# Patient Record
Sex: FEMALE | Race: OTHER | Employment: OTHER | URBAN - METROPOLITAN AREA
[De-identification: names, ages, dates, MRNs, and addresses within clinical notes are randomized per-mention and may not be internally consistent; named-entity substitution may affect disease eponyms.]

---

## 2017-06-08 PROBLEM — I35.0 NONRHEUMATIC AORTIC VALVE STENOSIS: Status: ACTIVE | Noted: 2017-06-08

## 2018-02-21 NOTE — LETTER
Consent to Procedure/Sedation    Date: 3/30/2020    Time: _______________    1. I authorize the performance upon Tuan Vuong the following:    Right Nephrostomy Tube Insertion    2.  I authorize Dr. Roma Dennis (and whomever is designated as the doctor’s as Scheduled for:  Colon  Provider Name: SYLVIA  Date:  3-23-18  Location:  [facility]  Sedation:  IV sedation   Time:  9:30am, arrival 8:30am  Prep: Colyte  Meds/Allergies Reconciled?:  yes  Diagnosis with codes:  History of polyps Z86.010  Was patient informed to jenaro Witness: ____________________     Date: ______________    Printed: 3/30/2020   12:03 PM    Patient Name: Trisha Uriosteguilauren        : 1935       Medical Record #: [id number]

## 2018-03-13 ENCOUNTER — APPOINTMENT (OUTPATIENT)
Dept: GENERAL RADIOLOGY | Age: 83
End: 2018-03-13
Attending: EMERGENCY MEDICINE
Payer: MEDICARE

## 2018-03-13 ENCOUNTER — APPOINTMENT (OUTPATIENT)
Dept: CT IMAGING | Age: 83
End: 2018-03-13
Attending: EMERGENCY MEDICINE
Payer: MEDICARE

## 2018-03-13 ENCOUNTER — HOSPITAL ENCOUNTER (OUTPATIENT)
Facility: HOSPITAL | Age: 83
Setting detail: OBSERVATION
Discharge: HOME | End: 2018-03-17
Attending: EMERGENCY MEDICINE
Payer: MEDICARE

## 2018-03-13 DIAGNOSIS — K57.92 ACUTE DIVERTICULITIS: ICD-10-CM

## 2018-03-13 DIAGNOSIS — R07.9 ACUTE CHEST PAIN: Primary | ICD-10-CM

## 2018-03-13 PROCEDURE — 99285 EMERGENCY DEPT VISIT HI MDM: CPT

## 2018-03-13 PROCEDURE — 80053 COMPREHEN METABOLIC PANEL: CPT | Performed by: EMERGENCY MEDICINE

## 2018-03-13 PROCEDURE — 93010 ELECTROCARDIOGRAM REPORT: CPT

## 2018-03-13 PROCEDURE — 74177 CT ABD & PELVIS W/CONTRAST: CPT | Performed by: EMERGENCY MEDICINE

## 2018-03-13 PROCEDURE — 85610 PROTHROMBIN TIME: CPT | Performed by: EMERGENCY MEDICINE

## 2018-03-13 PROCEDURE — 71275 CT ANGIOGRAPHY CHEST: CPT | Performed by: EMERGENCY MEDICINE

## 2018-03-13 PROCEDURE — 81003 URINALYSIS AUTO W/O SCOPE: CPT | Performed by: EMERGENCY MEDICINE

## 2018-03-13 PROCEDURE — 85025 COMPLETE CBC W/AUTO DIFF WBC: CPT | Performed by: EMERGENCY MEDICINE

## 2018-03-13 PROCEDURE — 84484 ASSAY OF TROPONIN QUANT: CPT | Performed by: EMERGENCY MEDICINE

## 2018-03-13 PROCEDURE — 93005 ELECTROCARDIOGRAM TRACING: CPT

## 2018-03-13 PROCEDURE — 71045 X-RAY EXAM CHEST 1 VIEW: CPT | Performed by: EMERGENCY MEDICINE

## 2018-03-13 PROCEDURE — 96365 THER/PROPH/DIAG IV INF INIT: CPT

## 2018-03-13 PROCEDURE — 96367 TX/PROPH/DG ADDL SEQ IV INF: CPT

## 2018-03-13 RX ORDER — METRONIDAZOLE 500 MG/100ML
500 INJECTION, SOLUTION INTRAVENOUS ONCE
Status: COMPLETED | OUTPATIENT
Start: 2018-03-13 | End: 2018-03-13

## 2018-03-13 RX ORDER — LEVOFLOXACIN 5 MG/ML
500 INJECTION, SOLUTION INTRAVENOUS ONCE
Status: COMPLETED | OUTPATIENT
Start: 2018-03-13 | End: 2018-03-13

## 2018-03-13 NOTE — ED PROVIDER NOTES
Patient Seen in: THE [facility] Emergency Department In [place]    History   Patient presents with:  Dyspnea LIBBY SOB (respiratory)  Abdomen/Flank Pain (GI/)  Fatigue (constitutional, neurologic)    Stated Complaint: LIBBY, fatigue, abdominal pain    HPI    82 Used                      Alcohol use: No                Review of Systems    Positive for stated complaint: LIBBY, fatigue, abdominal pain  Other systems are as noted in HPI. Constitutional and vital signs reviewed.       All other systems reviewed and nega individual orders. URINALYSIS WITH CULTURE REFLEX   RAINBOW DRAW BLUE   RAINBOW DRAW LAVENDER   RAINBOW DRAW LIGHT GREEN   RAINBOW DRAW GOLD   CBC W/ DIFFERENTIAL     EKG    Rate, intervals and axes as noted on EKG Report.   Rate: 57  Rhythm: Sinus bradyc ([facility]) NRDR ([address]) which includes the Dose Index Registry.   PATIENT STATED HISTORY:(As transcribed by Technologist)  Patient complain of fatigue, dyspnea, bloody stool, and right side abdominal pain for represent some mild diverticulitis. Underlying chronic mass cannot be excluded  No dilated small bowel loops. Belkis Pencil PELVIC ORGANS:  No free fluid. BONES:  No lytic or destructive process.   There are some scattered lucencies involving thoracic and lumbar ve specified.       Medications Prescribed:  Current Discharge Medication List        Present on Admission  Date Reviewed: 6/8/2017          ICD-10-CM Noted POA    Acute chest pain R07.9 3/13/2018 Unknown

## 2018-03-14 ENCOUNTER — APPOINTMENT (OUTPATIENT)
Dept: CV DIAGNOSTICS | Facility: HOSPITAL | Age: 83
End: 2018-03-14
Attending: HOSPITALIST
Payer: MEDICARE

## 2018-03-14 PROCEDURE — 93306 TTE W/DOPPLER COMPLETE: CPT | Performed by: HOSPITALIST

## 2018-03-14 PROCEDURE — 83036 HEMOGLOBIN GLYCOSYLATED A1C: CPT | Performed by: HOSPITALIST

## 2018-03-14 PROCEDURE — 82962 GLUCOSE BLOOD TEST: CPT

## 2018-03-14 PROCEDURE — 96376 TX/PRO/DX INJ SAME DRUG ADON: CPT

## 2018-03-14 PROCEDURE — 96366 THER/PROPH/DIAG IV INF ADDON: CPT

## 2018-03-14 PROCEDURE — 84484 ASSAY OF TROPONIN QUANT: CPT | Performed by: HOSPITALIST

## 2018-03-14 RX ORDER — OXYBUTYNIN CHLORIDE 10 MG/1
10 TABLET, EXTENDED RELEASE ORAL DAILY
Status: DISCONTINUED | OUTPATIENT
Start: 2018-03-14 | End: 2018-03-17

## 2018-03-14 RX ORDER — METRONIDAZOLE 500 MG/100ML
500 INJECTION, SOLUTION INTRAVENOUS EVERY 8 HOURS
Status: DISCONTINUED | OUTPATIENT
Start: 2018-03-14 | End: 2018-03-17

## 2018-03-14 RX ORDER — LEVOTHYROXINE SODIUM 0.15 MG/1
150 TABLET ORAL
Status: DISCONTINUED | OUTPATIENT
Start: 2018-03-14 | End: 2018-03-17

## 2018-03-14 RX ORDER — DEXTROSE MONOHYDRATE 25 G/50ML
50 INJECTION, SOLUTION INTRAVENOUS
Status: DISCONTINUED | OUTPATIENT
Start: 2018-03-14 | End: 2018-03-17

## 2018-03-14 RX ORDER — GABAPENTIN 300 MG/1
600 CAPSULE ORAL 3 TIMES DAILY
Status: DISCONTINUED | OUTPATIENT
Start: 2018-03-14 | End: 2018-03-17

## 2018-03-14 RX ORDER — LEVOFLOXACIN 5 MG/ML
750 INJECTION, SOLUTION INTRAVENOUS
Status: DISCONTINUED | OUTPATIENT
Start: 2018-03-15 | End: 2018-03-17

## 2018-03-14 RX ORDER — SODIUM CHLORIDE 9 MG/ML
INJECTION, SOLUTION INTRAVENOUS CONTINUOUS
Status: DISCONTINUED | OUTPATIENT
Start: 2018-03-14 | End: 2018-03-17

## 2018-03-14 RX ORDER — FUROSEMIDE 40 MG/1
40 TABLET ORAL
Status: DISCONTINUED | OUTPATIENT
Start: 2018-03-14 | End: 2018-03-17

## 2018-03-14 RX ORDER — METOPROLOL SUCCINATE 25 MG/1
25 TABLET, EXTENDED RELEASE ORAL
Status: DISCONTINUED | OUTPATIENT
Start: 2018-03-14 | End: 2018-03-17

## 2018-03-14 RX ORDER — POLYETHYLENE GLYCOL 3350 17 G/17G
17 POWDER, FOR SOLUTION ORAL DAILY
Status: DISCONTINUED | OUTPATIENT
Start: 2018-03-14 | End: 2018-03-17

## 2018-03-14 RX ORDER — ACETAMINOPHEN 325 MG/1
650 TABLET ORAL EVERY 8 HOURS PRN
Status: DISCONTINUED | OUTPATIENT
Start: 2018-03-14 | End: 2018-03-17

## 2018-03-14 RX ORDER — DOCUSATE SODIUM 100 MG/1
100 CAPSULE, LIQUID FILLED ORAL 2 TIMES DAILY
Status: DISCONTINUED | OUTPATIENT
Start: 2018-03-14 | End: 2018-03-17

## 2018-03-14 RX ORDER — AMLODIPINE BESYLATE 5 MG/1
10 TABLET ORAL
Status: DISCONTINUED | OUTPATIENT
Start: 2018-03-14 | End: 2018-03-17

## 2018-03-14 NOTE — H&P
.  CC: Patient presents with:  Dyspnea LIBBY SOB (respiratory)  Abdomen/Flank Pain (GI/)  Fatigue (constitutional, neurologic)       PCP: Lucas Hnaks MD    History of Present Illness: Patient is a 81year old female with PMH sig for HL, T2DM, HTN, PE on xa Rfl: 0   METOPROLOL SUCCINATE ER 25 MG Oral Tablet 24 Hr TAKE 1 TABLET(25 MG) BY MOUTH DAILY Disp: 90 tablet Rfl: 0   LEVOTHYROXINE SODIUM 150 MCG Oral Tab TAKE 1 TABLET(150 MCG) BY MOUTH BEFORE BREAKFAST Disp: 90 tablet Rfl: 1   Tolterodine Tartrate ER (D 1852   WBC  5.3   HGB  12.9   MCV  84.5   PLT  279.0   INR  1.03       Recent Labs   Lab  03/13/18   1852   NA  137   K  4.1   CL  102   CO2  28.0   BUN  16   CREATSERUM  0.81   GLU  121*   CA  9.0       Recent Labs   Lab  03/13/18   1852   ALT  15   AST troponin negative x 2  - CTA without evidence for PE  - check echocardiogram    # T2DM with associated peripheral neuropathy  - Ha1c 7.0% on 12/27/17  - hold orals during admission  - continue po neurontin TID  - accu checks, SSI, hypoglycemia protocol

## 2018-03-14 NOTE — PROGRESS NOTES
NURSING ADMISSION NOTE      Patient admitted via ambulance  Oriented to room. Safety precautions initiated. Bed in low position. Call light in reach. Pt is aox4. Kinyarwanda speaking but can understand simple commands. Very pleasant.    Started IV fluid

## 2018-03-14 NOTE — CONSULTS
[facility]  Report of Consultation    Tuan Vuong Patient Status:  Observation    1935 MRN [id number]   [facility] 3NE-A Attending Gely Morton MD   Hosp Day # 0 PCP Lakshmi Mack MD     Reason for Consultation:    Surgical Consu • Hypertension Daughter    • Diabetes Son    • Hypertension Son        Social History:     reports that she has never smoked. She has never used smokeless tobacco. She reports that she does not drink alcohol or use drugs.     Allergies:    No Known Aller facility-administered medications on file prior to encounter.    Current Outpatient Prescriptions on File Prior to Encounter:  METFORMIN HCL 1000 MG Oral Tab TAKE 1 TABLET(1000 MG) BY MOUTH TWICE DAILY Disp: 180 tablet Rfl: 0   XARELTO 20 MG Oral Tab TAKE 1 warm/dry  RESPIRATORY: clear to auscultation  CARDIOVASCULAR: RRR  ABDOMEN: soft, non distended, tender to palpation right abdomen, no guarding, no peritonitis, BS+  LYMPHATIC: no lymphadenopathy  EXTREMITIES: no cyanosis, clubbing or edema    .     Chon Lee (CPT=[id number]), 9/24/2015, 12:28.   INDICATIONS:  LIBBY, fatigue, abdominal pain  TECHNIQUE:  CT of the chest (with CTA imaging), abdomen, and pelvis were obtained post- injection of non-ionic intravenous contrast material. Multi-planar reformatted/3-D images we moderate calcific plaque at the origin of the celiac and SMA and right main renal artery. Mild calcific plaque involving the proximal LINDSAY  RETROPERITONEUM:  No enlarged retroperitoneal adenopathy.   BOWEL/MESENTERY:  Extensive colonic diverticulosis with a bladder)     Acquired hypothyroidism     PE (pulmonary thromboembolism) (HCC)     Nonrheumatic aortic valve stenosis     Acute chest pain     Acute diverticulitis      Impression:    80 y/o with acute mild diverticulitis  -CT scan as noted above  -lower gi

## 2018-03-14 NOTE — PAYOR COMM NOTE
--------------  ADMISSION REVIEW     Payor: MEDICARE PART B ONLY  Subscriber #:  [id number]  Authorization Number: N/A    Admit date: N/A  Admit time: N/A       Admitting Physician: Latoya Mace MD  Attending Physician:  Litzy Harvey MD  Primary Care Ph CBC W/ DIFFERENTIAL[[phone]]                              Final result                 Please view results for these tests on the individual orders.    URINALYSIS WITH CULTURE REFLEX   RAINBOW DRAW BLUE   RAINBOW DRAW LAVENDER   RAINBOW DRAW LIGHT mg Oral Judy Stratton, RN      iohexol (OMNIPAQUE) 350 MG/ML injection 100 mL     Date Action Dose Route User    3/13/2018 1957 Given 100 mL Intravenous [address]      levofloxacin in D5W (LEVAQUIN) IVPB premix 500 mg     Date Action Dose Route User

## 2018-03-14 NOTE — PROGRESS NOTES
Pharmacy Note: Renal dose adjustment of Levaquin    Tuan Vuong is a 81year old female who has been prescribed Levaquin. CrCl is 43.1 ml/min so the dose has been adjusted  to 750mg IV q48h per hospital renal dose adjustment protocol.   Pharmacy will fo

## 2018-03-15 ENCOUNTER — APPOINTMENT (OUTPATIENT)
Dept: GENERAL RADIOLOGY | Facility: HOSPITAL | Age: 83
End: 2018-03-15
Attending: PHYSICIAN ASSISTANT
Payer: MEDICARE

## 2018-03-15 PROCEDURE — 73110 X-RAY EXAM OF WRIST: CPT | Performed by: PHYSICIAN ASSISTANT

## 2018-03-15 PROCEDURE — 85027 COMPLETE CBC AUTOMATED: CPT | Performed by: PHYSICIAN ASSISTANT

## 2018-03-15 PROCEDURE — 82962 GLUCOSE BLOOD TEST: CPT

## 2018-03-15 PROCEDURE — 80048 BASIC METABOLIC PNL TOTAL CA: CPT | Performed by: PHYSICIAN ASSISTANT

## 2018-03-15 PROCEDURE — 96366 THER/PROPH/DIAG IV INF ADDON: CPT

## 2018-03-15 PROCEDURE — 96376 TX/PRO/DX INJ SAME DRUG ADON: CPT

## 2018-03-15 NOTE — PROGRESS NOTES
[facility]  Progress Note    Tuan Vuong Patient Status:  Observation    1935 MRN [id number]   [facility] 3NE-A Attending Trisha Yuen MD   Hosp Day # 0 PCP Rashawn Cantu MD     Cc: follow up    Subjective:      Ms. Mookie Sweeney is leonila Recent Labs   Lab  03/13/18   1852   ALT  15   AST  12*   ALB  3.6       Recent Labs   Lab  03/14/18   0813  03/14/18   1208  03/14/18   1615  03/14/18   2102  03/15/18   0727   PGLU  139*  111*  124*  138*  124*         Imaging:  Echo reviewed    Me Millicent Smart PA-C  3/15/2018  Pager: [phone]    . Addendum:    Patient seen and examined independently.     Agree with above  Discussed with pt and daughter Aidan Gilbert over phone at length this morning with RN Evelyne Cockayne  Pt had been feeling better but then had increased disco

## 2018-03-15 NOTE — PROGRESS NOTES
[facility]  Progress Note    Farazdebora TORO Yevgeniy Patient Status:  Observation    1935 MRN [id number]   [facility] 3NE-A Attending Marycarmen Lakhani MD   Hosp Day # 0 PCP Phylicia Clinton MD     Subjective:    Patient reported right sided abdomin discuss, no answer  -patient will need outpatient GI work up likely to include colonoscopy  No acute surgical management warranted  D/W Meredith Kearns [id number] Surgery  3/15/2018

## 2018-03-15 NOTE — PLAN OF CARE
CARDIOVASCULAR - ADULT    • Absence of cardiac arrhythmias or at baseline Progressing        Diabetes/Glucose Control    • Glucose maintained within prescribed range Progressing        GASTROINTESTINAL - ADULT    • Minimal or absence of nausea and vomiting

## 2018-03-16 PROCEDURE — 85025 COMPLETE CBC W/AUTO DIFF WBC: CPT | Performed by: PHYSICIAN ASSISTANT

## 2018-03-16 PROCEDURE — 96366 THER/PROPH/DIAG IV INF ADDON: CPT

## 2018-03-16 PROCEDURE — 96376 TX/PRO/DX INJ SAME DRUG ADON: CPT

## 2018-03-16 PROCEDURE — 82962 GLUCOSE BLOOD TEST: CPT

## 2018-03-16 NOTE — PLAN OF CARE
Pt alert and oriented. Croatian speaking. C/o abdominal pain with palpation. Tolerating full liquid diet. Pt impulsive, bed alarm on. IVF, abx. VSS. Afebrile. BG monitored. No BM. Will monitor closely.     CARDIOVASCULAR - ADULT    • Absence of cardi

## 2018-03-16 NOTE — PLAN OF CARE
Assumed care at 1900. A/ox4, forgetful, son on phone to assist with translation, video translation available. vss on room air. Asymptomatic sinus martell on tele, as low as 51. Denies pain, tolerating full liquid diet.   CARDIOVASCULAR - ADULT    • Absence

## 2018-03-16 NOTE — PROGRESS NOTES
[facility]  Progress Note    Tuan Vuong Patient Status:  Observation    1935 MRN [id number]   [facility] 3NE-A Attending Moriah Danielson MD   Hosp Day # 0 PCP Kimi Maki MD     Cc: follow up    Subjective:      Ms. Mi Olivier is leonila Daily Beta Blocker   • AmLODIPine Besylate  10 mg Oral Daily   • furosemide  40 mg Oral BID (Diuretic)   • docusate sodium  100 mg Oral BID   • PEG 3350  17 g Oral Daily   • Levothyroxine Sodium  150 mcg Oral Before breakfast   • Oxybutynin Chloride ER  10 wrist discomfort and b/l feet discomfort. Exam  Vitals stable  rrr  ctab  s ntnd  R wrist able to move passively/actively.  +ttp medially and mild erythema but no overt joint swelling  No ANGIE    Plan  Advance diet today, if does well can go home tomorrow

## 2018-03-17 VITALS
SYSTOLIC BLOOD PRESSURE: 126 MMHG | WEIGHT: 180 LBS | RESPIRATION RATE: 16 BRPM | DIASTOLIC BLOOD PRESSURE: 56 MMHG | OXYGEN SATURATION: 93 % | TEMPERATURE: 99 F | BODY MASS INDEX: 33.13 KG/M2 | HEIGHT: 62 IN | HEART RATE: 60 BPM

## 2018-03-17 PROCEDURE — 96366 THER/PROPH/DIAG IV INF ADDON: CPT

## 2018-03-17 PROCEDURE — 82962 GLUCOSE BLOOD TEST: CPT

## 2018-03-17 RX ORDER — LEVOFLOXACIN 750 MG/1
750 TABLET ORAL EVERY OTHER DAY
Qty: 5 TABLET | Refills: 0 | Status: SHIPPED | OUTPATIENT
Start: 2018-03-17 | End: 2018-03-26

## 2018-03-17 RX ORDER — METRONIDAZOLE 500 MG/1
500 TABLET ORAL 3 TIMES DAILY
Qty: 27 TABLET | Refills: 0 | Status: SHIPPED | OUTPATIENT
Start: 2018-03-17 | End: 2018-03-26

## 2018-03-17 NOTE — PROGRESS NOTES
[facility]  Progress Note    Tuan CORTEZ Yevgeniy Patient Status:  Observation    1935 MRN [id number]   [facility] 3NE-A Attending Nima Abdul MD   Hosp Day # 0 PCP Lucas Hanks MD     Cc: follow up    Subjective:      Pt son on phone t • furosemide  40 mg Oral BID (Diuretic)   • docusate sodium  100 mg Oral BID   • PEG 3350  17 g Oral Daily   • Levothyroxine Sodium  150 mcg Oral Before breakfast   • Oxybutynin Chloride ER  10 mg Oral Daily   • metRONIDAZOLE in NaCl  500 mg Intravenous

## 2018-03-17 NOTE — PLAN OF CARE
A/Ox4  On RA  NSR on tele  Denies pain at this time  On Xarelto  Soft diet- tolerating well  IVF infusing 0.9 NS at 83ml/hr  IV Levaquin  IV Flagyl  Accucheck QID  Possible d/c home tomorrow   Will continue to monitor     CARDIOVASCULAR - ADULT    • Absenc

## 2018-03-17 NOTE — PROGRESS NOTES
NURSING DISCHARGE NOTE    Discharged Home via Wheelchair. Accompanied by Family member  Belongings Taken by patient/family. Patient and son given med rec and discharge instructions. Prescriptions for flagyl and levaquin given to son.  Follow up appoin

## 2018-03-18 NOTE — DISCHARGE SUMMARY
General Medicine Discharge Summary     Patient ID:  Qiana Vuong  81year old  8/8/1935    Admit date: 3/13/2018    Discharge date and time: 3/17/2018  5:30 PM     Attending Physician: Masha Whitmore, with cardiologist as outpatient     # T2DM with associated peripheral neuropathy and retinopathy  - Ha1c 7.0% on 12/27/17  - hold orals during admission  - continue po neurontin 600 mg TID for neuropathy - son states she may be missing doses on this on adm Normal, Disp-90 tablet, R-3    gabapentin 300 MG Oral Cap  Take 2 capsules (600 mg total) by mouth 3 (three) times daily. , Normal, Disp-540 capsule, R-3    Glucose Blood (BL TEST STRIP PACK) In Vitro Strip  Test daily, Normal, Disp-100 strip, R-3    Acetam

## 2018-04-17 ENCOUNTER — HOSPITAL ENCOUNTER (OUTPATIENT)
Facility: HOSPITAL | Age: 83
Setting detail: OBSERVATION
Discharge: HOME HEALTH | End: 2018-04-19
Attending: EMERGENCY MEDICINE | Admitting: HOSPITALIST
Payer: MEDICARE

## 2018-04-17 ENCOUNTER — APPOINTMENT (OUTPATIENT)
Dept: CT IMAGING | Age: 83
End: 2018-04-17
Attending: EMERGENCY MEDICINE
Payer: MEDICARE

## 2018-04-17 DIAGNOSIS — K56.41 FECAL IMPACTION OF COLON (HCC): ICD-10-CM

## 2018-04-17 DIAGNOSIS — R14.0 ABDOMINAL DISTENSION: ICD-10-CM

## 2018-04-17 DIAGNOSIS — R10.9 ABDOMINAL PAIN, ACUTE: Primary | ICD-10-CM

## 2018-04-17 PROBLEM — D64.9 ANEMIA: Status: ACTIVE | Noted: 2018-04-17

## 2018-04-17 PROBLEM — R73.9 HYPERGLYCEMIA: Status: ACTIVE | Noted: 2018-04-17

## 2018-04-17 PROBLEM — E87.1 HYPONATREMIA: Status: ACTIVE | Noted: 2018-04-17

## 2018-04-17 PROCEDURE — 96360 HYDRATION IV INFUSION INIT: CPT

## 2018-04-17 PROCEDURE — 99285 EMERGENCY DEPT VISIT HI MDM: CPT

## 2018-04-17 PROCEDURE — 82272 OCCULT BLD FECES 1-3 TESTS: CPT

## 2018-04-17 PROCEDURE — 96372 THER/PROPH/DIAG INJ SC/IM: CPT

## 2018-04-17 PROCEDURE — 80053 COMPREHEN METABOLIC PANEL: CPT | Performed by: EMERGENCY MEDICINE

## 2018-04-17 PROCEDURE — 74177 CT ABD & PELVIS W/CONTRAST: CPT | Performed by: EMERGENCY MEDICINE

## 2018-04-17 PROCEDURE — 85025 COMPLETE CBC W/AUTO DIFF WBC: CPT | Performed by: EMERGENCY MEDICINE

## 2018-04-17 RX ORDER — HYDROCODONE BITARTRATE AND ACETAMINOPHEN 5; 325 MG/1; MG/1
2 TABLET ORAL EVERY 4 HOURS PRN
Status: DISCONTINUED | OUTPATIENT
Start: 2018-04-17 | End: 2018-04-19

## 2018-04-17 RX ORDER — ONDANSETRON 2 MG/ML
4 INJECTION INTRAMUSCULAR; INTRAVENOUS EVERY 6 HOURS PRN
Status: DISCONTINUED | OUTPATIENT
Start: 2018-04-17 | End: 2018-04-19

## 2018-04-17 RX ORDER — SODIUM PHOSPHATE, DIBASIC AND SODIUM PHOSPHATE, MONOBASIC 7; 19 G/133ML; G/133ML
1 ENEMA RECTAL ONCE AS NEEDED
Status: DISCONTINUED | OUTPATIENT
Start: 2018-04-17 | End: 2018-04-19

## 2018-04-17 RX ORDER — ACETAMINOPHEN 325 MG/1
650 TABLET ORAL EVERY 4 HOURS PRN
Status: DISCONTINUED | OUTPATIENT
Start: 2018-04-17 | End: 2018-04-19

## 2018-04-17 RX ORDER — HYDROCODONE BITARTRATE AND ACETAMINOPHEN 5; 325 MG/1; MG/1
1 TABLET ORAL EVERY 4 HOURS PRN
Status: DISCONTINUED | OUTPATIENT
Start: 2018-04-17 | End: 2018-04-19

## 2018-04-17 RX ORDER — BISACODYL 10 MG
10 SUPPOSITORY, RECTAL RECTAL
Status: DISCONTINUED | OUTPATIENT
Start: 2018-04-17 | End: 2018-04-19

## 2018-04-17 RX ORDER — POLYETHYLENE GLYCOL 3350 17 G/17G
17 POWDER, FOR SOLUTION ORAL 2 TIMES DAILY
Status: DISCONTINUED | OUTPATIENT
Start: 2018-04-17 | End: 2018-04-19

## 2018-04-17 RX ORDER — ACETAMINOPHEN 325 MG/1
650 TABLET ORAL EVERY 6 HOURS PRN
Status: DISCONTINUED | OUTPATIENT
Start: 2018-04-17 | End: 2018-04-19

## 2018-04-17 RX ORDER — ENOXAPARIN SODIUM 100 MG/ML
40 INJECTION SUBCUTANEOUS NIGHTLY
Status: DISCONTINUED | OUTPATIENT
Start: 2018-04-17 | End: 2018-04-18

## 2018-04-17 NOTE — ED PROVIDER NOTES
Patient Seen in: [facility] Emergency Department In [place]    History   Patient presents with:  Abdomen/Flank Pain (GI/)  Constipation (gastrointestinal)    Stated Complaint: ABD PAIN/CONSTIPATION X 5 DAYS    HPI  Patient is an 76-year-old female, non- No date: OTHER SURGICAL HISTORY      Comment: GALLBLADDER  No date: OTHER SURGICAL HISTORY      Comment: STOMACH SURGERY  No date: OTHER SURGICAL HISTORY      Comment: RECONSTUCTION RT UPPER ARM  No date: REMOVAL GALLBLADDER        Smoking status: Never Sm Genitourinary Comments: Rectal exam revealed no stool in the vault. Hemoccult was negative but may be invalid due to questionable stool on glove. Neurological: She is alert. Skin: Skin is warm and dry. No rash noted. She is not diaphoretic.  No erythem PROCEDURE:  CT ABDOMEN+PELVIS (CONTRAST ONLY) (CPT=74177)  COMPARISON:  [facility], CTA CHEST + CT ABD (W) + CT PEL (W) (CPT=71275/20483), 3/13/2018, 19:49.   INDICATIONS:  ABD PAIN/CONSTIPATION X 5 DAYS  TECHNIQUE:  CT scanning was performed from the dom facet joint arthropathy. LUNG BASES:  Lingular atelectasis or scar. Small hiatal hernia. CONCLUSION:  1.  Significant amount of stool throughout the colon with areas of slight bowel wall thickening and pericolonic inflammatory changes most likely due

## 2018-04-18 ENCOUNTER — APPOINTMENT (OUTPATIENT)
Dept: ULTRASOUND IMAGING | Facility: HOSPITAL | Age: 83
End: 2018-04-18
Attending: HOSPITALIST
Payer: MEDICARE

## 2018-04-18 PROCEDURE — 97530 THERAPEUTIC ACTIVITIES: CPT

## 2018-04-18 PROCEDURE — 93005 ELECTROCARDIOGRAM TRACING: CPT

## 2018-04-18 PROCEDURE — 84443 ASSAY THYROID STIM HORMONE: CPT | Performed by: EMERGENCY MEDICINE

## 2018-04-18 PROCEDURE — 97162 PT EVAL MOD COMPLEX 30 MIN: CPT

## 2018-04-18 PROCEDURE — 93010 ELECTROCARDIOGRAM REPORT: CPT | Performed by: INTERNAL MEDICINE

## 2018-04-18 PROCEDURE — 83735 ASSAY OF MAGNESIUM: CPT | Performed by: EMERGENCY MEDICINE

## 2018-04-18 PROCEDURE — 97116 GAIT TRAINING THERAPY: CPT

## 2018-04-18 PROCEDURE — 97165 OT EVAL LOW COMPLEX 30 MIN: CPT

## 2018-04-18 PROCEDURE — 85025 COMPLETE CBC W/AUTO DIFF WBC: CPT | Performed by: EMERGENCY MEDICINE

## 2018-04-18 PROCEDURE — 93970 EXTREMITY STUDY: CPT | Performed by: HOSPITALIST

## 2018-04-18 PROCEDURE — 80048 BASIC METABOLIC PNL TOTAL CA: CPT | Performed by: EMERGENCY MEDICINE

## 2018-04-18 RX ORDER — GABAPENTIN 300 MG/1
600 CAPSULE ORAL 3 TIMES DAILY
Status: DISCONTINUED | OUTPATIENT
Start: 2018-04-18 | End: 2018-04-19

## 2018-04-18 RX ORDER — POLYETHYLENE GLYCOL 3350 17 G/17G
17 POWDER, FOR SOLUTION ORAL DAILY
Status: DISCONTINUED | OUTPATIENT
Start: 2018-04-18 | End: 2018-04-18

## 2018-04-18 RX ORDER — SERTRALINE HYDROCHLORIDE 100 MG/1
100 TABLET, FILM COATED ORAL DAILY
Status: DISCONTINUED | OUTPATIENT
Start: 2018-04-18 | End: 2018-04-19

## 2018-04-18 RX ORDER — FUROSEMIDE 40 MG/1
40 TABLET ORAL
Status: DISCONTINUED | OUTPATIENT
Start: 2018-04-18 | End: 2018-04-19

## 2018-04-18 RX ORDER — DOCUSATE SODIUM 100 MG/1
100 CAPSULE, LIQUID FILLED ORAL 2 TIMES DAILY
Status: DISCONTINUED | OUTPATIENT
Start: 2018-04-18 | End: 2018-04-19

## 2018-04-18 RX ORDER — AMLODIPINE BESYLATE 5 MG/1
10 TABLET ORAL
Status: DISCONTINUED | OUTPATIENT
Start: 2018-04-18 | End: 2018-04-19

## 2018-04-18 RX ORDER — LEVOTHYROXINE SODIUM 0.15 MG/1
150 TABLET ORAL
Status: DISCONTINUED | OUTPATIENT
Start: 2018-04-18 | End: 2018-04-19

## 2018-04-18 RX ORDER — METOPROLOL SUCCINATE 25 MG/1
25 TABLET, EXTENDED RELEASE ORAL
Status: DISCONTINUED | OUTPATIENT
Start: 2018-04-18 | End: 2018-04-19

## 2018-04-18 RX ORDER — OXYBUTYNIN CHLORIDE 10 MG/1
10 TABLET, EXTENDED RELEASE ORAL DAILY
Status: DISCONTINUED | OUTPATIENT
Start: 2018-04-18 | End: 2018-04-19

## 2018-04-18 NOTE — ED PROVIDER NOTES
I reviewed that chart and discussed the case. I have examined the patient and noted mildly dry mucous membranes, no respiratory distress, abdomen is distended, moderately tender diffusely, bilateral ankle edema, skin.       I agree with the following clini

## 2018-04-18 NOTE — PHYSICAL THERAPY NOTE
PHYSICAL THERAPY QUICK EVALUATION - INPATIENT    Room Number: 703/030-M  Evaluation Date: 4/18/2018  Presenting Problem: abdominal pain  Physician Order: PT Eval and Treat    Problem List  Principal Problem:    Abdominal pain, acute  Active Problems: promotion; Body mechanics;Breathing techniques;Relaxation;Repositioning   RANGE OF MOTION AND STRENGTH ASSESSMENT  See OT note for UE assessment      Lower extremity ROM is within functional limits     Lower extremity strength is within functional limits recommendations with /    ASSESSMENT   Patient is a 81year old female admitted on 4/17/2018 for abdominal pain.   Pertinent comorbidities and personal factors impacting therapy include Luxembourgish speaking- requiring use of

## 2018-04-18 NOTE — PLAN OF CARE
GASTROINTESTINAL - ADULT    • Maintains or returns to baseline bowel function Progressing        PAIN - ADULT    • Verbalizes/displays adequate comfort level or patient's stated pain goal Progressing        Patient/Family Goals    • Patient/Family Long Ter constipation    Anticipated discharge date: TBD    Current discharge plan: Home. Action: Keep pt comfortable and continue to monitor every 2 hours. Education: POC for the day. Response: Verbalized understanding.   line used to [employer]

## 2018-04-18 NOTE — H&P
[facility] Hospitalist H&P       CC: abdominal pain    PCP: Jacky Talbot MD    History of Present Illness: Pt is an 82 yo with HTN/HL, DMII, hypercoaguable state with hx PE on xarelto, hypothyroidism, who is admitted for abdominal pain.   Says prior to admis DAILY Disp: 90 tablet Rfl: 2   AMLODIPINE BESYLATE 10 MG Oral Tab TAKE 1 TABLET BY MOUTH DAILY Disp: 90 tablet Rfl: 2   FUROSEMIDE 40 MG Oral Tab TAKE 1 TABLET(40 MG) BY MOUTH TWICE DAILY Disp: 180 tablet Rfl: 2   METFORMIN HCL 1000 MG Oral Tab TAKE 1 TABL . Non distended, no overt hernias   Extremities: Extremities normal, atraumatic, no cyanosis    Skin: Skin color, texture, turgor normal. No visible rashes      Neurologic:  Psychiatric: No focal deficits  Tearful occasionally  memory intact     Diagnostic Outpatient records or previous hospital records reviewed. Further recommendations pending patient's clinical course.   [facility] hospitalist to continue to follow patient while in house    Patient and/or patient's family given opportunity to ask questions a

## 2018-04-18 NOTE — PLAN OF CARE
New admission at 66  Pt arrived from [place] ED. ROSE orientation level, pt only speaks Kinyarwanda and refuses  line and will only speak with son. Very upset about room assignment, and refuses to go in room.  Son states she prefers a room near nu

## 2018-04-18 NOTE — CM/SW NOTE
04/18/18 1500   CM/SW Referral Data   Referral Source    Reason for Referral Discharge planning   Informant Patient  (with video  services)      Method of Interpretation Translation line  (video )   Interpret

## 2018-04-18 NOTE — HOME CARE LIAISON
Received referral from [place], [facility][note 3] [facility]. Met with pt at the bedside and spoke with her daughter, Everett Marrero on the phone. Spoke with the pt through the  IPAD. Pt requested that I speak with her son, Rd Bhatt.  Her son and daughter are agreeable to [facility] services

## 2018-04-19 VITALS
OXYGEN SATURATION: 94 % | SYSTOLIC BLOOD PRESSURE: 104 MMHG | RESPIRATION RATE: 18 BRPM | WEIGHT: 162.31 LBS | HEART RATE: 66 BPM | DIASTOLIC BLOOD PRESSURE: 70 MMHG | BODY MASS INDEX: 30 KG/M2 | TEMPERATURE: 99 F

## 2018-04-19 RX ORDER — BISACODYL 10 MG
10 SUPPOSITORY, RECTAL RECTAL
Qty: 16 SUPPOSITORY | Refills: 0 | Status: SHIPPED | OUTPATIENT
Start: 2018-04-19 | End: 2019-04-13

## 2018-04-19 RX ORDER — SODIUM PHOSPHATE, DIBASIC AND SODIUM PHOSPHATE, MONOBASIC 7; 19 G/133ML; G/133ML
1 ENEMA RECTAL ONCE AS NEEDED
Qty: 1 BOTTLE | Refills: 0 | Status: SHIPPED | OUTPATIENT
Start: 2018-04-19 | End: 2018-04-19

## 2018-04-19 NOTE — PROGRESS NOTES
[facility] Hospitalist Progress Note     PCP: Lakshmi Mack MD    CC:  Follow up    SUBJECTIVE:  Used  ipad. RN at bedside. Pt feels much better given that she has been having BMs with bisacodyl and enemas.   Prefers this over oral.  Having BM Daily   • Oxybutynin Chloride ER  10 mg Oral Daily   • Rivaroxaban  20 mg Oral Daily with food   • PEG 3350  17 g Oral BID       acetaminophen, acetaminophen **OR** HYDROcodone-acetaminophen **OR** HYDROcodone-acetaminophen, magnesium hydroxide, bisacodyl,

## 2018-04-19 NOTE — PROGRESS NOTES
NURSING DISCHARGE NOTE    Discharged Home via Wheelchair. Accompanied by Family member  Belongings Taken by patient/family. Pt discharged. IV removed. All questions and concerns addressed. Pt verbalized understanding of discharge instructions.  Rebekah

## 2018-04-19 NOTE — OCCUPATIONAL THERAPY NOTE
OCCUPATIONAL THERAPY QUICK EVALUATION - INPATIENT    Room Number: 908/019-X  Evaluation Date: 4/18/2018     Type of Evaluation: Quick Eval  Presenting Problem: abd pain     Physician Order: IP Consult to Occupational Therapy  Reason for Therapy:  ADL/IADL assist with showers from her son on the weekends. Pt reports that she must use a bidet to initiate a bowel movement. SUBJECTIVE   \"I just can't believe how my family is, it is not the way I raised them. \"  \"I just want to go to the bathroom like ever Pt performed supine>sit EOB with supervision assistance. Pt performed sit>stand with RW and supervision assistance with min verbal/visual/tactile cues for safety with RW use and hand placement.  Pt performed functional mobility with supervision assistance a able to dress lower extremities: at previous functional level  Patient/Caregiver able to demonstrate safety with ADLS: at previous functional level

## 2018-04-20 NOTE — CM/SW NOTE
Patient discharged on 4/19/18 as previously planned.        04/20/18 0800   Discharge disposition   Expected discharge disposition Home-Health   Name of Facillity/Home Care/Hospice Residential   Home services after discharge Skilled home care;Senior service

## 2018-12-20 PROCEDURE — 82570 ASSAY OF URINE CREATININE: CPT | Performed by: FAMILY MEDICINE

## 2018-12-20 PROCEDURE — 82043 UR ALBUMIN QUANTITATIVE: CPT | Performed by: FAMILY MEDICINE

## 2019-03-08 NOTE — BH LEVEL OF CARE ASSESSMENT
Level of Care Assessment Note    General Questions  Why are you here?: The pt states she came into the hospital due to abdominal pain. Precipitating Events: The pt told the medical nurse she was depressed. History of Present Illness:  The pt states Pattern: Sleeps all night  Number of Sleep Hours:  (he pt states she dont keep track of it)  Use of Sleep Aids: denies  Appetite Symptoms: Decreased  Unplanned Weight Loss: Yes (comment) (pt is but not sure how much)  Unplanned Weight Gain: No  History of Level:  Oriented X4  Insight: Good  Judgment: Good  Thought Patterns  Clarity/Relevance: Coherent  Flow: Organized  Content: Ordinary  Level of Consciousness: Alert  Level of Consciousness: Alert  Behavior  Exhibited behavior: Appropriate to situation    As follows commands/alert and awake

## 2019-11-11 ENCOUNTER — APPOINTMENT (OUTPATIENT)
Dept: GENERAL RADIOLOGY | Age: 84
DRG: 330 | End: 2019-11-11
Attending: EMERGENCY MEDICINE
Payer: MEDICARE

## 2019-11-11 ENCOUNTER — HOSPITAL ENCOUNTER (INPATIENT)
Facility: HOSPITAL | Age: 84
LOS: 19 days | Discharge: NURSING, REHAB OR LONG-TERM CARE | DRG: 330 | End: 2019-11-30
Attending: EMERGENCY MEDICINE | Admitting: INTERNAL MEDICINE
Payer: MEDICARE

## 2019-11-11 ENCOUNTER — APPOINTMENT (OUTPATIENT)
Dept: CT IMAGING | Age: 84
DRG: 330 | End: 2019-11-11
Attending: EMERGENCY MEDICINE
Payer: MEDICARE

## 2019-11-11 DIAGNOSIS — K56.609 BOWEL OBSTRUCTION (HCC): ICD-10-CM

## 2019-11-11 DIAGNOSIS — R10.9 ABDOMINAL PAIN, ACUTE: Primary | ICD-10-CM

## 2019-11-11 DIAGNOSIS — K56.609 INTESTINAL OBSTRUCTION, UNSPECIFIED CAUSE, UNSPECIFIED WHETHER PARTIAL OR COMPLETE (HCC): ICD-10-CM

## 2019-11-11 DIAGNOSIS — E87.1 HYPONATREMIA: ICD-10-CM

## 2019-11-11 PROCEDURE — 74177 CT ABD & PELVIS W/CONTRAST: CPT | Performed by: EMERGENCY MEDICINE

## 2019-11-11 PROCEDURE — 71045 X-RAY EXAM CHEST 1 VIEW: CPT | Performed by: EMERGENCY MEDICINE

## 2019-11-11 PROCEDURE — 99291 CRITICAL CARE FIRST HOUR: CPT | Performed by: NURSE PRACTITIONER

## 2019-11-11 RX ORDER — BISACODYL 10 MG
10 SUPPOSITORY, RECTAL RECTAL
Status: DISCONTINUED | OUTPATIENT
Start: 2019-11-11 | End: 2019-11-12 | Stop reason: HOSPADM

## 2019-11-11 RX ORDER — MORPHINE SULFATE 4 MG/ML
2 INJECTION, SOLUTION INTRAMUSCULAR; INTRAVENOUS EVERY 2 HOUR PRN
Status: DISCONTINUED | OUTPATIENT
Start: 2019-11-11 | End: 2019-11-12

## 2019-11-11 RX ORDER — ACETAMINOPHEN 325 MG/1
650 TABLET ORAL EVERY 6 HOURS PRN
Status: DISCONTINUED | OUTPATIENT
Start: 2019-11-11 | End: 2019-11-12 | Stop reason: HOSPADM

## 2019-11-11 RX ORDER — SODIUM PHOSPHATE, DIBASIC AND SODIUM PHOSPHATE, MONOBASIC 7; 19 G/133ML; G/133ML
1 ENEMA RECTAL ONCE AS NEEDED
Status: DISCONTINUED | OUTPATIENT
Start: 2019-11-11 | End: 2019-11-12 | Stop reason: HOSPADM

## 2019-11-11 RX ORDER — METOCLOPRAMIDE HYDROCHLORIDE 5 MG/ML
5 INJECTION INTRAMUSCULAR; INTRAVENOUS EVERY 8 HOURS PRN
Status: DISCONTINUED | OUTPATIENT
Start: 2019-11-11 | End: 2019-11-12 | Stop reason: HOSPADM

## 2019-11-11 RX ORDER — ONDANSETRON 2 MG/ML
4 INJECTION INTRAMUSCULAR; INTRAVENOUS EVERY 6 HOURS PRN
Status: DISCONTINUED | OUTPATIENT
Start: 2019-11-11 | End: 2019-11-12 | Stop reason: HOSPADM

## 2019-11-11 RX ORDER — MORPHINE SULFATE 4 MG/ML
1 INJECTION, SOLUTION INTRAMUSCULAR; INTRAVENOUS EVERY 2 HOUR PRN
Status: DISCONTINUED | OUTPATIENT
Start: 2019-11-11 | End: 2019-11-12

## 2019-11-11 RX ORDER — MORPHINE SULFATE 4 MG/ML
4 INJECTION, SOLUTION INTRAMUSCULAR; INTRAVENOUS EVERY 2 HOUR PRN
Status: DISCONTINUED | OUTPATIENT
Start: 2019-11-11 | End: 2019-11-12

## 2019-11-11 RX ORDER — SODIUM CHLORIDE 9 MG/ML
INJECTION, SOLUTION INTRAVENOUS CONTINUOUS
Status: DISCONTINUED | OUTPATIENT
Start: 2019-11-11 | End: 2019-11-12 | Stop reason: HOSPADM

## 2019-11-11 RX ORDER — POLYETHYLENE GLYCOL 3350 17 G/17G
17 POWDER, FOR SOLUTION ORAL DAILY PRN
Status: DISCONTINUED | OUTPATIENT
Start: 2019-11-11 | End: 2019-11-12 | Stop reason: HOSPADM

## 2019-11-11 RX ORDER — SODIUM CHLORIDE 9 MG/ML
INJECTION, SOLUTION INTRAVENOUS CONTINUOUS
Status: CANCELLED | OUTPATIENT
Start: 2019-11-11 | End: 2019-11-11

## 2019-11-11 RX ORDER — SODIUM CHLORIDE 9 MG/ML
125 INJECTION, SOLUTION INTRAVENOUS CONTINUOUS
Status: DISCONTINUED | OUTPATIENT
Start: 2019-11-11 | End: 2019-11-12

## 2019-11-12 ENCOUNTER — ANESTHESIA (OUTPATIENT)
Dept: SURGERY | Facility: HOSPITAL | Age: 84
DRG: 330 | End: 2019-11-12
Payer: MEDICARE

## 2019-11-12 ENCOUNTER — APPOINTMENT (OUTPATIENT)
Dept: GENERAL RADIOLOGY | Facility: HOSPITAL | Age: 84
DRG: 330 | End: 2019-11-12
Attending: EMERGENCY MEDICINE
Payer: MEDICARE

## 2019-11-12 ENCOUNTER — ANESTHESIA EVENT (OUTPATIENT)
Dept: SURGERY | Facility: HOSPITAL | Age: 84
DRG: 330 | End: 2019-11-12
Payer: MEDICARE

## 2019-11-12 PROCEDURE — 0DNE0ZZ RELEASE LARGE INTESTINE, OPEN APPROACH: ICD-10-PCS | Performed by: SURGERY

## 2019-11-12 PROCEDURE — 0TS70ZZ REPOSITION LEFT URETER, OPEN APPROACH: ICD-10-PCS | Performed by: UROLOGY

## 2019-11-12 PROCEDURE — 71045 X-RAY EXAM CHEST 1 VIEW: CPT | Performed by: NURSE PRACTITIONER

## 2019-11-12 PROCEDURE — 0DTE0ZZ RESECTION OF LARGE INTESTINE, OPEN APPROACH: ICD-10-PCS | Performed by: SURGERY

## 2019-11-12 PROCEDURE — 0WPF0JZ REMOVAL OF SYNTHETIC SUBSTITUTE FROM ABDOMINAL WALL, OPEN APPROACH: ICD-10-PCS | Performed by: SURGERY

## 2019-11-12 PROCEDURE — 0WBH0ZZ EXCISION OF RETROPERITONEUM, OPEN APPROACH: ICD-10-PCS | Performed by: SURGERY

## 2019-11-12 PROCEDURE — 0D1B0Z4 BYPASS ILEUM TO CUTANEOUS, OPEN APPROACH: ICD-10-PCS | Performed by: SURGERY

## 2019-11-12 DEVICE — STENT URET 6F 24CM ULSMTH: Type: IMPLANTABLE DEVICE | Site: URETER | Status: FUNCTIONAL

## 2019-11-12 RX ORDER — MAGNESIUM SULFATE HEPTAHYDRATE 40 MG/ML
2 INJECTION, SOLUTION INTRAVENOUS ONCE
Status: COMPLETED | OUTPATIENT
Start: 2019-11-12 | End: 2019-11-12

## 2019-11-12 RX ORDER — BUPIVACAINE HYDROCHLORIDE AND EPINEPHRINE 2.5; 5 MG/ML; UG/ML
INJECTION, SOLUTION EPIDURAL; INFILTRATION; INTRACAUDAL; PERINEURAL AS NEEDED
Status: DISCONTINUED | OUTPATIENT
Start: 2019-11-12 | End: 2019-11-12 | Stop reason: SURG

## 2019-11-12 RX ORDER — LIDOCAINE HYDROCHLORIDE 10 MG/ML
INJECTION, SOLUTION EPIDURAL; INFILTRATION; INTRACAUDAL; PERINEURAL AS NEEDED
Status: DISCONTINUED | OUTPATIENT
Start: 2019-11-12 | End: 2019-11-12 | Stop reason: SURG

## 2019-11-12 RX ORDER — ONDANSETRON 2 MG/ML
4 INJECTION INTRAMUSCULAR; INTRAVENOUS EVERY 6 HOURS PRN
Status: DISCONTINUED | OUTPATIENT
Start: 2019-11-12 | End: 2019-11-17

## 2019-11-12 RX ORDER — SODIUM CHLORIDE 9 MG/ML
INJECTION, SOLUTION INTRAVENOUS CONTINUOUS
Status: DISCONTINUED | OUTPATIENT
Start: 2019-11-12 | End: 2019-11-12

## 2019-11-12 RX ORDER — ESMOLOL HYDROCHLORIDE 10 MG/ML
INJECTION INTRAVENOUS AS NEEDED
Status: DISCONTINUED | OUTPATIENT
Start: 2019-11-12 | End: 2019-11-12 | Stop reason: SURG

## 2019-11-12 RX ORDER — DEXTROSE MONOHYDRATE 25 G/50ML
50 INJECTION, SOLUTION INTRAVENOUS
Status: DISCONTINUED | OUTPATIENT
Start: 2019-11-12 | End: 2019-11-12 | Stop reason: HOSPADM

## 2019-11-12 RX ORDER — KETOROLAC TROMETHAMINE 30 MG/ML
30 INJECTION, SOLUTION INTRAMUSCULAR; INTRAVENOUS EVERY 6 HOURS PRN
Status: DISCONTINUED | OUTPATIENT
Start: 2019-11-12 | End: 2019-11-12

## 2019-11-12 RX ORDER — ONDANSETRON 2 MG/ML
4 INJECTION INTRAMUSCULAR; INTRAVENOUS EVERY 6 HOURS PRN
Status: DISCONTINUED | OUTPATIENT
Start: 2019-11-12 | End: 2019-11-12

## 2019-11-12 RX ORDER — HYDROMORPHONE HYDROCHLORIDE 1 MG/ML
INJECTION, SOLUTION INTRAMUSCULAR; INTRAVENOUS; SUBCUTANEOUS
Status: COMPLETED
Start: 2019-11-12 | End: 2019-11-12

## 2019-11-12 RX ORDER — NALBUPHINE HCL 10 MG/ML
2.5 AMPUL (ML) INJECTION EVERY 4 HOURS PRN
Status: DISCONTINUED | OUTPATIENT
Start: 2019-11-12 | End: 2019-11-17

## 2019-11-12 RX ORDER — POTASSIUM CHLORIDE 14.9 MG/ML
20 INJECTION INTRAVENOUS ONCE
Status: COMPLETED | OUTPATIENT
Start: 2019-11-12 | End: 2019-11-12

## 2019-11-12 RX ORDER — METOCLOPRAMIDE HYDROCHLORIDE 5 MG/ML
10 INJECTION INTRAMUSCULAR; INTRAVENOUS EVERY 6 HOURS PRN
Status: DISCONTINUED | OUTPATIENT
Start: 2019-11-12 | End: 2019-11-14

## 2019-11-12 RX ORDER — DIPHENHYDRAMINE HCL 25 MG
25 CAPSULE ORAL NIGHTLY PRN
Status: DISCONTINUED | OUTPATIENT
Start: 2019-11-12 | End: 2019-11-30

## 2019-11-12 RX ORDER — KETOROLAC TROMETHAMINE 15 MG/ML
15 INJECTION, SOLUTION INTRAMUSCULAR; INTRAVENOUS EVERY 6 HOURS PRN
Status: DISCONTINUED | OUTPATIENT
Start: 2019-11-12 | End: 2019-11-13

## 2019-11-12 RX ORDER — ROCURONIUM BROMIDE 10 MG/ML
INJECTION, SOLUTION INTRAVENOUS AS NEEDED
Status: DISCONTINUED | OUTPATIENT
Start: 2019-11-12 | End: 2019-11-12 | Stop reason: SURG

## 2019-11-12 RX ORDER — PHENYLEPHRINE HCL 10 MG/ML
VIAL (ML) INJECTION AS NEEDED
Status: DISCONTINUED | OUTPATIENT
Start: 2019-11-12 | End: 2019-11-12 | Stop reason: SURG

## 2019-11-12 RX ORDER — DIPHENHYDRAMINE HYDROCHLORIDE 50 MG/ML
12.5 INJECTION INTRAMUSCULAR; INTRAVENOUS EVERY 4 HOURS PRN
Status: DISCONTINUED | OUTPATIENT
Start: 2019-11-12 | End: 2019-11-17

## 2019-11-12 RX ORDER — NALOXONE HYDROCHLORIDE 0.4 MG/ML
0.08 INJECTION, SOLUTION INTRAMUSCULAR; INTRAVENOUS; SUBCUTANEOUS
Status: DISCONTINUED | OUTPATIENT
Start: 2019-11-12 | End: 2019-11-30

## 2019-11-12 RX ORDER — DEXAMETHASONE SODIUM PHOSPHATE 4 MG/ML
VIAL (ML) INJECTION AS NEEDED
Status: DISCONTINUED | OUTPATIENT
Start: 2019-11-12 | End: 2019-11-12 | Stop reason: SURG

## 2019-11-12 RX ORDER — LABETALOL HYDROCHLORIDE 5 MG/ML
INJECTION, SOLUTION INTRAVENOUS AS NEEDED
Status: DISCONTINUED | OUTPATIENT
Start: 2019-11-12 | End: 2019-11-12 | Stop reason: SURG

## 2019-11-12 RX ORDER — HYDROMORPHONE HYDROCHLORIDE 1 MG/ML
0.2 INJECTION, SOLUTION INTRAMUSCULAR; INTRAVENOUS; SUBCUTANEOUS EVERY 30 MIN PRN
Status: DISCONTINUED | OUTPATIENT
Start: 2019-11-12 | End: 2019-11-30

## 2019-11-12 RX ORDER — ACETAMINOPHEN 10 MG/ML
INJECTION, SOLUTION INTRAVENOUS AS NEEDED
Status: DISCONTINUED | OUTPATIENT
Start: 2019-11-12 | End: 2019-11-12 | Stop reason: SURG

## 2019-11-12 RX ORDER — HYDROMORPHONE HYDROCHLORIDE 1 MG/ML
0.4 INJECTION, SOLUTION INTRAMUSCULAR; INTRAVENOUS; SUBCUTANEOUS EVERY 30 MIN PRN
Status: DISCONTINUED | OUTPATIENT
Start: 2019-11-12 | End: 2019-11-30

## 2019-11-12 RX ORDER — SODIUM CHLORIDE 450 MG/100ML
INJECTION, SOLUTION INTRAVENOUS CONTINUOUS
Status: DISCONTINUED | OUTPATIENT
Start: 2019-11-12 | End: 2019-11-13

## 2019-11-12 RX ORDER — HYDROMORPHONE HYDROCHLORIDE 1 MG/ML
0.5 INJECTION, SOLUTION INTRAMUSCULAR; INTRAVENOUS; SUBCUTANEOUS EVERY 2 HOUR PRN
Status: DISCONTINUED | OUTPATIENT
Start: 2019-11-12 | End: 2019-11-12 | Stop reason: HOSPADM

## 2019-11-12 RX ADMIN — ROCURONIUM BROMIDE 10 MG: 10 INJECTION, SOLUTION INTRAVENOUS at 11:59:00

## 2019-11-12 RX ADMIN — ACETAMINOPHEN 1000 MG: 10 INJECTION, SOLUTION INTRAVENOUS at 13:26:00

## 2019-11-12 RX ADMIN — PHENYLEPHRINE HCL 100 MCG: 10 MG/ML VIAL (ML) INJECTION at 09:27:00

## 2019-11-12 RX ADMIN — ROCURONIUM BROMIDE 10 MG: 10 INJECTION, SOLUTION INTRAVENOUS at 13:03:00

## 2019-11-12 RX ADMIN — ROCURONIUM BROMIDE 10 MG: 10 INJECTION, SOLUTION INTRAVENOUS at 10:36:00

## 2019-11-12 RX ADMIN — ROCURONIUM BROMIDE 10 MG: 10 INJECTION, SOLUTION INTRAVENOUS at 09:32:00

## 2019-11-12 RX ADMIN — ESMOLOL HYDROCHLORIDE 30 MG: 10 INJECTION INTRAVENOUS at 09:56:00

## 2019-11-12 RX ADMIN — SODIUM CHLORIDE: 9 INJECTION, SOLUTION INTRAVENOUS at 09:33:00

## 2019-11-12 RX ADMIN — SODIUM CHLORIDE: 9 INJECTION, SOLUTION INTRAVENOUS at 12:47:00

## 2019-11-12 RX ADMIN — SODIUM CHLORIDE: 9 INJECTION, SOLUTION INTRAVENOUS at 11:48:00

## 2019-11-12 RX ADMIN — ONDANSETRON 4 MG: 2 INJECTION INTRAMUSCULAR; INTRAVENOUS at 13:40:00

## 2019-11-12 RX ADMIN — LIDOCAINE HYDROCHLORIDE 100 MG: 10 INJECTION, SOLUTION EPIDURAL; INFILTRATION; INTRACAUDAL; PERINEURAL at 09:25:00

## 2019-11-12 RX ADMIN — LABETALOL HYDROCHLORIDE 5 MG: 5 INJECTION, SOLUTION INTRAVENOUS at 14:01:00

## 2019-11-12 RX ADMIN — DEXAMETHASONE SODIUM PHOSPHATE 8 MG: 4 MG/ML VIAL (ML) INJECTION at 10:22:00

## 2019-11-12 RX ADMIN — BUPIVACAINE HYDROCHLORIDE AND EPINEPHRINE 30 ML: 2.5; 5 INJECTION, SOLUTION EPIDURAL; INFILTRATION; INTRACAUDAL; PERINEURAL at 13:45:00

## 2019-11-12 RX ADMIN — ESMOLOL HYDROCHLORIDE 30 MG: 10 INJECTION INTRAVENOUS at 10:27:00

## 2019-11-12 RX ADMIN — BUPIVACAINE HYDROCHLORIDE AND EPINEPHRINE 30 ML: 2.5; 5 INJECTION, SOLUTION EPIDURAL; INFILTRATION; INTRACAUDAL; PERINEURAL at 13:50:00

## 2019-11-12 RX ADMIN — PHENYLEPHRINE HCL 100 MCG: 10 MG/ML VIAL (ML) INJECTION at 13:09:00

## 2019-11-12 NOTE — ANESTHESIA POSTPROCEDURE EVALUATION
[facility] Patient Status:  Inpatient   Age/Gender 81year old female MRN [id number]   Location [facility] Attending Steven Alvarez MD   Hosp Day # 1 PCP Cristel Contreras MD       Anesthesia Post-op Note    Procedure(s)

## 2019-11-12 NOTE — H&P
[facility] hospitalist H+P  PCP Corrina Vargas MD  CC abd pain  HPI 80 yo female with multiple medical problems including but not limited to DM2, HTN, HL, presented with abdominal pain, distension over 4 days, did not move bowels, decreased urine output. No emesis. Spouse name: Not on file      Number of children: Not on file      Years of education: Not on file      Highest education level: Not on file    Occupational History      Not on file    Social Needs      Financial resource strain: Not on file      Food inse TAKE 1 TABLET BY MOUTH DAILY, Disp: 90 tablet, Rfl: 0  TOLTERODINE TARTRATE ER 4 MG Oral Capsule SR 24 Hr, TAKE 1 CAPSULE(4 MG) BY MOUTH DAILY, Disp: 90 capsule, Rfl: 0  GABAPENTIN 300 MG Oral Cap, TAKE 2 CAPSULES(600 MG) BY MOUTH THREE TIMES DAILY, Disp: ascending colon and cecum. The cecum is distended up to 13 cm. There is some adjacent soft tissue stranding and free fluid. There is free fluid   abutting the distal tip of the appendix which is within normal limits in caliber.   No evidence of portal ve

## 2019-11-12 NOTE — PLAN OF CARE
Pt received this AM drowsy, oriented to self, place, situation. Indonesian speaking- & son used for communication. Lungs are clear/diminished bilaterally on room air. NSR. BP stable. NG tube to LIS. Abd-distended & firm. Kept NPO for surgery.  Dr. Dali Diallo - See additional Care Plan goals for specific interventions   Outcome: Progressing     Problem: PAIN - ADULT  Goal: Verbalizes/displays adequate comfort level or patient's stated pain goal  Description  INTERVENTIONS:  - Encourage pt to monitor pain and discharge planning  - Arrange for needed discharge resources and transportation as appropriate  - Identify discharge learning needs (meds, wound care, etc)  - Arrange for interpreters to assist at discharge as needed  - Consider post-discharge preferences PO as ordered and tolerated  - Evaluate effectiveness of GI medications  - Encourage mobilization and activity  - Obtain nutritional consult as needed  - Establish a toileting routine/schedule  - Consider collaborating with pharmacy to review patient's med

## 2019-11-12 NOTE — SEPSIS REASSESSMENT
[facility] IN [place]    Sepsis Reassessment Note    /80   Pulse 72   Temp 98.2 °F (36.8 °C) (Temporal)   Resp 16   Ht 162.6 cm (5' 4\")   Wt 81.6 kg   SpO2 96%   BMI 30.90 kg/m²      9:14 PM    Cardiac:  Regularity: Regular  Ra

## 2019-11-12 NOTE — PROGRESS NOTES
[facility] Pharmacy Note:  Renal Dose Adjustment for Metoclopramide (REGLAN)    Tuan Vuong has been prescribed Metoclopramide (REGLAN) 10 mg every 8 hours as needed for nausea/vomiting.     Estimated Creatinine Clearance: 31.7 mL/min (A) (based on SCr of 1.16

## 2019-11-12 NOTE — CONSULTS
[address] Patient Status:  Inpatient    1935 MRN [id number]   [facility] 4SW-A Attending Kala Gordon MD   [address] Day # 1 PCP Yesika Cardoso MD     Date of Admission: 2019  Admission Diagnosis: Hyponatr • RIGHT PHACOEMULSIFICATION OF CATARACT WITH INTRAOCULAR LENS IMPLANT 39215 Right 4/26/2017    Performed by Magnolia Bermudez MD at [address] [place] [address]         No Known Allergies     Social History:   reports that she has never smoked.  She has never u MG/ML injection 0.5 mg, 0.5 mg, Intravenous, Q2H PRN  •  Piperacillin Sod-Tazobactam So (ZOSYN) 3.375 g in dextrose 5 % 100 mL ADD-vantage, 3.375 g, Intravenous, Q8H  •  glucose (DEX4) oral liquid 15 g, 15 g, Oral, Q15 Min PRN **OR** Glucose-Vitamin C (DEX auscultation bilaterally, no wheezes or crackles                           Chest wall: No tenderness or deformity.                           CKMVE: ATPKRNH rate and rhythm, normal S1S2                          Abdomen: tender, distended, abdominal binder in MD  11/12/2019  8:36 AM

## 2019-11-12 NOTE — ED NOTES
Report given to [facility] ambulance paramedics.  Pt will transferred by ambulance at this time

## 2019-11-12 NOTE — PLAN OF CARE
Received patient from [facility]. Patient speaks Spanish.  used. Son updated on plan of care. Will be here in the morning to discuss plan of care with Dr. Amado Montejo. Bladder pressure 20. Dr. Amado Montejo aware. Replaced potassium and mag. Na 124.  Mina Palumbo

## 2019-11-12 NOTE — ANESTHESIA PROCEDURE NOTES
[facility]  Performed by: Lucinda Boykin DO  Authorized by: Lucinda Boykin DO       General Information and Staff    Start Time:  11/12/2019 1:40 PM  End Time:  11/12/2019 1:52 PM  Anesthesiologist:  Lucinda Boykin DO      Site Identificati

## 2019-11-12 NOTE — PALLIATIVE CARE NOTE
Palliative Care Consult request from [facility] 435 noted requesting discussion for goals of care at [phone]. Prior to consult the patient was taken to the OR. Will follow up with the patient and family after surgery.        Thank you for the referral.

## 2019-11-12 NOTE — OPERATIVE REPORT
[facility]    PATIENT'S NAME: Jamil TORO   ATTENDING PHYSICIAN: Eliz Gao M.D. OPERATING PHYSICIAN: Amy Sunshine M.D.    PATIENT ACCOUNT#:   [de-identified]    LOCATION:  [address] [place] [address]  MEDICAL RECORD #:   [id number]       DATE OF BIRTH:  [birth year] with electrocautery. The omentum was taken down also with the LigaSure device. The extensive adhesions were lysed, exposing the distended colon.   A subtotal colectomy was performed by mobilizing the cecum, ascending colon, splenic flexure, transverse col quadrant for placement of the ileostomy. Dissection proceeded through the subcutaneous tissues and muscle fascia. The terminal ileum was then exteriorized for later maturation into an ileostomy.   A drain was placed in the pelvis after irrigation of the a

## 2019-11-12 NOTE — OPERATIVE REPORT
[facility]  Urology Procedure Note  Tuan Vuong Patient Status:  Inpatient    1935 MRN [id number]   [facility] SURGERY Attending Steve Joshi MD   Hosp Day # 1 PCP Kendra Padilla MD     Procedure: Left ureteral reimplantat made to reimplant the left ureter. Surgeon:  Dr. Jihan Olivas    Anesthesia:  General    Findings:  Transected left ureter. The right ureter was intact. Specimens: None. Blood Loss:  5 mL    Complications:  None    Drains:  Cosme drain in pelvis.

## 2019-11-12 NOTE — PROGRESS NOTES
ICU  Critical Care APRN Progress Note    NAME: Tuan Vuong - ROOM: [address] [place] [address] - MRN: [id number] - Age: 81year old - :1935    History Of Present Illness:  Christine Johnston is a 81year old female with PMHx significant for HTN, DM2, depression, HLD, P No    Drug use: No    Family Hx:  Family History   Problem Relation Age of Onset   • Diabetes Daughter    • Hypertension Daughter    • Diabetes Son    • Hypertension Son      Review of Systems:   A comprehensive 10 point review of systems was completed.   P intrahepatic and common bile duct dilatation. Small amount of free fluid in the pelvis and adjacent to the inferior tip of the right lobe of the liver. Critical exam results phoned to Dr. Kane Singh in the ER on 11/11/2019 .    Dictated by: Prabha Mckinley MD flatus, though bowel sounds throughout.   -NGT to LIS for decompression   -Dulcolax suppository--stool noted in lower colon on CT  -GI and Surgery on consult  -May need surgical intervention or possible lower bowel decompression if no improvement with above

## 2019-11-12 NOTE — BRIEF OP NOTE
Pre-Operative Diagnosis: Bowel obstruction ([facility] [K56.609]     Post-Operative Diagnosis: Colon Mass       Procedure Performed:   Procedure(s):  SUBTOTAL COLECTOMY WITH ILEOSTOMY Lysis of adhesions and removal of Mesh   Left Ureter Reimplant  with stent ins

## 2019-11-12 NOTE — ANESTHESIA PROCEDURE NOTES
Airway  Date/Time: 11/12/2019 9:26 AM  Urgency: elective    Airway not difficult    General Information and Staff    Patient location during procedure: OR  Anesthesiologist: Lacie Devine DO  Performed: anesthesiologist     Indications and Patient Con

## 2019-11-12 NOTE — ANESTHESIA PREPROCEDURE EVALUATION
PRE-OP EVALUATION    Patient Name: Asher Rodriguez    Pre-op Diagnosis: Bowel obstruction ([address] [Q21.824]    Procedure(s):  SUBTOTAL COLECTOMY WITH ILEOSTOMY    Surgeon(s) and Role:     Marta Carl MD - Primary    Pre-op vitals reviewed.   Temp: 98 °F (36 mL, 1,000 mL, Intravenous, Once  [COMPLETED] sodium chloride 0.9% IV bolus 2,448 mL, 30 mL/kg, Intravenous, Once  [COMPLETED] Piperacillin Sod-Tazobactam So (ZOSYN) 3.375 g in dextrose 5 % 100 mL ADD-vantage, 3.375 g, Intravenous, Once        Outpatient Me size was normal. Systolic function was normal.     The estimated ejection fraction was 60%. There was no diagnostic evidence     for regional wall motion abnormalities.  Doppler parameters are consistent     with abnormal left ventricular relaxation - grade Value Date     (L) 11/12/2019    K 4.4 11/12/2019    CL 94 (L) 11/12/2019    CO2 23.0 11/12/2019    BUN 23 (H) 11/12/2019    CREATSERUM 0.92 11/12/2019     (H) 11/12/2019    CA 8.2 (L) 11/12/2019     Lab Results   Component Value Date    INR 1

## 2019-11-12 NOTE — CONSULTS
[address] Shiraz Vuong Patient Status:  Inpatient   Date of Birth 8/8/1935 MRN [id number]   [facility] 4SW-A Attending Alexandria Caban MD   Hosp Day # 1 PCP Jessee Donato MD     Date of Consultatio 9/16/19, End Date , Taking? Yes, Authorizing Provider Francisco Alvarado MD    Medication AMLODIPINE BESYLATE 10 MG Oral Tab, Sig TAKE 1 TABLET BY MOUTH DAILY, Start Date 9/16/19, End Date , Taking?  Yes, Authorizing Provider Francisco Alvarado MD    Medication Aly Harrington Oral Tab CR, Sig Take 1 tablet (650 mg total) by mouth every 8 (eight) hours as needed for Pain., Start Date 4/27/16, End Date , Taking? , Authorizing Provider Domitila Alvarado MD    Medication PEG 3350 (MIRALAX) Oral Powd Pack, Sig Take 17 g by mouth daily. , positives and negatives noted in the the HPI.       Physical Exam:    Vital Signs:    11/12/19  0700   BP: 143/78   Pulse: 80   Resp: 10   Temp:      General: Alert, oriented and in no acute distress  HEENT: normocephalic; non-icteric; oral cavity free of l urinary retention x 4 days. CONTRAST USED:  96cc of Omnipaque 350     FINDINGS:    LUNG BASE:  Atelectasis and scarring in the lung bases. LIVER:  Homogeneous intrahepatic biliary ductal prominence.   Common bile duct is dilated measuring up to 1.2 cm secondary to underlying stricture. Mass cannot entirely be excluded. Stable intrahepatic and common bile duct dilatation. Small amount of free fluid in the pelvis and adjacent to the inferior tip of the right lobe of the liver.      Critical exam r

## 2019-11-12 NOTE — ED PROVIDER NOTES
Patient Seen in: THE [facility] Emergency Department In [place]      History   Patient presents with:  Abdomen/Flank Pain (GI/)    Stated Complaint: abd pain, constipation, urinary retention    HPI    42-year-old female presents emergency room with chief co No             Review of Systems    Positive for stated complaint: abd pain, constipation, urinary retention  Other systems are as noted in HPI. Constitutional and vital signs reviewed. All other systems reviewed and negative except as noted above. following components:    HCT 34.7 (*)     All other components within normal limits   LIPASE - Normal   CBC WITH DIFFERENTIAL WITH PLATELET    Narrative: The following orders were created for panel order CBC WITH DIFFERENTIAL WITH PLATELET.   Procedure hospitalist physician. Patient does have elevated lactic acid, patient was given IV fluid bolus of 30 cc/kg and patient was given IV Zosyn and consultation with pulmonary/critical care Dr. Graciela Renee.   Patient to remain n.p.o., discussed with the  and

## 2019-11-12 NOTE — PROGRESS NOTES
[facility] Note:  Age Based Dose Adjustment    Tuan Vuong has been prescribed ketorolac (TORADOL) 30 mg IV every 6 hours prn. Patient is >71 years old therefore the dose has been adjusted to 15 mg IV every 6 hours prn.       Thank you,  [employer],

## 2019-11-12 NOTE — CONSULTS
[facility] - Nephrology  Report of Consultation    Tuan Vuong Patient Status:  Inpatient    1935 MRN [id number]   [facility] 4SW-A Attending Cam Guthrie MD   Hosp Day # 1 PCP Meilssa Be MD     Reason for consult: hypon [id number] Right 4/26/2017    Performed by Ree Pina MD at [facility]     Family History   Problem Relation Age of Onset   • Diabetes Daughter    • Hypertension Daughter    • Diabetes Son    • Hypertension Son       reports that she has neve Cap DR Particles, TAKE 1 CAPSULE(60 MG) BY MOUTH DAILY, Disp: 30 capsule, Rfl: 0, Taking  Sertraline HCl 100 MG Oral Tab, Take 1 tablet (100 mg total) by mouth daily. , Disp: 90 tablet, Rfl: 1, Taking  Rivaroxaban (XARELTO) 20 MG Oral Tab, TAKE 1 TABLET(20 (1.626 m)   Wt 171 lb 4.8 oz (77.7 kg)   SpO2 100%   BMI 29.40 kg/m²   Temp (24hrs), Av.6 °F (36.4 °C), Min:96.5 °F (35.8 °C), Max:98.2 °F (36.8 °C)       Intake/Output Summary (Last 24 hours) at 2019 1616  Last data filed at 2019 1500  Tracy 11/12/2019     Lab Results   Component Value Date    COLORUR Yellow 11/11/2019    CLARITY Clear 11/11/2019    SPECGRAVITY 1.025 11/11/2019    GLUUR Negative 11/11/2019    BILUR Small (A) 11/11/2019    KETUR Negative 11/11/2019    BLOODURINE Negative 11/11/

## 2019-11-13 PROBLEM — Z71.89 COUNSELING REGARDING ADVANCE CARE PLANNING AND GOALS OF CARE: Status: ACTIVE | Noted: 2019-11-13

## 2019-11-13 PROCEDURE — 99223 1ST HOSP IP/OBS HIGH 75: CPT | Performed by: INTERNAL MEDICINE

## 2019-11-13 RX ORDER — ACETAMINOPHEN 10 MG/ML
1000 INJECTION, SOLUTION INTRAVENOUS EVERY 6 HOURS
Status: DISCONTINUED | OUTPATIENT
Start: 2019-11-13 | End: 2019-11-17

## 2019-11-13 RX ORDER — SODIUM CHLORIDE 9 MG/ML
INJECTION, SOLUTION INTRAVENOUS CONTINUOUS
Status: DISCONTINUED | OUTPATIENT
Start: 2019-11-13 | End: 2019-11-22

## 2019-11-13 NOTE — PROGRESS NOTES
[facility]  Progress Note    Tuan Vuong Patient Status:  Inpatient    1935 MRN [id number]   [facility] 4SW-A Attending Shaylee Berrios MD   Hosp Day # 2 PCP Dhara Ramon MD     Subjective:   Son at bedside translating   Patient c/ diverticulitis     Hyponatremia     Anemia     Hyperglycemia     Abdominal pain, acute     Abdominal distension     Fecal impaction of colon (HCC)     Intestinal obstruction, unspecified cause, unspecified whether partial or complete (HCC)     Lactic acido

## 2019-11-13 NOTE — PROGRESS NOTES
[address]  Nephrology Progress Note    Tuan Vuong Attending:  Nuha Tapia MD       SUBJECTIVE:     Low UOP  Really wants the NG out   Cr up   Na stable     PHYSICAL EXAM:     Vital Signs: /60   Pulse 84   Temp 97.8 °F (36.6 °C)   Resp 18 EOPERCENT 0.0 11/12/2019    BAPERCENT 0.1 11/12/2019    NE 6.27 11/12/2019    LYMABS 0.31 (L) 11/12/2019    MOABSO 0.37 11/12/2019    EOABSO 0.00 11/12/2019    BAABSO 0.01 11/12/2019     Lab Results   Component Value Date    MALBP 0.75 12/20/2018    CRE Or  Glucose-Vitamin C (DEX-4) chewable tab 4 tablet, 4 tablet, Oral, Q15 Min PRN    Or  glucose (DEX4) oral liquid 30 g, 30 g, Oral, Q15 Min PRN    Or  Glucose-Vitamin C (DEX-4) chewable tab 8 tablet, 8 tablet, Oral, Q15 Min PRN  phenol (CHLORASEPTIC) 1.4

## 2019-11-13 NOTE — CONSULTS
[address] Initial Consult    Tuan Vuong  [id number]  Hospital Day #2  Date of Consult: 11/13/19  Patient seen at: [address]    Reason for Consultation:      Consult requested by Dr Ham Gallegos for evaluation of IMPLANT [id number] Left 2017    Performed by Judy Cummings MD at [address] [place] [address]   •      • OTHER      SHOULDER, KNEES   • OTHER SURGICAL HISTORY      KNEE SURGERY   • OTHER SURGICAL HISTORY      GALLBLADDER   • OTHER SURGICAL HISTORY      S injection 2.5 mg, 2.5 mg, Intravenous, Q4H PRN  •  diphenhydrAMINE HCl (BENADRYL) injection 12.5 mg, 12.5 mg, Intravenous, Q4H PRN  •  ondansetron HCl (ZOFRAN) injection 4 mg, 4 mg, Intravenous, Q6H PRN  •  Piperacillin Sod-Tazobactam So (ZOSYN) 3.375 g in Distended ascending transverse and descending colon with concern for pneumatosis involving the ascending colon and cecum. The cecum is distended up to 13 cm. There is some adjacent soft tissue stranding and free fluid.   There is free fluid  abutting the General: Alert awake and in no apparent respiratory distress. Body habitus: Overweight   HEENT: No focal deficits.    Extremities: BLE Edema present which appears mild however she is wearing compression stockings  Neurologic: Alert and awake, normal af time, we are awaiting pathology results of mass. This is still pending right now. Nick Cates and Kiel Lozada still wanted to meet for a GOCD. Nick Cates feels in his heart of hearts that the mass will be benign.  Thus, he wanted to center our discussion on options a Counseling regarding advance care planning and goals of care          Palliative Care Recommendations/Plan:     1. Goals of care discussions.  Continue with current medical treatments; they are open to continued GOCD's.    2. Advance Care Planning.    - COD

## 2019-11-13 NOTE — PLAN OF CARE
Received patient after report. Patient resting in bed on nasal canula. NG to LIS. Son at bedside translating as patient is Georgian speaking only. Patient frequently denies pain, PCA re-explained to her throughout the night.  Patient moaning but still shakes

## 2019-11-13 NOTE — PHYSICAL THERAPY NOTE
PHYSICAL THERAPY EVALUATION - INPATIENT     Room Number: 465/465-A  Evaluation Date: 11/13/2019  Type of Evaluation: Initial  Physician Order: PT Eval and Treat    Presenting Problem: colonic obstruction s/p subtotal colectomy/ileostomy 11/12/19  Rhea Cervantes RIGHT PHACOEMULSIFICATION OF CATARACT WITH INTRAOCULAR LENS IMPLANT [id number] Right 4/26/2017    Performed by Ree Pina MD at [address]  Type of Home: House   Home Layout: One level                Lives With: Family  D currently have. ..  -   Turning over in bed (including adjusting bedclothes, sheets and blankets)?: A Lot   -   Sitting down on and standing up from a chair with arms (e.g., wheelchair, bedside commode, etc.): A Lot   -   Moving from lying on back to sittin met;Call light within reach;RN aware of session/findings; All patient questions and concerns addressed;SCDs in place; Family present    ASSESSMENT   Patient is a 81year old female admitted on 11/11/2019 for abdominal pain.  Imaging reveals colonic obstructio Goal #3 Patient is able to ambulate 50 feet with assist device: walker - rolling at assistance level: minimum assistance     Goal #4    Goal #5    Goal #6    Goal Comments: Goals established on 11/13/2019

## 2019-11-13 NOTE — PROGRESS NOTES
[facility] hospitalist daily note    Patient was seen/examined on 11/13/19    S has pain at the surgery site. Family at the bedside.  Patient and family informed about PCA and expressed understanding    Medications in Epic    PE     11/13/19  1534   BP:    Pulse:

## 2019-11-13 NOTE — PROGRESS NOTES
[facility] Patient Status:  Inpatient    1935 MRN [id number]   [facility] 4SW-A Attending Cirilo Kern MD   Hosp Day # 2 PCP Yesika Cardoso MD     Pulm / Critical Care Progress Note     S: underwent subtotal colect bs   Extremity: no edema         Recent Labs   Lab 11/12/19  0328 11/12/19  1514 11/13/19  0434   WBC 7.0 7.0 11.4*   HGB 11.2* 10.5* 10.4*   HCT 32.2* 30.6* 31.2*   .0 327.0 346.0     Recent Labs   Lab 11/11/19  1740 11/12/19  1514   INR 1.91* 1.44

## 2019-11-13 NOTE — PROGRESS NOTES
[facility]  Urology Progress Note    Tuan Vuong Patient Status:  Inpatient    1935 MRN [id number]   [facility] 4SW-A Attending Jim Aguilar MD   Hosp Day # 2 PCP Tabby Farnsworth MD     Subjective:  Ming Ag is a(n) 84 year cystogram prior to removal.  Ureteral stent related symptoms reviewed with family. Family informed that the ureteral stent is not permanent and would eventually need to be removed. Will eventually need ureteral stent removed - ~6 weeks.     Further ma

## 2019-11-13 NOTE — OCCUPATIONAL THERAPY NOTE
OCCUPATIONAL THERAPY EVALUATION - INPATIENT     Room Number: 465/465-A  Evaluation Date: 11/13/2019  Type of Evaluation: Initial  Presenting Problem: hyponatremia, abd pain, s/p expl lap with colectomy and ileostomy creation on 11/12/19     Physician Order Pulmonary embolism (HCC)    • Thyroid disease    • Type II or unspecified type diabetes mellitus without mention of complication, not stated as uncontrolled    • Vision loss     need cataract surgery       Past Surgical History  Past Surgical History:   Pr Cognitive Status:  WFL - within functional limits        RANGE OF MOTION AND STRENGTH ASSESSMENT  Upper extremity ROM is within functional limits     Upper extremity strength is within functional limits     COORDINATION  Gross Motor    [facility]    Fine Motor and concerns addressed; Family present    ASSESSMENT     Patient is a 81year old female admitted on 11/11/2019 with c/o abd pain. Pt is s/p expl lap with colectomy and ileostomy creation on 11/12/19.  Complete medical history and occupational profile noted 3-5x/week  Number of Visits to Meet Established Goals: 5    ADL Goals   Patient will perform lower body dressing:  with mod assist  Patient will perform toileting: with mod assist    Functional Transfer Goals  Patient will transfer from bed to chair:  with

## 2019-11-13 NOTE — PLAN OF CARE
Dr. Yecenia Briggs notified that pt having severe R side abdominal pain after sitting up in the chair for 1.5 hrs. VS otherwise stable and LAMONT continuing to have serosanguinous output.  Dr. Yecenia Briggs stated that this pain is to be expected given her increase in mobility

## 2019-11-13 NOTE — PROGRESS NOTES
[facility]  GI Progress Note      Tuan TORO Isidrodel Patient Status:  Inpatient    1935 MRN [id number]   [facility] 4SW-A Attending Citlali Lopez MD   Hosp Day # 2 PCP Junito Nixon MD          SUBJECTIVE:     Patient alert and extubate

## 2019-11-13 NOTE — PLAN OF CARE
Pt received this AM fatigued, oriented to self, able to follow commands. Serbian speaking-communicates per family. Dilaudid PCA pump explained. Lungs are clear/diminished bilaterally on room air. IS @ bedside, pt encouraged to use. NSR. BP stable.  NG to LIS non-pharmacological measures as appropriate and evaluate response  - Consider cultural and social influences on pain and pain management  - Manage/alleviate anxiety  - Utilize distraction and/or relaxation techniques  - Monitor for opioid side effects  - N Refer to Case Management Department for coordinating discharge planning if the patient needs post-hospital services based on physician/LIP order or complex needs related to functional status, cognitive ability or social support system  Outcome: Progressing each meal consumed  - Identify factors contributing to decreased intake, treat as appropriate  - Assist with meals as needed  - Monitor I&O, WT and lab values  - Obtain nutritional consult as needed  - Optimize oral hygiene and moisture  - Encourage food f

## 2019-11-13 NOTE — PROGRESS NOTES
Palliative care consultation for GOCD  I reviewed medical chart  I met with Tuan's son and daughter and SHANNAN at bedside  There are six children total  Several live in [place] [facility] [place]  One son lives here however is at work however he would want to join our discussion

## 2019-11-14 PROCEDURE — 99231 SBSQ HOSP IP/OBS SF/LOW 25: CPT | Performed by: NURSE PRACTITIONER

## 2019-11-14 RX ORDER — METOCLOPRAMIDE HYDROCHLORIDE 5 MG/ML
5 INJECTION INTRAMUSCULAR; INTRAVENOUS EVERY 8 HOURS PRN
Status: DISCONTINUED | OUTPATIENT
Start: 2019-11-14 | End: 2019-11-30

## 2019-11-14 RX ORDER — HEPARIN SODIUM 5000 [USP'U]/ML
5000 INJECTION, SOLUTION INTRAVENOUS; SUBCUTANEOUS EVERY 12 HOURS SCHEDULED
Status: DISCONTINUED | OUTPATIENT
Start: 2019-11-14 | End: 2019-11-17

## 2019-11-14 NOTE — PROGRESS NOTES
[facility]  Urology Progress Note    Tuan Vuong Patient Status:  Inpatient    1935 MRN [id number]   [facility] 4SW-A Attending Christin Chester MD   Hosp Day # 3 PCP [address] Rosas MD     Subjective:  Zuleyma Blackmon is a(n) 84 year Kori Gutierrez P.A.-C  [facility] Urology  11/14/2019  9:50 AM

## 2019-11-14 NOTE — PROGRESS NOTES
[facility]  Progress Note    Tuan Vuong Patient Status:  Inpatient    1935 MRN [id number]   [facility] 4SW-A Attending Lupe Kaur MD   Hosp Day # 3 PCP rGacia Fu MD     Subjective:    Patient c/o pain in her throat from N hypertension     Hypo-osmolar hyponatremia     Counseling regarding advance care planning and goals of care      Impression:     80 y/o S/P: Exploratory laparotomy, subtotal colectomy, creation of end ileostomy, extensive intra-abdominal lysis of adhesions

## 2019-11-14 NOTE — PLAN OF CARE
Pt received on room air. Minimal complaints of pain. She does complain of pain to the right side of her abdomen- scheduled tylenol given and encouraged use of pca.    Son at bedside and would like to be used as  when he is present otherwise he is

## 2019-11-14 NOTE — PROGRESS NOTES
Critical Care Progress Note     Assessment / Plan:  1.  Acute abdomen: with distended colon from sigmoid colon obstruction and concern for pneumatosis,   -surgery and GI following  -s/p ex-lap, subtotal colectomy with creation of end ileostomy  -NG per surg

## 2019-11-14 NOTE — PLAN OF CARE
Took over patient care in am. Vital signs monitored. Up in chair with two person assist, pt ot worked with patient today. Ng tube clamped and monitor for s/s of N/v ,none noted. Continue to monitor patient progress. Ng output post 6 hour 10 cc.  Ng tube out

## 2019-11-14 NOTE — PROGRESS NOTES
[facility] Hospitalist Progress Note     Tuan Vuong Patient Status:  Inpatient    1935 MRN [id number]   [facility] 4SW-A Attending Jarad Lozada MD   Hosp Day # 3 PCP Denys Herbert MD     CC: follow up    SUBJECTIVE:  Family at bedside, i AST 15 116* 44*   ALB 3.6 2.2* 2.0*       Recent Labs   Lab 11/13/19  1158 11/13/19  1747 11/13/19  2337 11/14/19  0547 11/14/19  1152   PGLU 179* 157* 165* 153* 138*         Meds:   Scheduled Medication:  • acetaminophen  1,000 mg Intravenous Q6H   • pi bedside.     Jhon Ramachandran MD   [place] Hospitalist  [phone]

## 2019-11-14 NOTE — PROGRESS NOTES
[facility]  Nephrology Progress Note    Tuan Vuong Attending:  Ghulam Quintero MD       SUBJECTIVE:     Overall labs are stable   UOP slightly up  Drain still with fair amount of output    PHYSICAL EXAM:     Vital Signs: /66   Pulse 75   Temp MOPERCENT 5.3 11/12/2019    EOPERCENT 0.0 11/12/2019    BAPERCENT 0.1 11/12/2019    NE 6.27 11/12/2019    LYMABS 0.31 (L) 11/12/2019    MOABSO 0.37 11/12/2019    EOABSO 0.00 11/12/2019    BAABSO 0.01 11/12/2019     Lab Results   Component Value Date    MAL Q15 Min PRN    Or  glucose (DEX4) oral liquid 30 g, 30 g, Oral, Q15 Min PRN    Or  Glucose-Vitamin C (DEX-4) chewable tab 8 tablet, 8 tablet, Oral, Q15 Min PRN  phenol (CHLORASEPTIC) 1.4 % oral liquid spray, , Oral, Q2H PRN  Insulin Aspart Pen (NOVOLOG) 10

## 2019-11-14 NOTE — PROGRESS NOTES
[address] [facility] 149 Follow Up    Tuan Vuong Patient Status:  Inpatient    1935 MRN [id number]   [facility] 4SW-A Attending Alyssa Peterson MD   Hosp Day # 3 PCP Alexander Alejandra MD     Date of visit:  2019  Day 3 of hos Full Normal Full   80 Full Some disease  Normal w/effort Full Normal or  Reduced Full   70 Reduced Some disease  Can't perform job Full Normal or   Reduced Full   60 Reduced Significant disease  Can't perform hobby Occasional  Assist Normal or   Reduced Fu agent per [facility]. Surrogacy Act confirmed yesterday, and noted below  Healthcare Agent Appointed: Yes  Healthcare Agent's Name: Anahi Toro (dtr)  Healthcare Agent's Phone Number: [phone]    Psychosocial: Emotional support provided to pt's family.     P and coordinating care. I discussed today's visit with Lexy Ramos. Thank you for inviting Palliative Care service to participate in the care of Rosi Ramirez. Palliative Care Service will continue to follow clinically.       CHENG Rebolledo, LAMBERTOP-B

## 2019-11-14 NOTE — PHYSICAL THERAPY NOTE
PHYSICAL THERAPY TREATMENT NOTE - INPATIENT    Room Number: 465/465-A     Session: 1  Number of Visits to Meet Established Goals: 5    Presenting Problem: colonic obstruction s/p subtotal colectomy/ileostomy 11/12/19      History related to current admiss CATARACT WITH INTRAOCULAR LENS IMPLANT 99136 Right 4/26/2017    Performed by Kwesi Roland MD at [address] not responding to family translation.  Pts family suggests that patient is upset that they did not stay overnigh present. Pt not responding to yes/no questions when translated by family. Pts family reports that patient appears \"upset\" and Ruby Magaña" that family did not stay with her overnight. Pt encouraged to follow commands and participate in therapy.  Pt sit-stand training;Balance training  Rehab Potential : Good  Frequency (Obs): 5x/week    CURRENT GOALS     Goal #1 Patient is able to demonstrate supine - sit EOB @ level: minimum assistance      Goal #2 Patient is able to demonstrate transfers EOB to/from [facility] at as

## 2019-11-15 PROCEDURE — 99231 SBSQ HOSP IP/OBS SF/LOW 25: CPT | Performed by: NURSE PRACTITIONER

## 2019-11-15 RX ORDER — POTASSIUM CHLORIDE 14.9 MG/ML
20 INJECTION INTRAVENOUS ONCE
Status: COMPLETED | OUTPATIENT
Start: 2019-11-15 | End: 2019-11-15

## 2019-11-15 NOTE — CM/SW NOTE
11/15/19 1400   CM/SW Referral Data   Referral Source    Reason for Referral Discharge planning   Informant   ([address])   Patient Info   Patient's Mental Status Alert;Oriented   Patient Communication Issues Language barrier   Patient rubina

## 2019-11-15 NOTE — PROGRESS NOTES
[facility]  Progress Note    Tuan Vuong Patient Status:  Inpatient    1935 MRN [id number]   [facility] 4SW-A Attending Makayla Vasquez MD   Hosp Day # 4 PCP Denisa Dos Santos MD     Subjective:    Patient tolerating NG removal, denies Lactic acidosis     Essential hypertension     Hypo-osmolar hyponatremia     Counseling regarding advance care planning and goals of care     Palliative care encounter      Impression:     80 y/o S/P: Exploratory laparotomy, subtotal colectomy, creation

## 2019-11-15 NOTE — PROGRESS NOTES
[facility]  Urology Progress Note    Tuan Vuong Patient Status:  Inpatient    1935 MRN [id number]   [facility] 4SW-A Attending Neville Tay MD   Hosp Day # 4 PCP Tory Doan MD     Subjective:  Trisha Rendon is a(n) 84 year eventually need to be removed.    Will eventually need ureteral stent removed - ~6 weeks post-op    Further management per general surgery. Above discussed with Dr. Sally Yadav.       Roxy Rose P.A.-C  [facility]  11/15/2019  10:48 AM

## 2019-11-15 NOTE — PLAN OF CARE
Received but AAO x 3. Unsure of specific date but reports it's Friday. Continues on room air. Afebrile. NSR w/ 1st degree block. Tolerating diet per order. Ileostomy patent, draining. Niño catheter in place, draining. C/o pain alleviate w/PCA.  PCA teachin

## 2019-11-15 NOTE — PROGRESS NOTES
[address] Yevgeniy Patient Status:  Inpatient    1935 MRN [id number]   [facility] 4SW-A Attending Jarad Lozada MD   Hosp Day # 4 PCP Denys Herbert MD     Pulm / Critical Care Progress Note     S: Pt is doing well.  Some po Recent Labs   Lab 11/13/19  0434 11/14/19  0432 11/14/19  1430 11/15/19  0435   WBC 11.4* 9.5  --  8.1   HGB 10.4* 8.0* 8.4* 7.7*   HCT 31.2* 24.2*  --  23.7*   .0 292.0  --  323.0     Recent Labs   Lab 11/11/19  1740 11/12/19  1514   INR 1.91* 1.

## 2019-11-15 NOTE — PROGRESS NOTES
[facility] Hospitalist Progress Note     Farazdebora Vuong Patient Status:  Inpatient    1935 MRN [id number]   [facility] 4SW-A Attending Lupe Kaur MD   Hosp Day # 4 PCP Gracia Fu MD     CC: follow up    SUBJECTIVE:  Laying in bed.  Mary Beth Burnette > = values in this interval not displayed.        Recent Labs   Lab 11/11/19  1715 11/12/19  1514 11/13/19  0434   ALT 22 82* 64*   AST 15 116* 44*   ALB 3.6 2.2* 2.0*       Recent Labs   Lab 11/14/19  0547 11/14/19  1152 11/14/19  1721 11/15/19  0045 11/15 anemia.      # ANTONIO - improving  - s/p intra-op hypotension  - remains NPO  - apprec renal recommendations  - IVF    # hyponatremia - resolved  - improving with IVF    # h/o PE  - resume xarelto when ok with surgery and ok for PO  - ok for hep sq per gen antonietta

## 2019-11-15 NOTE — PLAN OF CARE
Assumed care at [address] [place] [address] pt on bed, on room air. Malay speaking, son at the bedside to interpret. Tolerable abdominal pain, PCA Dilaudid. Denies nausea, tolerates ice chips  Niño cath with pink tinged output  Ileostomy with dark brown liquid output.

## 2019-11-15 NOTE — PROGRESS NOTES
[facility]  Nephrology Progress Note    Tuan Vuong Attending:  Alejandro Elias MD       SUBJECTIVE:     Cr better  NG out, she is happy about this  UOP up  Drain still with fair amount of output    PHYSICAL EXAM:     Vital Signs: /75   Pulse 11/12/2019    MOPERCENT 5.3 11/12/2019    EOPERCENT 0.0 11/12/2019    BAPERCENT 0.1 11/12/2019    NE 6.27 11/12/2019    LYMABS 0.31 (L) 11/12/2019    MOABSO 0.37 11/12/2019    EOABSO 0.00 11/12/2019    BAABSO 0.01 11/12/2019     Lab Results   Component Shawna (DEX4) oral liquid 15 g, 15 g, Oral, Q15 Min PRN    Or  Glucose-Vitamin C (DEX-4) chewable tab 4 tablet, 4 tablet, Oral, Q15 Min PRN    Or  glucose (DEX4) oral liquid 30 g, 30 g, Oral, Q15 Min PRN    Or  Glucose-Vitamin C (DEX-4) chewable tab 8 tablet, 8 t

## 2019-11-15 NOTE — PLAN OF CARE
Pt received on room air. Denies shortness of breath/chest pain. Pain is well controlled with dilaudid pca and atc tylenol. Educated on pca use  Son at bedside and assisting with communication. Ileostomy bag leaking and changed.  Yuliya Huizar remains with increased se

## 2019-11-15 NOTE — PROGRESS NOTES
[address] [facility] 149 Follow Up    Tuan Vuong Patient Status:  Inpatient    1935 MRN [id number]   [facility] 4SW-A Attending Shantal Ballard MD   Hosp Day # 4 PCP Kendra Padilla MD     Date of visit:  11/15/2019  Day 4 of hos Assist Normal or Reduced Full or confused   40 Mainly in bed Extensive Disease  Can't do any work Mainly Assist Normal or Reduced Full or confused   30 Bedbound Extensive Disease  Can't do any work Max Assist  Total Care Reduced Drowsy/confused   20 Bedbou individual decision - and first encouraged him to include pt and other family in discussion and we can follow up with  on Monday if questions or concerns.   I also educated that if decision is made to change code status from current full code, th partial remission (HCC)     Arthritis     Adhesive capsulitis of left shoulder     OAB (overactive bladder)     Hypothyroidism     PE (pulmonary thromboembolism) ([address]     Nonrheumatic aortic valve stenosis     Acute chest pain     Acute diverticulitis

## 2019-11-16 NOTE — PROGRESS NOTES
1035--renal rounding;pt. c/o dizziness (just got her up on the chair)denies any sob;per renal() check ortho x1 only and notify if positive;  1051--apn still on the unit;informed of ortho bp;told this nurse to increase ivf to 100ml/hr  1400--c/o

## 2019-11-16 NOTE — PLAN OF CARE
Assumed care of pt for the night shift. Pt resting in bed. Maintaining adequate O2 saturations on RA. Afebrile. Ileostomy present see flowsheet for output. LAMONT drain x1 draining see flowsheet for output. Niño present with adequate urine output.  PCA dilaudi

## 2019-11-16 NOTE — PLAN OF CARE
Assumed care of patient at [phone]. Report received from [facility]. A&O x4. VSS and afebrile. O2 sats WNL on room air. C/o mild pain, offering pain meds PRN. Abdomen soft, nondistended and tender. Bowel sounds active x4.  Ileostomy in RLQ excreting stool and flat non-pharmacological measures as appropriate and evaluate response  - Consider cultural and social influences on pain and pain management  - Manage/alleviate anxiety  - Utilize distraction and/or relaxation techniques  - Monitor for opioid side effects  - N Progressing     Problem: Altered Communication/Language Barrier  Goal: Patient/Family is able to understand and participate in their care  Description  Interventions:  - Assess communication ability and preferred communication style  - Implement communicat Encourage food from home; allow for food preferences  - Enhance eating environment  Outcome: Progressing  Goal: Achieves appropriate nutritional intake (bariatric)  Description  INTERVENTIONS:  - Monitor for over-consumption  - Identify factors contributin

## 2019-11-16 NOTE — PROGRESS NOTES
[address]  Nephrology Progress Note    Tuan Vuong Attending:  Filipe Weiss MD       SUBJECTIVE:     Having some pain, but better with pain medication   No SOB  Some dizziness with standing      PHYSICAL EXAM:     Vital Signs: /78 (BP [place] 11/16/2019    LYPERCENT 18.8 11/16/2019    MOPERCENT 7.3 11/16/2019    EOPERCENT 5.2 11/16/2019    BAPERCENT 0.3 11/16/2019    NE 4.24 11/16/2019    LYMABS 1.18 11/16/2019    MOABSO 0.46 11/16/2019    EOABSO 0.33 11/16/2019    BAABSO 0.02 11/16/2019     La 100 mL ADD-vantage, 3.375 g, Intravenous, Q8H  HYDROmorphone HCl (DILAUDID) 1 MG/ML injection 0.2 mg, 0.2 mg, Intravenous, Q30 Min PRN    Or  HYDROmorphone HCl (DILAUDID) 1 MG/ML injection 0.4 mg, 0.4 mg, Intravenous, Q30 Min PRN  glucose (DEX4) oral liqui

## 2019-11-16 NOTE — PROGRESS NOTES
[facility]  Progress Note    Farazdebora TORO Isidrodel Patient Status:  Inpatient    1935 MRN [id number]   [facility] 3NW-A Attending Neville Tay MD   Hosp Day # 5 PCP Tory Doan MD     Subjective: Tolerating clear liquids.     Objectiv Intestinal obstruction, unspecified cause, unspecified whether partial or complete (HCC)     Lactic acidosis     Essential hypertension     Hypo-osmolar hyponatremia     Counseling regarding advance care planning and goals of care     Palliative care encou

## 2019-11-17 RX ORDER — HYDROCODONE BITARTRATE AND ACETAMINOPHEN 5; 325 MG/1; MG/1
2 TABLET ORAL EVERY 4 HOURS PRN
Status: DISCONTINUED | OUTPATIENT
Start: 2019-11-17 | End: 2019-11-30

## 2019-11-17 RX ORDER — HYDROCODONE BITARTRATE AND ACETAMINOPHEN 5; 325 MG/1; MG/1
1 TABLET ORAL EVERY 4 HOURS PRN
Status: DISCONTINUED | OUTPATIENT
Start: 2019-11-17 | End: 2019-11-30

## 2019-11-17 RX ORDER — ACETAMINOPHEN 325 MG/1
650 TABLET ORAL EVERY 6 HOURS PRN
Status: DISCONTINUED | OUTPATIENT
Start: 2019-11-17 | End: 2019-11-30

## 2019-11-17 NOTE — PLAN OF CARE
Problem: Diabetes/Glucose Control  Goal: Glucose maintained within prescribed range  Description  INTERVENTIONS:  - Monitor Blood Glucose as ordered  - Assess for signs and symptoms of hyperglycemia and hypoglycemia  - Administer ordered medications to m to assist with strengthening/mobility  - Encourage toileting schedule  11/17/2019 0204 by Dia Henderson RN  Outcome: Progressing  11/17/2019 0202 by Dia Henderson RN  Outcome: Progressing     Problem: DISCHARGE PLANNING  Goal: Discharge to home or ot GASTROINTESTINAL - ADULT  Goal: Minimal or absence of nausea and vomiting  Description  INTERVENTIONS:  - Maintain adequate hydration with IV or PO as ordered and tolerated  - Nasogastric tube to low intermittent suction as ordered  - Evaluate effectivenes consult as needed  - Evaluate psychosocial factors contributing to over-consumption  Outcome: Progressing     Problem: Impaired Functional Mobility  Goal: Achieve highest/safest level of mobility/gait  Description  Interventions:  - Assess patient's functi

## 2019-11-17 NOTE — PROGRESS NOTES
PT AWAKE AND RESTLESS. HR UP -130. SITTING AT EDGE OF BED WITH ASSISTANCE. WITH SON THE PHONE TO INTERPRET PT DENIES ANY PAIN AND UNDERSTANDING TO USE PAIN BUTTON WHEN NEEDED. WHEN ASKED TO TAKE A DEEP BREATH FINDS SL DIFFICULT. O2 SAT ON R/ AT 98%.

## 2019-11-17 NOTE — PROGRESS NOTES
[facility] Hospitalist Progress Note     Tuan Vuong Patient Status:  Inpatient    1935 MRN [id number]   [facility] 4SW-A Attending Rishi Henry MD   Hosp Day # 5 PCP Ebonie Thompson MD     CC: follow up    SUBJECTIVE:  [place] LH earlier toda --  1.9   PHOS 2.7  --  2.4*  --   --   --   --   --  1.3*   *  --  248*   < > 233* 165* 160* 119* 149*    < > = values in this interval not displayed.        Recent Labs   Lab 11/11/19  1715 11/12/19  1514 11/13/19  0434   ALT 22 82* 64*   AST 15 10.5-->10.4-->8-->8.4-->7.7-->8.4  - check CBC only PRN to prevent iatrogenic anemia     # ANTONIO - improving  - s/p intra-op hypotension  - apprec renal recommendations  - IVF    # hyponatremia - resolved  - improving with IVF    # h/o PE  - resume xarelto w

## 2019-11-17 NOTE — PROGRESS NOTES
[facility]  Nephrology Progress Note    Tuan Vuong Attending:  Ghulam Quintero MD       SUBJECTIVE:     Denies SOB or dizziness this AM   Video  used        PHYSICAL EXAM:     Vital Signs: /75 (BP Location: Left arm)   Pulse 92   T MOPERCENT 7.3 11/16/2019    EOPERCENT 5.2 11/16/2019    BAPERCENT 0.3 11/16/2019    NE 4.24 11/16/2019    LYMABS 1.18 11/16/2019    MOABSO 0.46 11/16/2019    EOABSO 0.33 11/16/2019    BAABSO 0.02 11/16/2019     Lab Results   Component Value Date    MALB Or  HYDROmorphone HCl (DILAUDID) 1 MG/ML injection 0.4 mg, 0.4 mg, Intravenous, Q30 Min PRN  glucose (DEX4) oral liquid 15 g, 15 g, Oral, Q15 Min PRN    Or  Glucose-Vitamin C (DEX-4) chewable tab 4 tablet, 4 tablet, Oral, Q15 Min PRN    Or  glucose (DEX4)

## 2019-11-17 NOTE — PROGRESS NOTES
[facility]  Progress Note    Farazdebora TORO Isidrodel Patient Status:  Inpatient    1935 MRN [id number]   [facility] 3NW-A Attending Jarad Lozada MD   Hosp Day # 6 PCP Denys Herbert MD     Subjective: Tolerating diet.     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

## 2019-11-17 NOTE — PLAN OF CARE
Patient alert and oriented x3  Up with SBA to chair and to hallway  Al patent with yellow urine  Ostomy present with liquid output  Denies pain  LAMONT drain to gravity per urology   Incision to midline is CDI.    POC updated with patient and family via hosp Utilize distraction and/or relaxation techniques  - Monitor for opioid side effects  - Notify MD/LIP if interventions unsuccessful or patient reports new pain  - Anticipate increased pain with activity and pre-medicate as appropriate  Outcome: Progressing PO as ordered and tolerated  - Nasogastric tube to low intermittent suction as ordered  - Evaluate effectiveness of ordered antiemetic medications  - Provide nonpharmacologic comfort measures as appropriate  - Advance diet as tolerated, if ordered  - Obtai transfers and ambulation   Outcome: Progressing     Problem: Impaired Activities of Daily Living  Goal: Achieve highest/safest level of independence in self care  Description  Interventions:  - Assess ability and encourage patient to participate in ADLs to

## 2019-11-17 NOTE — PROGRESS NOTES
[facility] Hospitalist Progress Note     Tuan Vuong Patient Status:  Inpatient    1935 MRN [id number]   [facility] 4SW-A Attending Neville Tay MD   Hosp Day # 6 PCP Tory Doan MD     CC: follow up    SUBJECTIVE:  Denies pain.  No comp Recent Labs   Lab 11/16/19  1407 11/16/19  1727 11/16/19  2146 11/17/19  0911 11/17/19  1131   PGLU 169* 162* 164* 184* 177*         Meds:   Scheduled Medication:  • Insulin Aspart Pen  2-10 Units Subcutaneous TID CC and HS   • Heparin Sodium (Porcin making steady progress    Questions/concerns were discussed with patient and/or family by bedside.     Deshaun Palacio MD   [facility]ness[facility]  [phone]

## 2019-11-18 ENCOUNTER — APPOINTMENT (OUTPATIENT)
Dept: ULTRASOUND IMAGING | Facility: HOSPITAL | Age: 84
DRG: 330 | End: 2019-11-18
Attending: INTERNAL MEDICINE
Payer: MEDICARE

## 2019-11-18 ENCOUNTER — APPOINTMENT (OUTPATIENT)
Dept: CT IMAGING | Facility: HOSPITAL | Age: 84
DRG: 330 | End: 2019-11-18
Attending: INTERNAL MEDICINE
Payer: MEDICARE

## 2019-11-18 PROCEDURE — 99233 SBSQ HOSP IP/OBS HIGH 50: CPT | Performed by: INTERNAL MEDICINE

## 2019-11-18 PROCEDURE — 05H633Z INSERTION OF INFUSION DEVICE INTO LEFT SUBCLAVIAN VEIN, PERCUTANEOUS APPROACH: ICD-10-PCS | Performed by: INTERNAL MEDICINE

## 2019-11-18 PROCEDURE — B547ZZA ULTRASONOGRAPHY OF LEFT SUBCLAVIAN VEIN, GUIDANCE: ICD-10-PCS | Performed by: INTERNAL MEDICINE

## 2019-11-18 PROCEDURE — 93971 EXTREMITY STUDY: CPT | Performed by: INTERNAL MEDICINE

## 2019-11-18 PROCEDURE — 74176 CT ABD & PELVIS W/O CONTRAST: CPT | Performed by: INTERNAL MEDICINE

## 2019-11-18 PROCEDURE — 30233N1 TRANSFUSION OF NONAUTOLOGOUS RED BLOOD CELLS INTO PERIPHERAL VEIN, PERCUTANEOUS APPROACH: ICD-10-PCS | Performed by: INTERNAL MEDICINE

## 2019-11-18 RX ORDER — SODIUM CHLORIDE 0.9 % (FLUSH) 0.9 %
10 SYRINGE (ML) INJECTION EVERY 12 HOURS
Status: DISCONTINUED | OUTPATIENT
Start: 2019-11-18 | End: 2019-11-30

## 2019-11-18 RX ORDER — PANTOPRAZOLE SODIUM 40 MG/1
40 TABLET, DELAYED RELEASE ORAL
Status: DISCONTINUED | OUTPATIENT
Start: 2019-11-18 | End: 2019-11-28

## 2019-11-18 RX ORDER — LEVOTHYROXINE SODIUM 0.1 MG/1
100 TABLET ORAL
Status: DISCONTINUED | OUTPATIENT
Start: 2019-11-18 | End: 2019-11-21

## 2019-11-18 RX ORDER — SODIUM CHLORIDE 9 MG/ML
INJECTION, SOLUTION INTRAVENOUS ONCE
Status: COMPLETED | OUTPATIENT
Start: 2019-11-18 | End: 2019-11-18

## 2019-11-18 RX ORDER — DULOXETIN HYDROCHLORIDE 30 MG/1
30 CAPSULE, DELAYED RELEASE ORAL DAILY
Status: DISCONTINUED | OUTPATIENT
Start: 2019-11-18 | End: 2019-11-18

## 2019-11-18 NOTE — PROGRESS NOTES
[facility] Hospitalist Progress Note     Tuan Vuong Patient Status:  Inpatient    1935 MRN [id number]   [facility] 4SW-A Attending Angélica March MD   Hosp Day # 7 PCP Yesika Cardoso MD     CC: follow up    SUBJECTIVE:  Feeling ok, having b --  1.9  --   --   --  1.9  --   --    PHOS 2.7  --  2.4*  --   --   --  1.3* 2.1* 1.6*   *  --  248*   < > 160* 119* 149* 186* 151*    < > = values in this interval not displayed.        Recent Labs   Lab 11/11/19  1715 11/12/19  1514 11/13/19  0434 Hgb  - Patient denies abdominal pain, but noting back/flank pain -- will check CT abd/pelvis, mildly pink tinged urine, no clots.  Dark watery stool in ostomy.   - Hgb was 11-12 on admission    # DMII  - ha1C 6.8%  - -160s  - SSI with QID accuchecks

## 2019-11-18 NOTE — PROGRESS NOTES
[address] [facility] 149 Follow Up    Christine Johnston  [id number]  Hospital Day #7  Date of Consult: 11/13/19  Patient seen at: [facility] Room 308    Subjective:      Patient was seen and examined with her daughter Seun Galdamez at the bedside.    Tuan Allergies    Medications:       Current Facility-Administered Medications:   •  0.9% NaCl infusion, , Intravenous, Once  •  Levothyroxine Sodium (SYNTHROID) tab 100 mcg, 100 mcg, Oral, Before breakfast  •  Pantoprazole Sodium (PROTONIX) EC tab 40 mg, 40 mg 400.0 11/18/2019       Coags:  Lab Results   Component Value Date    INR 1.44 (H) 11/12/2019    PTT 27.8 11/12/2019     Chemistry:  Lab Results   Component Value Date    CREATSERUM 1.37 (H) 11/18/2019    BUN 28 (H) 11/18/2019     11/18/2019    K 3.9 Drowsy/confused   10 Bedbound/coma Extensive Disease  Can’t do any work  coma  Max Assist  Total Care Mouth care Drowsy or coma   0 Death     Palliative Care Assessment     Goals of Care: continue current medical tx's at this time    Advance Care Planning surrogate at this time  Unfortunately, there are multiple family dynamics where siblings do not talk with one another. When I talked to Saint ignace last week, she was the  to let family know about mom's condition.     I learned today that Abigail Morris

## 2019-11-18 NOTE — PROGRESS NOTES
[facility]  Progress Note    Tuan CORTEZ Yevgeniy Patient Status:  Inpatient    1935 MRN [id number]   [facility] 3NW-A Attending Aditi Daley MD   Hosp Day # 7 PCP Lashell Johnston MD     Subjective:    Patient without new complaints, alisa unspecified whether partial or complete (HCC)     Lactic acidosis     Essential hypertension     Hypo-osmolar hyponatremia     Counseling regarding advance care planning and goals of care     Palliative care encounter      Impression:     80 y/o S/P: subto

## 2019-11-18 NOTE — CM/SW NOTE
Interdisciplinary Rounds: 11/18/19  Admitted: 11/11/2019 LOS: 7  Disciplines in attendance: charge nurse, staff nurse, CM, [address] and discharge plan reviewed. Active issues needing resolution prior to discharge: Hgb low this morning, 6.  Antonina Brown

## 2019-11-18 NOTE — CM/SW NOTE
Met with pt's son, Elinor Harden ([phone]) to discuss DC planning. Pt currently off the floor for testing. Reviewed DC plan for MARCELLA. Pt's son stated that he has not yet started to look into options for MARCELLA.   He stated preference for facility in the [facility] Angie [address]

## 2019-11-18 NOTE — PLAN OF CARE
Problem: Diabetes/Glucose Control  Goal: Glucose maintained within prescribed range  Description  INTERVENTIONS:  - Monitor Blood Glucose as ordered  - Assess for signs and symptoms of hyperglycemia and hypoglycemia  - Administer ordered medications to m Identify cognitive and physical deficits and behaviors that affect risk of falls.   - [place] fall precautions as indicated by assessment.  - Educate pt/family on patient safety including physical limitations  - Instruct pt to call for assistance with act strategies to overcome with those who interact with patient  Outcome: Progressing     Problem: GASTROINTESTINAL - ADULT  Goal: Minimal or absence of nausea and vomiting  Description  INTERVENTIONS:  - Maintain adequate hydration with IV or PO as ordered an mobility/gait  Description  Interventions:  - Assess patient's functional ability and stability  - Promote increasing activity/tolerance for mobility and gait  - Educate and engage patient/family in tolerated activity level and precautions  - Recommend use

## 2019-11-18 NOTE — PROGRESS NOTES
[facility]  Nephrology Progress Note    Tuan Vuong Attending:  Albina Mondragon MD       SUBJECTIVE:     Denies SOB or dizziness this AM   Po intake poor   Ostomy w liquid outpt   Hgb down to 6, getting CT ap    PHYSICAL EXAM:     Vital Signs: BP 13 NEUTABS 5.15 11/18/2019    LYMPHABS 1.87 11/18/2019    EOSABS 0.16 11/18/2019    BASABS 0.08 11/18/2019    NEUT 64 11/18/2019    LYMPH 24 11/18/2019    MON 5 11/18/2019    EOS 2 11/18/2019    BASO 1 11/18/2019    NEPERCENT 67.4 11/16/2019    LYPERCENT 18.8 (BENADRYL) cap/tab 25 mg, 25 mg, Oral, Nightly PRN  Naloxone HCl (NARCAN) 0.4 MG/ML injection 0.08 mg, 0.08 mg, Intravenous, Q5 Min PRN  Piperacillin Sod-Tazobactam So (ZOSYN) 3.375 g in dextrose 5 % 100 mL ADD-vantage, 3.375 g, Intravenous, Q8H  HYDROmorp may need to be started on sodium bicarb supplementation     Anemia   - getting PRBC today 11/18  - blood transfusions per primary     Hypoalbuminemia  -- increase protein intake    Hypophosphatemia  -- give neutraphos packets     Status post colectomy, sta

## 2019-11-18 NOTE — PHYSICAL THERAPY NOTE
Attempted to see Pt this AM - RN K aware of attempt. Pt currently receiving 1 unit of pRBCs, and planned CT at 1300, and pending US of LLE to r/u DVT.   RN requesting PT re-attempt in PM.  Will f/u later today if time permits, after all other patients are

## 2019-11-18 NOTE — PLAN OF CARE
1unit of  Rbc transfused without signs/symptoms of adverse reaction. Patient's lungs clear throughout and VSS. IV site remains clean, dry, and intact with no redness or swelling noted, patient denies pain at IV site. Will continue to monitor patient.

## 2019-11-19 PROCEDURE — 99356 PROLONGED SERV,INPATIENT,1ST HR: CPT | Performed by: INTERNAL MEDICINE

## 2019-11-19 PROCEDURE — 99233 SBSQ HOSP IP/OBS HIGH 50: CPT | Performed by: INTERNAL MEDICINE

## 2019-11-19 RX ORDER — SODIUM CHLORIDE 9 MG/ML
INJECTION, SOLUTION INTRAVENOUS ONCE
Status: COMPLETED | OUTPATIENT
Start: 2019-11-19 | End: 2019-11-19

## 2019-11-19 RX ORDER — HEPARIN SODIUM 5000 [USP'U]/ML
5000 INJECTION, SOLUTION INTRAVENOUS; SUBCUTANEOUS EVERY 8 HOURS SCHEDULED
Status: DISCONTINUED | OUTPATIENT
Start: 2019-11-19 | End: 2019-11-24

## 2019-11-19 NOTE — PHYSICAL THERAPY NOTE
Attempted to see Pt this AM.  RN aware of attempt. Per chart - hemoglobin is 6.7 - RN requesting to hold until further medical workup. Will f/u later today if time permits, after all other patients are attempted per tentative schedule.

## 2019-11-19 NOTE — PROGRESS NOTES
[facility] Hospitalist Progress Note     Tuan Vuong Patient Status:  Inpatient    1935 MRN [id number]   [facility] 4SW-A Attending Archana Barrera MD   Hosp Day # 8 PCP Chela Barajas MD     CC: follow up    SUBJECTIVE:  Feeling better  Hgb interval not displayed.        Recent Labs   Lab 11/12/19  1514 11/13/19  0434 11/17/19  0548 11/18/19  0522   ALT 82* 64*  --   --    * 44*  --   --    ALB 2.2* 2.0* 1.5* 1.5*       Recent Labs   Lab 11/18/19  1252 11/18/19  1644 11/18/19  2109 11/1 unit  - ordered 1 unit pRBCs this AM. Hold Xarelto (was resumed yesterday)  - check post-transfusion Hgb and monitor Hgb  - CT abdomen / pelvis negative for RP bleed or other source of blood loss  - Hgb was 11-12 on admission    # DMII  - ha1C 6.8%  - BG 1

## 2019-11-19 NOTE — CM/SW NOTE
Patient declined for [facility]CELLA admission to [facility] and [facility]. Pt clinically accepted to [facility], however they do not have private rooms available. Referrals pending to [facility] and [facility].   Texts sent via [facility] to

## 2019-11-19 NOTE — PROGRESS NOTES
[facility]  Progress Note    Tuan Vuong Patient Status:  Inpatient    1935 MRN [id number]   [facility] 3NW-A Attending Julian Rubio MD   Hosp Day # 8 PCP Kendra Padilla MD     Subjective:  CT scan obtained yesterday which was Hypo-osmolar hyponatremia     Counseling regarding advance care planning and goals of care     Palliative care encounter      Impression:     80 y/o S/P: subtotal colectomy, ileostomy  hgb 6.7: receiving I unit PRBC     Plan:     Tolerating PO diet  LAMONT to s

## 2019-11-19 NOTE — CM/SW NOTE
Met at length with patient and Virginia Nguyen once again. The language line  for Liam Novjacky Arron was used twice: Code 279717 and AAAJ.  Nati left the room and through the language line, (not video conference) the patient stated that she wishes for Virginia Nguyen

## 2019-11-19 NOTE — PROGRESS NOTES
[facility]  Urology Progress Note    Tuan CORTEZ Yevgeniy Patient Status:  Inpatient    1935 MRN [id number]   [facility] 3NW-A Attending Augie Duran MD   Hosp Day # 8 PCP Beverly Leo MD     Subjective:  Farazdebora TORO Isidrodel is a(n 81 yea eventually need to be removed.    Will eventually need ureteral stent removed - ~6 weeks post-op    Further management per general surgery.     Misty Hugh Schaumann, P.A.-C  [facility] Urology  11/19/2019  10:44 AM

## 2019-11-19 NOTE — OCCUPATIONAL THERAPY NOTE
OT orders received and chart reviewed. D/w RN, who requested wait for treatment now due to low HGB and try again later in the day. OT will follow up as pt is becomes appropriate and after all other pts have been seen per tentative schedule.

## 2019-11-19 NOTE — CM/SW NOTE
Met with Dr Estefani Patel and with the pts daughter, Zuly Frank. We had a long detailed discussion  with regards to her family dynamics and history. Zuly Frank is offering to be the primary  for her mother.  Zuly Frank lives in [place], does not work, and can

## 2019-11-19 NOTE — DIETARY NOTE
[facility]    NUTRITION INITIAL ASSESSMENT    Pt does not meet malnutrition criteria. NUTRITION DIAGNOSIS/PROBLEM:    Inadequate energy intake related to inability to consume sufficient PO as evidenced by decreased appetite.      NUTRITION INTERVE Accumulation: none per RN documentation    NUTRITION PRESCRIPTION:  Calories: 2368-7319 calories/day (25-30 calories per kg)  Protein: 76-98grams protein/day (1.0-1.3 grams protein per kg)  Fluid: ~1 ml/kcal or per MD discretion    MONITOR AND EVALUATE/NUT

## 2019-11-19 NOTE — PROGRESS NOTES
[id number] [facility] 149 Follow Up    Leatha Morin  [id number]  Hospital Day #8  Date of Consult: 11/13/19  Patient seen at: [facility] Room 308    Subjective:      Patient was seen and examined with her daughter Shon Burger at the bedside.    Tuan Dr Ascencion Severance and they talked a bit. After they finished, I was also able to help with some answers. I reviewed her pathology results and they were negative for malignancy. It did note extensive diverticulosis.   I also reviewed her CT scan where it revealed mcg, Oral, Before breakfast  •  Pantoprazole Sodium (PROTONIX) EC tab 40 mg, 40 mg, Oral, QAM AC  •  Normal Saline Flush 0.9 % injection 10 mL, 10 mL, Intravenous, Q12H  •  potassium & sodium phosphates (NEUTRA-PHOS) 280-160-250 MG powder packet 1 packet, Value Date    INR 1.44 (H) 11/12/2019    PTT 27.8 11/12/2019     Chemistry:  Lab Results   Component Value Date    CREATSERUM 1.31 (H) 11/19/2019    BUN 18 11/19/2019     11/19/2019    K 3.9 11/19/2019     11/19/2019    CO2 20.0 (L) 11/19/2019 BMI 29.02 kg/m²     General: awake and in no apparent respiratory distress. Body habitus: Overweight  HEENT: No focal deficits.    Neurologic: Alert and awake and able to answer , gets upset at times when she asks for shampoo and doesn't get it r obstruction, unspecified cause, unspecified whether partial or complete (HCC)        Lactic acidosis        Essential hypertension        Hypo-osmolar hyponatremia        Counseling regarding advance care planning and goals of care        Palliative care e

## 2019-11-19 NOTE — PROGRESS NOTES
[facility]  Nephrology Progress Note    Tuan Vuong Attending:  Shaylee Berrios MD       SUBJECTIVE:     Denies SOB or dizziness this AM   Po intake poor   >5L outpt yesterday between urine, ostomy, LAMONT drain   Getting additional PRBC today    PHYSIC 5.22 11/19/2019    NEUTABS 6.64 11/19/2019    LYMPHABS 1.01 11/19/2019    EOSABS 0.34 11/19/2019    BASABS 0.00 11/19/2019    NEUT 77 11/19/2019    LYMPH 12 11/19/2019    MON 5 11/19/2019    EOS 4 11/19/2019    BASO 0 11/19/2019    NEPERCENT 67.4 11/16/201 (NARCAN) 0.4 MG/ML injection 0.08 mg, 0.08 mg, Intravenous, Q5 Min PRN  Piperacillin Sod-Tazobactam So (ZOSYN) 3.375 g in dextrose 5 % 100 mL ADD-vantage, 3.375 g, Intravenous, Q8H  HYDROmorphone HCl (DILAUDID) 1 MG/ML injection 0.2 mg, 0.2 mg, Intravenous without intravenous contrast.  No oral contrast present in this region as well. There is no evidence of obstruction as contrast material seen within the ostomy bag. No pneumatosis is seen.   There are a few locules of free intraperitoneal air   which may getting PRBC 11/18 and 11/19  - blood transfusions per primary     Hypoalbuminemia  -- increase protein intake       Status post colectomy, status post ureteral injury with reimplantation  As per surgical team and urology respectively    JULIO stallworth

## 2019-11-20 NOTE — PROGRESS NOTES
Hematology      Attempted to see patient this am.   She was indisposed. Case reviewed with RN and surgical teams. Plan  1. Ok to keep heparin SQ going for now  2. Anemia workup ordered  3. CBC is stable today.       Po Allison

## 2019-11-20 NOTE — PROGRESS NOTES
Medical chart reviewed  I called Nati's phone number listed on ADT sheet  I note that Tuan requested that Tino Bryant be her HCPOA from now on    No answer from [facility] phone    I will try again.     Plan for MARCELLA  I strongly recommend community palliative care

## 2019-11-20 NOTE — PHYSICAL THERAPY NOTE
PHYSICAL THERAPY TREATMENT NOTE - INPATIENT    Room Number: 040/837-L     Session: 2  Number of Visits to Meet Established Goals: 5    Presenting Problem: colonic obstruction s/p subtotal colectomy/ileostomy 11/12/19      History related to current admiss RIGHT PHACOEMULSIFICATION OF CATARACT WITH INTRAOCULAR LENS IMPLANT 98964 Right 4/26/2017    Performed by Mike Becerra MD at [facility] corresponding with phone intrepreter    Patient’s self-stated goal is not stated. scores based on dept protocol    Skilled Therapy Provided:     Pt presented in supine upon PT arrival. Pt willing to participate in session, working towards increased functional mobility and independence.   Discussed GOC, and short term/long term goals for independence/return to baseline. DISCHARGE RECOMMENDATIONS  PT Discharge Recommendations: Sub-acute rehabilitation(ELOS 11-14 days)     PLAN  PT Treatment Plan: Bed mobility; Endurance; Energy conservation;Patient education;Gait training;Strengthening;

## 2019-11-20 NOTE — PROGRESS NOTES
[facility]  Urology Progress Note    Tuan Vuong Patient Status:  Inpatient    1935 MRN [id number]   [facility] 3NW-A Attending Margaret Sanchez MD   Hosp Day # 9 PCP Fernando Soni MD     Subjective:  Tuan Greenfield is a(n 81 yea Urology  11/20/2019  10:49 AM

## 2019-11-20 NOTE — PLAN OF CARE
Problem: Diabetes/Glucose Control  Goal: Glucose maintained within prescribed range  Description  INTERVENTIONS:  - Monitor Blood Glucose as ordered  - Assess for signs and symptoms of hyperglycemia and hypoglycemia  - Administer ordered medications to m post-hospital services based on physician/LIP order or complex needs related to functional status, cognitive ability or social support system  Outcome: Progressing     Problem: GASTROINTESTINAL - ADULT  Goal: Minimal or absence of nausea and vomiting  Desc

## 2019-11-20 NOTE — OCCUPATIONAL THERAPY NOTE
Pt chart reviewed and RN cleared writer to see pt this day. Attempted to see pt with female PT for co-tx. Pt communicated with via . Pt refused to be treated by male therapist and requested only to be seen by female therapists in the future.  Requ

## 2019-11-20 NOTE — CM/SW NOTE
AP for Nati to call CM to discuss MARCELLA & to discuss the role she accepted as primary  for this admission.      Accepting SARs are:   Saul Salas: (still checking about private room)  [facility]

## 2019-11-20 NOTE — PLAN OF CARE
Patient alert and oriented x3  Up with SBA +walker. Needs encouragement to go to chair. She sat in chair for a few hours. Denies pain   Incision to ABD CDI. Ostomy with stool noted. Niño draining clear yellow urine.    Scotty with serous output noted; josé miguel distraction and/or relaxation techniques  - Monitor for opioid side effects  - Notify MD/LIP if interventions unsuccessful or patient reports new pain  - Anticipate increased pain with activity and pre-medicate as appropriate  Outcome: Progressing     Prob stability  - Promote increasing activity/tolerance for mobility and gait  - Educate and engage patient/family in tolerated activity level and precautions  - Recommend use of  RW for transfers and ambulation   Outcome: Progressing     Problem: Impaired Acti

## 2019-11-20 NOTE — PROGRESS NOTES
[facility]  Progress Note    Tuan Vuong Patient Status:  Inpatient    1935 MRN [id number]   [facility] 3NW-A Attending Cam Orr MD   Hosp Day # 9 PCP Melissa Be MD     Subjective:    Spoke with patient via  l complete ([facility] [address]     Lactic acidosis     Essential hypertension     Hypo-osmolar hyponatremia     Counseling regarding advance care planning and goals of care     Palliative care encounter      Impression:     82 y/o S/P: subtotal colectomy, ileostomy  -hgb

## 2019-11-20 NOTE — PROGRESS NOTES
[facility] Hospitalist Progress Note     Tuan Vuong Patient Status:  Inpatient    1935 MRN [id number]   [facility] 4SW-A Attending Ronnie House MD   Hosp Day # 9 PCP Mark Perrin MD     CC: follow up    SUBJECTIVE:  No acute events    O 11/19/19  1641 11/19/19  2103 11/20/19  0746 11/20/19  1255   PGLU 131* 179* 150* 140* 143*         Meds:   Scheduled Medication:  • Heparin Sodium (Porcine)  5,000 Units Subcutaneous Q8H [address]   • Levothyroxine Sodium  100 mcg Oral Before breakfast   • Pant hypotension  - apprec renal recommendations  - IVF    # hyponatremia - resolved  - improving with IVF    # h/o PE  - Per daughter, this was several years ago, she is unsure if was provoked or not, but has been on xarelto since that time  - will c/s hematol

## 2019-11-21 PROCEDURE — 99232 SBSQ HOSP IP/OBS MODERATE 35: CPT | Performed by: INTERNAL MEDICINE

## 2019-11-21 RX ORDER — LEVOTHYROXINE SODIUM 0.12 MG/1
125 TABLET ORAL
Status: DISCONTINUED | OUTPATIENT
Start: 2019-11-22 | End: 2019-11-30

## 2019-11-21 RX ORDER — AMOXICILLIN AND CLAVULANATE POTASSIUM 875; 125 MG/1; MG/1
1 TABLET, FILM COATED ORAL 2 TIMES DAILY
Qty: 20 TABLET | Refills: 0 | Status: SHIPPED | OUTPATIENT
Start: 2019-11-21 | End: 2019-11-27

## 2019-11-21 NOTE — PROGRESS NOTES
[facility]  Nephrology Progress Note    Tuan Vuong Attending:  Brittney Barbosa MD       SUBJECTIVE:     Po intake very poor   3.4L outpt yesterday between urine, ostomy, LAMONT drain       PHYSICAL EXAM:     Vital Signs: /69 (BP Location: Right a 11/20/2019    EOSABS 0.23 11/20/2019    BASABS 0.08 11/20/2019    NEUT 75 11/20/2019    LYMPH 18 11/20/2019    MON 2 11/20/2019    EOS 3 11/20/2019    BASO 1 11/20/2019    NEPERCENT 67.4 11/16/2019    LYPERCENT 18.8 11/16/2019    MOPERCENT 7.3 11/16/2019 PRN  Piperacillin Sod-Tazobactam So (ZOSYN) 3.375 g in dextrose 5 % 100 mL ADD-vantage, 3.375 g, Intravenous, Q8H  HYDROmorphone HCl (DILAUDID) 1 MG/ML injection 0.2 mg, 0.2 mg, Intravenous, Q30 Min PRN    Or  HYDROmorphone HCl (DILAUDID) 1 MG/ML injection as well. There is no evidence of obstruction as contrast material seen within the ostomy bag. No pneumatosis is seen. There are a few locules of free intraperitoneal air   which may be related to postoperative change.   No evidence of retroperitoneal hem primary     Hypoalbuminemia  -- increase protein intake    Hypomg/HypoK  -- replete     Status post colectomy, status post ureteral injury with reimplantation  As per surgical team and urology respectively    MD Azeem Lott pt

## 2019-11-21 NOTE — CONSULTS
[facility]  Report of Inpatient Ostomy Consultation     Farazdebora TORO Yevgeniy Patient Status:  Inpatient    1935 MRN [id number]   [facility] 3NW-A 308/308-A Attending Jesse Bennett MD   Hosp Day # 10 PCP Yesika Cardoso MD       REASON F Drug use: No      ASSESSMENT:    Stoma location: RLQ  Stoma type: ileostomy   Stoma color: pink  Stoma condition: edematous  Stoma diameter: 1 1/2\"  Ostomy output: semi-formed stool  Stoma height: adequate  Peristomal skin: intact  Pouching system:one pie [address] [facility] [phone]  11/21/2019  3:45 PM

## 2019-11-21 NOTE — CM/SW NOTE
CM received a call from patients son. He would like patient to discharge to [facility].

## 2019-11-21 NOTE — PLAN OF CARE
Patient is alert and oriented x3  Up with SBA +walker. Patient did not want to ambulate in hallway. Niño draining clear yellow urine. Ostomy draining. [address] @bedside with son to go over ostomy teaching.    LAMONT with large amounts of straw colored output  I techniques  - Monitor for opioid side effects  - Notify MD/LIP if interventions unsuccessful or patient reports new pain  - Anticipate increased pain with activity and pre-medicate as appropriate  Outcome: Progressing     Problem: SAFETY ADULT - FALL  Goal post-discharge preferences of patient/family/discharge partner  - Complete POLST form as appropriate  - Assess patient's ability to be responsible for managing their own health  - Refer to Case Management Department for coordinating discharge planning if t

## 2019-11-21 NOTE — PLAN OF CARE
Problem: Diabetes/Glucose Control  Goal: Glucose maintained within prescribed range  Description  INTERVENTIONS:  - Monitor Blood Glucose as ordered  - Assess for signs and symptoms of hyperglycemia and hypoglycemia  - Administer ordered medications to m Identify cognitive and physical deficits and behaviors that affect risk of falls.   - [place] fall precautions as indicated by assessment.  - Educate pt/family on patient safety including physical limitations  - Instruct pt to call for assistance with act strategies to overcome with those who interact with patient  Outcome: Progressing     Problem: GASTROINTESTINAL - ADULT  Goal: Minimal or absence of nausea and vomiting  Description  INTERVENTIONS:  - Maintain adequate hydration with IV or PO as ordered an mobility/gait  Description  Interventions:  - Assess patient's functional ability and stability  - Promote increasing activity/tolerance for mobility and gait  - Educate and engage patient/family in tolerated activity level and precautions  - Recommend use

## 2019-11-21 NOTE — CM/SW NOTE
KENN received a call from patients daughter Aidan Gilbert. Aidan Gilbert states she is no longer able to be the power of . She is going to speak with her brother regarding this.   Aidan Gilbert states she will not be making the decision as to what MARCELLA her mother goes to

## 2019-11-21 NOTE — PROGRESS NOTES
[facility] Hospitalist Progress Note     Tuan Vuong Patient Status:  Inpatient    1935 MRN [id number]   [facility] 4SW-A Attending Roe Davidson MD   Hosp Day # 10 PCP Savanah Addison MD     CC: follow up    SUBJECTIVE:  Doing well, no comp ALB 1.5* 1.5* 1.4* 1.8* 1.6*   LDH  --   --   --  173  --        Recent Labs   Lab 11/20/19  1255 11/20/19  1636 11/20/19  2111 11/21/19  0912 11/21/19  1217   PGLU 143* 210* 140* 159* 232*         Meds:   Scheduled Medication:  • Heparin Sodium (Porcine with QID accuchecks    # ANTONIO - improving- though increase in creat noted today  - s/p intra-op hypotension  - apprec renal recommendations  - IVF    # hyponatremia - resolved  - improving with IVF    # h/o PE  - Per daughter, this was several years ago, sh

## 2019-11-21 NOTE — CM/SW NOTE
[address] [place] to let them know pt/family has chosen them for MAR[facility]A. Spoke with Molly Kwon who said they will be ready for pt tomorrow when pt is scheduled to be d/c'd.

## 2019-11-21 NOTE — OCCUPATIONAL THERAPY NOTE
OCCUPATIONAL THERAPY TREATMENT NOTE - INPATIENT     Room Number: 166/177-J  Session: 1  Number of Visits to Meet Established Goals: 5    Presenting Problem: hyponatremia, abd pain, s/p expl lap with colectomy and ileostomy creation on 11/12/19     History PHACOEMULSIFICATION OF CATARACT WITH INTRAOCULAR LENS IMPLANT 38091 Right 4/26/2017    Performed by Meryle Guarneri, MD at [place] [address] Left 11/12/2019    Performed by Shiraz Wiley MD at [place] [address] remain standing. Patient instructed in reaching out of base of support and crossing midline while standing to challenge balance. Patient returned to sitting EOB and declined going to the chair despite encouragement.   Patient returned to supine with SBA

## 2019-11-21 NOTE — PROGRESS NOTES
[facility]  Nephrology Progress Note    Tuan Vuong Attending:  Valente Christopher MD       SUBJECTIVE:     Po intake still very poor   3.7L outpt yesterday between urine, ostomy, LAMONT drain   Cr bumped      PHYSICAL EXAM:     Vital Signs: /74 (BP NEUTABS 5.85 11/20/2019    LYMPHABS 1.39 11/20/2019    EOSABS 0.23 11/20/2019    BASABS 0.08 11/20/2019    NEUT 75 11/20/2019    LYMPH 18 11/20/2019    MON 2 11/20/2019    EOS 3 11/20/2019    BASO 1 11/20/2019    NEPERCENT 67.4 11/16/2019    LYPERCENT 18.8 MG/ML injection 0.08 mg, 0.08 mg, Intravenous, Q5 Min PRN  Piperacillin Sod-Tazobactam So (ZOSYN) 3.375 g in dextrose 5 % 100 mL ADD-vantage, 3.375 g, Intravenous, Q8H  HYDROmorphone HCl (DILAUDID) 1 MG/ML injection 0.2 mg, 0.2 mg, Intravenous, Q30 Min PRN intravenous contrast.  No oral contrast present in this region as well. There is no evidence of obstruction as contrast material seen within the ostomy bag. No pneumatosis is seen.   There are a few locules of free intraperitoneal air   which may be relat change fluids to bicarb.  Check lactate to ensure    Anemia   - getting PRBC 11/18 and 11/19  - blood transfusions per primary     Hypoalbuminemia  -- increase protein intake    Hypomg/HypoK  -- replete     Status post colectomy, status post ureteral injury

## 2019-11-21 NOTE — PROGRESS NOTES
[facility]  Progress Note    Tuan Vuong Patient Status:  Inpatient    1935 MRN [id number]   [facility] 3NW-A Attending Donn Perera MD   Hosp Day # 10 PCP Orinda Carrel, MD     Subjective:  Spoke with patient via  li Hyponatremia     Anemia     Hyperglycemia     Abdominal pain, acute     Abdominal distension     Fecal impaction of colon (HCC)     Intestinal obstruction, unspecified cause, unspecified whether partial or complete (HCC)     Lactic acidosis     Essential

## 2019-11-21 NOTE — PROGRESS NOTES
[facility]  Urology Progress Note    Tuan Vuong Patient Status:  Inpatient    1935 MRN [id number]   [facility] 3NW-A Attending Sabine Mckay MD   Hosp Day # 10 PCP Savanah Addison MD     Subjective:  Leatha Morin is a(n) 81 ye weeks post-op, cystogram prior to removal.    Will eventually need ureteral stent removed - ~6 weeks post-op     Further management per general surgery.     Angy Colin P.A.-C  [facility] Urology  11/21/2019  9:10 AM

## 2019-11-21 NOTE — PROGRESS NOTES
[address] [facility] 149 Follow Up    Leoncio Arroyo  [id number]  Hospital Day #10  Date of Consult: 11/13/19  Patient seen at: [facility] Room 308    Subjective:      Patient was seen and examined with no one else at the bedside.    I reviewed Naloxone HCl (NARCAN) 0.4 MG/ML injection 0.08 mg, 0.08 mg, Intravenous, Q5 Min PRN  •  Piperacillin Sod-Tazobactam So (ZOSYN) 3.375 g in dextrose 5 % 100 mL ADD-vantage, 3.375 g, Intravenous, Q8H  •  HYDROmorphone HCl (DILAUDID) 1 MG/ML injection 0.2 mg, SpO2 97%   BMI 29.02 kg/m²     General: awake and in no apparent respiratory distress.  Up in chair, appears comfortable    Palliative Performance Scale:     Palliative Performance Scale   % Ambulation Activity Level Self-Care Intake Consciousness   100 Fu Recommendations/Plan:     1. Ongoing goals of care discussion: continue with current medical tx's - GOC are established.     2. Advance Care Planning:   - CODE STATUS:  FULL CODE STATUS  - POLST: deferred  - Healthcare TORI / Tomas surrogate: Elinor Harden her son

## 2019-11-22 NOTE — PROGRESS NOTES
[facility]  Urology Progress Note    Tuan Vuong Patient Status:  Inpatient    1935 MRN [id number]   [facility] 3NW-A Attending Rojas Stratton MD   Hosp Day # 11 PCP Ebonie Thompson MD     Subjective:  Steff Renae is a(n) 81 ye surgery.     Wilfredo Mcrae P.A.-C  [facility] Urology  11/22/2019  9:51 AM

## 2019-11-22 NOTE — PROGRESS NOTES
[address] [place]  Nephrology Progress Note    Tuan Vuong Attending:  Brittney Barbosa MD       SUBJECTIVE:     Po intake still very poor -- refusing to eat per nurse, had zero lunch  3.2L outpt yesterday between urine, ostomy, LAMONT drain   Cr bumped day bef 33.5 11/22/2019    RDW 16.0 (H) 11/22/2019    NEPRELIM 4.41 11/22/2019    NEUTABS 5.85 11/20/2019    LYMPHABS 1.39 11/20/2019    EOSABS 0.23 11/20/2019    BASABS 0.08 11/20/2019    NEUT 75 11/20/2019    LYMPH 18 11/20/2019    MON 2 11/20/2019    EOS 3 11/2 mg, Oral, Nightly PRN  Naloxone HCl (NARCAN) 0.4 MG/ML injection 0.08 mg, 0.08 mg, Intravenous, Q5 Min PRN  Piperacillin Sod-Tazobactam So (ZOSYN) 3.375 g in dextrose 5 % 100 mL ADD-vantage, 3.375 g, Intravenous, Q8H  HYDROmorphone HCl (DILAUDID) 1 MG/ML i quadrant (series 3,   image 72) without intravenous contrast.  No oral contrast present in this region as well. There is no evidence of obstruction as contrast material seen within the ostomy bag. No pneumatosis is seen.   There are a few locules of free worsening, may need to be started on sodium bicarb supplementation or change fluids to bicarb.     Anemia   - getting PRBC 11/18 and 11/19  - blood transfusions per primary     Hypoalbuminemia  -- increase protein intake    Hypomg/HypoK  -- replete     Stat

## 2019-11-22 NOTE — DIETARY NOTE
[facility] [facility] ASSESSMENT    Pt meets moderate (acute) malnutrition criteria.       NUTRITION DIAGNOSIS/PROBLEM:    Malnutrition related to inability to consume sufficient PO as evidenced by decreased appetite, PO intake <75% for > 7 (180 lb)    NUTRITION:  Diet: 2500 kca CHO controlled  Oral Supplements: Ensure HP TID    FOOD/NUTRITION RELATED HISTORY:PTA  Appetite: Fair  Intake: 50-75%  Intake Meeting Needs: Marginal, added supplements  Food Allergies: No  Cultural/Ethnic/Protestant P

## 2019-11-22 NOTE — PLAN OF CARE
Problem: Diabetes/Glucose Control  Goal: Glucose maintained within prescribed range  Description  INTERVENTIONS:  - Monitor Blood Glucose as ordered  - Assess for signs and symptoms of hyperglycemia and hypoglycemia  - Administer ordered medications to m Identify cognitive and physical deficits and behaviors that affect risk of falls.   - Streeter fall precautions as indicated by assessment.  - Educate pt/family on patient safety including physical limitations  - Instruct pt to call for assistance with act strategies to overcome with those who interact with patient  Outcome: Progressing     Problem: GASTROINTESTINAL - ADULT  Goal: Minimal or absence of nausea and vomiting  Description  INTERVENTIONS:  - Maintain adequate hydration with IV or PO as ordered an mobility/gait  Description  Interventions:  - Assess patient's functional ability and stability  - Promote increasing activity/tolerance for mobility and gait  - Educate and engage patient/family in tolerated activity level and precautions  - Recommend use

## 2019-11-22 NOTE — CM/SW NOTE
Pt not ready for d/c today. Pt has too much LAMONT drain output. SW to call [facility] when she is ready for d/c.

## 2019-11-22 NOTE — CONSULTS
[facility]  Report of Consultation    Tuan Vuong Patient Status:  Inpatient    1935 MRN [id number]   [facility] 3NW-A Attending Edmar Whipple MD   Hosp Day # 6 PCP Daniel Moran MD     Reason for Consultation:  Anemia  Antico II or unspecified type diabetes mellitus without mention of complication, not stated as uncontrolled    • Vision loss     need cataract surgery     Past Surgical History:   Procedure Laterality Date   •      • COLECTOMY N/A 2019    Performed Units, 2-10 Units, Subcutaneous, TID CC and HS  •  Metoclopramide HCl (REGLAN) injection 5 mg, 5 mg, Intravenous, Q8H PRN  •  0.9% NaCl infusion, , Intravenous, Continuous  •  diphenhydrAMINE (BENADRYL) cap/tab 25 mg, 25 mg, Oral, Nightly PRN  •  Naloxone 11/22/2019    HCT 24.2 11/22/2019    .0 11/22/2019    CREATSERUM 1.26 11/22/2019    BUN 11 11/22/2019     11/22/2019    K 4.1 11/22/2019     11/22/2019    CO2 19.0 11/22/2019     11/22/2019    CA 8.5 11/22/2019    ALB 1.7 11/22/20 caliber. No evidence of portal venous gas. Ischemic bowel cannot be excluded. There is an abrupt caliber change involving the mid sigmoid colon. This may be secondary to underlying stricture. Mass cannot entirely be excluded.   Stable intrahepatic and Fecal impaction of colon ([facility] [address]     Intestinal obstruction, unspecified cause, unspecified whether partial or complete (HCC)     Lactic acidosis     Essential hypertension     Hypo-osmolar hyponatremia     Counseling regarding advance care planning and goal

## 2019-11-22 NOTE — PLAN OF CARE
Patient has very little appetite, refusing food, has high LAMONT drain output, and family has not made a decision about rehab services after discharge.    Problem: Diabetes/Glucose Control  Goal: Glucose maintained within prescribed range  Description  INTERVEN injury  Description  INTERVENTIONS:  - Assess pt frequently for physical needs  - Identify cognitive and physical deficits and behaviors that affect risk of falls.   - [place] fall precautions as indicated by assessment.  - Educate pt/family on patient sa Progressing  Goal: Maintains or returns to baseline bowel function  Description  INTERVENTIONS:  - Assess bowel function  - Maintain adequate hydration with IV or PO as ordered and tolerated  - Evaluate effectiveness of GI medications  - Encourage mobiliza

## 2019-11-22 NOTE — PROGRESS NOTES
11/22/19 8902   Clinical Encounter Type   Visited With   (Patient with  on [address])   Referral To Nurse  (Patient would like her son to be present for completion of HPOA form.  Please page a  at pager [phone] when her son arrives.  )

## 2019-11-22 NOTE — PLAN OF CARE
RN consulted emily for advance directives. Patient does not want to discuss today. When son is here, RN to call emily to discuss advance directives.

## 2019-11-22 NOTE — PROGRESS NOTES
[facility] Hospitalist Progress Note     Tuan Vuong Patient Status:  Inpatient    1935 MRN [id number]   [facility] 4SW-A Attending Octavia Guillory MD   Hosp Day # 6 PCP Jacky Talbot MD     CC: follow up    SUBJECTIVE:  Very poor PO      O AST  --  12*  --   --    ALB 1.4* 2.23*  1.8* 1.6* 1.7*   LDH  --  173  --   --        Recent Labs   Lab 11/21/19  1217 11/21/19  1651 11/21/19  2140 11/22/19  0722 11/22/19  1201   PGLU 232* 130* 151* 146* 123*         Meds:   Scheduled Medication:  • L source of blood loss  - Heme was consulted    # DMII  - ha1C 6.8%  - -160s  - SSI with QID accuchecks    # ANTONIO - improving- though increase in creat noted when off IVFs.  Renal following- will trial off IVF again to see if she tolerates- if not we manohar

## 2019-11-23 PROBLEM — E46 MALNUTRITION (HCC): Status: ACTIVE | Noted: 2019-11-23

## 2019-11-23 RX ORDER — ALBUMIN, HUMAN INJ 5% 5 %
250 SOLUTION INTRAVENOUS EVERY 6 HOURS
Status: COMPLETED | OUTPATIENT
Start: 2019-11-23 | End: 2019-11-24

## 2019-11-23 RX ORDER — MAGNESIUM HYDROXIDE/ALUMINUM HYDROXICE/SIMETHICONE 120; 1200; 1200 MG/30ML; MG/30ML; MG/30ML
30 SUSPENSION ORAL 4 TIMES DAILY PRN
Status: DISCONTINUED | OUTPATIENT
Start: 2019-11-23 | End: 2019-11-30

## 2019-11-23 RX ORDER — CALCIUM CARBONATE 200(500)MG
500 TABLET,CHEWABLE ORAL
Status: DISCONTINUED | OUTPATIENT
Start: 2019-11-23 | End: 2019-11-30

## 2019-11-23 NOTE — PROGRESS NOTES
[facility]  Urology Progress Note    Tuan Vuong Patient Status:  Inpatient    1935 MRN [id number]   [facility] 3NW-A Attending Manisha Heath MD   Hosp Day # 12 PCP Luis Dumont MD     Subjective:  Maral Mclaughlin is a(n) 81 ye peripherally.      Shae Garrido PA-C  [facility] Urology

## 2019-11-23 NOTE — PLAN OF CARE
Problem: Diabetes/Glucose Control  Goal: Glucose maintained within prescribed range  Description  INTERVENTIONS:  - Monitor Blood Glucose as ordered  - Assess for signs and symptoms of hyperglycemia and hypoglycemia  - Administer ordered medications to m Identify cognitive and physical deficits and behaviors that affect risk of falls.   - [place] fall precautions as indicated by assessment.  - Educate pt/family on patient safety including physical limitations  - Instruct pt to call for assistance with act strategies to overcome with those who interact with patient  Outcome: Progressing     Problem: GASTROINTESTINAL - ADULT  Goal: Minimal or absence of nausea and vomiting  Description  INTERVENTIONS:  - Maintain adequate hydration with IV or PO as ordered an mobility/gait  Description  Interventions:  - Assess patient's functional ability and stability  - Promote increasing activity/tolerance for mobility and gait  - Educate and engage patient/family in tolerated activity level and precautions  - Recommend use

## 2019-11-23 NOTE — PROGRESS NOTES
[facility]    Nephrology Progress Note    Tuan Vuong Attending:  Tico Angeles MD       SUBJECTIVE:     Used video language line to obtain history  She endorses good PO intake and no abd pain or urinary complaints but notes suggests she has poo Component Value Date    WBC 6.5 11/22/2019    RBC 2.81 (L) 11/22/2019    HGB 8.1 (L) 11/22/2019    HCT 24.2 (L) 11/22/2019    .0 11/22/2019    MCV 86.1 11/22/2019    MCH 28.8 11/22/2019    MCHC 33.5 11/22/2019    RDW 16.0 (H) 11/22/2019    NEPRELI Units, 2-10 Units, Subcutaneous, TID CC and HS  Metoclopramide HCl (REGLAN) injection 5 mg, 5 mg, Intravenous, Q8H PRN  diphenhydrAMINE (BENADRYL) cap/tab 25 mg, 25 mg, Oral, Nightly PRN  Naloxone HCl (NARCAN) 0.4 MG/ML injection 0.08 mg, 0.08 mg, Intraven - secondary to increase in ileostomy output  - HCO3 22 today, monitor    Anemia   - s/p PRBC this admission  - blood transfusions per primary      Hypoalbuminemia  -- increase protein intake  -- IV albumin per above    Hypomg/HypoK  -- replete prn

## 2019-11-23 NOTE — PLAN OF CARE
Patient verbalized desire to go to the chair. Patient has eaten very little, and still refuses food. Patient and family are not discharge ready.   Problem: Diabetes/Glucose Control  Goal: Glucose maintained within prescribed range  Description  INTERVENTION Progressing     Problem: SAFETY ADULT - FALL  Goal: Free from fall injury  Description  INTERVENTIONS:  - Assess pt frequently for physical needs  - Identify cognitive and physical deficits and behaviors that affect risk of falls.   - [place] fall precaut tolerated  - Evaluate effectiveness of GI medications  - Encourage mobilization and activity  - Obtain nutritional consult as needed  - Establish a toileting routine/schedule  - Consider collaborating with pharmacy to review patient's medication profile  O

## 2019-11-23 NOTE — PROGRESS NOTES
[facility]  Progress Note    Farazdebora TORO Isidrodel Patient Status:  Inpatient    1935 MRN [id number]   [facility] 3NW-A Attending Jesse Bennett MD   Hosp Day # 12 PCP Yesika Cardoso MD     Subjective:  Resting comfortably.   Poor oral [employer]

## 2019-11-23 NOTE — PROGRESS NOTES
Assumed care of pt resting in bed a&ox4, vss, afebrile. Breathing easily on RA. Lungs CTA but diminished in bases. Heart RRR at 80. Abd soft with present bowel sounds throughout. Right stoma beefy pink with small brown stool and gas in colostomy bag.  [place]

## 2019-11-23 NOTE — PLAN OF CARE
Patient refused lunch. Son brought food from home, had 3-4 grapes. Discussed with son, son will encourage patient to try small meals. POC discussed with son, all questions and concerns addressed.

## 2019-11-24 ENCOUNTER — APPOINTMENT (OUTPATIENT)
Dept: ULTRASOUND IMAGING | Facility: HOSPITAL | Age: 84
DRG: 330 | End: 2019-11-24
Attending: INTERNAL MEDICINE
Payer: MEDICARE

## 2019-11-24 PROCEDURE — 93970 EXTREMITY STUDY: CPT | Performed by: INTERNAL MEDICINE

## 2019-11-24 RX ORDER — ALBUMIN, HUMAN INJ 5% 5 %
250 SOLUTION INTRAVENOUS EVERY 6 HOURS
Status: COMPLETED | OUTPATIENT
Start: 2019-11-24 | End: 2019-11-25

## 2019-11-24 NOTE — PROGRESS NOTES
[facility]  Progress Note    Farazdebora TORO Yevgeniy Patient Status:  Inpatient    1935 MRN [id number]   [facility] 3NW-A Attending Nuzhat Logan MD   Hosp Day # 15 PCP Denys Herbert MD     Subjective:  More out ostomy, po intake marginal

## 2019-11-24 NOTE — PLAN OF CARE
Pt is A&O x4. Nepali speaking, attempted to call sister for interpretation per pt preference but no answer. Used iPad  for translation of subjective assessment information. VSS and afebrile.  O2 sats WNL on room air, lung sounds sounds clear, dim Goal  Description  Patient's Short Term Goal: have less pain     Interventions:   - Report pain to care giver   -PCA Dilaudid    - See additional Care Plan goals for specific interventions    Outcome: Progressing     Problem: PAIN - ADULT  Goal: [employer] learning needs (meds, wound care, etc)  - Arrange for interpreters to assist at discharge as needed  - Consider post-discharge preferences of patient/family/discharge partner  - Complete POLST form as appropriate  - Assess patient's ability to be responsib Obtain nutritional consult as needed  - Establish a toileting routine/schedule  - Consider collaborating with pharmacy to review patient's medication profile  Outcome: Progressing  Goal: Maintains adequate nutritional intake (undernourished)  Description

## 2019-11-24 NOTE — PLAN OF CARE
Problem: Diabetes/Glucose Control  Goal: Glucose maintained within prescribed range  Description  INTERVENTIONS:  - Monitor Blood Glucose as ordered  - Assess for signs and symptoms of hyperglycemia and hypoglycemia  - Administer ordered medications to m Identify cognitive and physical deficits and behaviors that affect risk of falls.   - [place] fall precautions as indicated by assessment.  - Educate pt/family on patient safety including physical limitations  - Instruct pt to call for assistance with act strategies to overcome with those who interact with patient  Outcome: Progressing     Problem: GASTROINTESTINAL - ADULT  Goal: Minimal or absence of nausea and vomiting  Description  INTERVENTIONS:  - Maintain adequate hydration with IV or PO as ordered an mobility/gait  Description  Interventions:  - Assess patient's functional ability and stability  - Promote increasing activity/tolerance for mobility and gait  - Educate and engage patient/family in tolerated activity level and precautions  - Recommend use

## 2019-11-24 NOTE — PROGRESS NOTES
[facility]    Nephrology Progress Note    Tuan Vuong Attending:  Saqib Warner MD       SUBJECTIVE:     Video language line used to obtain history  Continues to have high drainage from LAMONT  No pain or issues with albert    PHYSICAL EXAM:     Josephine HCT 24.2 (L) 11/22/2019    .0 11/22/2019    MCV 86.1 11/22/2019    MCH 28.8 11/22/2019    MCHC 33.5 11/22/2019    RDW 16.0 (H) 11/22/2019    NEPRELIM 4.41 11/22/2019    NEUTABS 5.85 11/20/2019    LYMPHABS 1.39 11/20/2019    EOSABS 0.23 11/20/2019 (TYLENOL) tab 650 mg, 650 mg, Oral, Q6H PRN  Insulin Aspart Pen (NOVOLOG) 100 UNIT/ML flexpen 2-10 Units, 2-10 Units, Subcutaneous, TID CC and HS  Metoclopramide HCl (REGLAN) injection 5 mg, 5 mg, Intravenous, Q8H PRN  diphenhydrAMINE (BENADRYL) cap/tab 25 are higher today, may need to start amlodipine tomorrow pending trends  - albumin per above     Hypophosphatemia   - replace as needed and monitor      Acidosis    - secondary to increase in ileostomy output  - HCO3 23 today, monitor     Anemia   - s/p PRB

## 2019-11-24 NOTE — PROGRESS NOTES
C/o \"chest pressure\" after drinking enlive shake-pt drank very fast. Tums provided w/o relief. Pt also crying \"upset my sister or brother won't come see me\". B/P 115/52, HR 86 RRR, denies LIBBY. EKG done. MD notified. See orders.

## 2019-11-24 NOTE — PROGRESS NOTES
[facility] Hospitalist Progress Note     Tuan Vuong Patient Status:  Inpatient    1935 MRN [id number]   [facility] 4SW-A Attending Nader Seymour MD   Hosp Day # 15 PCP Rashawn Cantu MD     CC: follow up    SUBJECTIVE:  Had some chest disc --   --   --    ALB 2.23*  1.8* 1.6* 1.7* 1.8* 2.4*     --   --   --   --        Recent Labs   Lab 11/23/19  1129 11/23/19  1637 11/23/19  2031 11/24/19  0836 11/24/19  1304   PGLU 167* 154* 169* 131* 167*         Meds:   Scheduled Medication:  • ri consulted    # DMII  - ha1C 6.8%  - -160s  - SSI with QID accuchecks    # ANTONIO - improving- though increase in creat noted when off IVFs. Renal following.  Albumin per renal  - Push PO    # hyponatremia - resolved    # h/o PE  - Per daughter, this was

## 2019-11-24 NOTE — CM/SW NOTE
SW informed RN that pt has been accepted for admission to [facility]- await medical clearance for dc.     Anthony Rivas LCSW  /Discharge Planner  [phone]

## 2019-11-24 NOTE — PLAN OF CARE
Pt taken to ultrasound by transport at 0456.  Returned to room from ultrasound at 0600 in stable condition

## 2019-11-25 RX ORDER — POTASSIUM CHLORIDE 20 MEQ/1
40 TABLET, EXTENDED RELEASE ORAL EVERY 4 HOURS
Status: COMPLETED | OUTPATIENT
Start: 2019-11-25 | End: 2019-11-25

## 2019-11-25 RX ORDER — MAGNESIUM OXIDE 400 MG (241.3 MG MAGNESIUM) TABLET
800 TABLET ONCE
Status: COMPLETED | OUTPATIENT
Start: 2019-11-25 | End: 2019-11-25

## 2019-11-25 NOTE — CM/SW NOTE
Interdisciplinary Rounds: 11/25/19  Admitted: 11/11/2019 LOS: 14  Disciplines in attendance: charge nurse, staff nurse, CM, [address] and discharge plan reviewed. Active issues needing resolution prior to discharge: Pt is clear to dc per surgery.

## 2019-11-25 NOTE — PROGRESS NOTES
[facility]  Progress Note    Farazdebora TORO Yevgeniy Patient Status:  Inpatient    1935 MRN [id number]   [facility] 3NW-A Attending Marlena Danielson MD   Hosp Day # 15 PCP Eve Khan MD     Subjective:    No new complaints      Objective: 10.4 (L)   11/12/2019      3:14 PM 10.5 (L)   11/12/2019      3:28 AM 11.2 (L)   11/11/2019       12.0   4/18/2018       11.7 (L)   4/17/2018       11.7 (L)   3/16/2018       12.7   3/15/2018       12.7   3/13/2018       12.9   12/27/2017       12.7

## 2019-11-25 NOTE — PROGRESS NOTES
[facility]    Nephrology Progress Note    Tuan Vuong Attending:  Aditi Daley MD       SUBJECTIVE:     LAMONT removed - having high output from the site   No pain or issues with albert    PHYSICAL EXAM:     Vital Signs: /76 (BP Location: [place] 11/22/2019    MCV 86.1 11/22/2019    MCH 28.8 11/22/2019    MCHC 33.5 11/22/2019    RDW 16.0 (H) 11/22/2019    NEPRELIM 4.41 11/22/2019    NEUTABS 5.85 11/20/2019    LYMPHABS 1.39 11/20/2019    EOSABS 0.23 11/20/2019    BASABS 0.08 11/20/2019    NEUT 75 11 PRN  Insulin Aspart Pen (NOVOLOG) 100 UNIT/ML flexpen 2-10 Units, 2-10 Units, Subcutaneous, TID CC and HS  Metoclopramide HCl (REGLAN) injection 5 mg, 5 mg, Intravenous, Q8H PRN  diphenhydrAMINE (BENADRYL) cap/tab 25 mg, 25 mg, Oral, Nightly PRN  Naloxone intake     Hypomg/HypoK  -- replete prn      [facility]

## 2019-11-25 NOTE — PLAN OF CARE
Problem: Diabetes/Glucose Control  Goal: Glucose maintained within prescribed range  Description  INTERVENTIONS:  - Monitor Blood Glucose as ordered  - Assess for signs and symptoms of hyperglycemia and hypoglycemia  - Administer ordered medications to m Identify cognitive and physical deficits and behaviors that affect risk of falls.   - [place] fall precautions as indicated by assessment.  - Educate pt/family on patient safety including physical limitations  - Instruct pt to call for assistance with act strategies to overcome with those who interact with patient  Outcome: Progressing     Problem: GASTROINTESTINAL - ADULT  Goal: Minimal or absence of nausea and vomiting  Description  INTERVENTIONS:  - Maintain adequate hydration with IV or PO as ordered an mobility/gait  Description  Interventions:  - Assess patient's functional ability and stability  - Promote increasing activity/tolerance for mobility and gait  - Educate and engage patient/family in tolerated activity level and precautions  - Recommend use

## 2019-11-25 NOTE — PLAN OF CARE
Problem: Diabetes/Glucose Control  Goal: Glucose maintained within prescribed range  Description  INTERVENTIONS:  - Monitor Blood Glucose as ordered  - Assess for signs and symptoms of hyperglycemia and hypoglycemia  - Administer ordered medications to m Identify cognitive and physical deficits and behaviors that affect risk of falls.   - [place] fall precautions as indicated by assessment.  - Educate pt/family on patient safety including physical limitations  - Instruct pt to call for assistance with act strategies to overcome with those who interact with patient  Outcome: Progressing     Problem: GASTROINTESTINAL - ADULT  Goal: Minimal or absence of nausea and vomiting  Description  INTERVENTIONS:  - Maintain adequate hydration with IV or PO as ordered an mobility/gait  Description  Interventions:  - Assess patient's functional ability and stability  - Promote increasing activity/tolerance for mobility and gait  - Educate and engage patient/family in tolerated activity level and precautions  - Recommend use

## 2019-11-25 NOTE — PROGRESS NOTES
Pt is Oriented to situation/place. Bed alarm on for safety. Up x 2 with cane. Missing teeth. Midline incision with staples open to air clean dry and intact. Ileostomy with stool and gas, appliance intact. Scotty drain with scant output to bulb suction.  Poor ap

## 2019-11-25 NOTE — PROGRESS NOTES
[facility]  Progress Note    Tuan Vuong Patient Status:  Inpatient    1935 MRN [id number]   [facility] 3NW-A Attending Felix Dawkins MD   Hosp Day # 15 PCP Roma Rosas MD     Subjective:    Spoke with patient via the transla hyponatremia     Counseling regarding advance care planning and goals of care     Palliative care encounter     Malnutrition [facility])      Impression:     82 y/o S/P: subtotal colectomy, ileostomy 11/12/19  -LAMONT output decreased 120cc/24hr  -ostomy with stool

## 2019-11-26 RX ORDER — ALBUMIN (HUMAN) 12.5 G/50ML
25 SOLUTION INTRAVENOUS ONCE
Status: COMPLETED | OUTPATIENT
Start: 2019-11-26 | End: 2019-11-26

## 2019-11-26 RX ORDER — SODIUM CHLORIDE 9 MG/ML
INJECTION, SOLUTION INTRAVENOUS CONTINUOUS
Status: DISCONTINUED | OUTPATIENT
Start: 2019-11-26 | End: 2019-11-30

## 2019-11-26 NOTE — PLAN OF CARE
Problem: Diabetes/Glucose Control  Goal: Glucose maintained within prescribed range  Description  INTERVENTIONS:  - Monitor Blood Glucose as ordered  - Assess for signs and symptoms of hyperglycemia and hypoglycemia  - Administer ordered medications to m Identify cognitive and physical deficits and behaviors that affect risk of falls.   - [place] fall precautions as indicated by assessment.  - Educate pt/family on patient safety including physical limitations  - Instruct pt to call for assistance with act strategies to overcome with those who interact with patient  Outcome: Progressing     Problem: GASTROINTESTINAL - ADULT  Goal: Minimal or absence of nausea and vomiting  Description  INTERVENTIONS:  - Maintain adequate hydration with IV or PO as ordered an mobility/gait  Description  Interventions:  - Assess patient's functional ability and stability  - Promote increasing activity/tolerance for mobility and gait  - Educate and engage patient/family in tolerated activity level and precautions  - Recommend use

## 2019-11-26 NOTE — PROGRESS NOTES
[facility]  Progress Note    Tuan Vuong Patient Status:  Inpatient    1935 MRN [id number]   [facility] 3NW-A Attending Lisa Khan MD   Hosp Day # 15 PCP Lucinda Pace MD     Subjective:    Patient not taking in much PO or complete ([facility] [address]     Lactic acidosis     Essential hypertension     Hypo-osmolar hyponatremia     Counseling regarding advance care planning and goals of care     Palliative care encounter     Malnutrition [facility])      Impression:     80 y/o S/P: subtotal c

## 2019-11-26 NOTE — DIETARY MALNUTRITION NOTE
[facility] [facility] ASSESSMENT    Pt meets moderate (acute) malnutrition criteria.       NUTRITION DIAGNOSIS/PROBLEM:    Malnutrition related to inability to consume sufficient PO as evidenced by decreased appetite, PO intake <75% for > 7 (165 lb)  12/20/18 : 72.6 kg (160 lb)  09/10/18 : 72.8 kg (160 lb 9.6 oz)  04/17/18 : 73.6 kg (162 lb 4.8 oz)  03/13/18 : 81.6 kg (180 lb)    NUTRITION:  Diet: 2500 kca CHO controlled  Oral Supplements: Ensure HP TID    FOOD/NUTRITION RELATED HISTORY:PTA

## 2019-11-26 NOTE — PHYSICAL THERAPY NOTE
PHYSICAL THERAPY TREATMENT NOTE - INPATIENT    Room Number: 552/630-B     Session: 3  Number of Visits to Meet Established Goals: 5    Presenting Problem: colonic obstruction s/p subtotal colectomy/ileostomy 11/12/19      History related to current admiss SURGERY   • OTHER SURGICAL HISTORY      RECONSTUCTION RT UPPER ARM   • REMOVAL GALLBLADDER     • RIGHT PHACOEMULSIFICATION OF CATARACT WITH INTRAOCULAR LENS IMPLANT [id number] Right 4/26/2017    Performed by Judy Cummings MD at [address] [place] [address] Assessment   Gait Assistance: Dependent assistance(per FIM distance - otherwise SBA)  Distance (ft): 12  Assistive Device: Rolling walker  Pattern: Shuffle(frequently leaning on bilateral forearms)  Stoop/Curb Assistance: Not tested  Comment : Pt able to w energy conservation. At this time, Pt. presents with decreased balance, impaired strength, difficulty with gait/transfers resulting in downgrade of overall functional mobility.   Due to above deficits, Pt will benefit from continued IP PT, so that patient

## 2019-11-26 NOTE — PROGRESS NOTES
[facility]    Nephrology Progress Note    Tuan Vuong Attending:  Steve Joshi MD       SUBJECTIVE:     Complaining of discomfort surrounding bladder  She has a albert  Bladder scan showed no urinary retention  No leg swelling  Son reports 11/26/2019    NEPRELIM 3.42 11/26/2019    NEUTABS 5.85 11/20/2019    LYMPHABS 1.39 11/20/2019    EOSABS 0.23 11/20/2019    BASABS 0.08 11/20/2019    NEUT 75 11/20/2019    LYMPH 18 11/20/2019    MON 2 11/20/2019    EOS 3 11/20/2019    BASO 1 11/20/2019    N HS  Metoclopramide HCl (REGLAN) injection 5 mg, 5 mg, Intravenous, Q8H PRN  diphenhydrAMINE (BENADRYL) cap/tab 25 mg, 25 mg, Oral, Nightly PRN  Naloxone HCl (NARCAN) 0.4 MG/ML injection 0.08 mg, 0.08 mg, Intravenous, Q5 Min PRN  Piperacillin Sod-Tazobactam [address]. Guanako Guerra, [address]    11/26/2019  11:58 AM

## 2019-11-26 NOTE — OCCUPATIONAL THERAPY NOTE
OCCUPATIONAL THERAPY TREATMENT NOTE - INPATIENT     Room Number: 649/070-D  Session: 2  Number of Visits to Meet Established Goals: 5    Presenting Problem: hyponatremia, abd pain, s/p expl lap with colectomy and ileostomy creation on 11/12/19     History RIGHT PHACOEMULSIFICATION OF CATARACT WITH INTRAOCULAR LENS IMPLANT 94714 Right 4/26/2017    Performed by Cailin Sheffield MD at [place] [id number] Left 11/12/2019    Performed by Evangelist Oden MD at [address] [place] [address] patient. Patient's son states they would be able to provide support/assist when patient returns home. Patient End of Session: Up in chair;Needs met;Call light within reach;RN aware of session/findings; All patient questions and concerns addressed; Alarm s

## 2019-11-26 NOTE — PROGRESS NOTES
Vss. Pt a&ox3. Pt Luxembourgish speaking but able to use  in room this am and son at bedside at this time Pt on ra with 02 sats wnl. Lungs cta. Pt denies difficulty breathing or sob. Hr sr on tele.  Pt denies n/v but states she does not have a great appe

## 2019-11-26 NOTE — PROGRESS NOTES
[facility] Hospitalist Progress Note     Tuan Vuong Patient Status:  Inpatient    1935 MRN [id number]   [facility] 4SW-A Attending Ronnie House MD   Hosp Day # 15 PCP Mark Perrin MD     CC: follow up    SUBJECTIVE:  Eating a little bit food   • Levothyroxine Sodium  125 mcg Oral Before breakfast   • Pantoprazole Sodium  40 mg Oral QAM AC   • Normal Saline Flush  10 mL Intravenous Q12H   • Insulin Aspart Pen  2-10 Units Subcutaneous TID CC and HS   • piperacillin-tazobactam  3.375 g Intra not, but has been on xarelto since that time  - Heme on consult re: ongoing AC recs- recommend restarting Xarelto when OK with surgery team. Now restarted- Hgb stable today.     # Left Leg Swelling  - US negative for dvt    # Hypothyroidism:  - TSH is high,

## 2019-11-27 ENCOUNTER — APPOINTMENT (OUTPATIENT)
Dept: GENERAL RADIOLOGY | Facility: HOSPITAL | Age: 84
DRG: 330 | End: 2019-11-27
Attending: PHYSICIAN ASSISTANT
Payer: MEDICARE

## 2019-11-27 PROCEDURE — 51600 INJECTION FOR BLADDER X-RAY: CPT | Performed by: PHYSICIAN ASSISTANT

## 2019-11-27 PROCEDURE — 74430 CONTRAST X-RAY BLADDER: CPT | Performed by: PHYSICIAN ASSISTANT

## 2019-11-27 RX ORDER — SUCRALFATE 1 G/1
2 TABLET ORAL
Status: DISCONTINUED | OUTPATIENT
Start: 2019-11-27 | End: 2019-11-30

## 2019-11-27 NOTE — PLAN OF CARE
Problem: Diabetes/Glucose Control  Goal: Glucose maintained within prescribed range  Description  INTERVENTIONS:  - Monitor Blood Glucose as ordered  - Assess for signs and symptoms of hyperglycemia and hypoglycemia  - Administer ordered medications to m Identify cognitive and physical deficits and behaviors that affect risk of falls.   - [place] fall precautions as indicated by assessment.  - Educate pt/family on patient safety including physical limitations  - Instruct pt to call for assistance with act strategies to overcome with those who interact with patient  Outcome: Progressing     Problem: GASTROINTESTINAL - ADULT  Goal: Minimal or absence of nausea and vomiting  Description  INTERVENTIONS:  - Maintain adequate hydration with IV or PO as ordered an mobility/gait  Description  Interventions:  - Assess patient's functional ability and stability  - Promote increasing activity/tolerance for mobility and gait  - Educate and engage patient/family in tolerated activity level and precautions  - Recommend use

## 2019-11-27 NOTE — DIETARY NOTE
Clinical Nutrition  Pt continues with poor intake. 3 day calorie count started - envelope hanging on door and will be monitored daily by RD. Only taking oral supplements at this time, no eating foods brought in by family.  Carafate added per MD note and psy

## 2019-11-27 NOTE — PROGRESS NOTES
[facility]  Urology Progress Note    Tuan CORTEZ Yevgeniy Patient Status:  Inpatient    1935 MRN [id number]   [facility] 3NW-A Attending Damon Deleon MD   Hosp Day # 16 PCP Orinda Carrel, MD     Subjective:  Daryl Alberts is a(n) Carina Cruz P.A.-C  [facility] Urology  11/27/2019  12:32 PM    Addendum:  Cystogram:   CONCLUSION:       1. No bladder rupture or mass.   Contrast extended from the bladder into the collecting system of the left kidney and renal pelvis by way of the existing nephro

## 2019-11-27 NOTE — CM/SW NOTE
Spoke with pt's RN who stated pt not yet medically ready for DC. Pt with minimal PO intake. Discussed DC planning for [address] [place] [address] vs home with Jr Cox and 24 hour supervision.   / to remain available for support and/or discha

## 2019-11-27 NOTE — PROGRESS NOTES
[facility] Hospitalist Progress Note     Tuan Vuong Patient Status:  Inpatient    1935 MRN [id number]   [facility] 4SW-A Attending Roe Davidson MD   Hosp Day # 12 PCP Savanah Addison MD     CC: follow up    SUBJECTIVE:    Had episode of ch 11/26/19 2107 11/27/19  0801   PGLU 135* 182* 109* 175* 134*         Meds:   Scheduled Medication:  • sucralfate (CARAFATE) 10% rectal enema  2 g Rectal TID CC   • rivaroxaban  20 mg Oral Daily with food   • Levothyroxine Sodium  125 mcg Oral Before break of blood loss  - Heme was consulted    # DMII  - ha1C 6.8%  - -160s  - SSI with QID accuchecks     # ANTONIO - albumin + IVF per renal.  Creatinine remains elevated today.       # hyponatremia - resolved    # h/o PE  - Per daughter, this was several years

## 2019-11-27 NOTE — PROGRESS NOTES
[facility]  Progress Note    Tuan Vuong Patient Status:  Inpatient    1935 MRN [id number]   [facility] 3NW-A Attending Juany Naylor MD   Hosp Day # 16 PCP Jacky Tablot MD     Subjective:    Spoke with patient via transl ([address]     Lactic acidosis     Essential hypertension     Hypo-osmolar hyponatremia     Counseling regarding advance care planning and goals of care     Palliative care encounter     Malnutrition [facility])      Impression:     82 y/o S/P: subtotal colectomy, il

## 2019-11-27 NOTE — PLAN OF CARE
RECEIVED A CALL FORM ISABELLA PTs DAUGHTER STATING PT WAS  CHEST PAIN AND WAS D/C. IP USED TO ASSESS PT, PT DID STATE THAT SHE WAS DIZZY, HAVING CHEST PRESSURE, AND SOB. DR Earl Kwon NOTIFIED, EKG AND TROPONIN ORDERED.

## 2019-11-27 NOTE — PROGRESS NOTES
[facility]    Nephrology Progress Note    Tuan Vuong Attending:  Jong Borja MD       SUBJECTIVE:     C/o pain at albert insertion site  Otherwise poor po intake noted    PHYSICAL EXAM:     Vital Signs: /74 (BP Location: Right arm) EOSABS 0.23 11/20/2019    BASABS 0.08 11/20/2019    NEUT 75 11/20/2019    LYMPH 18 11/20/2019    MON 2 11/20/2019    EOS 3 11/20/2019    BASO 1 11/20/2019    NEPERCENT 59.9 11/26/2019    LYPERCENT 21.9 11/26/2019    MOPERCENT 10.0 11/26/2019    EOPERCEN PRN  Insulin Aspart Pen (NOVOLOG) 100 UNIT/ML flexpen 2-10 Units, 2-10 Units, Subcutaneous, TID CC and HS  Metoclopramide HCl (REGLAN) injection 5 mg, 5 mg, Intravenous, Q8H PRN  diphenhydrAMINE (BENADRYL) cap/tab 25 mg, 25 mg, Oral, Nightly PRN  Naloxone do not hesitate to call with questions or concerns. John Aguero, [address] [facility]  [address]  [address] [facility]  [place], [address]    11/27/2019  1:10 PM

## 2019-11-27 NOTE — PROGRESS NOTES
Pt called jeni RN and on phone with her daughter in room. Per patient daughter, patient is c/o chest pain that she has had on and off over last week.  Pt daughter stating that patient needs to have cardiologist consulted and vascular surgeon as she might

## 2019-11-28 RX ORDER — SERTRALINE HYDROCHLORIDE 100 MG/1
100 TABLET, FILM COATED ORAL DAILY
Status: DISCONTINUED | OUTPATIENT
Start: 2019-11-28 | End: 2019-11-30

## 2019-11-28 RX ORDER — PANTOPRAZOLE SODIUM 40 MG/1
40 TABLET, DELAYED RELEASE ORAL
Status: DISCONTINUED | OUTPATIENT
Start: 2019-11-28 | End: 2019-11-30

## 2019-11-28 NOTE — PLAN OF CARE
ORDERED MULTIPLE FOODS FOR PT AND SHE REFUSED TO EAT EXPERT FOR THE ENSURE. PT STATED SHE HAD NO APPETITE. USED THE IPAD  TO EXPLAIN THE IMPORTANCE OF NUTRITION AND PT STILL REFUSED. DR Winsome Mirza NOTIFIED.

## 2019-11-28 NOTE — PLAN OF CARE
Problem: Diabetes/Glucose Control  Goal: Glucose maintained within prescribed range  Description  INTERVENTIONS:  - Monitor Blood Glucose as ordered  - Assess for signs and symptoms of hyperglycemia and hypoglycemia  - Administer ordered medications to m resources  Description  INTERVENTIONS:  - Identify barriers to discharge w/pt and caregiver  - Include patient/family/discharge partner in discharge planning  - Arrange for needed discharge resources and transportation as appropriate  - Identify discharge integrity  - Monitor for areas of redness and/or skin breakdown  - Initiate interventions, skin care algorithm/standards of care as needed  Outcome: Progressing  Goal: Incision(s), wounds(s) or drain site(s) healing without S/S of infection  Description  I

## 2019-11-28 NOTE — PLAN OF CARE
Problem: Diabetes/Glucose Control  Goal: Glucose maintained within prescribed range  Description  INTERVENTIONS:  - Monitor Blood Glucose as ordered  - Assess for signs and symptoms of hyperglycemia and hypoglycemia  - Administer ordered medications to m Identify cognitive and physical deficits and behaviors that affect risk of falls.   - [place] fall precautions as indicated by assessment.  - Educate pt/family on patient safety including physical limitations  - Instruct pt to call for assistance with act strategies to overcome with those who interact with patient  Outcome: Progressing     Problem: GASTROINTESTINAL - ADULT  Goal: Minimal or absence of nausea and vomiting  Description  INTERVENTIONS:  - Maintain adequate hydration with IV or PO as ordered an mobility/gait  Description  Interventions:  - Assess patient's functional ability and stability  - Promote increasing activity/tolerance for mobility and gait  - Educate and engage patient/family in tolerated activity level and precautions  - Recommend use Implement strategies to promote bladder emptying  Outcome: Progressing    Upon assessment, A&Ox4. Denies pain. Poor appetite. Tolerated small amount of diet for dinner. Abdomen soft, round. Bowel sounds active, present.  Colostomy passing flatus & watery, b

## 2019-11-28 NOTE — PROGRESS NOTES
[facility]    Nephrology Progress Note    Tuan Vuong Attending:  Melanie Deutsch MD       SUBJECTIVE:   Ming Ag is accompanied by her family this morning. Oral intake remains poor. No appetite.     PHYSICAL EXAM:     Vital Signs: BP 12 11/20/2019    LYMPHABS 1.39 11/20/2019    EOSABS 0.23 11/20/2019    BASABS 0.08 11/20/2019    NEUT 75 11/20/2019    LYMPH 18 11/20/2019    MON 2 11/20/2019    EOS 3 11/20/2019    BASO 1 11/20/2019    NEPERCENT 59.9 11/26/2019    LYPERCENT 21.9 11/26/2019 (TYLENOL) tab 650 mg, 650 mg, Oral, Q6H PRN  Insulin Aspart Pen (NOVOLOG) 100 UNIT/ML flexpen 2-10 Units, 2-10 Units, Subcutaneous, TID CC and HS  Metoclopramide HCl (REGLAN) injection 5 mg, 5 mg, Intravenous, Q8H PRN  diphenhydrAMINE (BENADRYL) cap/tab 25

## 2019-11-28 NOTE — PROGRESS NOTES
[facility]  Urology Progress Note    Tuan CORTEZ Yevgeniy Patient Status:  Inpatient    1935 MRN [id number]   [facility] 3NW-A Attending Pérez Guerin MD   [facility] Day # 16 PCP Lashell Johnston MD     Assessment:  Status-post exploratory

## 2019-11-28 NOTE — BH PROGRESS NOTE
During the [facility] level of care assessment the pts daughter, who is a psychiatrist is asking to speak to a . She said, they have been waiting for 3 days now. They are asking for the  to at least call her.   This information

## 2019-11-28 NOTE — PROGRESS NOTES
[facility] Hospitalist Progress Note     Tuan Vuong Patient Status:  Inpatient    1935 MRN [id number]   [facility] 4SW-A Attending Ysabel Karimi MD   Hosp Day # 16 PCP Omid Toscano MD     CC: follow up    SUBJECTIVE:    Pt seen and exami and HS   • rivaroxaban  20 mg Oral Daily with food   • Levothyroxine Sodium  125 mcg Oral Before breakfast   • Pantoprazole Sodium  40 mg Oral QAM AC   • Normal Saline Flush  10 mL Intravenous Q12H   • Insulin Aspart Pen  2-10 Units Subcutaneous TID CC and Improved, s/p 1 unit  - Stable   - CT abdomen / pelvis negative for RP bleed or other source of blood loss  - Heme was consulted    # DMII  - ha1C 6.8%  - -160s  - SSI with QID accuchecks     # ANTONIO - albumin + IVF per renal.  Creatinine remains eleva

## 2019-11-28 NOTE — BH LEVEL OF CARE ASSESSMENT
Level of Care Assessment Note    General Questions  Why are you here?: The pt states she came to the hospital because her abdomen was distented. Precipitating Events: Referral placed for pt to be seen by the liaison.   History of Present Illness: 82 y/o fe Others/Property  Have you harmed someone or had thoughts about wanting someone harmed or killed in the past 30 days?: No  Have you harmed someone or had thoughts about wanting someone harmed or killed further back than 30 days?: No  Current or Past Harm To Symptoms  Current Withdrawal Symptoms: No                                                   Functional Impairment  Currently Attending School: No  Employment Status: Homemaker  Concerns/Conflicts with Social Relationships: No  Do you have any prior/current said, the pt is depressed and sad because of the home life situation. She said, the pt lives with her son and daughter-in-law. They both work and not able to be with her all day.   The pt is upset about this and feels the family should be there to care fo

## 2019-11-29 NOTE — CM/SW NOTE
KENN spoke with Dr Rachael Javier regarding D/C plan. Pt should be ready for D/C tomorrow 11/30 if oral intake is tolerated. Pt son has brought food in from from home today. Spoke with NILES and RN regarding the above. Plan to D/C to [facility] via [facility].

## 2019-11-29 NOTE — PROGRESS NOTES
PT ARBIC SPEAKING, IPAD  USED. PT RESTING IN RECLINER, EASY NON LABORED BREATHING ON RA. CPOX AND TELEMETRY. PT DENIES ANY NAUSEA OR PAIN. VAZQUEZ D/C AT 0830. DTV. PT HAS POOR APPETITE, ON CALORIE COUNT. UP WITH SB ASSIST.  MIDLINE YOHAN NO DRAINAGE

## 2019-11-29 NOTE — PROGRESS NOTES
[facility]  Urology Progress Note    Farazdebora TORO Isidrodel Patient Status:  Inpatient    1935 MRN [id number]   [facility] 3NW-A Attending Shira Whitlock MD   [facility] Day # 18 PCP Eve Khan MD     Assessment:  Status-post exploratory

## 2019-11-29 NOTE — PLAN OF CARE
Patient is alert and orientated x4. Seizure precautions in place. RA, . VSS. Ilestomsy producing gas and stool. Terry N/V. Niño, producing clean yellow urine. Ostomy bag over old LAMONT site, CDI. Incision on abdomen, open to air with 3 steri strips.  Die pain management  - Manage/alleviate anxiety  - Utilize distraction and/or relaxation techniques  - Monitor for opioid side effects  - Notify MD/LIP if interventions unsuccessful or patient reports new pain  - Anticipate increased pain with activity and pre care  Description  Interventions:  - Assess communication ability and preferred communication style  - Implement communication aides and strategies  - Use visual cues when possible  - Listen attentively, be patient, do not interrupt  - Minimize distraction nutritional intake (bariatric)  Description  INTERVENTIONS:  - Monitor for over-consumption  - Identify factors contributing to increased intake, treat as appropriate  - Monitor I&O, WT and lab values  - Obtain nutritional consult as needed  - Evaluate psy Ulcer prevention bundle as indicated  Outcome: Progressing     Problem: GENITOURINARY - ADULT  Goal: Absence of urinary retention  Description  INTERVENTIONS:  - Assess patient’s ability to void and empty bladder  - Monitor intake/output and perform bladde

## 2019-11-29 NOTE — DIETARY NOTE
Clinical Nutrition  Pt continues with poor intake. 3 day calorie count started - envelope hanging on door and will be monitored daily by RD. Only taking oral supplements at this time, no eating foods brought in by family.  If pt and family want, consider NG

## 2019-11-29 NOTE — PROGRESS NOTES
[facility] Hospitalist Progress Note     Tuan Vuong Patient Status:  Inpatient    1935 MRN [id number]   [facility] 4SW-A Attending Norma Hoang MD   Hosp Day # 25 PCP Junito Nixon MD     CC: follow up    SUBJECTIVE:    Pt seen and exami sucralfate  2 g Oral TID AC and HS   • rivaroxaban  20 mg Oral Daily with food   • Levothyroxine Sodium  125 mcg Oral Before breakfast   • Normal Saline Flush  10 mL Intravenous Q12H   • Insulin Aspart Pen  2-10 Units Subcutaneous TID CC and HS     Continu meds    # Acute Post-operative anemia  - Improved, s/p 1 unit  - Stable   - CT abdomen / pelvis negative for RP bleed or other source of blood loss  - Heme was consulted    # DMII  - ha1C 6.8%  - -160s  - SSI with QID accuchecks     # ANTONIO - albumin +

## 2019-11-29 NOTE — PROGRESS NOTES
[facility]    Nephrology Progress Note    Tuan Vuong Attending:  Nicole Interiano MD       SUBJECTIVE:   Tuan Vuong is feeling OK this morning. No acute events overnight.      PHYSICAL EXAM:     Vital Signs: /74 (BP Location: Right a 11/20/2019    EOSABS 0.23 11/20/2019    BASABS 0.08 11/20/2019    NEUT 75 11/20/2019    LYMPH 18 11/20/2019    MON 2 11/20/2019    EOS 3 11/20/2019    BASO 1 11/20/2019    NEPERCENT 54.9 11/29/2019    LYPERCENT 28.3 11/29/2019    MOPERCENT 9.7 11/29/2019 Q4H PRN  acetaminophen (TYLENOL) tab 650 mg, 650 mg, Oral, Q6H PRN  Insulin Aspart Pen (NOVOLOG) 100 UNIT/ML flexpen 2-10 Units, 2-10 Units, Subcutaneous, TID CC and HS  Metoclopramide HCl (REGLAN) injection 5 mg, 5 mg, Intravenous, Q8H PRN  diphenhydrAMIN

## 2019-11-30 VITALS
HEIGHT: 64 IN | OXYGEN SATURATION: 95 % | SYSTOLIC BLOOD PRESSURE: 139 MMHG | RESPIRATION RATE: 18 BRPM | BODY MASS INDEX: 27.11 KG/M2 | HEART RATE: 72 BPM | TEMPERATURE: 98 F | DIASTOLIC BLOOD PRESSURE: 79 MMHG | WEIGHT: 158.81 LBS

## 2019-11-30 RX ORDER — SUCRALFATE 1 G/1
1 TABLET ORAL
Qty: 60 TABLET | Refills: 0 | Status: ON HOLD | COMMUNITY
Start: 2019-11-30 | End: 2020-05-11

## 2019-11-30 RX ORDER — PANTOPRAZOLE SODIUM 40 MG/1
40 TABLET, DELAYED RELEASE ORAL
Qty: 60 TABLET | Refills: 0 | Status: ON HOLD | COMMUNITY
Start: 2019-11-30 | End: 2020-05-11

## 2019-11-30 RX ORDER — LEVOTHYROXINE SODIUM 0.12 MG/1
125 TABLET ORAL
Qty: 30 TABLET | Refills: 0 | Status: SHIPPED | COMMUNITY
Start: 2019-12-01 | End: 2020-02-28

## 2019-11-30 RX ORDER — METOPROLOL SUCCINATE 25 MG/1
25 TABLET, EXTENDED RELEASE ORAL
Status: DISCONTINUED | OUTPATIENT
Start: 2019-11-30 | End: 2019-11-30

## 2019-11-30 RX ORDER — AMLODIPINE BESYLATE 5 MG/1
10 TABLET ORAL
Status: DISCONTINUED | OUTPATIENT
Start: 2019-11-30 | End: 2019-11-30

## 2019-11-30 NOTE — CM/SW NOTE
Spoke with pt's RN who anticipates pt will be ready for DC to [facility] today. Spoke with Shreyas Lal at [facility] who confirmed pt can be accepted for admission today.   Spoke with pt's son, Zohaib Raymond ([phone]) who expressed agreement with plan for NM SA

## 2019-11-30 NOTE — PROGRESS NOTES
[facility]    Nephrology Progress Note    Tuan Vuong Attending:  Christophe Herrera MD       SUBJECTIVE:   Monika Cueto is transferring to subacute rehab today.       PHYSICAL EXAM:     Vital Signs: /76 (BP Location: Right arm)   Pulse 7 0.23 11/20/2019    BASABS 0.08 11/20/2019    NEUT 75 11/20/2019    LYMPH 18 11/20/2019    MON 2 11/20/2019    EOS 3 11/20/2019    BASO 1 11/20/2019    NEPERCENT 54.9 11/29/2019    LYPERCENT 28.3 11/29/2019    MOPERCENT 9.7 11/29/2019    EOPERCENT 5.5 11/29 per tab 1 tablet, 1 tablet, Oral, Q4H PRN    Or  HYDROcodone-acetaminophen (NORCO) 5-325 MG per tab 2 tablet, 2 tablet, Oral, Q4H PRN  acetaminophen (TYLENOL) tab 650 mg, 650 mg, Oral, Q6H PRN  Insulin Aspart Pen (NOVOLOG) 100 UNIT/ML flexpen 2-10 Units, 2

## 2019-11-30 NOTE — PROGRESS NOTES
Pouch removed from old tom site, scan drainage in bag, 4x4 and abd's with pressure tape on left lower quad of abdomen placed. Pt tolerated well.

## 2019-11-30 NOTE — PROGRESS NOTES
[facility] Hospitalist Progress Note     Tuan CORTEZ Yevgeniy Patient Status:  Inpatient    1935 MRN [id number]   [facility] 4SW-A Attending Nader Seymour MD   Hosp Day # 23 PCP Rashawn Cantu MD     CC: follow up    SUBJECTIVE:    Pt seen and exami Oral Before breakfast   • Normal Saline Flush  10 mL Intravenous Q12H   • Insulin Aspart Pen  2-10 Units Subcutaneous TID CC and HS     Continuous Infusing Medication:  • sodium chloride 83 mL/hr at 11/30/19 0213     PRN Medication:Calcium Carbonate Antaci meds    # Acute Post-operative anemia  - Improved, s/p 1 unit  - Stable   - CT abdomen / pelvis negative for RP bleed or other source of blood loss  - Heme was consulted    # DMII  - ha1C 6.8%  - -160s  - SSI with QID accuchecks     # ANTONIO - albumin +

## 2019-11-30 NOTE — PROGRESS NOTES
[facility]    Progress Note    Tuan Vuong Patient Status:  Inpatient    1935 MRN [id number]   [facility] 3NW-A Attending Lalo Whyte MD   Hosp Day # 19 PCP Rashawn Cantu MD     Subjective:  Tuan Vuong is a(n 81 yea

## 2019-11-30 NOTE — PLAN OF CARE
Problem: Diabetes/Glucose Control  Goal: Glucose maintained within prescribed range  Description  INTERVENTIONS:  - Monitor Blood Glucose as ordered  - Assess for signs and symptoms of hyperglycemia and hypoglycemia  - Administer ordered medications to m to assist with strengthening/mobility  - Encourage toileting schedule  11/30/2019 0111 by Nikos Smyth RN  Outcome: Progressing  11/30/2019 0110 by Nikos Smyth RN  Outcome: Progressing     Problem: DISCHARGE PLANNING  Goal: Discharge to home or ot GASTROINTESTINAL - ADULT  Goal: Minimal or absence of nausea and vomiting  Description  INTERVENTIONS:  - Maintain adequate hydration with IV or PO as ordered and tolerated  - Nasogastric tube to low intermittent suction as ordered  - Evaluate effectivenes consult as needed  - Evaluate psychosocial factors contributing to over-consumption  11/30/2019 0111 by Cruz Pompa RN  Outcome: Progressing  11/30/2019 0110 by Cruz Pompa, RN  Outcome: Progressing     Problem: Impaired Functional Mobility  Goal: Monitor for areas of redness and/or skin breakdown  - Initiate interventions, skin care algorithm/standards of care as needed  11/30/2019 0111 by Lulu Hsieh, RN  Outcome: Progressing  11/30/2019 0110 by Lulu Hsieh, RN  Outcome: Progressing  Goal:

## 2019-11-30 NOTE — PLAN OF CARE
Patient alert and oriented x3  Up with SBA +walker  Tolerating diet. Had an ensure, bag of chips and 2 glasses of water for lunch. Patient is ordering dinner now. Incision to ABD is CDI. Voiding. Wearing a brief.    Illeostomy with brown stool noted +gas non-pharmacological measures as appropriate and evaluate response  - Consider cultural and social influences on pain and pain management  - Manage/alleviate anxiety  - Utilize distraction and/or relaxation techniques  - Monitor for opioid side effects  - N system  Outcome: Adequate for Discharge     Problem: GASTROINTESTINAL - ADULT  Goal: Minimal or absence of nausea and vomiting  Description  INTERVENTIONS:  - Maintain adequate hydration with IV or PO as ordered and tolerated  - Nasogastric tube to low int participate in their care  Description  Interventions:  - Assess communication ability and preferred communication style  - Implement communication aides and strategies  - Use visual cues when possible  - Listen attentively, be patient, do not interrupt  -

## 2019-12-01 ENCOUNTER — EXTERNAL FACILITY (OUTPATIENT)
Dept: FAMILY MEDICINE CLINIC | Facility: CLINIC | Age: 84
End: 2019-12-01

## 2019-12-01 DIAGNOSIS — Z48.815 ENCOUNTER FOR SURGICAL AFTERCARE FOLLOWING SURGERY OF DIGESTIVE SYSTEM: Primary | ICD-10-CM

## 2019-12-01 DIAGNOSIS — D64.9 ANEMIA, UNSPECIFIED TYPE: ICD-10-CM

## 2019-12-01 DIAGNOSIS — K56.609 INTESTINAL OBSTRUCTION, UNSPECIFIED CAUSE, UNSPECIFIED WHETHER PARTIAL OR COMPLETE (HCC): ICD-10-CM

## 2019-12-01 DIAGNOSIS — F33.41 RECURRENT MAJOR DEPRESSIVE DISORDER, IN PARTIAL REMISSION (HCC): ICD-10-CM

## 2019-12-01 DIAGNOSIS — I10 ESSENTIAL HYPERTENSION: ICD-10-CM

## 2019-12-01 DIAGNOSIS — E11.42 TYPE 2 DIABETES MELLITUS WITH DIABETIC POLYNEUROPATHY, UNSPECIFIED WHETHER LONG TERM INSULIN USE (HCC): ICD-10-CM

## 2019-12-01 DIAGNOSIS — E03.9 ACQUIRED HYPOTHYROIDISM: ICD-10-CM

## 2019-12-01 DIAGNOSIS — Z86.711 HISTORY OF PULMONARY EMBOLISM: ICD-10-CM

## 2019-12-01 PROCEDURE — 99306 1ST NF CARE HIGH MDM 50: CPT | Performed by: FAMILY MEDICINE

## 2019-12-01 NOTE — PROGRESS NOTES
Tuan Vuong Author: Marium Martinez MD     1935 MRN [id number]   [facility]  Admission 19      Last Hospital Discharge 19 PCP Corrina Vargas MD   Hospital of Discharge  [facility]        CC --[facility]    • Other and unspecified hyperlipidemia    • Pneumonia, organism unspecified(486)    • Pulmonary embolism (HCC)    • Thyroid disease    • Type II or unspecified type diabetes mellitus without mention of complication, not stated as uncontroll CAPSULES(600 MG) BY MOUTH THREE TIMES DAILY 540 capsule 0   • METFORMIN HCL 1000 MG Oral Tab TAKE 1 TABLET(1000 MG) BY MOUTH TWICE DAILY 180 tablet 0   • Sertraline HCl 100 MG Oral Tab Take 1 tablet (100 mg total) by mouth daily.  90 tablet 1   • Rivaroxaba awake and oriented  , cranial nerves are intact, motor and sensory are grossly intact      DIAGNOSTICS REVIEWED AT THIS VISIT:  Recent Labs   Lab 11/26/19  0533 11/27/19  0522 11/28/19  0602 11/29/19  0533 11/30/19  0737   * 132* 134* 134* 133*   K 5 getting food from home      Kenya Xiong MD   [address] [place] [address], [address] [place] [address]    Electronically signed

## 2019-12-01 NOTE — DISCHARGE SUMMARY
General Medicine Discharge Summary     Patient ID:  Tuan Vuong  81year old  8/8/1935    Admit date: 11/11/2019    Discharge date and time: 11/30/2019  5:52 PM     Attending Physician: Timur Nunez to MARCELLA later today.      # Postop pain  - currently controlled with PRN pain meds     # Acute Post-operative anemia  - Improved, s/p 1 unit  - Stable   - CT abdomen / pelvis negative for RP bleed or other source of blood loss  - Heme was consulted     # DM total) by mouth before breakfast., Historical, Disp-30 tablet, R-0      CONTINUE these medications which have NOT CHANGED    Sertraline HCl 100 MG Oral Tab  Take 1 tablet (100 mg total) by mouth daily. , Normal, Disp-90 tablet, R-1    Rivaroxaban ([place]) rashes  Psych: normal mood    Total time coordinating care for discharge: Greater than 30 minutes    Ish Leiva DO  [facility]

## 2019-12-02 ENCOUNTER — MOBILE ENCOUNTER (OUTPATIENT)
Dept: FAMILY MEDICINE CLINIC | Facility: CLINIC | Age: 84
End: 2019-12-02

## 2019-12-02 ENCOUNTER — INITIAL APN SNF VISIT (OUTPATIENT)
Dept: INTERNAL MEDICINE CLINIC | Age: 84
End: 2019-12-02

## 2019-12-02 ENCOUNTER — NURSE ONLY (OUTPATIENT)
Dept: LAB | Age: 84
End: 2019-12-02
Attending: NURSE PRACTITIONER
Payer: MEDICARE

## 2019-12-02 VITALS
BODY MASS INDEX: 27 KG/M2 | TEMPERATURE: 98 F | SYSTOLIC BLOOD PRESSURE: 141 MMHG | HEART RATE: 84 BPM | WEIGHT: 157.38 LBS | DIASTOLIC BLOOD PRESSURE: 68 MMHG | OXYGEN SATURATION: 97 % | RESPIRATION RATE: 16 BRPM

## 2019-12-02 DIAGNOSIS — Z93.2 ILEOSTOMY, HAS CURRENTLY (HCC): ICD-10-CM

## 2019-12-02 DIAGNOSIS — F33.41 RECURRENT MAJOR DEPRESSIVE DISORDER, IN PARTIAL REMISSION (HCC): ICD-10-CM

## 2019-12-02 DIAGNOSIS — E11.65 TYPE 2 DIABETES MELLITUS WITH HYPERGLYCEMIA, WITHOUT LONG-TERM CURRENT USE OF INSULIN (HCC): ICD-10-CM

## 2019-12-02 DIAGNOSIS — E46 MALNUTRITION, UNSPECIFIED TYPE (HCC): ICD-10-CM

## 2019-12-02 DIAGNOSIS — E11.40 DIABETIC NEUROPATHY, PAINFUL (HCC): ICD-10-CM

## 2019-12-02 DIAGNOSIS — E03.9 ACQUIRED HYPOTHYROIDISM: ICD-10-CM

## 2019-12-02 DIAGNOSIS — K56.609 INTESTINAL OBSTRUCTION, UNSPECIFIED CAUSE, UNSPECIFIED WHETHER PARTIAL OR COMPLETE (HCC): Primary | ICD-10-CM

## 2019-12-02 DIAGNOSIS — E11.42 TYPE 2 DIABETES MELLITUS WITH DIABETIC POLYNEUROPATHY, UNSPECIFIED WHETHER LONG TERM INSULIN USE (HCC): ICD-10-CM

## 2019-12-02 DIAGNOSIS — D64.9 ANEMIA, UNSPECIFIED TYPE: ICD-10-CM

## 2019-12-02 DIAGNOSIS — R53.1 WEAKNESS: Primary | ICD-10-CM

## 2019-12-02 DIAGNOSIS — E11.9 CONTROLLED TYPE 2 DIABETES MELLITUS WITHOUT COMPLICATION, WITHOUT LONG-TERM CURRENT USE OF INSULIN (HCC): ICD-10-CM

## 2019-12-02 DIAGNOSIS — Z90.49 HISTORY OF PARTIAL COLECTOMY: ICD-10-CM

## 2019-12-02 DIAGNOSIS — Z74.09 IMPAIRED MOBILITY AND ADLS: ICD-10-CM

## 2019-12-02 DIAGNOSIS — Z91.81 AT RISK FOR FALLING: ICD-10-CM

## 2019-12-02 DIAGNOSIS — Z86.711 HISTORY OF PULMONARY EMBOLISM: ICD-10-CM

## 2019-12-02 DIAGNOSIS — Z78.9 IMPAIRED MOBILITY AND ADLS: ICD-10-CM

## 2019-12-02 DIAGNOSIS — I27.82 OTHER CHRONIC PULMONARY EMBOLISM WITHOUT ACUTE COR PULMONALE (HCC): ICD-10-CM

## 2019-12-02 DIAGNOSIS — R33.9 URINARY RETENTION: ICD-10-CM

## 2019-12-02 DIAGNOSIS — R53.1 WEAKNESS GENERALIZED: ICD-10-CM

## 2019-12-02 DIAGNOSIS — K56.609 COLON OBSTRUCTION (HCC): Primary | ICD-10-CM

## 2019-12-02 DIAGNOSIS — I10 ESSENTIAL HYPERTENSION: ICD-10-CM

## 2019-12-02 DIAGNOSIS — E11.42 TYPE 2 DIABETES MELLITUS WITH DIABETIC POLYNEUROPATHY, WITHOUT LONG-TERM CURRENT USE OF INSULIN (HCC): ICD-10-CM

## 2019-12-02 PROCEDURE — 80053 COMPREHEN METABOLIC PANEL: CPT

## 2019-12-02 PROCEDURE — 36415 COLL VENOUS BLD VENIPUNCTURE: CPT

## 2019-12-02 PROCEDURE — 85025 COMPLETE CBC W/AUTO DIFF WBC: CPT

## 2019-12-02 PROCEDURE — 99310 SBSQ NF CARE HIGH MDM 45: CPT | Performed by: NURSE PRACTITIONER

## 2019-12-02 PROCEDURE — 99358 PROLONG SERVICE W/O CONTACT: CPT | Performed by: FAMILY MEDICINE

## 2019-12-02 NOTE — PROGRESS NOTES
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 Sertraline HCl 100 MG Oral Tab Take 1 tablet (100 mg total) by mouth daily.  90 tablet 1   • Rivaroxaban (XARELTO) 20 MG Oral Tab TAKE 1 TABLET(20 MG) BY MOUTH DAILY WITH FOOD 90 tablet 2   • METOPROLOL SUCCINATE ER 25 MG Oral Tablet 24 Hr TAKE 1 TABLET(25 hospitalization/diagnoses/sequelae; will undergo therapies to rehab and improve strength, endurance and independence w/ ADLs  EXTREMITIES/VASCULAR:no cyanosis, clubbing or edema, radial pulses 2+ and dorsalis pedal pulses 2+  NEUROLOGIC: intact; no sensori 500 mg stat  9. F/U w/ Dr Mendes/surgery in <1 wk  10. F/U w/ Dr Montano/urology <1 wks    ABLA  1. CBC weekly  2. S/P transfusion 1u PRBCs    Generalized weakness/Impaired mobility and ADLs  1. PT/OT eval and tx  2. ELOS 12-14 days  3. Plan DC on or before 12.

## 2019-12-02 NOTE — PROGRESS NOTES
Tuan Vuong Author: Michael Phillips MD     1935 MRN [id number]   [facility]  Admission 19      Last Hospital Discharge 19 PCP Lucas Hanks MD   Hospital of Discharge  [facility]       Patient admitted to [facility] under

## 2019-12-04 ENCOUNTER — SNF VISIT (OUTPATIENT)
Dept: INTERNAL MEDICINE CLINIC | Age: 84
End: 2019-12-04

## 2019-12-04 VITALS
SYSTOLIC BLOOD PRESSURE: 106 MMHG | OXYGEN SATURATION: 96 % | WEIGHT: 154.38 LBS | DIASTOLIC BLOOD PRESSURE: 60 MMHG | TEMPERATURE: 99 F | HEART RATE: 87 BPM | RESPIRATION RATE: 18 BRPM | BODY MASS INDEX: 27 KG/M2

## 2019-12-04 DIAGNOSIS — Z93.2 ILEOSTOMY, HAS CURRENTLY (HCC): ICD-10-CM

## 2019-12-04 DIAGNOSIS — F32.2 CURRENT SEVERE EPISODE OF MAJOR DEPRESSIVE DISORDER WITHOUT PSYCHOTIC FEATURES, UNSPECIFIED WHETHER RECURRENT (HCC): ICD-10-CM

## 2019-12-04 DIAGNOSIS — R53.1 WEAKNESS GENERALIZED: ICD-10-CM

## 2019-12-04 DIAGNOSIS — K56.609 COLON OBSTRUCTION (HCC): Primary | ICD-10-CM

## 2019-12-04 DIAGNOSIS — R63.0 ANOREXIA: ICD-10-CM

## 2019-12-04 DIAGNOSIS — E46 MALNUTRITION, UNSPECIFIED TYPE (HCC): ICD-10-CM

## 2019-12-04 DIAGNOSIS — Z78.9 IMPAIRED MOBILITY AND ADLS: ICD-10-CM

## 2019-12-04 DIAGNOSIS — Z90.49 HISTORY OF PARTIAL COLECTOMY: ICD-10-CM

## 2019-12-04 DIAGNOSIS — Z74.09 IMPAIRED MOBILITY AND ADLS: ICD-10-CM

## 2019-12-04 PROCEDURE — 99309 SBSQ NF CARE MODERATE MDM 30: CPT | Performed by: NURSE PRACTITIONER

## 2019-12-04 RX ORDER — MIRTAZAPINE 7.5 MG/1
15 TABLET, FILM COATED ORAL NIGHTLY
COMMUNITY
End: 2020-01-13 | Stop reason: CLARIF

## 2019-12-04 NOTE — PROGRESS NOTES
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   NECK: supple; FROM; no JVD, no TMG, no carotid bruits  BREAST: ---deferred  RESPIRATORY:clear to auscultation; no wheezing; on room air  CARDIOVASCULAR: S1, S2 normal, R labs     Depression  1. Consult psych  2. Increase Sertraline to 150 mg daily     HTN/HL/Hx of PE  1. VS q shift  2. Metoprolol succinate 25 mg daily  3. Amlodipine 10 mg daily  4. Xarelto 20 mg daily     Hypothyroidism  1.  LT4 125 mcg daily  (dose increas

## 2019-12-06 ENCOUNTER — SNF VISIT (OUTPATIENT)
Dept: INTERNAL MEDICINE CLINIC | Age: 84
End: 2019-12-06

## 2019-12-06 VITALS
RESPIRATION RATE: 18 BRPM | SYSTOLIC BLOOD PRESSURE: 151 MMHG | OXYGEN SATURATION: 95 % | TEMPERATURE: 99 F | DIASTOLIC BLOOD PRESSURE: 87 MMHG | HEART RATE: 75 BPM

## 2019-12-06 DIAGNOSIS — K56.609 COLON OBSTRUCTION (HCC): Primary | ICD-10-CM

## 2019-12-06 DIAGNOSIS — G47.00 INSOMNIA, UNSPECIFIED TYPE: ICD-10-CM

## 2019-12-06 DIAGNOSIS — Z90.49 HISTORY OF PARTIAL COLECTOMY: ICD-10-CM

## 2019-12-06 DIAGNOSIS — R53.1 WEAKNESS GENERALIZED: ICD-10-CM

## 2019-12-06 DIAGNOSIS — R11.0 NAUSEA: ICD-10-CM

## 2019-12-06 DIAGNOSIS — E46 MALNUTRITION, UNSPECIFIED TYPE (HCC): ICD-10-CM

## 2019-12-06 DIAGNOSIS — R63.0 ANOREXIA: ICD-10-CM

## 2019-12-06 DIAGNOSIS — Z93.2 ILEOSTOMY, HAS CURRENTLY (HCC): ICD-10-CM

## 2019-12-06 PROCEDURE — 99309 SBSQ NF CARE MODERATE MDM 30: CPT | Performed by: NURSE PRACTITIONER

## 2019-12-06 RX ORDER — ONDANSETRON 4 MG/1
4 TABLET, ORALLY DISINTEGRATING ORAL
COMMUNITY
End: 2020-01-13 | Stop reason: CLARIF

## 2019-12-06 RX ORDER — MELATONIN
3 NIGHTLY
Status: ON HOLD | COMMUNITY
End: 2020-05-12

## 2019-12-06 NOTE — PROGRESS NOTES
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   NECK: supple; FROM; no J plan     Urinary retention  1. Check PVR q 8 hrs x 72 hrs; notify if >350cc     Hyponatremia  1. Monitor labs     Depression  1. Consult psych  2. Sertraline 150 mg daily     HTN/HL/Hx of PE  1. VS q shift  2. Metoprolol succinate 25 mg daily  3.  Amlodipin

## 2019-12-07 ENCOUNTER — EXTERNAL FACILITY (OUTPATIENT)
Dept: FAMILY MEDICINE CLINIC | Facility: CLINIC | Age: 84
End: 2019-12-07

## 2019-12-07 DIAGNOSIS — R10.9 ABDOMINAL PAIN, UNSPECIFIED ABDOMINAL LOCATION: Primary | ICD-10-CM

## 2019-12-07 PROCEDURE — 99309 SBSQ NF CARE MODERATE MDM 30: CPT | Performed by: FAMILY MEDICINE

## 2019-12-08 NOTE — PROGRESS NOTES
Tuan Vuong Author: Rosi Roque MD     1935 MRN [id number]   [facility]  Admission       [facility] Discharge 19 PCP Junito Nixon MD   [facility] of Discharge  [facility]         CC--abdominal pain    H. P.I Pooja Jasmine is a 81 ye Jarrod Swartz MD at [facility] MAIN OR   • LEFT PHACOEMULSIFICATION OF CATARACT WITH INTRAOCULAR LENS IMPLANT [id number] Left 2017    Performed by Rolan Cuevas MD at [address] [place] [address]   •      • OTHER      SHOULDER, KNEES   • OTHER SURGICAL HISTORY      KNEE masses, HSM or tenderness  Right lower quadrant ileostomy  MUSCULOSKELETAL: back is not tender, FROM of the back  EXTREMITIES: no cyanosis, clubbing or edema  NEURO: Oriented times three, cranial nerves are intact, motor and sensory are grossly intact    A

## 2019-12-09 ENCOUNTER — NURSE ONLY (OUTPATIENT)
Dept: LAB | Age: 84
End: 2019-12-09
Attending: FAMILY MEDICINE
Payer: MEDICARE

## 2019-12-09 ENCOUNTER — SNF VISIT (OUTPATIENT)
Dept: INTERNAL MEDICINE CLINIC | Age: 84
End: 2019-12-09

## 2019-12-09 ENCOUNTER — APPOINTMENT (OUTPATIENT)
Dept: ULTRASOUND IMAGING | Facility: HOSPITAL | Age: 84
DRG: 683 | End: 2019-12-09
Attending: HOSPITALIST
Payer: MEDICARE

## 2019-12-09 ENCOUNTER — HOSPITAL ENCOUNTER (INPATIENT)
Facility: HOSPITAL | Age: 84
LOS: 14 days | Discharge: NURSING, REHAB OR LONG-TERM CARE | DRG: 683 | End: 2019-12-24
Attending: EMERGENCY MEDICINE | Admitting: HOSPITALIST
Payer: MEDICARE

## 2019-12-09 VITALS — HEART RATE: 73 BPM | OXYGEN SATURATION: 92 %

## 2019-12-09 DIAGNOSIS — Z93.2 ILEOSTOMY, HAS CURRENTLY (HCC): ICD-10-CM

## 2019-12-09 DIAGNOSIS — K56.609 COLON OBSTRUCTION (HCC): Primary | ICD-10-CM

## 2019-12-09 DIAGNOSIS — R69 DIAGNOSIS UNKNOWN: Primary | ICD-10-CM

## 2019-12-09 DIAGNOSIS — N28.9 ACUTE RENAL INSUFFICIENCY: Primary | ICD-10-CM

## 2019-12-09 DIAGNOSIS — E46 MALNUTRITION, UNSPECIFIED TYPE (HCC): ICD-10-CM

## 2019-12-09 DIAGNOSIS — N17.9 AKI (ACUTE KIDNEY INJURY) (HCC): ICD-10-CM

## 2019-12-09 DIAGNOSIS — R63.0 ANOREXIA: ICD-10-CM

## 2019-12-09 DIAGNOSIS — R53.1 WEAKNESS GENERALIZED: ICD-10-CM

## 2019-12-09 DIAGNOSIS — Z90.49 HISTORY OF PARTIAL COLECTOMY: ICD-10-CM

## 2019-12-09 DIAGNOSIS — F32.2 CURRENT SEVERE EPISODE OF MAJOR DEPRESSIVE DISORDER WITHOUT PSYCHOTIC FEATURES, UNSPECIFIED WHETHER RECURRENT (HCC): ICD-10-CM

## 2019-12-09 LAB
ALBUMIN SERPL-MCNC: 2.6 G/DL (ref 3.4–5)
ALBUMIN/GLOB SERPL: 0.5 {RATIO} (ref 1–2)
ALP LIVER SERPL-CCNC: 118 U/L (ref 55–142)
ALT SERPL-CCNC: 20 U/L (ref 13–56)
ANION GAP SERPL CALC-SCNC: 8 MMOL/L (ref 0–18)
AST SERPL-CCNC: 23 U/L (ref 15–37)
BASOPHILS # BLD AUTO: 0.03 X10(3) UL (ref 0–0.2)
BASOPHILS NFR BLD AUTO: 0.5 %
BILIRUB SERPL-MCNC: 0.2 MG/DL (ref 0.1–2)
BUN BLD-MCNC: 51 MG/DL (ref 7–18)
BUN/CREAT SERPL: 19.2 (ref 10–20)
CALCIUM BLD-MCNC: 9.8 MG/DL (ref 8.5–10.1)
CHLORIDE SERPL-SCNC: 103 MMOL/L (ref 98–112)
CO2 SERPL-SCNC: 20 MMOL/L (ref 21–32)
CREAT BLD-MCNC: 2.66 MG/DL (ref 0.55–1.02)
DEPRECATED RDW RBC AUTO: 46.5 FL (ref 35.1–46.3)
EOSINOPHIL # BLD AUTO: 0.23 X10(3) UL (ref 0–0.7)
EOSINOPHIL NFR BLD AUTO: 3.9 %
ERYTHROCYTE [DISTWIDTH] IN BLOOD BY AUTOMATED COUNT: 15 % (ref 11–15)
GLOBULIN PLAS-MCNC: 5 G/DL (ref 2.8–4.4)
GLUCOSE BLD-MCNC: 131 MG/DL (ref 70–99)
GLUCOSE BLD-MCNC: 155 MG/DL (ref 70–99)
HCT VFR BLD AUTO: 27.6 % (ref 35–48)
HGB BLD-MCNC: 8.8 G/DL (ref 12–16)
IMM GRANULOCYTES # BLD AUTO: 0.09 X10(3) UL (ref 0–1)
IMM GRANULOCYTES NFR BLD: 1.5 %
LACTATE SERPL-SCNC: 1.2 MMOL/L (ref 0.4–2)
LYMPHOCYTES # BLD AUTO: 0.81 X10(3) UL (ref 1–4)
LYMPHOCYTES NFR BLD AUTO: 13.8 %
M PROTEIN MFR SERPL ELPH: 7.6 G/DL (ref 6.4–8.2)
MCH RBC QN AUTO: 27.1 PG (ref 26–34)
MCHC RBC AUTO-ENTMCNC: 31.9 G/DL (ref 31–37)
MCV RBC AUTO: 84.9 FL (ref 80–100)
MONOCYTES # BLD AUTO: 0.8 X10(3) UL (ref 0.1–1)
MONOCYTES NFR BLD AUTO: 13.6 %
NEUTROPHILS # BLD AUTO: 3.91 X10 (3) UL (ref 1.5–7.7)
NEUTROPHILS # BLD AUTO: 3.91 X10(3) UL (ref 1.5–7.7)
NEUTROPHILS NFR BLD AUTO: 66.7 %
OSMOLALITY SERPL CALC.SUM OF ELEC: 289 MOSM/KG (ref 275–295)
PLATELET # BLD AUTO: 339 10(3)UL (ref 150–450)
POTASSIUM SERPL-SCNC: 4.4 MMOL/L (ref 3.5–5.1)
RBC # BLD AUTO: 3.25 X10(6)UL (ref 3.8–5.3)
SODIUM SERPL-SCNC: 131 MMOL/L (ref 136–145)
WBC # BLD AUTO: 5.9 X10(3) UL (ref 4–11)

## 2019-12-09 PROCEDURE — 96360 HYDRATION IV INFUSION INIT: CPT

## 2019-12-09 PROCEDURE — 85025 COMPLETE CBC W/AUTO DIFF WBC: CPT

## 2019-12-09 PROCEDURE — 80053 COMPREHEN METABOLIC PANEL: CPT

## 2019-12-09 PROCEDURE — 93010 ELECTROCARDIOGRAM REPORT: CPT

## 2019-12-09 PROCEDURE — 83605 ASSAY OF LACTIC ACID: CPT | Performed by: EMERGENCY MEDICINE

## 2019-12-09 PROCEDURE — 36415 COLL VENOUS BLD VENIPUNCTURE: CPT

## 2019-12-09 PROCEDURE — 99285 EMERGENCY DEPT VISIT HI MDM: CPT

## 2019-12-09 PROCEDURE — 93005 ELECTROCARDIOGRAM TRACING: CPT

## 2019-12-09 PROCEDURE — 85025 COMPLETE CBC W/AUTO DIFF WBC: CPT | Performed by: EMERGENCY MEDICINE

## 2019-12-09 PROCEDURE — 99309 SBSQ NF CARE MODERATE MDM 30: CPT | Performed by: NURSE PRACTITIONER

## 2019-12-09 PROCEDURE — 82962 GLUCOSE BLOOD TEST: CPT

## 2019-12-09 PROCEDURE — 96372 THER/PROPH/DIAG INJ SC/IM: CPT

## 2019-12-09 PROCEDURE — 96361 HYDRATE IV INFUSION ADD-ON: CPT

## 2019-12-09 PROCEDURE — 76770 US EXAM ABDO BACK WALL COMP: CPT | Performed by: HOSPITALIST

## 2019-12-09 PROCEDURE — 80053 COMPREHEN METABOLIC PANEL: CPT | Performed by: EMERGENCY MEDICINE

## 2019-12-09 PROCEDURE — 83930 ASSAY OF BLOOD OSMOLALITY: CPT | Performed by: INTERNAL MEDICINE

## 2019-12-09 RX ORDER — METOCLOPRAMIDE HYDROCHLORIDE 5 MG/ML
10 INJECTION INTRAMUSCULAR; INTRAVENOUS EVERY 8 HOURS PRN
Status: DISCONTINUED | OUTPATIENT
Start: 2019-12-09 | End: 2019-12-23

## 2019-12-09 RX ORDER — SODIUM CHLORIDE 9 MG/ML
INJECTION, SOLUTION INTRAVENOUS CONTINUOUS
Status: DISCONTINUED | OUTPATIENT
Start: 2019-12-09 | End: 2019-12-12

## 2019-12-09 RX ORDER — MELATONIN
325
Status: ON HOLD | COMMUNITY
End: 2020-05-11

## 2019-12-09 RX ORDER — HEPARIN SODIUM 5000 [USP'U]/ML
5000 INJECTION, SOLUTION INTRAVENOUS; SUBCUTANEOUS EVERY 12 HOURS SCHEDULED
Status: DISCONTINUED | OUTPATIENT
Start: 2019-12-09 | End: 2019-12-10

## 2019-12-09 RX ORDER — ONDANSETRON 2 MG/ML
4 INJECTION INTRAMUSCULAR; INTRAVENOUS EVERY 6 HOURS PRN
Status: DISCONTINUED | OUTPATIENT
Start: 2019-12-09 | End: 2019-12-24

## 2019-12-09 RX ORDER — PHENAZOPYRIDINE HYDROCHLORIDE 100 MG/1
100 TABLET, FILM COATED ORAL 3 TIMES DAILY PRN
Status: ON HOLD | COMMUNITY
End: 2019-12-24

## 2019-12-09 RX ORDER — ACETAMINOPHEN 325 MG/1
650 TABLET ORAL EVERY 6 HOURS PRN
Status: DISCONTINUED | OUTPATIENT
Start: 2019-12-09 | End: 2019-12-24

## 2019-12-09 NOTE — ED INITIAL ASSESSMENT (HPI)
Received from [facility], c/o abnormal lab results, weakness and back pain. Pt has picc line to Lt upper arm w/NS infusing. Family at bedside.  Pt speaks Sami

## 2019-12-09 NOTE — PROGRESS NOTES
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 Xarelto 20 mg daily for DVT prophylaxis  6. Pantoprazole 40 mg BID  7. Sucralfate 1 gm QID  8. Tylenol 650 mg TID q prn plus 500 mg q AM  9.  F/U w/ Dr Errol Marie

## 2019-12-10 ENCOUNTER — APPOINTMENT (OUTPATIENT)
Dept: CT IMAGING | Facility: HOSPITAL | Age: 84
DRG: 683 | End: 2019-12-10
Attending: PHYSICIAN ASSISTANT
Payer: MEDICARE

## 2019-12-10 LAB
ANION GAP SERPL CALC-SCNC: 6 MMOL/L (ref 0–18)
BASOPHILS # BLD AUTO: 0.03 X10(3) UL (ref 0–0.2)
BASOPHILS NFR BLD AUTO: 0.7 %
BILIRUB UR QL STRIP.AUTO: NEGATIVE
BUN BLD-MCNC: 43 MG/DL (ref 7–18)
BUN/CREAT SERPL: 19.3 (ref 10–20)
CALCIUM BLD-MCNC: 9 MG/DL (ref 8.5–10.1)
CHLORIDE SERPL-SCNC: 107 MMOL/L (ref 98–112)
CO2 SERPL-SCNC: 21 MMOL/L (ref 21–32)
COLOR UR AUTO: YELLOW
CREAT BLD-MCNC: 2.23 MG/DL (ref 0.55–1.02)
CREAT UR-SCNC: 26.8 MG/DL
CREAT UR-SCNC: 95.2 MG/DL
DEPRECATED RDW RBC AUTO: 47.3 FL (ref 35.1–46.3)
EOSINOPHIL # BLD AUTO: 0.16 X10(3) UL (ref 0–0.7)
EOSINOPHIL NFR BLD AUTO: 3.6 %
ERYTHROCYTE [DISTWIDTH] IN BLOOD BY AUTOMATED COUNT: 14.9 % (ref 11–15)
GLUCOSE BLD-MCNC: 132 MG/DL (ref 70–99)
GLUCOSE BLD-MCNC: 134 MG/DL (ref 70–99)
GLUCOSE BLD-MCNC: 140 MG/DL (ref 70–99)
GLUCOSE BLD-MCNC: 148 MG/DL (ref 70–99)
GLUCOSE BLD-MCNC: 155 MG/DL (ref 70–99)
GLUCOSE UR STRIP.AUTO-MCNC: NEGATIVE MG/DL
HCT VFR BLD AUTO: 24.7 % (ref 35–48)
HGB BLD-MCNC: 7.7 G/DL (ref 12–16)
IMM GRANULOCYTES # BLD AUTO: 0.07 X10(3) UL (ref 0–1)
IMM GRANULOCYTES NFR BLD: 1.6 %
KETONES UR STRIP.AUTO-MCNC: NEGATIVE MG/DL
LYMPHOCYTES # BLD AUTO: 0.73 X10(3) UL (ref 1–4)
LYMPHOCYTES NFR BLD AUTO: 16.4 %
MCH RBC QN AUTO: 26.9 PG (ref 26–34)
MCHC RBC AUTO-ENTMCNC: 31.2 G/DL (ref 31–37)
MCV RBC AUTO: 86.4 FL (ref 80–100)
MONOCYTES # BLD AUTO: 0.49 X10(3) UL (ref 0.1–1)
MONOCYTES NFR BLD AUTO: 11 %
NEUTROPHILS # BLD AUTO: 2.98 X10 (3) UL (ref 1.5–7.7)
NEUTROPHILS # BLD AUTO: 2.98 X10(3) UL (ref 1.5–7.7)
NEUTROPHILS NFR BLD AUTO: 66.7 %
NITRITE UR QL STRIP.AUTO: NEGATIVE
OSMOLALITY SERPL CALC.SUM OF ELEC: 291 MOSM/KG (ref 275–295)
OSMOLALITY SERPL: 293 MOSM/KG (ref 280–300)
OSMOLALITY UR: 337 MOSM/KG (ref 300–1300)
PH UR STRIP.AUTO: 5 [PH] (ref 4.5–8)
PLATELET # BLD AUTO: 321 10(3)UL (ref 150–450)
POTASSIUM SERPL-SCNC: 4.4 MMOL/L (ref 3.5–5.1)
POTASSIUM UR-SCNC: 20.5 MMOL/L
PROT UR STRIP.AUTO-MCNC: 30 MG/DL
RBC # BLD AUTO: 2.86 X10(6)UL (ref 3.8–5.3)
SODIUM SERPL-SCNC: 134 MMOL/L (ref 136–145)
SODIUM SERPL-SCNC: 37 MMOL/L
SODIUM SERPL-SCNC: 94 MMOL/L
SP GR UR STRIP.AUTO: 1.01 (ref 1–1.03)
T4 FREE SERPL-MCNC: 1.1 NG/DL (ref 0.8–1.7)
TSI SER-ACNC: 5.88 MIU/ML (ref 0.36–3.74)
URATE SERPL-MCNC: 6.4 MG/DL (ref 2.6–6)
UROBILINOGEN UR STRIP.AUTO-MCNC: <2 MG/DL
WBC # BLD AUTO: 4.5 X10(3) UL (ref 4–11)
WBC #/AREA URNS AUTO: >50 /HPF

## 2019-12-10 PROCEDURE — 84300 ASSAY OF URINE SODIUM: CPT | Performed by: INTERNAL MEDICINE

## 2019-12-10 PROCEDURE — 83935 ASSAY OF URINE OSMOLALITY: CPT | Performed by: INTERNAL MEDICINE

## 2019-12-10 PROCEDURE — 84133 ASSAY OF URINE POTASSIUM: CPT | Performed by: INTERNAL MEDICINE

## 2019-12-10 PROCEDURE — 82962 GLUCOSE BLOOD TEST: CPT

## 2019-12-10 PROCEDURE — 82570 ASSAY OF URINE CREATININE: CPT | Performed by: HOSPITALIST

## 2019-12-10 PROCEDURE — 74176 CT ABD & PELVIS W/O CONTRAST: CPT | Performed by: PHYSICIAN ASSISTANT

## 2019-12-10 PROCEDURE — 87086 URINE CULTURE/COLONY COUNT: CPT | Performed by: HOSPITALIST

## 2019-12-10 PROCEDURE — 81001 URINALYSIS AUTO W/SCOPE: CPT | Performed by: HOSPITALIST

## 2019-12-10 PROCEDURE — 87186 SC STD MICRODIL/AGAR DIL: CPT | Performed by: HOSPITALIST

## 2019-12-10 PROCEDURE — 84439 ASSAY OF FREE THYROXINE: CPT | Performed by: INTERNAL MEDICINE

## 2019-12-10 PROCEDURE — 84443 ASSAY THYROID STIM HORMONE: CPT | Performed by: INTERNAL MEDICINE

## 2019-12-10 PROCEDURE — 87077 CULTURE AEROBIC IDENTIFY: CPT | Performed by: HOSPITALIST

## 2019-12-10 PROCEDURE — 84300 ASSAY OF URINE SODIUM: CPT | Performed by: HOSPITALIST

## 2019-12-10 PROCEDURE — 82570 ASSAY OF URINE CREATININE: CPT | Performed by: INTERNAL MEDICINE

## 2019-12-10 PROCEDURE — 84550 ASSAY OF BLOOD/URIC ACID: CPT | Performed by: INTERNAL MEDICINE

## 2019-12-10 PROCEDURE — 80048 BASIC METABOLIC PNL TOTAL CA: CPT | Performed by: HOSPITALIST

## 2019-12-10 PROCEDURE — 85025 COMPLETE CBC W/AUTO DIFF WBC: CPT | Performed by: HOSPITALIST

## 2019-12-10 RX ORDER — PHENAZOPYRIDINE HYDROCHLORIDE 100 MG/1
100 TABLET, FILM COATED ORAL 3 TIMES DAILY PRN
Status: DISCONTINUED | OUTPATIENT
Start: 2019-12-10 | End: 2019-12-24

## 2019-12-10 RX ORDER — PANTOPRAZOLE SODIUM 40 MG/1
40 TABLET, DELAYED RELEASE ORAL
Status: DISCONTINUED | OUTPATIENT
Start: 2019-12-10 | End: 2019-12-24

## 2019-12-10 RX ORDER — MIRTAZAPINE 15 MG/1
15 TABLET, FILM COATED ORAL NIGHTLY
Status: DISCONTINUED | OUTPATIENT
Start: 2019-12-10 | End: 2019-12-24

## 2019-12-10 RX ORDER — SUCRALFATE 1 G/1
1 TABLET ORAL
Status: DISCONTINUED | OUTPATIENT
Start: 2019-12-10 | End: 2019-12-24

## 2019-12-10 RX ORDER — MEGESTROL ACETATE 40 MG/ML
400 SUSPENSION ORAL DAILY
Status: DISCONTINUED | OUTPATIENT
Start: 2019-12-10 | End: 2019-12-24

## 2019-12-10 RX ORDER — GABAPENTIN 300 MG/1
600 CAPSULE ORAL 3 TIMES DAILY
Status: DISCONTINUED | OUTPATIENT
Start: 2019-12-10 | End: 2019-12-24

## 2019-12-10 RX ORDER — MAGNESIUM OXIDE 400 MG (241.3 MG MAGNESIUM) TABLET
3 TABLET NIGHTLY
Status: DISCONTINUED | OUTPATIENT
Start: 2019-12-10 | End: 2019-12-24

## 2019-12-10 RX ORDER — HEPARIN SODIUM 5000 [USP'U]/ML
5000 INJECTION, SOLUTION INTRAVENOUS; SUBCUTANEOUS EVERY 8 HOURS SCHEDULED
Status: DISCONTINUED | OUTPATIENT
Start: 2019-12-10 | End: 2019-12-18

## 2019-12-10 RX ORDER — MELATONIN
325
Status: DISCONTINUED | OUTPATIENT
Start: 2019-12-10 | End: 2019-12-24

## 2019-12-10 RX ORDER — LEVOTHYROXINE SODIUM 0.12 MG/1
125 TABLET ORAL
Status: DISCONTINUED | OUTPATIENT
Start: 2019-12-10 | End: 2019-12-24

## 2019-12-10 NOTE — ED NOTES
Attempted straight catheterization, unsuccessful d/t presence of tissue adhesions in urethral region. Per pt, states \"feels stuck\" w/urination, question asked to daughter for translation.  MD aware of situation

## 2019-12-10 NOTE — H&P
[facility] Hospitalist History and Physical      Patient presents with:  Fatigue  Abnormal Result  Back Pain       PCP: Kalli Silvestre MD      History of Present Illness: Patient is a 81year old female with PMH sig for sigmoid colon obstruction due to mass s/p subt Performed by Bina Sterling MD at [facility]   •      • OTHER      SHOULDER, KNEES   • OTHER SURGICAL HISTORY      KNEE SURGERY   • OTHER SURGICAL HISTORY      GALLBLADDER   • OTHER SURGICAL HISTORY      STOMACH SURGERY   • OTHER SURGICA Tab, TAKE 1 TABLET BY MOUTH DAILY, Disp: 90 tablet, Rfl: 0  Acetaminophen  MG Oral Tab CR, Take 1 tablet (650 mg total) by mouth every 8 (eight) hours as needed for Pain., Disp: 90 tablet, Rfl: 2  Phenazopyridine HCl 100 MG Oral Tab, Take 100 mg by m 118 12/09/2019    BILT 0.2 12/09/2019    TP 7.6 12/09/2019    AST 23 12/09/2019    ALT 20 12/09/2019    PGLU 132 12/10/2019       CXR: image personally reviewed.       Radiology: Xr Cystogram (min 3 Views) (aod=[id number])    Result Date: 11/27/2019  PROCE contrast seen within normal caliber proximal ureter. None was seen in the distal ureter, and this proximal ureter contrast probably was coming down from the renal pelvis.   Toward the end of the examination there was opacification of the vagina and the [place] brachial, basilic, and cephalic veins. The mid and peripheral brachial and basilic veins were not visualized because of overlying bandages. OTHER:  Negative. CONCLUSION:  Limited by overlying bandages.   No DVT or superficial thrombophlebitis is ident color flow. Doppler imaging were performed. The following veins were imaged:  Common, deep, and superficial femoral, popliteal, sapheno-femoral junction, posterior tibial veins, and the contralateral common femoral vein.   PATIENT STATED HISTORY: (As sales small bowel which are limited assessment in the right upper quadrant (series 3, image 72) without intravenous contrast.  No oral contrast present in this region as well. There is no evidence of obstruction as contrast material seen within the ostomy bag. ductal prominence. Common bile duct is dilated measuring up to 1.2 cm without significant change from 4/17/2018. Stable coarse 1.5 cm calcification abutting the lateral margin of the left lobe of the liver. Ellie Ontiveros  PANCREAS:  Atrophic SPLEEN:  Stable KIDNEYS: the liver. Critical exam results phoned to Dr. Ben Xavier in the ER on 11/11/2019 .    Dictated by: Rocio Menard MD on 11/11/2019 at 18:52     Approved by: Rocio Menard MD on 11/11/2019 at 19:10           Kidney/bladder (MSR=[id number])    Result Date: 12/9/2 terminate in the stomach. A preliminary report was provided by the [facility] teleradiology service.      Dictated by: Twyla Coello MD on 11/12/2019 at 8:40     Approved by: Twyla Coello MD on 11/12/2019 at 8:41          Xr Chest Ap Portable  (cpt=7 amlodipine given borderline low BP    #DM II with hyperglycemia  - ISS with accuchecks     #Anemia of chronic disease  - hgb of 7.7 likely dilutional  - repeat in AM    #Hx of PE  - hold xarelto given ANTONIO  - SC hep for now    #Hypothyroidism  - cont synthr

## 2019-12-10 NOTE — CONSULTS
[facility]    Report of Consultation    Tuan Vuong Patient Status:  Observation    1935 MRN [id number]   [facility] 4NW-A Attending Alek Townsend MD   Hosp Day # 0 PCP Deya Christine MD     Date of consult: 12/10/2019    REASON F [id number] Left 2017    Performed by Tanika Portillo MD at [facility]   •      • OTHER      SHOULDER, KNEES   • OTHER SURGICAL HISTORY      KNEE SURGERY   • OTHER SURGICAL HISTORY      GALLBLADDER   • OTHER SURGICAL HISTORY      STOMACH S Intravenous, Continuous  •  acetaminophen (TYLENOL) tab 650 mg, 650 mg, Oral, Q6H PRN  •  ondansetron HCl (ZOFRAN) injection 4 mg, 4 mg, Intravenous, Q6H PRN  •  Metoclopramide HCl (REGLAN) injection 10 mg, 10 mg, Intravenous, Q8H PRN  No current outpatien 12/10/2019    MCH 26.9 12/10/2019    MCHC 31.2 12/10/2019    RDW 14.9 12/10/2019    NEPRELIM 2.98 12/10/2019    NEUTABS 5.85 11/20/2019    LYMPHABS 1.39 11/20/2019    EOSABS 0.23 11/20/2019    BASABS 0.08 11/20/2019    NEUT 75 11/20/2019    LYMPH 18 11/20/ who presented to the ED from [facility] for ANTONIO and hyponatremia. She was recently admitted to [facility] 11/11/19-11/30/19 after complex colon resection with ileostomy c/b poor oral intake and ANTONIO. Required significant IVFs. Discharged to [facility] 12.  Poor intake a

## 2019-12-10 NOTE — ED PROVIDER NOTES
Patient Seen in: [facility] Emergency Department      History   Patient presents with:  Fatigue  Abnormal Result  Back Pain    Stated Complaint: weakness, abnormal labs, back pain    HPI    56-year-old female presents emergency department from [facility] [address] Right 4/26/2017    Performed by Xin Adams MD at [place] [address] Left 11/12/2019    Performed by Carlos Zamora MD at [facility] MAIN OR                    Social History    Tobacco Use      Smoking status: Never Smoker Creatinine 2.66 (*)     GFR, Non- 16 (*)     GFR, -American 18 (*)     Albumin 2.6 (*)     Globulin  5.0 (*)     A/G Ratio 0.5 (*)     All other components within normal limits   CBC W/ DIFFERENTIAL - Abnormal; Notable for the follow septic at this point. She is resting comfortably. MDM     Patient was evaluated in the emergency department and at this point patient will need admission for further management of medical condition.   Patient was stable in the emergency department and manohar

## 2019-12-10 NOTE — CONSULTS
[facility] Urology   Consultation Note    Tuan Vuong Patient Status:  Observation    1935 MRN [id number]   [facility] 4NW-A Attending Valente Christopher MD   Hosp Day # 0 PCP Alexander Alejandra MD     Reason for Consultation:  Right SURGICAL HISTORY      RECONSTUCTION RT UPPER ARM   • REMOVAL GALLBLADDER     • RIGHT PHACOEMULSIFICATION OF CATARACT WITH INTRAOCULAR LENS IMPLANT [id number] Right 4/26/2017    Performed by Rd Gill MD at [address] [place] [address] in HPI. Physical Exam:  /63 (BP Location: Right arm)   Pulse 72   Temp 98.4 °F (36.9 °C) (Oral)   Resp 18   Ht 5' (1.524 m)   Wt 147 lb 11.3 oz (67 kg)   SpO2 96%   BMI 28.85 kg/m²   GENERAL: Patient resting in bed in no acute distress.     HEENT: region as well. There is no evidence of obstruction as contrast material seen within the ostomy bag. No pneumatosis is seen. There are a few locules of free intraperitoneal air   which may be related to postoperative change.   No evidence of retroperiton Abdominal distension     Fecal impaction of colon (HCC)     Intestinal obstruction, unspecified cause, unspecified whether partial or complete (HCC)     Lactic acidosis     Essential hypertension     Hypo-osmolar hyponatremia     Counseling regarding advan

## 2019-12-10 NOTE — PROGRESS NOTES
NURSING ADMISSION NOTE      Patient admitted via Cart  Oriented to room. Safety precautions initiated. Bed in low position. Call light in reach. A&O times 4. Speaks jan Leo, daughter at bedside. Admitted with ANTONIO.   0.9 NS infusing per MD

## 2019-12-11 LAB
ALBUMIN SERPL-MCNC: 1.8 G/DL (ref 3.4–5)
ANION GAP SERPL CALC-SCNC: 7 MMOL/L (ref 0–18)
ATRIAL RATE: 78 BPM
BASOPHILS # BLD AUTO: 0.02 X10(3) UL (ref 0–0.2)
BASOPHILS NFR BLD AUTO: 0.5 %
BUN BLD-MCNC: 28 MG/DL (ref 7–18)
BUN/CREAT SERPL: 15 (ref 10–20)
CALCIUM BLD-MCNC: 8.5 MG/DL (ref 8.5–10.1)
CHLORIDE SERPL-SCNC: 111 MMOL/L (ref 98–112)
CO2 SERPL-SCNC: 18 MMOL/L (ref 21–32)
CREAT BLD-MCNC: 1.87 MG/DL (ref 0.55–1.02)
DEPRECATED RDW RBC AUTO: 46.2 FL (ref 35.1–46.3)
EOSINOPHIL # BLD AUTO: 0.11 X10(3) UL (ref 0–0.7)
EOSINOPHIL NFR BLD AUTO: 2.6 %
ERYTHROCYTE [DISTWIDTH] IN BLOOD BY AUTOMATED COUNT: 14.8 % (ref 11–15)
GLUCOSE BLD-MCNC: 108 MG/DL (ref 70–99)
GLUCOSE BLD-MCNC: 120 MG/DL (ref 70–99)
GLUCOSE BLD-MCNC: 126 MG/DL (ref 70–99)
GLUCOSE BLD-MCNC: 129 MG/DL (ref 70–99)
HAV IGM SER QL: 1.4 MG/DL (ref 1.6–2.6)
HCT VFR BLD AUTO: 24.6 % (ref 35–48)
HGB BLD-MCNC: 7.6 G/DL (ref 12–16)
IMM GRANULOCYTES # BLD AUTO: 0.08 X10(3) UL (ref 0–1)
IMM GRANULOCYTES NFR BLD: 1.9 %
LACTATE SERPL-SCNC: 1.4 MMOL/L (ref 0.4–2)
LYMPHOCYTES # BLD AUTO: 0.83 X10(3) UL (ref 1–4)
LYMPHOCYTES NFR BLD AUTO: 19.8 %
MCH RBC QN AUTO: 26.5 PG (ref 26–34)
MCHC RBC AUTO-ENTMCNC: 30.9 G/DL (ref 31–37)
MCV RBC AUTO: 85.7 FL (ref 80–100)
MONOCYTES # BLD AUTO: 0.47 X10(3) UL (ref 0.1–1)
MONOCYTES NFR BLD AUTO: 11.2 %
NEUTROPHILS # BLD AUTO: 2.69 X10 (3) UL (ref 1.5–7.7)
NEUTROPHILS # BLD AUTO: 2.69 X10(3) UL (ref 1.5–7.7)
NEUTROPHILS NFR BLD AUTO: 64 %
OSMOLALITY SERPL CALC.SUM OF ELEC: 289 MOSM/KG (ref 275–295)
P AXIS: 2 DEGREES
P-R INTERVAL: 192 MS
PHOSPHATE SERPL-MCNC: 2.1 MG/DL (ref 2.5–4.9)
PLATELET # BLD AUTO: 343 10(3)UL (ref 150–450)
POTASSIUM SERPL-SCNC: 4 MMOL/L (ref 3.5–5.1)
Q-T INTERVAL: 362 MS
QRS DURATION: 76 MS
QTC CALCULATION (BEZET): 412 MS
R AXIS: -31 DEGREES
RBC # BLD AUTO: 2.87 X10(6)UL (ref 3.8–5.3)
SODIUM SERPL-SCNC: 136 MMOL/L (ref 136–145)
T AXIS: 64 DEGREES
VENTRICULAR RATE: 78 BPM
WBC # BLD AUTO: 4.2 X10(3) UL (ref 4–11)

## 2019-12-11 PROCEDURE — 85025 COMPLETE CBC W/AUTO DIFF WBC: CPT | Performed by: INTERNAL MEDICINE

## 2019-12-11 PROCEDURE — 82962 GLUCOSE BLOOD TEST: CPT

## 2019-12-11 PROCEDURE — 83735 ASSAY OF MAGNESIUM: CPT | Performed by: INTERNAL MEDICINE

## 2019-12-11 PROCEDURE — 80069 RENAL FUNCTION PANEL: CPT | Performed by: INTERNAL MEDICINE

## 2019-12-11 PROCEDURE — 83605 ASSAY OF LACTIC ACID: CPT | Performed by: INTERNAL MEDICINE

## 2019-12-11 RX ORDER — CIPROFLOXACIN 2 MG/ML
400 INJECTION, SOLUTION INTRAVENOUS EVERY 24 HOURS
Status: DISCONTINUED | OUTPATIENT
Start: 2019-12-11 | End: 2019-12-16

## 2019-12-11 RX ORDER — MAGNESIUM SULFATE HEPTAHYDRATE 40 MG/ML
2 INJECTION, SOLUTION INTRAVENOUS ONCE
Status: COMPLETED | OUTPATIENT
Start: 2019-12-11 | End: 2019-12-11

## 2019-12-11 NOTE — DIETARY MALNUTRITION NOTE
[facility]    NUTRITION INITIAL ASSESSMENT    Pt meets moderate malnutrition criteria.     CRITERIA FOR MALNUTRITION DIAGNOSIS:  Criteria for non-severe malnutrition diagnosis: chronic illness related to energy intake less than75% for greater than 1 mo from Last 10 Encounters:  12/09/19 : 67 kg (147 lb 11.3 oz)  12/03/19 : 70 kg (154 lb 6.4 oz)  11/30/19 : 71.4 kg (157 lb 6.4 oz)  11/30/19 : 72 kg (158 lb 12.8 oz)  04/13/19 : 74.8 kg (165 lb)    NUTRITION:  Diet: Carb Controlled 1800 kcal/ 60 gm  Oral Granados

## 2019-12-11 NOTE — PROGRESS NOTES
Slept well overnight. Vitals stable. Denies any pain. IVF infusing. Voiding without difficulty. Rocephin q24h continued. Fall precautions in place.

## 2019-12-11 NOTE — PROGRESS NOTES
[facility]  Urology Progress Note    Tuan Vuong Patient Status:  Inpatient    1935 MRN [id number]   [facility] 4NW-A Attending Michael Loredo, DO   Hosp Day # 1 PCP Jessee Donato MD     Subjective:  Asher Rodriguez is a(n) 81 lysis of adhesions, left ureteral reimplant, left ureteral stent insertions with Dr. Christine Dunne. Satish Yusuf (11/12/19)  -albert catheter removed on 11/29/19      Plan:    -Dr. Satish Yusuf reviewed CT scan - hydro may be related to infection  -Check final culture results.   I

## 2019-12-11 NOTE — PLAN OF CARE
Pt. A&Ox4. VSS. Pt. Nepali speaking; able to translate with family members and using the  ipad. Pt. Is weak but able to transfer to the [facility] and chair with 1 staff assist. QID accuchecks; insulin given per [facility] started to help increase her Progressing  Goal: Electrolytes maintained within normal limits  Description  INTERVENTIONS:  - Monitor labs and rhythm and assess patient for signs and symptoms of electrolyte imbalances  - Administer electrolyte replacement as ordered  - Monitor response Progressing

## 2019-12-11 NOTE — PROGRESS NOTES
[facility]    Nephrology Progress Note    Tuan Vuong Attending:  Jennifer Giles DO     Cc: ANTONIO, hyponatremia    SUBJECTIVE     Still w poor intake  C/o abd pain worse on R   No other complaints     PHYSICAL EXAM   Vital signs: /64 (BP L injection 5,000 Units, 5,000 Units, Subcutaneous, Q8H [facility]  CefTRIAXone Sodium (ROCEPHIN) 1 g in sodium chloride 0.9% 100 mL MBP/ADD-vantage, 1 g, Intravenous, Q24H  0.9% NaCl infusion, , Intravenous, Continuous  acetaminophen (TYLENOL) tab 650 mg, 650 mg, 5.88. urine lytes done after IVFs     Hydronephrosis on R  -- noted on US. CT w hydro on R. Some ?pain there. ?UTI. Await urology recs     Acidosis  -- mild, monitor, if worsens change to bicarb.  Check lactate     HTN  -- amlodipine and BB on hold given lo

## 2019-12-11 NOTE — PHYSICAL THERAPY NOTE
PT eval and treat noted, attempted eval via  as patient Belarusian speaking, patient reporting 10/10 pain, refusing mobility, RN notified and aware, will reattempt as able

## 2019-12-11 NOTE — PLAN OF CARE
Pt alert/oriented, Occitan speaking,  monitor used for communication. Appetite poor, complaints of abdominal pain. Abdomen soft, tender throughout. Ileostomy noted with soft dark stools noted.   Offered to call for orders for pain medication, de medications to maintain glucose within target range  - Assess barriers to adequate nutritional intake and initiate nutrition consult as needed  - Instruct patient on self management of diabetes  Outcome: Progressing  Goal: Electrolytes maintained within no Assess patient's functional ability and stability  - Promote increasing activity/tolerance for mobility and gait  - Educate and engage patient/family in tolerated activity level and precautions  - Recommend use of  walker for transfers and ambulation  Outc

## 2019-12-11 NOTE — CM/SW NOTE
12/11/19 1300   CM/SW Referral Data   Referral Source Social Work (self-referral)   Reason for Referral Discharge planning   Informant Patient   Patient Info   Patient's Mental Status Alert;Oriented   Patient's Home Environment House   Patient lives wit

## 2019-12-11 NOTE — PROGRESS NOTES
[facility] Hospitalist Progress Note                                                                   [address] CORTEZ Vuong  8/8/1935    SUBJECTIVE:  Pt seen and examined.   Via , pt c/o abd zurdo potassium & sodium phosphates  1 packet Oral TID CC   • ferrous sulfate  325 mg Oral Daily with breakfast   • gabapentin  600 mg Oral TID   • Levothyroxine Sodium  125 mcg Oral Before breakfast   • melatonin  3 mg Oral Nightly   • mirtazapine  15 mg Oral N pending UCx     #Sigmoid colon obstruction and ischemic bowel s/p subtotal colectomy, formation of end ileostomy, BALBIR, removal of mesh and reimplantation of left ureter w/ stent insertion  - ilestomy care Q shift  - given worsening abd pain and some disten

## 2019-12-12 LAB
ALBUMIN SERPL-MCNC: 1.7 G/DL (ref 3.4–5)
ANION GAP SERPL CALC-SCNC: 8 MMOL/L (ref 0–18)
BASOPHILS # BLD AUTO: 0.02 X10(3) UL (ref 0–0.2)
BASOPHILS NFR BLD AUTO: 0.4 %
BUN BLD-MCNC: 22 MG/DL (ref 7–18)
BUN/CREAT SERPL: 13.7 (ref 10–20)
CALCIUM BLD-MCNC: 8.4 MG/DL (ref 8.5–10.1)
CHLORIDE SERPL-SCNC: 112 MMOL/L (ref 98–112)
CO2 SERPL-SCNC: 15 MMOL/L (ref 21–32)
CREAT BLD-MCNC: 1.61 MG/DL (ref 0.55–1.02)
DEPRECATED RDW RBC AUTO: 47.3 FL (ref 35.1–46.3)
EOSINOPHIL # BLD AUTO: 0.17 X10(3) UL (ref 0–0.7)
EOSINOPHIL NFR BLD AUTO: 3.3 %
ERYTHROCYTE [DISTWIDTH] IN BLOOD BY AUTOMATED COUNT: 15.1 % (ref 11–15)
GLUCOSE BLD-MCNC: 113 MG/DL (ref 70–99)
GLUCOSE BLD-MCNC: 130 MG/DL (ref 70–99)
GLUCOSE BLD-MCNC: 164 MG/DL (ref 70–99)
GLUCOSE BLD-MCNC: 175 MG/DL (ref 70–99)
GLUCOSE BLD-MCNC: 233 MG/DL (ref 70–99)
HAV IGM SER QL: 1.9 MG/DL (ref 1.6–2.6)
HCT VFR BLD AUTO: 26.5 % (ref 35–48)
HGB BLD-MCNC: 8.3 G/DL (ref 12–16)
IMM GRANULOCYTES # BLD AUTO: 0.17 X10(3) UL (ref 0–1)
IMM GRANULOCYTES NFR BLD: 3.3 %
LYMPHOCYTES # BLD AUTO: 0.9 X10(3) UL (ref 1–4)
LYMPHOCYTES NFR BLD AUTO: 17.6 %
MCH RBC QN AUTO: 27 PG (ref 26–34)
MCHC RBC AUTO-ENTMCNC: 31.3 G/DL (ref 31–37)
MCV RBC AUTO: 86.3 FL (ref 80–100)
MONOCYTES # BLD AUTO: 0.51 X10(3) UL (ref 0.1–1)
MONOCYTES NFR BLD AUTO: 10 %
NEUTROPHILS # BLD AUTO: 3.33 X10 (3) UL (ref 1.5–7.7)
NEUTROPHILS # BLD AUTO: 3.33 X10(3) UL (ref 1.5–7.7)
NEUTROPHILS NFR BLD AUTO: 65.4 %
OSMOLALITY SERPL CALC.SUM OF ELEC: 284 MOSM/KG (ref 275–295)
PHOSPHATE SERPL-MCNC: 1.6 MG/DL (ref 2.5–4.9)
PLATELET # BLD AUTO: 309 10(3)UL (ref 150–450)
POTASSIUM SERPL-SCNC: 4.1 MMOL/L (ref 3.5–5.1)
RBC # BLD AUTO: 3.07 X10(6)UL (ref 3.8–5.3)
SODIUM SERPL-SCNC: 135 MMOL/L (ref 136–145)
WBC # BLD AUTO: 5.1 X10(3) UL (ref 4–11)

## 2019-12-12 PROCEDURE — 82962 GLUCOSE BLOOD TEST: CPT

## 2019-12-12 PROCEDURE — 80069 RENAL FUNCTION PANEL: CPT | Performed by: INTERNAL MEDICINE

## 2019-12-12 PROCEDURE — 85025 COMPLETE CBC W/AUTO DIFF WBC: CPT | Performed by: INTERNAL MEDICINE

## 2019-12-12 PROCEDURE — 83735 ASSAY OF MAGNESIUM: CPT | Performed by: INTERNAL MEDICINE

## 2019-12-12 RX ORDER — HYDROCODONE BITARTRATE AND ACETAMINOPHEN 5; 325 MG/1; MG/1
1 TABLET ORAL EVERY 6 HOURS PRN
Status: DISCONTINUED | OUTPATIENT
Start: 2019-12-12 | End: 2019-12-24

## 2019-12-12 NOTE — CM/SW NOTE
SW spoke w/son regarding dc plan. He stated he would like either home health or rehab (whatever is recommended). Awaiting PT eval. Son stated the pt has been to [facility] in the past and if rehab is recommended he would like her to go there.  Will se

## 2019-12-12 NOTE — PROGRESS NOTES
Pharmacy Note: Renal dose adjustment of Cipro    Tuan Vuong is a 81year old female who has been prescribed Cipro 400mg every 12 hours.   CrCl is 16.4 ml/min so the dose has been adjusted  to 400mg IV every 24 hours per hospital renal dose adjustment pr

## 2019-12-12 NOTE — PROGRESS NOTES
[facility]  Urology Progress Note    Tuan Vuong Patient Status:  Inpatient    1935 MRN [id number]   [facility] 4NW-A Attending Mabel Garzon DO   Hosp Day # 2 PCP Tabby Farnsworth MD     Subjective:  Ming Ag is a(n) 84 year clinically     Assessment:    Right hydronephrosis  UTI  ANTONIO - improving  S/p ex lap, subtotal colectomy, creation of end ileostomy, lysis of adhesions, left ureteral reimplant, left ureteral stent insertions with Dr. Luis Baker. Rg Falcon (11/12/19)  -albert dee dee

## 2019-12-12 NOTE — CM/SW NOTE
Pt still wanting her daughter Jaydon Rey to be her HCPOA. SW called pt's daughter Jaydon Rey she stated she does not want to be HCPOA.  She stated the pt lives w/her brother and stated he has been caring for her, managing medical care, and managing the pt's benefits/

## 2019-12-12 NOTE — PROGRESS NOTES
[facility]    Nephrology Progress Note    Tuan Vuong Attending:  Jarvis Moreno DO     Cc: ANTONIO, hyponatremia    SUBJECTIVE     Still w poor intake  No other complaints     PHYSICAL EXAM   Vital signs: /57 (BP Location: Right arm)   Puls injection 5,000 Units, 5,000 Units, Subcutaneous, Q8H [address]  0.9% NaCl infusion, , Intravenous, Continuous  acetaminophen (TYLENOL) tab 650 mg, 650 mg, Oral, Q6H PRN  ondansetron HCl (ZOFRAN) injection 4 mg, 4 mg, Intravenous, Q6H PRN  Metoclopramide HCl (RE ? UTI. Suspect may be due to infection, repeating US tomorrow per urology     Acidosis  -- lactate wnl.  Change fluids to bicarb as bicarb down to 15     HTN  -- amlodipine and BB on hold given lower BPs    Hypomagnesium / Hypophosphatremia  -- replete and m

## 2019-12-12 NOTE — CONSULTS
Asked to return to see patient due to increased abd pain on right side. No N/V. CT scan yesterday without contrast reviewed. Taking no pain meds. Ostomy functioning. On exam no peritonitis. Tender to palp. Hydro on right noted. Will ask urology to see.  No

## 2019-12-12 NOTE — CONSULTS
[facility]  Report of Consultation    Tuan Vuong Patient Status:  Inpatient    1935 MRN [id number]   [facility] 4NW-A Attending Maria Teresa Burden, [address] [facility] Day # 2 PCP Dhara Ramon MD     19    Reason for Consultation:    Susana Evangelista [facility]   •      • OTHER      SHOULDER, KNEES   • OTHER SURGICAL HISTORY      KNEE SURGERY   • OTHER SURGICAL HISTORY      GALLBLADDER   • OTHER SURGICAL HISTORY      STOMACH SURGERY   • OTHER SURGICAL HISTORY      RECONSTUCTION RT UPP acetaminophen (TYLENOL) tab 650 mg, 650 mg, Oral, Q6H PRN  •  ondansetron HCl (ZOFRAN) injection 4 mg, 4 mg, Intravenous, Q6H PRN  •  Metoclopramide HCl (REGLAN) injection 10 mg, 10 mg, Intravenous, Q8H PRN    Home Medications:    No current facility-admin 4 mg by mouth 3 (three) times daily before meals. , Disp: , Rfl:         Review of Systems:    10 point review performed pertinent positives and negatives per history of present illness. Physical Exam:    Blood pressure 144/81, pulse 75, temperature 98. 5 and with radiographs. COMPARISON:  [facility] , CT, CT ABDOMEN+PELVIS(CPT=74176), 11/18/2019, 14:31.   INDICATIONS:  status-post left ureteral re-implantation/left ureteral stent insertion  PATIENT STATED HISTORY: (As transcribed by Technologist)  Patient offe existing nephro ureteral stent. This confirms patency of the stent, and this findings not unexpected given the communication created between the bladder and the kidney by way of the stent.   2. Toward the end of the study, contrast developed in the uterus 11/24/2019  PROCEDURE:  US VENOUS DOPPLER LEG BILAT - DIAG IMG (CPT=93970)  COMPARISON:  US DEREK, US VENOUS DOPPLER LEG LEFT - DIAG IMG (CPT=93971), 11/18/2019, 14:59.   INDICATIONS:  Bilateral foot pain  TECHNIQUE:  Real time, grey scale, and duplex ul identified in the left knee and calf. CONCLUSION:  Subcutaneous edema identified. No DVT identified in the visualized deep veins of the left lower extremity.     Dictated by: Genia Hyde MD on 11/18/2019 at 15:40     Approved by: Mely Nunez limited assessment without intravenous contrast.  There are loops of unopacified small bowel as well which limits assessment. There are dilated loops of small bowel. This is favored represent ileus or postop change.   However continued follow-up is sugges cranio-caudal plane. Dose reduction techniques were used. Dose information is transmitted to the  [facility] of [facility]) NRDR ([address]) which includes the Dose Index Registry.   PATIENT STATED HISTORY: (As transcribed b the right renal pelvic fat. Visualized proximal ureter is also slightly distended but is not entirely visualized without IV contrast. 4. Left ureteral stent.   There is air within the left renal collecting system as well as within the bladder, correlate cl Plan:    Diet as tolerated  Encourage ambulation  Urology following  No acute surgical management warranted  All questions answered  Will sign off  D/W Meredith Davis [id number] Surgery  12/12/2019    Patient seen

## 2019-12-12 NOTE — PROGRESS NOTES
[facility] Hospitalist Progress Note     PCP: Ebonie Thompson MD    SUBJECTIVE:  No CP, SOB, or N/V. Pt continues to complain of abdominal pain but overall daughter notes it has improved.     OBJECTIVE:  Temp:  [98.1 °F (36.7 °C)-98.5 °F (36.9 °C)] 98.1 °F (36.7 °C 12/12/19  1217   PGLU 129* 120* 108* 130* 175*         Meds:     • potassium & sodium phosphates  1 packet Oral TID CC   • ciprofloxacin  400 mg Intravenous Q24H   • ferrous sulfate  325 mg Oral Daily with breakfast   • gabapentin  600 mg Oral TID   • Levo ISS with accuchecks      #Anemia of chronic disease  - hgb stable     #Hx of PE  - hold xarelto given ANTONIO  - SC hep for now     #Hypothyroidism  - cont synthroid     #Major depression, recurrent  - cont sertraline     #Moderate protein calorie malnutrition

## 2019-12-12 NOTE — PLAN OF CARE
Pt care assumed @ 1930. VSS. Aox4. General surgeon here, who consulted urology. Urology already on consult, will endorse to day shift. Pt is Urdu speaking, bedside  used as needed. Pt appears to be in some discomfort.  Comfort measures used for Return mobility to safest level of function  Description  INTERVENTIONS:  - Assess patient stability and activity tolerance for standing, transferring and ambulating w/ or w/o assistive devices  - Assist with transfers and ambulation using safe patient maurer

## 2019-12-13 LAB
ALBUMIN SERPL-MCNC: 1.7 G/DL (ref 3.4–5)
ANION GAP SERPL CALC-SCNC: 7 MMOL/L (ref 0–18)
BUN BLD-MCNC: 16 MG/DL (ref 7–18)
BUN/CREAT SERPL: 11.9 (ref 10–20)
CALCIUM BLD-MCNC: 8.2 MG/DL (ref 8.5–10.1)
CHLORIDE SERPL-SCNC: 106 MMOL/L (ref 98–112)
CO2 SERPL-SCNC: 21 MMOL/L (ref 21–32)
CREAT BLD-MCNC: 1.34 MG/DL (ref 0.55–1.02)
GLUCOSE BLD-MCNC: 118 MG/DL (ref 70–99)
GLUCOSE BLD-MCNC: 163 MG/DL (ref 70–99)
GLUCOSE BLD-MCNC: 174 MG/DL (ref 70–99)
GLUCOSE BLD-MCNC: 213 MG/DL (ref 70–99)
GLUCOSE BLD-MCNC: 231 MG/DL (ref 70–99)
HAV IGM SER QL: 1.6 MG/DL (ref 1.6–2.6)
OSMOLALITY SERPL CALC.SUM OF ELEC: 283 MOSM/KG (ref 275–295)
PHOSPHATE SERPL-MCNC: 1 MG/DL (ref 2.5–4.9)
PHOSPHATE SERPL-MCNC: 1.1 MG/DL (ref 2.5–4.9)
POTASSIUM SERPL-SCNC: 4.1 MMOL/L (ref 3.5–5.1)
SODIUM SERPL-SCNC: 134 MMOL/L (ref 136–145)

## 2019-12-13 PROCEDURE — 82962 GLUCOSE BLOOD TEST: CPT

## 2019-12-13 PROCEDURE — 80069 RENAL FUNCTION PANEL: CPT | Performed by: INTERNAL MEDICINE

## 2019-12-13 PROCEDURE — 87040 BLOOD CULTURE FOR BACTERIA: CPT | Performed by: PHYSICIAN ASSISTANT

## 2019-12-13 PROCEDURE — 84100 ASSAY OF PHOSPHORUS: CPT | Performed by: HOSPITALIST

## 2019-12-13 PROCEDURE — 83735 ASSAY OF MAGNESIUM: CPT | Performed by: INTERNAL MEDICINE

## 2019-12-13 RX ORDER — SODIUM CHLORIDE, SODIUM LACTATE, POTASSIUM CHLORIDE, CALCIUM CHLORIDE 600; 310; 30; 20 MG/100ML; MG/100ML; MG/100ML; MG/100ML
INJECTION, SOLUTION INTRAVENOUS CONTINUOUS
Status: ACTIVE | OUTPATIENT
Start: 2019-12-13 | End: 2019-12-19

## 2019-12-13 NOTE — CM/SW NOTE
MSW spoke with the patient's POA who is also his son to discuss potential rehab need upon dc. He would be agreeable to the patient going back to [facility] where she has been in the past if she needs rehab.   Preliminary referral sent to [facility]

## 2019-12-13 NOTE — PROGRESS NOTES
[facility] Hospitalist Progress Note     PCP: Gracia Fu MD    SUBJECTIVE:  No CP, SOB, or N/V. Pt feels abdominal pain is improving.     OBJECTIVE:  Temp:  [98.1 °F (36.7 °C)-98.7 °F (37.1 °C)] 98.7 °F (37.1 °C)  Pulse:  [67-73] 73  Resp:  [18-20] 18  BP: (11 12/12/19  2244 12/13/19  0749   PGLU 130* 175* 233* 164* 174*         Meds:     • sodium phosphate IVPB  30 mmol Intravenous Once   • ciprofloxacin  400 mg Intravenous Q24H   • ferrous sulfate  325 mg Oral Daily with breakfast   • gabapentin  600 mg Oral T HTN  - hold metoprolol and amlodipine given borderline low BP     #DM II with hyperglycemia  - ISS with accuchecks      #Anemia of chronic disease  - hgb stable     #Hx of PE  - hold xarelto given ANTONIO  - SC hep for now     #Hypothyroidism  - cont synthroid

## 2019-12-13 NOTE — PROGRESS NOTES
[facility]    Nephrology Progress Note    Tuan Vuong Attending:  Steven Baxter DO     Cc: ANTONIO, hyponatremia    SUBJECTIVE     Po intake mildly improved  C/o pain on R   No other complaints     PHYSICAL EXAM   Vital signs: /75 (BP Locat (MEGACE) oral suspension 400 mg, 400 mg, Oral, Daily  Heparin Sodium (Porcine) 5000 UNIT/ML injection 5,000 Units, 5,000 Units, Subcutaneous, Q8H JOSE ENRIQUE  acetaminophen (TYLENOL) tab 650 mg, 650 mg, Oral, Q6H PRN  ondansetron HCl (ZOFRAN) injection 4 mg, 4 mg, due to infection, repeating US pending.      Acidosis  -- lactate wnl.  S/p bicarb, put back on LR now     HTN  -- amlodipine and BB on hold given lower BPs    Hypomagnesium / Hypophosphatremia  -- replete and monitor          D/w RN and [employer] PA

## 2019-12-13 NOTE — PLAN OF CARE
Problem: SAFETY ADULT - FALL  Goal: Free from fall injury  Description  INTERVENTIONS:  - Assess pt frequently for physical needs  - Identify cognitive and physical deficits and behaviors that affect risk of falls.   - [place] fall precautions as indica appropriate  Outcome: Progressing  Goal: Hemodynamic stability and optimal renal function maintained  Description  INTERVENTIONS:  - Monitor labs and assess for signs and symptoms of volume excess or deficit  - Monitor intake, output and patient weight  - Modify environment to reduce risk of injury  - Provide assistive devices as appropriate  - Consider OT/PT consult to assist with strengthening/mobility  - Encourage toileting schedule  Outcome: Progressing     Problem: GENITOURINARY - ADULT  Goal: Absence urine output, blood pressure (other measures as available)  - Encourage oral intake as appropriate  - Instruct patient on fluid and nutrition restrictions as appropriate  Outcome: Progressing     Problem: MUSCULOSKELETAL - ADULT  Goal: Return mobility to s

## 2019-12-13 NOTE — PROGRESS NOTES
[facility]  Urology Progress Note    Tuan Vuong Patient Status:  Inpatient    1935 MRN [id number]   [facility] 4NW-A Attending Danielle Liz, DO   Hosp Day # 3 PCP Deya Christine MD     Subjective:  Iftikhar Garcia is a(n) 84 year pending patient's clinical course. -Patient to f/u as scheduled on 12/23/19 for left ureteral stent removal.    Janis Zendejas P.A.-C  [facility] Urology  12/13/2019  10:29 AM    Addendum at 12:05 PM:  Spoke with patient's daughter and discussed above.  Daughter

## 2019-12-13 NOTE — PROGRESS NOTES
Incontinent of urine. Pure wick in use and draining cloudy yellow urine. No residual noted with bladder scan.

## 2019-12-13 NOTE — PLAN OF CARE
Pt care assumed @ 1930. Aox4. VSS. On room air.  @ bedside. Pt son @ bedside. All PM meds tolerated. IV bicarb infusing. IV abx per MAR. SCDs on. Heparin per MAR. Ileostomy bag leaking @ change of shift. Bag was replaced.  Purewick catheter placed mobility to safest level of function  Description  INTERVENTIONS:  - Assess patient stability and activity tolerance for standing, transferring and ambulating w/ or w/o assistive devices  - Assist with transfers and ambulation using safe patient handling e

## 2019-12-14 ENCOUNTER — APPOINTMENT (OUTPATIENT)
Dept: ULTRASOUND IMAGING | Facility: HOSPITAL | Age: 84
DRG: 683 | End: 2019-12-14
Attending: PHYSICIAN ASSISTANT
Payer: MEDICARE

## 2019-12-14 LAB
ALBUMIN SERPL-MCNC: 1.7 G/DL (ref 3.4–5)
ANION GAP SERPL CALC-SCNC: 5 MMOL/L (ref 0–18)
BUN BLD-MCNC: 13 MG/DL (ref 7–18)
BUN/CREAT SERPL: 10.4 (ref 10–20)
CALCIUM BLD-MCNC: 8.2 MG/DL (ref 8.5–10.1)
CHLORIDE SERPL-SCNC: 103 MMOL/L (ref 98–112)
CO2 SERPL-SCNC: 27 MMOL/L (ref 21–32)
CREAT BLD-MCNC: 1.25 MG/DL (ref 0.55–1.02)
GLUCOSE BLD-MCNC: 110 MG/DL (ref 70–99)
GLUCOSE BLD-MCNC: 179 MG/DL (ref 70–99)
GLUCOSE BLD-MCNC: 205 MG/DL (ref 70–99)
GLUCOSE BLD-MCNC: 96 MG/DL (ref 70–99)
HAV IGM SER QL: 1.4 MG/DL (ref 1.6–2.6)
OSMOLALITY SERPL CALC.SUM OF ELEC: 280 MOSM/KG (ref 275–295)
PHOSPHATE SERPL-MCNC: 1.7 MG/DL (ref 2.5–4.9)
PHOSPHATE SERPL-MCNC: 2.8 MG/DL (ref 2.5–4.9)
POTASSIUM SERPL-SCNC: 4 MMOL/L (ref 3.5–5.1)
SODIUM SERPL-SCNC: 135 MMOL/L (ref 136–145)

## 2019-12-14 PROCEDURE — 83735 ASSAY OF MAGNESIUM: CPT | Performed by: INTERNAL MEDICINE

## 2019-12-14 PROCEDURE — 97162 PT EVAL MOD COMPLEX 30 MIN: CPT

## 2019-12-14 PROCEDURE — 82962 GLUCOSE BLOOD TEST: CPT

## 2019-12-14 PROCEDURE — 84100 ASSAY OF PHOSPHORUS: CPT | Performed by: INTERNAL MEDICINE

## 2019-12-14 PROCEDURE — 80069 RENAL FUNCTION PANEL: CPT | Performed by: INTERNAL MEDICINE

## 2019-12-14 PROCEDURE — 97116 GAIT TRAINING THERAPY: CPT

## 2019-12-14 PROCEDURE — 76770 US EXAM ABDO BACK WALL COMP: CPT | Performed by: PHYSICIAN ASSISTANT

## 2019-12-14 RX ORDER — MAGNESIUM OXIDE 400 MG (241.3 MG MAGNESIUM) TABLET
800 TABLET ONCE
Status: COMPLETED | OUTPATIENT
Start: 2019-12-14 | End: 2019-12-14

## 2019-12-14 NOTE — PLAN OF CARE
Pt alert and oriented x4. Pt speaks Faroese,  utilized for care. Pt remains on IV antibiotics. Denies pain. Pt using purewick. No BM overnight. Pt changed and repositioned frequently through the night. Fall precautions in place.   Call light

## 2019-12-14 NOTE — PROGRESS NOTES
[facility]    Nephrology Progress Note    Tuan Vuong Attending:  Liss Siu DO     Cc: ANTONIO, hyponatremia    SUBJECTIVE     Reports eating better   Denies having significant abd pain      Video  used     PHYSICAL EXAM   Vital si Pen (NOVOLOG) 100 UNIT/ML flexpen 1-5 Units, 1-5 Units, Subcutaneous, TID PC  Megestrol Acetate (MEGACE) oral suspension 400 mg, 400 mg, Oral, Daily  Heparin Sodium (Porcine) 5000 UNIT/ML injection 5,000 Units, 5,000 Units, Subcutaneous, Q8H [address]  acetamino IVFs  - improving with IVFs      Hydronephrosis on R  -- noted on US. CT w hydro on R.   - repeat renal US pending, Urology following    Acidosis  -- lactate wnl. Improved, monitor      HTN  -- amlodipine and BB on hold given lower BPs .  Restart as tolerat

## 2019-12-14 NOTE — PROGRESS NOTES
[facility] Hospitalist Progress Note     PCP: Lucinda Pace MD    SUBJECTIVE:  Pt seen and examined. Interview via audio Thai . Pt states she feels very weak. Abd pain improved. Still with poor appetite but improving. No F/C.      OBJECTIVE:  Tem --   --    ALB 2.3* 2.6* 1.8* 1.7* 1.7* 1.7*       Recent Labs   Lab 12/13/19  0749 12/13/19  1300 12/13/19  1619 12/13/19  2101 12/14/19  0755   PGLU 174* 231* 213* 118* 110*         Meds:     • sodium phosphate IVPB  15 mmol Intravenous Once   • ciproflo Green- this thickening on CT due to perf; observe; surg has signed off  -norco prn ordered     #Essential HTN  - hold metoprolol and amlodipine given borderline low BP     #DM II with hyperglycemia  - ISS with accuchecks      #Anemia of chronic disease  -

## 2019-12-14 NOTE — PLAN OF CARE
Pt. A&O x4; mostly Kinyarwanda speaking; used the  when needed. Magnesium and Phosphorus replaced per protocol; Nephrology aware. No c/o pain. No c/o N/V. Appetite is a little better. Pt. Voiding; purewick in place.  Pt. Working with PT; up to the [employer] Electrolytes maintained within normal limits  Description  INTERVENTIONS:  - Monitor labs and rhythm and assess patient for signs and symptoms of electrolyte imbalances  - Administer electrolyte replacement as ordered  - Monitor response to electrolyte rep level and precautions  - Recommend use of  RW for transfers and short distance ambulation   Outcome: Progressing

## 2019-12-14 NOTE — PROGRESS NOTES
[facility]    Progress Note    Tuan Vuong Patient Status:  Inpatient    1935 MRN [id number]   [facility] 4NW-A Attending Salud Xavier, DO   Hosp Day # 4 PCP Lakshmi Mack MD     Subjective:  Merlene Yoder is a(n) 81year old fe

## 2019-12-14 NOTE — PHYSICAL THERAPY NOTE
PHYSICAL THERAPY EVALUATION - INPATIENT     Room Number: 432/432-A  Evaluation Date: 12/14/2019  Type of Evaluation: Initial  Physician Order: PT Eval and Treat    Presenting Problem: Acute renal insufficiency, R hydronephrosis  Reason for Therapy: M STOMACH SURGERY   • OTHER SURGICAL HISTORY      RECONSTUCTION RT UPPER ARM   • REMOVAL GALLBLADDER     • RIGHT PHACOEMULSIFICATION OF CATARACT WITH INTRAOCULAR LENS IMPLANT [id number] Right 4/26/2017    Performed by Mike Becerra MD at [facility], Poor  Dynamic Standing: Poor -      AM-PAC '6-Clicks' INPATIENT SHORT FORM - BASIC MOBILITY  How much difficulty does the patient currently have. ..  -   Turning over in bed (including adjusting bedclothes, sheets and blankets)?: A Lot   -   Sitting down on not interpreting correctly during session - so IPAD  will definitely be needed - although appears to have limited success based on past notes. Pt very quiet verbalizations and limited motivation to participate.     Pt completed additional 5'x1 of : Fair  Frequency (Obs): 5x/week  Number of Visits to Meet Established Goals: 5      CURRENT GOALS    Goal #1 Patient is able to demonstrate supine - sit EOB @ level: minimum assistance     Goal #2 Patient is able to demonstrate transfers EOB to/from [facility] a

## 2019-12-15 LAB
ALBUMIN SERPL-MCNC: 1.8 G/DL (ref 3.4–5)
ANION GAP SERPL CALC-SCNC: 6 MMOL/L (ref 0–18)
BUN BLD-MCNC: 18 MG/DL (ref 7–18)
BUN/CREAT SERPL: 12.2 (ref 10–20)
CALCIUM BLD-MCNC: 8.2 MG/DL (ref 8.5–10.1)
CHLORIDE SERPL-SCNC: 103 MMOL/L (ref 98–112)
CO2 SERPL-SCNC: 25 MMOL/L (ref 21–32)
CREAT BLD-MCNC: 1.47 MG/DL (ref 0.55–1.02)
GLUCOSE BLD-MCNC: 142 MG/DL (ref 70–99)
GLUCOSE BLD-MCNC: 143 MG/DL (ref 70–99)
GLUCOSE BLD-MCNC: 180 MG/DL (ref 70–99)
GLUCOSE BLD-MCNC: 229 MG/DL (ref 70–99)
GLUCOSE BLD-MCNC: 232 MG/DL (ref 70–99)
HAV IGM SER QL: 1.4 MG/DL (ref 1.6–2.6)
OSMOLALITY SERPL CALC.SUM OF ELEC: 282 MOSM/KG (ref 275–295)
PHOSPHATE SERPL-MCNC: 2.3 MG/DL (ref 2.5–4.9)
POTASSIUM SERPL-SCNC: 4.3 MMOL/L (ref 3.5–5.1)
SODIUM SERPL-SCNC: 134 MMOL/L (ref 136–145)

## 2019-12-15 PROCEDURE — 80069 RENAL FUNCTION PANEL: CPT | Performed by: INTERNAL MEDICINE

## 2019-12-15 PROCEDURE — 82962 GLUCOSE BLOOD TEST: CPT

## 2019-12-15 PROCEDURE — 83735 ASSAY OF MAGNESIUM: CPT | Performed by: INTERNAL MEDICINE

## 2019-12-15 RX ORDER — MAGNESIUM OXIDE 400 MG (241.3 MG MAGNESIUM) TABLET
800 TABLET ONCE
Status: COMPLETED | OUTPATIENT
Start: 2019-12-15 | End: 2019-12-15

## 2019-12-15 NOTE — PROGRESS NOTES
[facility] Hospitalist Progress Note     PCP: Karan Shelton MD    SUBJECTIVE:  Pt seen and examined. Interview via audio [place] . States she still feels sick and weak but with improved appetite. Asking for ensure.       OBJECTIVE:  Temp:  [98.9 °F (3 12/14/19  0555 12/15/19  0552   ALT 20 20  --   --   --   --   --    AST 22 23  --   --   --   --   --    ALB 2.3* 2.6* 1.8* 1.7* 1.7* 1.7* 1.8*       Recent Labs   Lab 12/13/19  2101 12/14/19  0755 12/14/19  1158 12/14/19  1647 12/15/19  0716   PGLU 118* insertion  - ilestomy care Q shift  - seen by Dr. Merida - this thickening on CT due to perf; observe; surg has signed off  -norco prn ordered     #Essential HTN  - hold metoprolol and amlodipine given borderline low BP     #DM II with hyperglycemia  - ISS w

## 2019-12-15 NOTE — CM/SW NOTE
MD informed michael that family wants different NH than [facility]. SW left message for POA- await return call.     CHRISTA JuradoW  /Discharge Planner  [phone]      SW spoke to son Zayda [facility])  He states his sister was considering wanting pt

## 2019-12-15 NOTE — PLAN OF CARE
Pt. A&O x4. VSS. Latvian speaking; able to use the ipad  when needed. QID accuchecks; insulin given per MAR. Family at the bedside; MD requested to come talk to family and give an update. All questions answered to family.  Pt. Up in the chair for m needed  - Instruct patient on self management of diabetes  Outcome: Progressing     Problem: METABOLIC/FLUID AND ELECTROLYTES - ADULT  Goal: Electrolytes maintained within normal limits  Description  INTERVENTIONS:  - Monitor labs and rhythm and assess pat ability and stability  - Promote increasing activity/tolerance for mobility and gait  - Educate and engage patient/family in tolerated activity level and precautions  - Recommend use of  RW for transfers and short distance ambulation   Outcome: Progressing

## 2019-12-15 NOTE — PROGRESS NOTES
[facility]    Nephrology Progress Note    Tuan Vuong Attending:  Jase Lopez DO     Cc: ANTONIO, hyponatremia    SUBJECTIVE     Resting comfortably in bed, appetite is getting better.       Video  used     PHYSICAL EXAM   Vital signs Oral, Daily  Heparin Sodium (Porcine) 5000 UNIT/ML injection 5,000 Units, 5,000 Units, Subcutaneous, Q8H [facility]  acetaminophen (TYLENOL) tab 650 mg, 650 mg, Oral, Q6H PRN  ondansetron HCl (ZOFRAN) injection 4 mg, 4 mg, Intravenous, Q6H PRN  Metoclopramide HCl following    Acidosis  -- lactate wnl. Improved, monitor      HTN  -- amlodipine and BB on hold given lower BPs .  Restart as tolerated     Hypomagnesium / Hypophosphatremia  -- replete and monitor     D/w RN    Joseph Nice MD  [place] [address]

## 2019-12-16 LAB
ALBUMIN SERPL-MCNC: 1.8 G/DL (ref 3.4–5)
ANION GAP SERPL CALC-SCNC: 5 MMOL/L (ref 0–18)
BUN BLD-MCNC: 21 MG/DL (ref 7–18)
BUN/CREAT SERPL: 14.4 (ref 10–20)
CALCIUM BLD-MCNC: 8.4 MG/DL (ref 8.5–10.1)
CHLORIDE SERPL-SCNC: 103 MMOL/L (ref 98–112)
CO2 SERPL-SCNC: 25 MMOL/L (ref 21–32)
CREAT BLD-MCNC: 1.46 MG/DL (ref 0.55–1.02)
GLUCOSE BLD-MCNC: 148 MG/DL (ref 70–99)
GLUCOSE BLD-MCNC: 148 MG/DL (ref 70–99)
GLUCOSE BLD-MCNC: 166 MG/DL (ref 70–99)
GLUCOSE BLD-MCNC: 175 MG/DL (ref 70–99)
GLUCOSE BLD-MCNC: 200 MG/DL (ref 70–99)
HAV IGM SER QL: 1.4 MG/DL (ref 1.6–2.6)
OSMOLALITY SERPL CALC.SUM OF ELEC: 282 MOSM/KG (ref 275–295)
PHOSPHATE SERPL-MCNC: 2.4 MG/DL (ref 2.5–4.9)
POTASSIUM SERPL-SCNC: 4.7 MMOL/L (ref 3.5–5.1)
SODIUM SERPL-SCNC: 133 MMOL/L (ref 136–145)

## 2019-12-16 PROCEDURE — 97165 OT EVAL LOW COMPLEX 30 MIN: CPT

## 2019-12-16 PROCEDURE — 83735 ASSAY OF MAGNESIUM: CPT | Performed by: HOSPITALIST

## 2019-12-16 PROCEDURE — 82962 GLUCOSE BLOOD TEST: CPT

## 2019-12-16 PROCEDURE — 80069 RENAL FUNCTION PANEL: CPT | Performed by: HOSPITALIST

## 2019-12-16 PROCEDURE — 97530 THERAPEUTIC ACTIVITIES: CPT

## 2019-12-16 RX ORDER — CIPROFLOXACIN 500 MG/1
500 TABLET, FILM COATED ORAL NIGHTLY
Status: COMPLETED | OUTPATIENT
Start: 2019-12-16 | End: 2019-12-20

## 2019-12-16 RX ORDER — MAGNESIUM OXIDE 400 MG (241.3 MG MAGNESIUM) TABLET
800 TABLET ONCE
Status: COMPLETED | OUTPATIENT
Start: 2019-12-16 | End: 2019-12-16

## 2019-12-16 NOTE — PROGRESS NOTES
[facility]    Nephrology Progress Note    Tuan Vuong Attending:  Alba Rucker DO       SUBJECTIVE:     No complaints except abd pain  Has been trying to drink ensure at bedside    PHYSICAL EXAM:     Vital Signs: /69   Pulse 73   Temp 0.08 11/20/2019    NEUT 75 11/20/2019    LYMPH 18 11/20/2019    MON 2 11/20/2019    EOS 3 11/20/2019    BASO 1 11/20/2019    NEPERCENT 65.4 12/12/2019    LYPERCENT 17.6 12/12/2019    MOPERCENT 10.0 12/12/2019    EOPERCENT 3.3 12/12/2019    BAPERCENT 0.4 12 1 g, 1 g, Oral, TID AC and HS  Insulin Aspart Pen (NOVOLOG) 100 UNIT/ML flexpen 1-5 Units, 1-5 Units, Subcutaneous, TID PC  Megestrol Acetate (MEGACE) oral suspension 400 mg, 400 mg, Oral, Daily  Heparin Sodium (Porcine) 5000 UNIT/ML injection 5,000 Units, PM

## 2019-12-16 NOTE — PHYSICAL THERAPY NOTE
PHYSICAL THERAPY TREATMENT NOTE - INPATIENT    Room Number: 432/432-A     Session: 1   Number of Visits to Meet Established Goals: 5    Presenting Problem: Acute renal insufficiency, R hydronephrosis    Problem List  Principal Problem:    Acute renal insu )  Location: B LE and UE   Management Techniques:  Activity promotion;Repositioning    BALANCE                                                                                                                     Static Sitting: Fair  Dynamic Sitting: Poor + Session: Up in chair;Needs met;Call light within reach;RN aware of session/findings; All patient questions and concerns addressed    ASSESSMENT   Patient able to ambulate with least assistance this session, however patient still limited with functional mobi

## 2019-12-16 NOTE — PROGRESS NOTES
[facility] hospitalist daily note    Patient was seen/examined on 12/16/19    S: did interview via  (Audio), with pt's permission  Has gas pain after eating  + passing gas and urinating  Has generalized weakness, no numbness/tingling  No fever  No blee of dobhoff for now    General weakness; suspect realated to medical issues  Pt reported that she cant lift her legs, will consult neurology     DVT prophy - heparin ordered    Yulia Hull MD  [facility] hospitalist   [phone]

## 2019-12-16 NOTE — DIETARY MALNUTRITION NOTE
[facility] ASSESSMENT    Pt meets moderate malnutrition criteria.     CRITERIA FOR MALNUTRITION DIAGNOSIS:  Criteria for non-severe malnutrition diagnosis: chronic illness related to energy intake less than75% for greater than 1 hx, appears to have lost about 18 lb (11%) in about 8 months, which is not significant wt loss per standards, but may be concerning given pt's age.      Wt Readings from Last 20 Encounters:  12/09/19 : 67 kg (147 lb 11.3 oz)  12/03/19 : 70 kg (154 lb 6.4 oz

## 2019-12-16 NOTE — OCCUPATIONAL THERAPY NOTE
OCCUPATIONAL THERAPY EVALUATION - INPATIENT     Room Number: 432/432-A  Evaluation Date: 12/16/2019  Type of Evaluation: Initial  Presenting Problem: acute renal insufficiency    Physician Order: IP Consult to Occupational Therapy  Reason for Therapy: ADL/ STOMACH SURGERY   • OTHER SURGICAL HISTORY      RECONSTUCTION RT UPPER ARM   • REMOVAL GALLBLADDER     • RIGHT PHACOEMULSIFICATION OF CATARACT WITH INTRAOCULAR LENS IMPLANT [id number] Right 4/26/2017    Performed by Cezar Lagos MD at [place] [address] Nose  Coordination - Finger to Nose: Symmetrical       ACTIVITY TOLERANCE          O2 SATURATIONS       ACTIVITIES OF DAILY LIVING ASSESSMENT  AM-PAC ‘6-Clicks’ Inpatient Daily Activity Short Form  How much help from another person does the patient current coordination. These deficits impact the patient’s ability to participate in BADLs, functional transfers, instrumental activities of daily living, rest and sleep, work, leisure and social participation. Subacute rehab is recommended for 12-15 days.   Aft supervision  Patient will transfer from supine to sit:  with supervision  Patient will transfer from sit to stand:  with supervision  Patient will transfer form stand to sit:  with supervision  Patient will transfer to toilet:  with supervision    GLEN Munroe

## 2019-12-16 NOTE — CONSULTS
Renetta Sebastian 72     Patient name: Angelique     : 1935      MRN: [id number]     [facility] 4N   Attending: Emile Rowley DO   Date of Admission:  2019   PCP: Beverly Leo MD     HISTORY OF PRE PHACOEMULSIFICATION OF CATARACT WITH INTRAOCULAR LENS IMPLANT [id number] Left 2017    Performed by Romel Barriga MD at [address] [place] [address]   •      • OTHER      SHOULDER, KNEES   • OTHER SURGICAL HISTORY      KNEE SURGERY   • OTHER SURGICAL HIST rash    NEUROLOGICAL EXAMINATION  General Appearance: Well-nourished and no acute distress  Mental Status: Alert and oriented; following commands.   Cranial nerves:    II:  PERRL;    III, IV, VI:          Normal EOM   V:                     Normal facial se - 11/30 for colon resection, ileostomy, poor oral intake and ANTONIO.  No clear focal weakness on exam but limited as patient will not cooperate with examination and states that she is too weak to walk or move her arms and legs but patient would like to get up

## 2019-12-16 NOTE — PROGRESS NOTES
[facility] Pharmacy Note: Route Optimization for Ciprofloxacin (CIPRO)    Patient is currently on Ciprofloxacin (CIPRO) 400 mg IV every 24 hours.    The patient meets the criteria to convert to the oral equivalent as established by the IV to Oral conversion protoc

## 2019-12-16 NOTE — PLAN OF CARE
Problem: GENITOURINARY - ADULT  Goal: Absence of urinary retention  Description  INTERVENTIONS:  - Assess patient’s ability to void and empty bladder  - Monitor intake/output and perform bladder scan as needed  - Follow urinary retention protocol/standar on fluid and nutrition restrictions as appropriate  Outcome: Progressing   Pt. Receiving IV LR, Nephrology managing and increased rate to 100ml/hr today. Pt's Mg and Phos replaced as well. Mg 1.4 and Phos 2.4. BP stable. BUN 21, Cr 1.46, Na 133.        Prob

## 2019-12-16 NOTE — PROGRESS NOTES
[facility]  Urology Progress Note    Tuan Vuong Patient Status:  Inpatient    1935 MRN [id number]   [facility] Attending Veronika Johnson, DO   Hosp Day # 6 PCP Ebonie Thompson MD     Subjective:  Steff Renae is a(n) 81

## 2019-12-17 LAB
ALBUMIN SERPL-MCNC: 1.8 G/DL (ref 3.4–5)
ANION GAP SERPL CALC-SCNC: 4 MMOL/L (ref 0–18)
BUN BLD-MCNC: 24 MG/DL (ref 7–18)
BUN/CREAT SERPL: 16.1 (ref 10–20)
CALCIUM BLD-MCNC: 8.4 MG/DL (ref 8.5–10.1)
CHLORIDE SERPL-SCNC: 104 MMOL/L (ref 98–112)
CO2 SERPL-SCNC: 27 MMOL/L (ref 21–32)
CREAT BLD-MCNC: 1.49 MG/DL (ref 0.55–1.02)
GLUCOSE BLD-MCNC: 131 MG/DL (ref 70–99)
GLUCOSE BLD-MCNC: 151 MG/DL (ref 70–99)
GLUCOSE BLD-MCNC: 190 MG/DL (ref 70–99)
GLUCOSE BLD-MCNC: 207 MG/DL (ref 70–99)
HAV IGM SER QL: 1.4 MG/DL (ref 1.6–2.6)
OSMOLALITY SERPL CALC.SUM OF ELEC: 286 MOSM/KG (ref 275–295)
PHOSPHATE SERPL-MCNC: 3.2 MG/DL (ref 2.5–4.9)
POTASSIUM SERPL-SCNC: 4.8 MMOL/L (ref 3.5–5.1)
SODIUM SERPL-SCNC: 135 MMOL/L (ref 136–145)

## 2019-12-17 PROCEDURE — 82962 GLUCOSE BLOOD TEST: CPT

## 2019-12-17 PROCEDURE — 83735 ASSAY OF MAGNESIUM: CPT | Performed by: HOSPITALIST

## 2019-12-17 PROCEDURE — 80069 RENAL FUNCTION PANEL: CPT | Performed by: HOSPITALIST

## 2019-12-17 RX ORDER — MAGNESIUM SULFATE HEPTAHYDRATE 40 MG/ML
2 INJECTION, SOLUTION INTRAVENOUS ONCE
Status: COMPLETED | OUTPATIENT
Start: 2019-12-17 | End: 2019-12-17

## 2019-12-17 RX ORDER — SIMETHICONE 80 MG
80 TABLET,CHEWABLE ORAL 4 TIMES DAILY PRN
Status: DISCONTINUED | OUTPATIENT
Start: 2019-12-17 | End: 2019-12-22

## 2019-12-17 RX ORDER — MAGNESIUM OXIDE 400 MG (241.3 MG MAGNESIUM) TABLET
800 TABLET ONCE
Status: DISCONTINUED | OUTPATIENT
Start: 2019-12-17 | End: 2019-12-17

## 2019-12-17 NOTE — PROGRESS NOTES
[facility] hospitalist daily note     Patient was seen/examined on 12/17/19     S: son at the bedside and interpreted with pt's permission  Still feels general weakness  Son is interested in hematology evaluation to see if pt needs to continue anticoauglation aft And ANTONIO  Monitor Blood pressure     #DM II with hyperglycemia  - ISS with accuchecks      #Anemia of chronic disease  - hgb stable     #Hx of PE  - hold xarelto given ANTONIO, heparin subcutaneous ordered during admit  Pt denies bleeding  I spoke with pt's son

## 2019-12-17 NOTE — CM/SW NOTE
MD stating the pt's son may want an alternative [facility]. Informed her he would need to select a facility that has a Medicaid bed available. Provided a list. Will follow up.

## 2019-12-17 NOTE — PROGRESS NOTES
[facility]  Urology Progress Note    Tuan Vuong Patient Status:  Inpatient    1935 MRN [id number]   [facility] 4NW-A Attending Jay Rodriguez DO   Hosp Day # 7 PCP Orinda Carrel, MD     Subjective:  Daryl Alberts is a(n) 81

## 2019-12-17 NOTE — PROGRESS NOTES
[facility]    Nephrology Progress Note    Tuan Vuong Attending:  Emile Rowley DO       SUBJECTIVE:     No family available by phone  Pt denies complaints; ate most of her breakfast    PHYSICAL EXAM:     Vital Signs: /67 (BP Location: EOSABS 0.23 11/20/2019    BASABS 0.08 11/20/2019    NEUT 75 11/20/2019    LYMPH 18 11/20/2019    MON 2 11/20/2019    EOS 3 11/20/2019    BASO 1 11/20/2019    NEPERCENT 65.4 12/12/2019    LYPERCENT 17.6 12/12/2019    MOPERCENT 10.0 12/12/2019    EOPERCENT 3 (ZOLOFT) tab 150 mg, 150 mg, Oral, Daily  sucralfate (CARAFATE) tab 1 g, 1 g, Oral, TID AC and HS  Insulin Aspart Pen (NOVOLOG) 100 UNIT/ML flexpen 1-5 Units, 1-5 Units, Subcutaneous, TID PC  Megestrol Acetate (MEGACE) oral suspension 400 mg, 400 mg, Oral, [address] Guanako Guerra, [address]    12/17/2019  10:26 AM

## 2019-12-17 NOTE — PLAN OF CARE
Stable on room air. Denies pain. Meds given per mar. Slept well. Bed alarm on. Call light in reach. Ileostomy with brown watery stool, no leakage noted. Afebrile overnight. Will monitor.    Problem: SAFETY ADULT - FALL  Goal: Free from fall injury  Descript

## 2019-12-18 ENCOUNTER — APPOINTMENT (OUTPATIENT)
Dept: ULTRASOUND IMAGING | Facility: HOSPITAL | Age: 84
DRG: 683 | End: 2019-12-18
Attending: HOSPITALIST
Payer: MEDICARE

## 2019-12-18 LAB
ALBUMIN SERPL-MCNC: 1.8 G/DL (ref 3.4–5)
ANION GAP SERPL CALC-SCNC: 8 MMOL/L (ref 0–18)
BUN BLD-MCNC: 27 MG/DL (ref 7–18)
BUN/CREAT SERPL: 18 (ref 10–20)
CALCIUM BLD-MCNC: 8.7 MG/DL (ref 8.5–10.1)
CHLORIDE SERPL-SCNC: 103 MMOL/L (ref 98–112)
CO2 SERPL-SCNC: 24 MMOL/L (ref 21–32)
CREAT BLD-MCNC: 1.5 MG/DL (ref 0.55–1.02)
GLUCOSE BLD-MCNC: 124 MG/DL (ref 70–99)
GLUCOSE BLD-MCNC: 130 MG/DL (ref 70–99)
GLUCOSE BLD-MCNC: 143 MG/DL (ref 70–99)
GLUCOSE BLD-MCNC: 151 MG/DL (ref 70–99)
HAV IGM SER QL: 1.7 MG/DL (ref 1.6–2.6)
OSMOLALITY SERPL CALC.SUM OF ELEC: 287 MOSM/KG (ref 275–295)
PHOSPHATE SERPL-MCNC: 2.7 MG/DL (ref 2.5–4.9)
POTASSIUM SERPL-SCNC: 4.9 MMOL/L (ref 3.5–5.1)
SODIUM SERPL-SCNC: 135 MMOL/L (ref 136–145)

## 2019-12-18 PROCEDURE — 80069 RENAL FUNCTION PANEL: CPT | Performed by: HOSPITALIST

## 2019-12-18 PROCEDURE — 97530 THERAPEUTIC ACTIVITIES: CPT

## 2019-12-18 PROCEDURE — 97116 GAIT TRAINING THERAPY: CPT

## 2019-12-18 PROCEDURE — 82962 GLUCOSE BLOOD TEST: CPT

## 2019-12-18 PROCEDURE — 93970 EXTREMITY STUDY: CPT | Performed by: HOSPITALIST

## 2019-12-18 PROCEDURE — 83735 ASSAY OF MAGNESIUM: CPT | Performed by: HOSPITALIST

## 2019-12-18 NOTE — PROGRESS NOTES
[facility] Hospitalist Progress Note     PCP: Tabby Farnsworth MD    SUBJECTIVE:  Pt seen and examined. Interview via audio Estonian . States appetite is improving, less abd pain. Still feels weak. No F/C. OBJECTIVE:  Temp:  [98.3 °F (36.8 °C)-98. 207* 151* 143*         Meds:     • Rivaroxaban  20 mg Oral Daily with food   • Ciprofloxacin HCl  500 mg Oral Nightly   • ferrous sulfate  325 mg Oral Daily with breakfast   • gabapentin  600 mg Oral TID   • Levothyroxine Sodium  125 mcg Oral Before breakf this thickening on CT due to perf; observe; surg has signed off  -norco prn ordered     #Essential HTN  - holding metoprolol and amlodipine given borderline low BP     #DM II with hyperglycemia  - ISS with accuchecks      #Anemia of chronic disease  - hgb

## 2019-12-18 NOTE — PROGRESS NOTES
[facility]  Urology Progress Note    Tuan Vuong Patient Status:  Inpatient    1935 MRN [id number]   [facility] 4NW-A Attending Beau Villatoro DO   Hosp Day # 8 PCP Lucinda Pace MD     Subjective:  Tuan Vuong is a(n) 81

## 2019-12-18 NOTE — PLAN OF CARE
Patient alertx4. Vital signs stable. Bed alarm on. Bulgarian speaking only.  for lengthy sentences/conversations. Ileostomy intact. Scar in middle of abdomen. Patient does not complain of pain. Purewick in place. IVF infusing.  Will continue to monit

## 2019-12-18 NOTE — PHYSICAL THERAPY NOTE
PHYSICAL THERAPY TREATMENT NOTE - INPATIENT    Room Number: 432/432-A     Session: 2  Number of Visits to Meet Established Goals: 5    Presenting Problem: Acute renal insufficiency, R hydronephrosis    Problem List  Principal Problem:    Acute renal insuf rate  Location: B LE and UE   Management Techniques:  Activity promotion;Repositioning    BALANCE                                                                                                                     Static Sitting: Fair  Dynamic Sitting: Poor provide  Sit<>Supine: NA  Stand<>Sit: Min A    Comments related to Mobility: Pt willing to work with writer on OOB activity - Pt amb in hallway increased distance with use of RW and close chair follow.    Pt tends to sit without warning, and staff must have assistance level: minimum assistance      Goal #4     Goal #5     Goal #6     Goal Comments: Goals established on 12/14/2019; all goals ongoing as of 12/18/2019

## 2019-12-18 NOTE — CONSULTS
[facility]  Report of Consultation    Tuan Vuong Patient Status:  Inpatient    1935 MRN [id number]   [facility] 4NW-A Attending Candis Delgadillo, DO   Hosp Day # 8 PCP Roma Rosas MD     Reason for Consultation:  Known to us, cataract surgery     Past Surgical History:   Procedure Laterality Date   •      • COLECTOMY N/A 2019    Performed by Mark Poole MD at [facility] MAIN OR   • LEFT PHACOEMULSIFICATION OF CATARACT WITH INTRAOCULAR LENS IMPLANT [id number] Left 2017 (ZOLOFT) tab 150 mg, 150 mg, Oral, Daily  •  sucralfate (CARAFATE) tab 1 g, 1 g, Oral, TID AC and HS  •  Insulin Aspart Pen (NOVOLOG) 100 UNIT/ML flexpen 1-5 Units, 1-5 Units, Subcutaneous, TID PC  •  Megestrol Acetate (MEGACE) oral suspension 400 mg, 400 seen in the setting of urethrovaginal reflux which can sometimes occur as the patient bears down trying to void.     Dictated by: Honorio Pittman MD on 11/27/2019 at 15:04     Approved by: Honorio Pittman MD on 11/27/2019 at 15:17          Us Venous Doppler cyst inferior pole left kidney.   Urinary bladder is obscured by bandages    Dictated by: Terrell Avila MD on 12/14/2019 at 9:35     Approved by: Terrell Avila MD on 12/14/2019 at 9:37          Us Kidney/bladder (cpt=76770)    Result Date: 12/9/2019  CONC distension     Fecal impaction of colon (HCC)     Intestinal obstruction, unspecified cause, unspecified whether partial or complete (HCC)     Lactic acidosis     Essential hypertension     Hypo-osmolar hyponatremia     Counseling regarding advance care pl

## 2019-12-18 NOTE — CM/SW NOTE
SW spoke w/th ept's son in regards to dc plan. He has been very indecisive. He is now stating he will have the pt dc to [facility].

## 2019-12-19 LAB
ALBUMIN SERPL-MCNC: 1.9 G/DL (ref 3.4–5)
ANION GAP SERPL CALC-SCNC: 6 MMOL/L (ref 0–18)
BASOPHILS # BLD AUTO: 0.02 X10(3) UL (ref 0–0.2)
BASOPHILS NFR BLD AUTO: 0.4 %
BUN BLD-MCNC: 29 MG/DL (ref 7–18)
BUN/CREAT SERPL: 18 (ref 10–20)
CALCIUM BLD-MCNC: 8.4 MG/DL (ref 8.5–10.1)
CHLORIDE SERPL-SCNC: 105 MMOL/L (ref 98–112)
CO2 SERPL-SCNC: 23 MMOL/L (ref 21–32)
CREAT BLD-MCNC: 1.61 MG/DL (ref 0.55–1.02)
DEPRECATED RDW RBC AUTO: 48 FL (ref 35.1–46.3)
EOSINOPHIL # BLD AUTO: 0.2 X10(3) UL (ref 0–0.7)
EOSINOPHIL NFR BLD AUTO: 3.8 %
ERYTHROCYTE [DISTWIDTH] IN BLOOD BY AUTOMATED COUNT: 15.6 % (ref 11–15)
GLUCOSE BLD-MCNC: 140 MG/DL (ref 70–99)
GLUCOSE BLD-MCNC: 140 MG/DL (ref 70–99)
GLUCOSE BLD-MCNC: 147 MG/DL (ref 70–99)
HAV IGM SER QL: 1.7 MG/DL (ref 1.6–2.6)
HCT VFR BLD AUTO: 22.8 % (ref 35–48)
HGB BLD-MCNC: 7.2 G/DL (ref 12–16)
IMM GRANULOCYTES # BLD AUTO: 0.2 X10(3) UL (ref 0–1)
IMM GRANULOCYTES NFR BLD: 3.8 %
LYMPHOCYTES # BLD AUTO: 1.37 X10(3) UL (ref 1–4)
LYMPHOCYTES NFR BLD AUTO: 25.8 %
MCH RBC QN AUTO: 27 PG (ref 26–34)
MCHC RBC AUTO-ENTMCNC: 31.6 G/DL (ref 31–37)
MCV RBC AUTO: 85.4 FL (ref 80–100)
MONOCYTES # BLD AUTO: 0.47 X10(3) UL (ref 0.1–1)
MONOCYTES NFR BLD AUTO: 8.8 %
NEUTROPHILS # BLD AUTO: 3.06 X10 (3) UL (ref 1.5–7.7)
NEUTROPHILS # BLD AUTO: 3.06 X10(3) UL (ref 1.5–7.7)
NEUTROPHILS NFR BLD AUTO: 57.4 %
OSMOLALITY SERPL CALC.SUM OF ELEC: 286 MOSM/KG (ref 275–295)
PHOSPHATE SERPL-MCNC: 3.6 MG/DL (ref 2.5–4.9)
PLATELET # BLD AUTO: 345 10(3)UL (ref 150–450)
POTASSIUM SERPL-SCNC: 4.8 MMOL/L (ref 3.5–5.1)
RBC # BLD AUTO: 2.67 X10(6)UL (ref 3.8–5.3)
SODIUM SERPL-SCNC: 134 MMOL/L (ref 136–145)
WBC # BLD AUTO: 5.3 X10(3) UL (ref 4–11)

## 2019-12-19 PROCEDURE — 85025 COMPLETE CBC W/AUTO DIFF WBC: CPT | Performed by: INTERNAL MEDICINE

## 2019-12-19 PROCEDURE — 97110 THERAPEUTIC EXERCISES: CPT

## 2019-12-19 PROCEDURE — 83735 ASSAY OF MAGNESIUM: CPT | Performed by: HOSPITALIST

## 2019-12-19 PROCEDURE — 80069 RENAL FUNCTION PANEL: CPT | Performed by: HOSPITALIST

## 2019-12-19 PROCEDURE — 82962 GLUCOSE BLOOD TEST: CPT

## 2019-12-19 NOTE — PROGRESS NOTES
[facility] Hospitalist Progress Note     PCP: Savanah Addison MD    SUBJECTIVE:  Pt seen and examined. Interview via audio Slovak . States appetite is improving, less abd pain. Still feels weak. No F/C.        OBJECTIVE:  Temp:  [98.6 °F (37 °C)-99.2 Daily with food   • Ciprofloxacin HCl  500 mg Oral Nightly   • ferrous sulfate  325 mg Oral Daily with breakfast   • gabapentin  600 mg Oral TID   • Levothyroxine Sodium  125 mcg Oral Before breakfast   • melatonin  3 mg Oral Nightly   • mirtazapine  15 mg ordered     #Essential HTN  - holding metoprolol and amlodipine given borderline low BP     #DM II with hyperglycemia  - ISS with accuchecks      #Anemia of chronic disease  - hgb 7.2  - repeat in AM, transfuse if hgb < 7     #Hx of PE  #Acute b/l calf and

## 2019-12-19 NOTE — PROGRESS NOTES
[facility]    Nephrology Progress Note    Tuan Vuong Attending:  Roe Chiu DO       SUBJECTIVE:     No complaints   Has some leg swelling    PHYSICAL EXAM:     Vital Signs: /40 (BP Location: Right arm)   Pulse 74   Temp 98.6 °F (37 NEUT 75 11/20/2019    LYMPH 18 11/20/2019    MON 2 11/20/2019    EOS 3 11/20/2019    BASO 1 11/20/2019    NEPERCENT 65.4 12/12/2019    LYPERCENT 17.6 12/12/2019    MOPERCENT 10.0 12/12/2019    EOPERCENT 3.3 12/12/2019    BAPERCENT 0.4 12/12/2019    NE 3 (CARAFATE) tab 1 g, 1 g, Oral, TID AC and HS  Insulin Aspart Pen (NOVOLOG) 100 UNIT/ML flexpen 1-5 Units, 1-5 Units, Subcutaneous, TID PC  Megestrol Acetate (MEGACE) oral suspension 400 mg, 400 mg, Oral, Daily  acetaminophen (TYLENOL) tab 650 mg, 650 mg, O

## 2019-12-19 NOTE — PROGRESS NOTES
[facility]    Nephrology Progress Note    Tuan Vuong Attending:  Елена Johnson DO       SUBJECTIVE:     Asked me for ketchup and french fries with a hamburger  She is hungry  Urine output is increasing despite decrease in IV fluids    PHYSIC NEPRELIM 3.06 12/19/2019    NEUTABS 5.85 11/20/2019    LYMPHABS 1.39 11/20/2019    EOSABS 0.23 11/20/2019    BASABS 0.08 11/20/2019    NEUT 75 11/20/2019    LYMPH 18 11/20/2019    MON 2 11/20/2019    EOS 3 11/20/2019    BASO 1 11/20/2019    NEPERCENT 57.4 (PYRIDIUM) tab 100 mg, 100 mg, Oral, TID PRN  Sertraline HCl (ZOLOFT) tab 150 mg, 150 mg, Oral, Daily  sucralfate (CARAFATE) tab 1 g, 1 g, Oral, TID AC and HS  Insulin Aspart Pen (NOVOLOG) 100 UNIT/ML flexpen 1-5 Units, 1-5 Units, Subcutaneous, TID PC  Farnaz [id number]    12/19/2019  12:27 PM

## 2019-12-19 NOTE — OCCUPATIONAL THERAPY NOTE
OCCUPATIONAL THERAPY TREATMENT NOTE - INPATIENT     Room Number: 432/432-A  Session: 2/5   Number of Visits to Meet Established Goals: 5    Presenting Problem: acute renal insufficiency    History related to current admission: Pt is 81year old female admi • RIGHT PHACOEMULSIFICATION OF CATARACT WITH INTRAOCULAR LENS IMPLANT [id number] Right 4/26/2017    Performed by Morena Davidson MD at [place] [address] Left 11/12/2019    Performed by Eli Constantino MD at [address] [place] [address] with SUPV. Pt required increased verbal cues to attempt elbow flexion exercises for full set of 8, however able to wash face with wash cloth SUPV requiring shoulder and elbow flexion over multiple reps.  Pt returned to seated position in arm chair with all supervision  Patient will transfer form stand to sit:  with supervision  Patient will transfer to toilet:  with supervision     UE Exercise Program Goal  Patient will be independent with bilateral AROM HEP (home exercise program).  - progressing

## 2019-12-19 NOTE — PLAN OF CARE
Speaks Native language of Sinhala, calls ,Soto Majano , to nursing staff, indicates hungry , standard tray ordered d/t language barrier , turns away foods though is still hungry , 1800 ADA diet limitations, orks w/ PT , walks short distance and sits to rest , rivera

## 2019-12-19 NOTE — PROGRESS NOTES
No change, sitting in chair. Colostomy draining liquid stool. . Left PICC intact iv LR running. slept well.

## 2019-12-20 LAB
ALBUMIN SERPL-MCNC: 2.1 G/DL (ref 3.4–5)
ANION GAP SERPL CALC-SCNC: 7 MMOL/L (ref 0–18)
BASOPHILS # BLD AUTO: 0.04 X10(3) UL (ref 0–0.2)
BASOPHILS NFR BLD AUTO: 0.7 %
BUN BLD-MCNC: 33 MG/DL (ref 7–18)
BUN/CREAT SERPL: 19.2 (ref 10–20)
CALCIUM BLD-MCNC: 8.8 MG/DL (ref 8.5–10.1)
CHLORIDE SERPL-SCNC: 104 MMOL/L (ref 98–112)
CO2 SERPL-SCNC: 22 MMOL/L (ref 21–32)
CREAT BLD-MCNC: 1.72 MG/DL (ref 0.55–1.02)
CREAT UR-SCNC: 53.7 MG/DL
DEPRECATED RDW RBC AUTO: 48.9 FL (ref 35.1–46.3)
EOSINOPHIL # BLD AUTO: 0.2 X10(3) UL (ref 0–0.7)
EOSINOPHIL NFR BLD AUTO: 3.6 %
ERYTHROCYTE [DISTWIDTH] IN BLOOD BY AUTOMATED COUNT: 15.6 % (ref 11–15)
GLUCOSE BLD-MCNC: 132 MG/DL (ref 70–99)
GLUCOSE BLD-MCNC: 132 MG/DL (ref 70–99)
GLUCOSE BLD-MCNC: 134 MG/DL (ref 70–99)
GLUCOSE BLD-MCNC: 211 MG/DL (ref 70–99)
GLUCOSE BLD-MCNC: 227 MG/DL (ref 70–99)
HAV IGM SER QL: 1.7 MG/DL (ref 1.6–2.6)
HCT VFR BLD AUTO: 24.6 % (ref 35–48)
HGB BLD-MCNC: 7.6 G/DL (ref 12–16)
IMM GRANULOCYTES # BLD AUTO: 0.15 X10(3) UL (ref 0–1)
IMM GRANULOCYTES NFR BLD: 2.7 %
LYMPHOCYTES # BLD AUTO: 1.35 X10(3) UL (ref 1–4)
LYMPHOCYTES NFR BLD AUTO: 24 %
MCH RBC QN AUTO: 26.5 PG (ref 26–34)
MCHC RBC AUTO-ENTMCNC: 30.9 G/DL (ref 31–37)
MCV RBC AUTO: 85.7 FL (ref 80–100)
MONOCYTES # BLD AUTO: 0.53 X10(3) UL (ref 0.1–1)
MONOCYTES NFR BLD AUTO: 9.4 %
NEUTROPHILS # BLD AUTO: 3.36 X10 (3) UL (ref 1.5–7.7)
NEUTROPHILS # BLD AUTO: 3.36 X10(3) UL (ref 1.5–7.7)
NEUTROPHILS NFR BLD AUTO: 59.6 %
OSMOLALITY SERPL CALC.SUM OF ELEC: 285 MOSM/KG (ref 275–295)
PHOSPHATE SERPL-MCNC: 3.6 MG/DL (ref 2.5–4.9)
PLATELET # BLD AUTO: 386 10(3)UL (ref 150–450)
POTASSIUM SERPL-SCNC: 4.9 MMOL/L (ref 3.5–5.1)
RBC # BLD AUTO: 2.87 X10(6)UL (ref 3.8–5.3)
SODIUM SERPL-SCNC: 133 MMOL/L (ref 136–145)
SODIUM SERPL-SCNC: 87 MMOL/L
WBC # BLD AUTO: 5.6 X10(3) UL (ref 4–11)

## 2019-12-20 PROCEDURE — 97116 GAIT TRAINING THERAPY: CPT

## 2019-12-20 PROCEDURE — 97535 SELF CARE MNGMENT TRAINING: CPT

## 2019-12-20 PROCEDURE — 85025 COMPLETE CBC W/AUTO DIFF WBC: CPT | Performed by: INTERNAL MEDICINE

## 2019-12-20 PROCEDURE — 84300 ASSAY OF URINE SODIUM: CPT | Performed by: INTERNAL MEDICINE

## 2019-12-20 PROCEDURE — 83735 ASSAY OF MAGNESIUM: CPT | Performed by: INTERNAL MEDICINE

## 2019-12-20 PROCEDURE — 97530 THERAPEUTIC ACTIVITIES: CPT

## 2019-12-20 PROCEDURE — 82962 GLUCOSE BLOOD TEST: CPT

## 2019-12-20 PROCEDURE — 80069 RENAL FUNCTION PANEL: CPT | Performed by: INTERNAL MEDICINE

## 2019-12-20 PROCEDURE — 82570 ASSAY OF URINE CREATININE: CPT | Performed by: INTERNAL MEDICINE

## 2019-12-20 RX ORDER — SODIUM CHLORIDE 9 MG/ML
INJECTION, SOLUTION INTRAVENOUS CONTINUOUS
Status: ACTIVE | OUTPATIENT
Start: 2019-12-20 | End: 2019-12-21

## 2019-12-20 NOTE — PLAN OF CARE
Up to chair most of day , naps in chair , denies discomfort , walks short distance w/ PT , dependant for all cares and safety , incontinent of urine PURE WICK device in place, colostomy w/ loose /liq stool , glucometer monitored , Speaks Macedonian , calls out

## 2019-12-20 NOTE — PHYSICAL THERAPY NOTE
PHYSICAL THERAPY TREATMENT NOTE - INPATIENT    Room Number: 432/432-A     Session: 3  Number of Visits to Meet Established Goals: 5    Presenting Problem: Acute renal insufficiency, R hydronephrosis    Problem List  Principal Problem:    Acute renal insuf Management Techniques:  Activity promotion;Repositioning    BALANCE                                                                                                                     Static Sitting: Fair  Dynamic Sitting: Poor +           Static Standing Min A    Comments related to Mobility: Pt eager to ambulate, and attempting to walk in circles. Attempted to have patient walk in linear pattern, although Pt refused.   Pt remains with poor safety awareness, and frequently walking into equipment in hallway at assistance level: minimum assistance      Goal #4     Goal #5     Goal #6     Goal Comments: Goals established on 12/14/2019; all goals ongoing as of 12/20/2019

## 2019-12-20 NOTE — PLAN OF CARE
Pt is aox3, and pt is on room air , and denies any SOB and 02 levels are normal. Pt has denied any pain , and is up in chair. Pt is schedule for an US of the kidneys pt is aware and is awaiting that. Pt sugar levels were covered based off levels.  Call [facility] for signs and symptoms of electrolyte imbalances  - Administer electrolyte replacement as ordered  - Monitor response to electrolyte replacements, including rhythm and repeat lab results as appropriate  - Fluid restriction as ordered  - Instruct patient on Impaired Activities of Daily Living  Goal: Achieve highest/safest level of independence in self care  Description  Interventions:  - Assess ability and encourage patient to participate in ADLs to maximize function  - Promote sitting position while performi

## 2019-12-20 NOTE — PROGRESS NOTES
[facility]  Follow up note    Tuan Vuong Patient Status:  Inpatient    1935 MRN [id number]   [facility] 4NW-A Attending Keri Borges, DO   Hosp Day # 10 PCP Dhara Ramon MD     Interval History--  Mrs. Kaplan was found to h hyperlipidemia    • Pneumonia, organism unspecified(486)    • Pulmonary embolism (HCC)    • Thyroid disease    • Type II or unspecified type diabetes mellitus without mention of complication, not stated as uncontrolled    • Vision loss     need cataract fernandez mg, Oral, Nightly  •  mirtazapine (REMERON) tab 15 mg, 15 mg, Oral, Nightly  •  Pantoprazole Sodium (PROTONIX) EC tab 40 mg, 40 mg, Oral, BID AC  •  Phenazopyridine HCl (PYRIDIUM) tab 100 mg, 100 mg, Oral, TID PRN  •  Sertraline HCl (ZOLOFT) tab 150 mg, 15 nephro ureteral stent. This confirms patency of the stent, and this findings not unexpected given the communication created between the bladder and the kidney by way of the stent.   2. Toward the end of the study, contrast developed in the uterus and vagin noted. 2. 13 mm cyst seen at the inferior left kidney.     Dictated by: Nayla Alvarado MD on 12/09/2019 at 23:15     Approved by: Nayla Alvarado MD on 12/09/2019 at 23:17          Ct Abdomen+pelvis Kidneystone 2d Rndr(no Iv,no Oral)(cpt=74176)    Resu ANTONIO (acute kidney injury) ([facility] [address]    Impression  1. Remote history of PE (2015)--maintained on Xarelto for nearly five years. 2. Immobility currently  3. Acute bilateral calf DVT, left superficial femoral nonocclusive thrombosis. 4. ANTONIO  5.  Anemia of

## 2019-12-20 NOTE — PROGRESS NOTES
[facility]    Nephrology Progress Note    Tuan Vuong Attending:  Michael Loredo DO       SUBJECTIVE:     Net negative  No complaints    PHYSICAL EXAM:     Vital Signs: /60 (BP Location: Right arm)   Pulse 82   Temp 98.7 °F (37.1 °C) (Or BASABS 0.08 11/20/2019    NEUT 75 11/20/2019    LYMPH 18 11/20/2019    MON 2 11/20/2019    EOS 3 11/20/2019    BASO 1 11/20/2019    NEPERCENT 59.6 12/20/2019    LYPERCENT 24.0 12/20/2019    MOPERCENT 9.4 12/20/2019    EOPERCENT 3.6 12/20/2019    BAPERCE HS  Insulin Aspart Pen (NOVOLOG) 100 UNIT/ML flexpen 1-5 Units, 1-5 Units, Subcutaneous, TID PC  Megestrol Acetate (MEGACE) oral suspension 400 mg, 400 mg, Oral, Daily  acetaminophen (TYLENOL) tab 650 mg, 650 mg, Oral, Q6H PRN  ondansetron HCl (ZOFRAN) inj

## 2019-12-20 NOTE — DIETARY NOTE
[facility] ASSESSMENT    Pt meets moderate malnutrition criteria.     CRITERIA FOR MALNUTRITION DIAGNOSIS:  Criteria for non-severe malnutrition diagnosis: chronic illness related to energy intake less than75% for greater than 1 with ileostomy, left ureteral stent, DM2. ANTHROPOMETRICS:  Ht: 152.4 cm (5')  Wt: 67 kg (147 lb 11.3 oz). This is 148 % of IBW  BMI: Body mass index is 28.85 kg/m².   IBW: 45.4 kg    WEIGHT HISTORY: Per EMR hx, appears to have lost about 18 lb (11%) in [place]  Dietetic Intern none

## 2019-12-20 NOTE — PROGRESS NOTES
[facility]  Urology Progress Note    Farazdebora TORO Yevgeniy Patient Status:  Inpatient    1935 MRN [id number]   [facility] 4NW-A Attending Venice Stern DO   Hosp Day # 10 PCP Gracia Fu MD     Subjective:  Monika Cueto is a(n) 81

## 2019-12-20 NOTE — OCCUPATIONAL THERAPY NOTE
OCCUPATIONAL THERAPY TREATMENT NOTE - INPATIENT     Room Number: 432/432-A  Session: 3/5   Number of Visits to Meet Established Goals: 5    Presenting Problem: acute renal insufficiency    History related to current admission: Pt is 81year old female admi RIGHT PHACOEMULSIFICATION OF CATARACT WITH INTRAOCULAR LENS IMPLANT [id number] Right 4/26/2017    Performed by Ami Triana MD at [place] [address] Left 11/12/2019    Performed by Judy Bennett MD at [address] [place] [address] arm chair with all needs met, call light within reach, RN aware of session. Patient End of Session: Up in chair; With [facility] staff;Needs met;Call light within reach;RN aware of session/findings; All patient questions and concerns addressed; Alarm set    ASSESSM HEP (home exercise program).  - progressing

## 2019-12-20 NOTE — PROGRESS NOTES
[facility] Hospitalist Progress Note     PCP: Tabby Farnsworth MD    SUBJECTIVE:  Pt seen and examined. Interview via audio Greenlandic . States appetite is improving, less abd pain. Feels stronger. No F/C.        OBJECTIVE:  Temp:  [98.7 °F (37.1 °C)-99 °F apixaban  10 mg Oral BID   • Ciprofloxacin HCl  500 mg Oral Nightly   • ferrous sulfate  325 mg Oral Daily with breakfast   • gabapentin  600 mg Oral TID   • Levothyroxine Sodium  125 mcg Oral Before breakfast   • melatonin  3 mg Oral Nightly   • mirtazapi care Q shift  - seen by Dr. Darien Ariza- this thickening on CT due to perf; observe; surg has signed off  -norco prn ordered     #Essential HTN  - holding metoprolol and amlodipine given borderline low BP     #DM II with hyperglycemia  - ISS with accuchecks

## 2019-12-20 NOTE — PLAN OF CARE
Patient alert and orientedx4. Croatian speaking, but understands small phrases and commands in english. No complaints of pain. Encouraging PO intake. IVF discontinued. Ileostomy intact with brown, liquid/soft stool.  Up with 1-2 assist. In the chair most of t

## 2019-12-21 ENCOUNTER — APPOINTMENT (OUTPATIENT)
Dept: ULTRASOUND IMAGING | Facility: HOSPITAL | Age: 84
DRG: 683 | End: 2019-12-21
Attending: PHYSICIAN ASSISTANT
Payer: MEDICARE

## 2019-12-21 LAB
ALBUMIN SERPL-MCNC: 2 G/DL (ref 3.4–5)
ANION GAP SERPL CALC-SCNC: 8 MMOL/L (ref 0–18)
BUN BLD-MCNC: 37 MG/DL (ref 7–18)
BUN/CREAT SERPL: 20 (ref 10–20)
CALCIUM BLD-MCNC: 8.5 MG/DL (ref 8.5–10.1)
CHLORIDE SERPL-SCNC: 106 MMOL/L (ref 98–112)
CO2 SERPL-SCNC: 21 MMOL/L (ref 21–32)
CREAT BLD-MCNC: 1.85 MG/DL (ref 0.55–1.02)
GLUCOSE BLD-MCNC: 136 MG/DL (ref 70–99)
GLUCOSE BLD-MCNC: 149 MG/DL (ref 70–99)
GLUCOSE BLD-MCNC: 181 MG/DL (ref 70–99)
GLUCOSE BLD-MCNC: 250 MG/DL (ref 70–99)
GLUCOSE BLD-MCNC: 282 MG/DL (ref 70–99)
HAV IGM SER QL: 1.6 MG/DL (ref 1.6–2.6)
OSMOLALITY SERPL CALC.SUM OF ELEC: 291 MOSM/KG (ref 275–295)
PHOSPHATE SERPL-MCNC: 3.4 MG/DL (ref 2.5–4.9)
POTASSIUM SERPL-SCNC: 4.9 MMOL/L (ref 3.5–5.1)
SODIUM SERPL-SCNC: 135 MMOL/L (ref 136–145)

## 2019-12-21 PROCEDURE — 82962 GLUCOSE BLOOD TEST: CPT

## 2019-12-21 PROCEDURE — 83735 ASSAY OF MAGNESIUM: CPT | Performed by: HOSPITALIST

## 2019-12-21 PROCEDURE — 76770 US EXAM ABDO BACK WALL COMP: CPT | Performed by: PHYSICIAN ASSISTANT

## 2019-12-21 PROCEDURE — 80069 RENAL FUNCTION PANEL: CPT | Performed by: HOSPITALIST

## 2019-12-21 RX ORDER — SODIUM CHLORIDE 9 MG/ML
INJECTION, SOLUTION INTRAVENOUS CONTINUOUS
Status: DISCONTINUED | OUTPATIENT
Start: 2019-12-21 | End: 2019-12-23

## 2019-12-21 NOTE — PLAN OF CARE
Aaox4, Frisian speaking only, denies pain, colostomy care done with mushy, greenish stool, incontinent with purewick, IVF infusing via midline.    Problem: SAFETY ADULT - FALL  Goal: Free from fall injury  Description  INTERVENTIONS:  - Assess pt frequently rhythm and repeat lab results as appropriate  - Fluid restriction as ordered  - Instruct patient on fluid and nutrition restrictions as appropriate  Outcome: Progressing  Goal: Hemodynamic stability and optimal renal function maintained  Description  INTER patient to participate in ADLs to maximize function  - Promote sitting position while performing ADLs such as feeding, grooming, and bathing  - Educate and encourage patient/family in tolerated functional activity level and precautions during self-care

## 2019-12-21 NOTE — PROGRESS NOTES
[facility] Hospitalist Progress Note     [facility]      SUBJECTIVE:  No CP, SOB, or N/V.   Appetite is good    OBJECTIVE:  Temp:  [98.3 °F (36.8 °C)-99.6 °F (37.6 °C)] 98.3 °F (36.8 °C)  Pulse:  [73-79] 79  Resp:  [18] 18  BP: (107-122)/(44-60) 109/60    I bladder and voiding process were evaluated fluoroscopically and with radiographs. COMPARISON:  [facility] , CT, CT ABDOMEN+PELVIS(CPT=74176), 11/18/2019, 14:31.   INDICATIONS:  status-post left ureteral re-implantation/left ureteral stent insertion  PATIENT ST collecting system of the left kidney and renal pelvis by way of the existing nephro ureteral stent.   This confirms patency of the stent, and this findings not unexpected given the communication created between the bladder and the kidney by way of the stent obstruction involving the left superficial femoral vein proximal.     The critical value results were called to the floor nurse, Alysha Briggs at 1420 hours on  12/18/19. Result read back was performed.      Dictated by: Paulina Sullivan MD on 12/18/2019 at 14:18 Technologist)     FINDINGS:   RIGHT KIDNEY MEASUREMENTS:  11.6 x 5.6 x 5.2 cm ECHOGENICITY:  Normal. HYDRONEPHROSIS:  Slight improvement in mild to moderate right hydronephrosis when compared to 12/9/2019. CYSTS/STONES/MASSES:  None.   LEFT KIDNEY Lopez Perches Rndr(no Iv,no Oral)(cpt=74176)    Result Date: 12/10/2019  PROCEDURE:  CT ABDOMEN/PELVIS KIDNEYSTONE 2D RNDR (NO IV,NO ORAL) (CPT=74176)  COMPARISON:  [facility], CT ABDOMEN+PELVIS(CPT=74176), 11/18/2019, 14:31.   INDICATIONS:  Right hydronephrosis  TECHNI bladder with distal portion of left ureteral stent present. LUNG BASES:  Slight elevation of the left hemidiaphragm with left lower lobe increased interstitial and nonspecific ground-glass density most likely due to atelectasis. CONCLUSION:  1.  Karen Mendiola 1-5 Units, 1-5 Units, Subcutaneous, TID PC  Megestrol Acetate (MEGACE) oral suspension 400 mg, 400 mg, Oral, Daily  acetaminophen (TYLENOL) tab 650 mg, 650 mg, Oral, Q6H PRN  ondansetron HCl (ZOFRAN) injection 4 mg, 4 mg, Intravenous, Q6H PRN  Metocloprami the future she will likely need to be bridged with hep gtt if she has to go off of xarelto   - given her GFR < 30, switched to eliquis 10 mg po BID x 7 days, then 5 mg po BID     #Hypothyroidism  - cont synthroid     #Major depression, recurrent  - cont se

## 2019-12-21 NOTE — PROGRESS NOTES
[facility]    Progress Note    Tuan Vuong Patient Status:  Inpatient    1935 MRN [id number]   [facility] 4NW-A Attending Jessa Shearer, DO   Hosp Day # 11 PCP Cristel Contreras MD     Subjective:  Leoncio Arroyo is a(n) 84 year

## 2019-12-21 NOTE — PLAN OF CARE
Pt alert and oriented. VSS and afebrile. Pt able to take pills without difficulty. Denies pain. Pt slept in recliner, appears comfortable. No acute events overnight. Pt refusing purewick to be changed at this time.   Will attempt again before end of s

## 2019-12-22 ENCOUNTER — APPOINTMENT (OUTPATIENT)
Dept: GENERAL RADIOLOGY | Facility: HOSPITAL | Age: 84
DRG: 683 | End: 2019-12-22
Attending: INTERNAL MEDICINE
Payer: MEDICARE

## 2019-12-22 LAB
ALBUMIN SERPL-MCNC: 2 G/DL (ref 3.4–5)
ANION GAP SERPL CALC-SCNC: 7 MMOL/L (ref 0–18)
BUN BLD-MCNC: 37 MG/DL (ref 7–18)
BUN/CREAT SERPL: 22.2 (ref 10–20)
CALCIUM BLD-MCNC: 8.3 MG/DL (ref 8.5–10.1)
CHLORIDE SERPL-SCNC: 108 MMOL/L (ref 98–112)
CO2 SERPL-SCNC: 20 MMOL/L (ref 21–32)
CREAT BLD-MCNC: 1.67 MG/DL (ref 0.55–1.02)
GLUCOSE BLD-MCNC: 131 MG/DL (ref 70–99)
GLUCOSE BLD-MCNC: 144 MG/DL (ref 70–99)
GLUCOSE BLD-MCNC: 185 MG/DL (ref 70–99)
GLUCOSE BLD-MCNC: 248 MG/DL (ref 70–99)
GLUCOSE BLD-MCNC: 304 MG/DL (ref 70–99)
HAV IGM SER QL: 1.6 MG/DL (ref 1.6–2.6)
OSMOLALITY SERPL CALC.SUM OF ELEC: 290 MOSM/KG (ref 275–295)
PHOSPHATE SERPL-MCNC: 3 MG/DL (ref 2.5–4.9)
POTASSIUM SERPL-SCNC: 4.7 MMOL/L (ref 3.5–5.1)
SODIUM SERPL-SCNC: 135 MMOL/L (ref 136–145)

## 2019-12-22 PROCEDURE — 74018 RADEX ABDOMEN 1 VIEW: CPT | Performed by: INTERNAL MEDICINE

## 2019-12-22 PROCEDURE — 83735 ASSAY OF MAGNESIUM: CPT | Performed by: HOSPITALIST

## 2019-12-22 PROCEDURE — 82962 GLUCOSE BLOOD TEST: CPT

## 2019-12-22 PROCEDURE — 80069 RENAL FUNCTION PANEL: CPT | Performed by: HOSPITALIST

## 2019-12-22 RX ORDER — SIMETHICONE 80 MG
80 TABLET,CHEWABLE ORAL
Status: DISCONTINUED | OUTPATIENT
Start: 2019-12-22 | End: 2019-12-24

## 2019-12-22 NOTE — PROGRESS NOTES
[facility]    Nephrology Progress Note    Tuan Vuong Attending:  German Connor DO       SUBJECTIVE:     Net negative again   Cr up  No complaints    PHYSICAL EXAM:     Vital Signs: /60 (BP Location: Right arm)   Pulse 79   Temp 98.3 °F EOSABS 0.23 11/20/2019    BASABS 0.08 11/20/2019    NEUT 75 11/20/2019    LYMPH 18 11/20/2019    MON 2 11/20/2019    EOS 3 11/20/2019    BASO 1 11/20/2019    NEPERCENT 59.6 12/20/2019    LYPERCENT 24.0 12/20/2019    MOPERCENT 9.4 12/20/2019    EOPERCENT 3. and HS  Insulin Aspart Pen (NOVOLOG) 100 UNIT/ML flexpen 1-5 Units, 1-5 Units, Subcutaneous, TID PC  Megestrol Acetate (MEGACE) oral suspension 400 mg, 400 mg, Oral, Daily  acetaminophen (TYLENOL) tab 650 mg, 650 mg, Oral, Q6H PRN  ondansetron HCl (ZOFRAN)

## 2019-12-22 NOTE — PLAN OF CARE
Pt aaox4, Albanian speaking only, denies pain, voiding well, ivf infusing, maintained on contact isol.   Problem: SAFETY ADULT - FALL  Goal: Free from fall injury  Description  INTERVENTIONS:  - Assess pt frequently for physical needs  - Identify cognitive an appropriate  - Fluid restriction as ordered  - Instruct patient on fluid and nutrition restrictions as appropriate  Outcome: Progressing  Goal: Hemodynamic stability and optimal renal function maintained  Description  INTERVENTIONS:  - Monitor labs and ass to maximize function  - Promote sitting position while performing ADLs such as feeding, grooming, and bathing  - Educate and encourage patient/family in tolerated functional activity level and precautions during self-care     Outcome: Progressing

## 2019-12-22 NOTE — PLAN OF CARE
Stable on room air. Afebrile overnight. Left arm midline intact, ivf infusing. US of the kidneys done tonight. Denies pain. Bed alarm on.    Problem: METABOLIC/FLUID AND ELECTROLYTES - ADULT  Goal: Electrolytes maintained within normal limits  Description

## 2019-12-22 NOTE — PROGRESS NOTES
[facility] Hospitalist Progress Note     [facility]      SUBJECTIVE:  Appetite good  Having some bloating  US showed improvement in hydronephrosis     OBJECTIVE:  Temp:  [98.3 °F (36.8 °C)-99.8 °F (37.7 °C)] 99.8 °F (37.7 °C)  Pulse:  [71-79] 71  Resp:  [1 contrast material via gravity infusion. The bladder and voiding process were evaluated fluoroscopically and with radiographs. COMPARISON:  [facility] , CT, CT ABDOMEN+PELVIS(CPT=74176), 11/18/2019, 14:31.   INDICATIONS:  status-post left ureteral re-implantat Contrast extended from the bladder into the collecting system of the left kidney and renal pelvis by way of the existing nephro ureteral stent.   This confirms patency of the stent, and this findings not unexpected given the communication created between th DVTs and an acute DVT with partial obstruction involving the left superficial femoral vein proximal.     The critical value results were called to the floor nurse, Leonora Fernández at 1420 hours on  12/18/19. Result read back was performed.      Dictated by: Joanne Haas HISTORY: (As transcribed by Technologist)     FINDINGS:   RIGHT KIDNEY MEASUREMENTS:  11.6 x 5.6 x 5.2 cm ECHOGENICITY:  Normal. HYDRONEPHROSIS:  Slight improvement in mild to moderate right hydronephrosis when compared to 12/9/2019.  CYSTS/STONES/MASSES: Abdomen+pelvis Kidneystone 2d Rndr(no Iv,no Oral)(cpt=74176)    Result Date: 12/10/2019  PROCEDURE:  CT ABDOMEN/PELVIS KIDNEYSTONE 2D RNDR (NO IV,NO ORAL) (CPT=74176)  COMPARISON:  [facility], CT ABDOMEN+PELVIS(CPT=74176), 11/18/2019, 14:31.   INDICATIONS: air fluid level within the bladder with distal portion of left ureteral stent present. LUNG BASES:  Slight elevation of the left hemidiaphragm with left lower lobe increased interstitial and nonspecific ground-glass density most likely due to atelectasis. AC  Phenazopyridine HCl (PYRIDIUM) tab 100 mg, 100 mg, Oral, TID PRN  Sertraline HCl (ZOLOFT) tab 150 mg, 150 mg, Oral, Daily  sucralfate (CARAFATE) tab 1 g, 1 g, Oral, TID AC and HS  Megestrol Acetate (MEGACE) oral suspension 400 mg, 400 mg, Oral, Daily femoral DVT  - d/w Dr. Tami Isabel of hem/onc, appreciate his recs, plan to restart xarelto 20 mg po daily indefinitely.  In the future she will likely need to be bridged with hep gtt if she has to go off of xarelto   - given her GFR < 30, switched to eliquis 10

## 2019-12-22 NOTE — PROGRESS NOTES
[facility]    Progress Note    Tuan Vuong Patient Status:  Inpatient    1935 MRN [id number]   [facility] 4NW-A Attending Jay Rodriguez DO   Hosp Day # 12 PCP Orinda Carrel, MD     Subjective:  Daryljanel Alberts is a(n) 84 year

## 2019-12-22 NOTE — PROGRESS NOTES
[facility]    Nephrology Progress Note    Tuan Vuong Attending:  Ce Phillips DO       SUBJECTIVE:     Net negative again   No complaints  Starting to become swollen    PHYSICAL EXAM:     Vital Signs: /63 (BP Location: Right arm)   Pu LYMPHABS 1.39 11/20/2019    EOSABS 0.23 11/20/2019    BASABS 0.08 11/20/2019    NEUT 75 11/20/2019    LYMPH 18 11/20/2019    MON 2 11/20/2019    EOS 3 11/20/2019    BASO 1 11/20/2019    NEPERCENT 59.6 12/20/2019    LYPERCENT 24.0 12/20/2019    MOPERCENT Nightly  Pantoprazole Sodium (PROTONIX) EC tab 40 mg, 40 mg, Oral, BID AC  Phenazopyridine HCl (PYRIDIUM) tab 100 mg, 100 mg, Oral, TID PRN  Sertraline HCl (ZOLOFT) tab 150 mg, 150 mg, Oral, Daily  sucralfate (CARAFATE) tab 1 g, 1 g, Oral, TID AC and HS  M

## 2019-12-23 LAB
ALBUMIN SERPL-MCNC: 2 G/DL (ref 3.4–5)
ANION GAP SERPL CALC-SCNC: 8 MMOL/L (ref 0–18)
BASOPHILS # BLD AUTO: 0.03 X10(3) UL (ref 0–0.2)
BASOPHILS NFR BLD AUTO: 0.5 %
BILIRUB UR QL STRIP.AUTO: NEGATIVE
BUN BLD-MCNC: 35 MG/DL (ref 7–18)
BUN/CREAT SERPL: 21.2 (ref 10–20)
CALCIUM BLD-MCNC: 8.2 MG/DL (ref 8.5–10.1)
CHLORIDE SERPL-SCNC: 111 MMOL/L (ref 98–112)
CO2 SERPL-SCNC: 17 MMOL/L (ref 21–32)
COLOR UR AUTO: YELLOW
CREAT BLD-MCNC: 1.65 MG/DL (ref 0.55–1.02)
DEPRECATED RDW RBC AUTO: 50.4 FL (ref 35.1–46.3)
EOSINOPHIL # BLD AUTO: 0.2 X10(3) UL (ref 0–0.7)
EOSINOPHIL NFR BLD AUTO: 3.6 %
ERYTHROCYTE [DISTWIDTH] IN BLOOD BY AUTOMATED COUNT: 15.9 % (ref 11–15)
GLUCOSE BLD-MCNC: 137 MG/DL (ref 70–99)
GLUCOSE BLD-MCNC: 137 MG/DL (ref 70–99)
GLUCOSE BLD-MCNC: 150 MG/DL (ref 70–99)
GLUCOSE BLD-MCNC: 156 MG/DL (ref 70–99)
GLUCOSE BLD-MCNC: 242 MG/DL (ref 70–99)
GLUCOSE UR STRIP.AUTO-MCNC: NEGATIVE MG/DL
HAV IGM SER QL: 1.6 MG/DL (ref 1.6–2.6)
HCT VFR BLD AUTO: 23.1 % (ref 35–48)
HGB BLD-MCNC: 7.2 G/DL (ref 12–16)
IMM GRANULOCYTES # BLD AUTO: 0.14 X10(3) UL (ref 0–1)
IMM GRANULOCYTES NFR BLD: 2.5 %
KETONES UR STRIP.AUTO-MCNC: NEGATIVE MG/DL
LYMPHOCYTES # BLD AUTO: 1.64 X10(3) UL (ref 1–4)
LYMPHOCYTES NFR BLD AUTO: 29.4 %
MCH RBC QN AUTO: 27 PG (ref 26–34)
MCHC RBC AUTO-ENTMCNC: 31.2 G/DL (ref 31–37)
MCV RBC AUTO: 86.5 FL (ref 80–100)
MONOCYTES # BLD AUTO: 0.52 X10(3) UL (ref 0.1–1)
MONOCYTES NFR BLD AUTO: 9.3 %
NEUTROPHILS # BLD AUTO: 3.04 X10 (3) UL (ref 1.5–7.7)
NEUTROPHILS # BLD AUTO: 3.04 X10(3) UL (ref 1.5–7.7)
NEUTROPHILS NFR BLD AUTO: 54.7 %
NITRITE UR QL STRIP.AUTO: NEGATIVE
OSMOLALITY SERPL CALC.SUM OF ELEC: 293 MOSM/KG (ref 275–295)
PH UR STRIP.AUTO: 6 [PH] (ref 4.5–8)
PHOSPHATE SERPL-MCNC: 2.8 MG/DL (ref 2.5–4.9)
PLATELET # BLD AUTO: 387 10(3)UL (ref 150–450)
POTASSIUM SERPL-SCNC: 4.7 MMOL/L (ref 3.5–5.1)
PROT UR STRIP.AUTO-MCNC: NEGATIVE MG/DL
RBC # BLD AUTO: 2.67 X10(6)UL (ref 3.8–5.3)
RBC #/AREA URNS AUTO: >10 /HPF
SODIUM SERPL-SCNC: 136 MMOL/L (ref 136–145)
SP GR UR STRIP.AUTO: 1.01 (ref 1–1.03)
UROBILINOGEN UR STRIP.AUTO-MCNC: <2 MG/DL
WBC # BLD AUTO: 5.6 X10(3) UL (ref 4–11)
WBC #/AREA URNS AUTO: >50 /HPF

## 2019-12-23 PROCEDURE — 85025 COMPLETE CBC W/AUTO DIFF WBC: CPT | Performed by: INTERNAL MEDICINE

## 2019-12-23 PROCEDURE — 87077 CULTURE AEROBIC IDENTIFY: CPT | Performed by: PHYSICIAN ASSISTANT

## 2019-12-23 PROCEDURE — 97535 SELF CARE MNGMENT TRAINING: CPT

## 2019-12-23 PROCEDURE — 87086 URINE CULTURE/COLONY COUNT: CPT | Performed by: PHYSICIAN ASSISTANT

## 2019-12-23 PROCEDURE — 97530 THERAPEUTIC ACTIVITIES: CPT

## 2019-12-23 PROCEDURE — 83735 ASSAY OF MAGNESIUM: CPT | Performed by: HOSPITALIST

## 2019-12-23 PROCEDURE — 82962 GLUCOSE BLOOD TEST: CPT

## 2019-12-23 PROCEDURE — 81001 URINALYSIS AUTO W/SCOPE: CPT | Performed by: PHYSICIAN ASSISTANT

## 2019-12-23 PROCEDURE — 87186 SC STD MICRODIL/AGAR DIL: CPT | Performed by: PHYSICIAN ASSISTANT

## 2019-12-23 PROCEDURE — 80069 RENAL FUNCTION PANEL: CPT | Performed by: HOSPITALIST

## 2019-12-23 PROCEDURE — 97116 GAIT TRAINING THERAPY: CPT

## 2019-12-23 RX ORDER — METOCLOPRAMIDE HYDROCHLORIDE 5 MG/ML
5 INJECTION INTRAMUSCULAR; INTRAVENOUS EVERY 8 HOURS PRN
Status: DISCONTINUED | OUTPATIENT
Start: 2019-12-23 | End: 2019-12-24

## 2019-12-23 NOTE — PHYSICAL THERAPY NOTE
PHYSICAL THERAPY TREATMENT NOTE - INPATIENT    Room Number: 432/432-A     Session: 4  Number of Visits to Meet Established Goals: 5    Presenting Problem: Acute renal insufficiency, R hydronephrosis    Problem List  Principal Problem:    Acute renal insuf promotion;Repositioning    BALANCE                                                                                                                     Static Sitting: Fair  Dynamic Sitting: Poor +           Static Standing: Poor +  Dynamic Standing: Poor + Pt ambulate - OT encouraged Pt to use bathroom. See OT for ADL/karli care. Pt denied pain, and motivated to ambulate in hallway after completion of tolieting.      Standardized Assessment  NA    Axillary Transfers/Environmental Barriers    Toilet/Commode T Goal #4     Goal #5     Goal #6     Goal Comments: Goals established on 12/14/2019; all goals ongoing as of 12/23/2019

## 2019-12-23 NOTE — PROGRESS NOTES
[facility] Hospitalist Progress Note     [facility]      SUBJECTIVE:  Feeling fine  Eating well    OBJECTIVE:  Temp:  [98.2 °F (36.8 °C)-99.4 °F (37.4 °C)] 98.2 °F (36.8 °C)  Pulse:  [75-79] 75  Resp:  [18] 18  BP: (113-134)/(56-61) 134/56    Intake/Output gravity infusion. The bladder and voiding process were evaluated fluoroscopically and with radiographs. COMPARISON:  [facility] , CT, CT ABDOMEN+PELVIS(CPT=74176), 11/18/2019, 14:31.   INDICATIONS:  status-post left ureteral re-implantation/left ureteral sten bladder into the collecting system of the left kidney and renal pelvis by way of the existing nephro ureteral stent.   This confirms patency of the stent, and this findings not unexpected given the communication created between the bladder and the kidney by partial obstruction involving the left superficial femoral vein proximal.     The critical value results were called to the floor nurse, Leodan Saucedo at 1420 hours on  12/18/19. Result read back was performed.      Dictated by: Kavin Irwin MD on 12/18/2019 Technologist)     FINDINGS:   RIGHT KIDNEY MEASUREMENTS:  11.6 x 5.6 x 5.2 cm ECHOGENICITY:  Normal. HYDRONEPHROSIS:  Slight improvement in mild to moderate right hydronephrosis when compared to 12/9/2019. CYSTS/STONES/MASSES:  None.   LEFT KIDNEY Sharif Gray Rndr(no Iv,no Oral)(cpt=74176)    Result Date: 12/10/2019  PROCEDURE:  CT ABDOMEN/PELVIS KIDNEYSTONE 2D RNDR (NO IV,NO ORAL) (CPT=74176)  COMPARISON:  [facility], CT ABDOMEN+PELVIS(CPT=74176), 11/18/2019, 14:31.   INDICATIONS:  Right hydronephrosis  TECHNI bladder with distal portion of left ureteral stent present. LUNG BASES:  Slight elevation of the left hemidiaphragm with left lower lobe increased interstitial and nonspecific ground-glass density most likely due to atelectasis. CONCLUSION:  1.  Stacy Screen (PROTONIX) EC tab 40 mg, 40 mg, Oral, BID AC  Phenazopyridine HCl (PYRIDIUM) tab 100 mg, 100 mg, Oral, TID PRN  Sertraline HCl (ZOLOFT) tab 150 mg, 150 mg, Oral, Daily  sucralfate (CARAFATE) tab 1 g, 1 g, Oral, TID AC and HS  Megestrol Acetate (MEGACE) ora b/l calft DVTs and non occlusive L superficial femoral DVT  - d/w Dr. Ruben Titus of hem/onc, appreciate his recs, plan to restart xarelto 20 mg po daily indefinitely.  In the future she will likely need to be bridged with hep gtt if she has to go off of xarelto

## 2019-12-23 NOTE — CONSULTS
[facility] Note:  Renal Dose Adjustment for Metoclopramide (REGLAN)    Tuan Vuong has been prescribed Metoclopramide (REGLAN) 10 mg every 5 hours as needed. Estimated Creatinine Clearance: 18.6 mL/min (A) (based on SCr of 1.65 mg/dL (H)).     Her c

## 2019-12-23 NOTE — PROGRESS NOTES
[facility]  Urology Progress Note    Tuan CORTEZ Yevgeniy Patient Status:  Inpatient    1935 MRN [id number]   [facility] 4NW-A Attending Saqib Warner MD   Hosp Day # 15 PCP Phylicia Clinton MD     Subjective:  Latoya Vargas is a(n) 81 ye

## 2019-12-23 NOTE — OCCUPATIONAL THERAPY NOTE
OCCUPATIONAL THERAPY TREATMENT NOTE - INPATIENT     Room Number: 432/432-A  Session: 4/5   Number of Visits to Meet Established Goals: 5    Presenting Problem: acute renal insufficiency    History related to current admission:  Pt is 81year old female adm RIGHT PHACOEMULSIFICATION OF CATARACT WITH INTRAOCULAR LENS IMPLANT 43837 Right 4/26/2017    Performed by Ronny Atkinson MD at [place] [address] Left 11/12/2019    Performed by Tanisha Lmaa MD at [address] [place] [address] session. Patient End of Session: Up in chair;Needs met;Call light within reach;RN aware of session/findings; All patient questions and concerns addressed; Alarm set    ASSESSMENT   Pt seen this day for ADL retraining and functional trasnfers.  Pt continues

## 2019-12-23 NOTE — PLAN OF CARE
Alert & oriented x 4. Denies pain and SOB. Afebrile. Purewick catheter in place draining clear, straw colored urine. Ileostomy was leaking so pouch and appliance changed and site care performed. Pts appetite is good and is requesting food frequently.  Medic

## 2019-12-23 NOTE — PROGRESS NOTES
[facility]    Nephrology Progress Note    Tuan Vuong Attending:  Renay Lopez DO       SUBJECTIVE:     Net negative again   No complaints  Starting to become swollen    PHYSICAL EXAM:     Vital Signs: /66 (BP Location: Right arm)   Pu 1.39 11/20/2019    EOSABS 0.23 11/20/2019    BASABS 0.08 11/20/2019    NEUT 75 11/20/2019    LYMPH 18 11/20/2019    MON 2 11/20/2019    EOS 3 11/20/2019    BASO 1 11/20/2019    NEPERCENT 54.7 12/23/2019    LYPERCENT 29.4 12/23/2019    MOPERCENT 9.3 12/23/2 Nightly  Pantoprazole Sodium (PROTONIX) EC tab 40 mg, 40 mg, Oral, BID AC  Phenazopyridine HCl (PYRIDIUM) tab 100 mg, 100 mg, Oral, TID PRN  Sertraline HCl (ZOLOFT) tab 150 mg, 150 mg, Oral, Daily  sucralfate (CARAFATE) tab 1 g, 1 g, Oral, TID AC and HS  M

## 2019-12-24 VITALS
HEART RATE: 75 BPM | SYSTOLIC BLOOD PRESSURE: 140 MMHG | WEIGHT: 147.69 LBS | DIASTOLIC BLOOD PRESSURE: 78 MMHG | RESPIRATION RATE: 16 BRPM | HEIGHT: 60 IN | BODY MASS INDEX: 28.99 KG/M2 | OXYGEN SATURATION: 97 % | TEMPERATURE: 98 F

## 2019-12-24 LAB
ALBUMIN SERPL-MCNC: 2.1 G/DL (ref 3.4–5)
ANION GAP SERPL CALC-SCNC: 9 MMOL/L (ref 0–18)
BUN BLD-MCNC: 37 MG/DL (ref 7–18)
BUN/CREAT SERPL: 22.4 (ref 10–20)
CALCIUM BLD-MCNC: 8.9 MG/DL (ref 8.5–10.1)
CHLORIDE SERPL-SCNC: 112 MMOL/L (ref 98–112)
CO2 SERPL-SCNC: 16 MMOL/L (ref 21–32)
CREAT BLD-MCNC: 1.65 MG/DL (ref 0.55–1.02)
GLUCOSE BLD-MCNC: 118 MG/DL (ref 70–99)
GLUCOSE BLD-MCNC: 133 MG/DL (ref 70–99)
GLUCOSE BLD-MCNC: 172 MG/DL (ref 70–99)
GLUCOSE BLD-MCNC: 193 MG/DL (ref 70–99)
HAV IGM SER QL: 1.7 MG/DL (ref 1.6–2.6)
HGB BLD-MCNC: 7.4 G/DL (ref 12–16)
OSMOLALITY SERPL CALC.SUM OF ELEC: 294 MOSM/KG (ref 275–295)
PHOSPHATE SERPL-MCNC: 3.3 MG/DL (ref 2.5–4.9)
POTASSIUM SERPL-SCNC: 4.7 MMOL/L (ref 3.5–5.1)
SODIUM SERPL-SCNC: 137 MMOL/L (ref 136–145)

## 2019-12-24 PROCEDURE — 85018 HEMOGLOBIN: CPT | Performed by: INTERNAL MEDICINE

## 2019-12-24 PROCEDURE — 82962 GLUCOSE BLOOD TEST: CPT

## 2019-12-24 PROCEDURE — 83735 ASSAY OF MAGNESIUM: CPT | Performed by: HOSPITALIST

## 2019-12-24 PROCEDURE — 80069 RENAL FUNCTION PANEL: CPT | Performed by: HOSPITALIST

## 2019-12-24 RX ORDER — CIPROFLOXACIN 500 MG/1
500 TABLET, FILM COATED ORAL EVERY 24 HOURS
Status: DISCONTINUED | OUTPATIENT
Start: 2019-12-25 | End: 2019-12-24

## 2019-12-24 RX ORDER — BLOOD-GLUCOSE METER
EACH MISCELLANEOUS
Qty: 1 KIT | Refills: 0 | Status: SHIPPED | OUTPATIENT
Start: 2019-12-24 | End: 2020-01-13 | Stop reason: CLARIF

## 2019-12-24 RX ORDER — CIPROFLOXACIN 500 MG/1
500 TABLET, FILM COATED ORAL EVERY 12 HOURS SCHEDULED
Qty: 14 TABLET | Refills: 0 | Status: SHIPPED | OUTPATIENT
Start: 2019-12-24 | End: 2019-12-24

## 2019-12-24 RX ORDER — CIPROFLOXACIN 500 MG/1
500 TABLET, FILM COATED ORAL EVERY 12 HOURS SCHEDULED
Status: DISCONTINUED | OUTPATIENT
Start: 2019-12-24 | End: 2019-12-24

## 2019-12-24 RX ORDER — BUMETANIDE 1 MG/1
1 TABLET ORAL DAILY
Status: DISCONTINUED | OUTPATIENT
Start: 2019-12-24 | End: 2019-12-24

## 2019-12-24 RX ORDER — CIPROFLOXACIN 500 MG/1
500 TABLET, FILM COATED ORAL EVERY 24 HOURS
Status: DISCONTINUED | OUTPATIENT
Start: 2019-12-24 | End: 2019-12-24

## 2019-12-24 RX ORDER — BUMETANIDE 1 MG/1
1 TABLET ORAL DAILY
Qty: 30 TABLET | Refills: 0 | Status: ON HOLD | OUTPATIENT
Start: 2019-12-24 | End: 2020-01-10

## 2019-12-24 RX ORDER — CIPROFLOXACIN 500 MG/1
500 TABLET, FILM COATED ORAL EVERY 24 HOURS
Qty: 6 TABLET | Refills: 0 | Status: SHIPPED | OUTPATIENT
Start: 2019-12-25 | End: 2019-12-27 | Stop reason: ALTCHOICE

## 2019-12-24 RX ORDER — MEGESTROL ACETATE 40 MG/ML
400 SUSPENSION ORAL DAILY
Qty: 240 ML | Refills: 0 | Status: ON HOLD | OUTPATIENT
Start: 2019-12-25 | End: 2020-05-12

## 2019-12-24 RX ORDER — BLOOD SUGAR DIAGNOSTIC
STRIP MISCELLANEOUS
Qty: 50 STRIP | Refills: 6 | Status: SHIPPED | OUTPATIENT
Start: 2019-12-24 | End: 2020-01-13 | Stop reason: CLARIF

## 2019-12-24 NOTE — CM/SW NOTE
Urology has signed off of case. Nephrology paged for discharge orders. [facility] called and able to accept patient today. If discharged, please call report to #[phone]. Family informed of tentative discharge. Medicar placed on will call.  Please

## 2019-12-24 NOTE — PLAN OF CARE
Pt with good appetite, kept asking for snacks. Denies pain. Ileostomy intact, purewick intact draining yellow urine. Insulin given per mar. Vss. Afebrile. Slept well.     Problem: METABOLIC/FLUID AND ELECTROLYTES - ADULT  Goal: Hemodynamic stability and opt

## 2019-12-24 NOTE — DISCHARGE SUMMARY
General Medicine Discharge Summary     Patient ID:  Pooja Jasmine  81year old  8/8/1935    Admit date: 12/9/2019    Discharge date and time: 12/24/19    Attending Physician: Lucia Hills MD     Primary Care Physician: Junito Nixon MD     Reason for ad HTN  - holding metoprolol and amlodipine given borderline low BP  - discussed with Dr. Rubia Sandoval will continue to hold at discharge as BP has been normal without     #DM II with hyperglycemia  - ISS with accuchecks --> stop metformin at d/c and transition to i 1-68 Units into the skin 3 (three) times daily before meals.  Inject 1 unit of insulin for every 10 grams of carbohydrates eaten Inject within 10 minutes before or after a meal,    Blood Glucose Monitoring Suppl ([address]) w/Device Does no total) by mouth every 8 (eight) hours as needed for Pain. ondansetron (ZOFRAN ODT) 4 MG Oral Tablet Dispersible  Take 4 mg by mouth 3 (three) times daily before meals.       STOP taking these medications    Phenazopyridine HCl 100 MG Oral Tab    METFORMI

## 2019-12-24 NOTE — CM/SW NOTE
Sent clinicals updates to [facility] via [place]. Informed them the pt may dc later today.

## 2019-12-24 NOTE — PLAN OF CARE
Pt AOx3. VSS. Great appetite. Ostomy care prn. IVF discontinued last night. Nephrology monitoring kidney function. [place] [facility] [place] medically [place]. Some abdominal cramping this afternoon--improved with tylenol and scheduled simethicone.

## 2019-12-24 NOTE — PROGRESS NOTES
[facility]    Nephrology Progress Note    Tuan Vuong Attending:  Arina Logan DO       SUBJECTIVE:     Net negative again   No complaints  swollen    PHYSICAL EXAM:     Vital Signs: /78 (BP Location: Right arm)   Pulse 75   Temp 98.3 EOSABS 0.23 11/20/2019    BASABS 0.08 11/20/2019    NEUT 75 11/20/2019    LYMPH 18 11/20/2019    MON 2 11/20/2019    EOS 3 11/20/2019    BASO 1 11/20/2019    NEPERCENT 54.7 12/23/2019    LYPERCENT 29.4 12/23/2019    MOPERCENT 9.3 12/23/2019    EOPERCENT 3. Nightly  mirtazapine (REMERON) tab 15 mg, 15 mg, Oral, Nightly  Pantoprazole Sodium (PROTONIX) EC tab 40 mg, 40 mg, Oral, BID AC  Phenazopyridine HCl (PYRIDIUM) tab 100 mg, 100 mg, Oral, TID PRN  Sertraline HCl (ZOLOFT) tab 150 mg, 150 mg, Oral, Daily  suc

## 2019-12-24 NOTE — PROGRESS NOTES
[facility]  Urology Progress Note    Farazdebora TORO Isidrodel Patient Status:  Inpatient    1935 MRN [id number]   [facility] 4NW-A Attending Ruben Senior MD   Hosp Day # 14 PCP Gracia Fu MD     Subjective:  Monika Cueto is a(n) 81 ye

## 2019-12-24 NOTE — CONSULTS
[facility] Dosing Service  Antibiotic Dosing    Steff Renae is a 81year old female for whom pharmacy is dosing cipro for treatment of  UTI. .  Other antibiotics (Not dosed by pharmacy): -    Allergies: has No Known Allergies.     Vitals: /78 (BP

## 2019-12-24 NOTE — DIETARY NOTE
[facility]     NUTRITION FOLLOW UP ASSESSMENT     Pt meets moderate malnutrition criteria.     CRITERIA FOR MALNUTRITION DIAGNOSIS:  Criteria for non-severe malnutrition diagnosis: chronic illness related to energy intake less than75% for greater than kg     WEIGHT HISTORY: Per EMR hx, appears to have lost about 18 lb (11%) in about 8 months, which is not significant wt loss per standards, but may be concerning given pt's age.      Wt Readings from Last 20 Encounters:  12/09/19 : 67 kg (147 lb 11.3 oz)

## 2019-12-25 NOTE — PLAN OF CARE
NURSING DISCHARGE NOTE    Discharged Rehab facility via Ambulance. Accompanied by Support staff  Belongings Taken by patient/family. Pt remains a/o x4. Stable on room air. Left arm midline removed per Dr. Griselda Loft.  Nursing report given to Erendira Ferguson

## 2019-12-26 ENCOUNTER — EXTERNAL FACILITY (OUTPATIENT)
Dept: FAMILY MEDICINE CLINIC | Facility: CLINIC | Age: 84
End: 2019-12-26

## 2019-12-26 ENCOUNTER — NURSE ONLY (OUTPATIENT)
Dept: LAB | Age: 84
End: 2019-12-26
Attending: FAMILY MEDICINE
Payer: MEDICARE

## 2019-12-26 DIAGNOSIS — I10 ESSENTIAL HYPERTENSION: ICD-10-CM

## 2019-12-26 DIAGNOSIS — Z91.81 AT RISK FOR FALLING: ICD-10-CM

## 2019-12-26 DIAGNOSIS — E11.42 TYPE 2 DIABETES MELLITUS WITH DIABETIC POLYNEUROPATHY, UNSPECIFIED WHETHER LONG TERM INSULIN USE (HCC): ICD-10-CM

## 2019-12-26 DIAGNOSIS — E03.9 ACQUIRED HYPOTHYROIDISM: ICD-10-CM

## 2019-12-26 DIAGNOSIS — N39.0 URINARY TRACT INFECTION WITHOUT HEMATURIA, SITE UNSPECIFIED: ICD-10-CM

## 2019-12-26 DIAGNOSIS — I51.9 MYXEDEMA HEART DISEASE: Primary | ICD-10-CM

## 2019-12-26 DIAGNOSIS — N17.9 AKI (ACUTE KIDNEY INJURY) (HCC): Primary | ICD-10-CM

## 2019-12-26 DIAGNOSIS — D64.9 ANEMIA, UNSPECIFIED: ICD-10-CM

## 2019-12-26 DIAGNOSIS — D64.9 ANEMIA, UNSPECIFIED TYPE: ICD-10-CM

## 2019-12-26 DIAGNOSIS — I26.99 PE (PULMONARY THROMBOEMBOLISM) (HCC): ICD-10-CM

## 2019-12-26 DIAGNOSIS — E03.9 MYXEDEMA HEART DISEASE: Primary | ICD-10-CM

## 2019-12-26 DIAGNOSIS — R33.9 RETENTION OF URINE, UNSPECIFIED: ICD-10-CM

## 2019-12-26 DIAGNOSIS — E87.1 HYPONATREMIA: ICD-10-CM

## 2019-12-26 DIAGNOSIS — N13.30 HYDRONEPHROSIS OF RIGHT KIDNEY: ICD-10-CM

## 2019-12-26 PROCEDURE — 36415 COLL VENOUS BLD VENIPUNCTURE: CPT

## 2019-12-26 PROCEDURE — 99305 1ST NF CARE MODERATE MDM 35: CPT | Performed by: FAMILY MEDICINE

## 2019-12-26 PROCEDURE — 85025 COMPLETE CBC W/AUTO DIFF WBC: CPT

## 2019-12-26 PROCEDURE — 80053 COMPREHEN METABOLIC PANEL: CPT

## 2019-12-27 ENCOUNTER — INITIAL APN SNF VISIT (OUTPATIENT)
Dept: INTERNAL MEDICINE CLINIC | Age: 84
End: 2019-12-27

## 2019-12-27 ENCOUNTER — MOBILE ENCOUNTER (OUTPATIENT)
Dept: FAMILY MEDICINE CLINIC | Facility: CLINIC | Age: 84
End: 2019-12-27

## 2019-12-27 VITALS
HEART RATE: 66 BPM | OXYGEN SATURATION: 96 % | DIASTOLIC BLOOD PRESSURE: 58 MMHG | WEIGHT: 155.63 LBS | TEMPERATURE: 99 F | SYSTOLIC BLOOD PRESSURE: 127 MMHG | BODY MASS INDEX: 30 KG/M2 | RESPIRATION RATE: 18 BRPM

## 2019-12-27 DIAGNOSIS — R10.9 ABDOMINAL PAIN, UNSPECIFIED ABDOMINAL LOCATION: ICD-10-CM

## 2019-12-27 DIAGNOSIS — N39.0 URINARY TRACT INFECTION WITHOUT HEMATURIA, SITE UNSPECIFIED: ICD-10-CM

## 2019-12-27 DIAGNOSIS — E87.1 HYPONATREMIA: ICD-10-CM

## 2019-12-27 DIAGNOSIS — R53.1 WEAKNESS GENERALIZED: ICD-10-CM

## 2019-12-27 DIAGNOSIS — R11.0 NAUSEA: ICD-10-CM

## 2019-12-27 DIAGNOSIS — E11.40 DIABETIC NEUROPATHY, PAINFUL (HCC): ICD-10-CM

## 2019-12-27 DIAGNOSIS — E03.9 ACQUIRED HYPOTHYROIDISM: ICD-10-CM

## 2019-12-27 DIAGNOSIS — Z16.12 UTI DUE TO EXTENDED-SPECTRUM BETA LACTAMASE (ESBL) PRODUCING ESCHERICHIA COLI: ICD-10-CM

## 2019-12-27 DIAGNOSIS — Z93.2 ILEOSTOMY, HAS CURRENTLY (HCC): ICD-10-CM

## 2019-12-27 DIAGNOSIS — G47.00 INSOMNIA, UNSPECIFIED TYPE: ICD-10-CM

## 2019-12-27 DIAGNOSIS — D64.9 ANEMIA, UNSPECIFIED TYPE: ICD-10-CM

## 2019-12-27 DIAGNOSIS — E11.42 TYPE 2 DIABETES MELLITUS WITH DIABETIC POLYNEUROPATHY, UNSPECIFIED WHETHER LONG TERM INSULIN USE (HCC): ICD-10-CM

## 2019-12-27 DIAGNOSIS — N39.0 UTI DUE TO EXTENDED-SPECTRUM BETA LACTAMASE (ESBL) PRODUCING ESCHERICHIA COLI: ICD-10-CM

## 2019-12-27 DIAGNOSIS — N28.9 ACUTE RENAL INSUFFICIENCY: Primary | ICD-10-CM

## 2019-12-27 DIAGNOSIS — E46 MALNUTRITION, UNSPECIFIED TYPE (HCC): ICD-10-CM

## 2019-12-27 DIAGNOSIS — I10 ESSENTIAL HYPERTENSION: ICD-10-CM

## 2019-12-27 DIAGNOSIS — Z74.09 IMPAIRED MOBILITY AND ADLS: ICD-10-CM

## 2019-12-27 DIAGNOSIS — Z86.711 HISTORY OF PULMONARY EMBOLISM: ICD-10-CM

## 2019-12-27 DIAGNOSIS — N13.30 HYDRONEPHROSIS OF RIGHT KIDNEY: ICD-10-CM

## 2019-12-27 DIAGNOSIS — Z90.49 HISTORY OF PARTIAL COLECTOMY: ICD-10-CM

## 2019-12-27 DIAGNOSIS — F32.2 CURRENT SEVERE EPISODE OF MAJOR DEPRESSIVE DISORDER WITHOUT PSYCHOTIC FEATURES, UNSPECIFIED WHETHER RECURRENT (HCC): ICD-10-CM

## 2019-12-27 DIAGNOSIS — N17.9 AKI (ACUTE KIDNEY INJURY) (HCC): Primary | ICD-10-CM

## 2019-12-27 DIAGNOSIS — I26.99 PE (PULMONARY THROMBOEMBOLISM) (HCC): ICD-10-CM

## 2019-12-27 DIAGNOSIS — I82.403 ACUTE DEEP VEIN THROMBOSIS (DVT) OF BOTH LOWER EXTREMITIES, UNSPECIFIED VEIN (HCC): ICD-10-CM

## 2019-12-27 DIAGNOSIS — Z91.81 AT RISK FOR FALLING: ICD-10-CM

## 2019-12-27 DIAGNOSIS — B96.29 UTI DUE TO EXTENDED-SPECTRUM BETA LACTAMASE (ESBL) PRODUCING ESCHERICHIA COLI: ICD-10-CM

## 2019-12-27 DIAGNOSIS — K56.609 COLON OBSTRUCTION (HCC): ICD-10-CM

## 2019-12-27 DIAGNOSIS — E11.42 TYPE 2 DIABETES MELLITUS WITH DIABETIC POLYNEUROPATHY, WITHOUT LONG-TERM CURRENT USE OF INSULIN (HCC): ICD-10-CM

## 2019-12-27 DIAGNOSIS — Z78.9 IMPAIRED MOBILITY AND ADLS: ICD-10-CM

## 2019-12-27 PROCEDURE — 99310 SBSQ NF CARE HIGH MDM 45: CPT | Performed by: NURSE PRACTITIONER

## 2019-12-27 PROCEDURE — 99358 PROLONG SERVICE W/O CONTACT: CPT | Performed by: FAMILY MEDICINE

## 2019-12-27 NOTE — PROGRESS NOTES
Arlene Patino  : 1935  Age 81year old  female patient is admitted to Facility: [place] [address] for  MARCELLA after KARSTEN/E Coli ESBL UTI/Acute b/l DVTs    [facility] Admit date:    19 after colon obstruction s/p colectomy and left ure OF SYSTEMS:  Unable to perform 2/2 language barrier    PHYSICAL EXAM:  GENERAL HEALTH: well developed, well nourished, in no apparent distress; +sitting in bed finishing breakfast  LINES, TUBES, DRAINS:  none  SKIN: no rashes, no suspicious lesions, pale, Component Value Date    GLU 99 12/26/2019    BUN 41 (H) 12/26/2019    BUNCREA 26.1 (H) 12/26/2019    CREATSERUM 1.57 (H) 12/26/2019    ANIONGAP 9 12/26/2019    GFR 69 11/29/2016    GFRNAA 30 (L) 12/26/2019    GFRAA 35 (L) 12/26/2019    CA 9.3 12/26/2019 labs    Depression  1. Monitor mood; consult psych prn  2. Sertraline 150 mg daily  3. Mirtazapine 15 mg q HS    HTN/HL/Hx of PE  1. VS q shift  2. Daily weight  3. Metoprolol succinate 25 mg daily--On hold until B/P improves  4.  Amlodipine 10 mg daily--On

## 2019-12-27 NOTE — PROGRESS NOTES
Tuan Vuong Author: Nathanael Mccormick MD     1935 MRN [id number]   [facility]  Admission 19      Last Hospital Discharge 19 PCP Fernando Soni MD   Hospital of Discharge  [facility]        CC --readmitted to [facility] from [facility] Vision loss     need cataract surgery     Past Surgical History:   Procedure Laterality Date   •      • COLECTOMY N/A 2019    Performed by Jakob Truong MD at [facility] MAIN OR   • LEFT PHACOEMULSIFICATION OF CATARACT WITH INTRAOCULAR LENS IMPLANT 6 times daily, before each meal 1 kit 0   • Glucose Blood (ONETOUCH VERIO) In Vitro Strip Test blood sugar three times daily, before each meal 50 strip 6   • ONETOUCH DELICA LANCETS FINE Does not apply Misc Test blood sugar three times daily, before each [employer]:   Constitutional: No fevers, chills, fatigue or night sweats. ENT: No mouth pain, neck pain, running nose, headaches or swollen glands. Skin: No rashes, pruritus or skin changes,  Respiratory: Denies cough, wheezing or shortness of breath.   CV: diabetic polyneuropathy, unspecified whether long term insulin use (HCC)    Essential hypertension    Anemia, unspecified type    Acquired hypothyroidism    At risk for falling    PE (pulmonary thromboembolism) ([facility] [address]    Readmitted to [facility]  Filipe simms

## 2019-12-28 NOTE — PROGRESS NOTES
Tuan Vuong Author: Milagro Jacobo MD     1935 MRN [id number]   [facility]  Admission 19      Last Hospital Discharge 19 PCP Karan Shelton MD   Hospital of Discharge  [facility]       Patient admitted to [facility] from [facility]

## 2019-12-30 ENCOUNTER — NURSE ONLY (OUTPATIENT)
Dept: LAB | Age: 84
End: 2019-12-30
Attending: FAMILY MEDICINE
Payer: MEDICARE

## 2019-12-30 ENCOUNTER — SNF VISIT (OUTPATIENT)
Dept: INTERNAL MEDICINE CLINIC | Age: 84
End: 2019-12-30

## 2019-12-30 VITALS
DIASTOLIC BLOOD PRESSURE: 61 MMHG | TEMPERATURE: 98 F | SYSTOLIC BLOOD PRESSURE: 109 MMHG | OXYGEN SATURATION: 95 % | RESPIRATION RATE: 18 BRPM | HEART RATE: 84 BPM

## 2019-12-30 DIAGNOSIS — I82.403 ACUTE DEEP VEIN THROMBOSIS (DVT) OF BOTH LOWER EXTREMITIES, UNSPECIFIED VEIN (HCC): ICD-10-CM

## 2019-12-30 DIAGNOSIS — N39.0 UTI DUE TO EXTENDED-SPECTRUM BETA LACTAMASE (ESBL) PRODUCING ESCHERICHIA COLI: ICD-10-CM

## 2019-12-30 DIAGNOSIS — Z16.12 UTI DUE TO EXTENDED-SPECTRUM BETA LACTAMASE (ESBL) PRODUCING ESCHERICHIA COLI: ICD-10-CM

## 2019-12-30 DIAGNOSIS — E11.9 DM (DIABETES MELLITUS) (HCC): Primary | ICD-10-CM

## 2019-12-30 DIAGNOSIS — B96.29 UTI DUE TO EXTENDED-SPECTRUM BETA LACTAMASE (ESBL) PRODUCING ESCHERICHIA COLI: ICD-10-CM

## 2019-12-30 DIAGNOSIS — N28.9 ACUTE RENAL INSUFFICIENCY: Primary | ICD-10-CM

## 2019-12-30 LAB
ALBUMIN SERPL-MCNC: 2.5 G/DL (ref 3.4–5)
ALBUMIN/GLOB SERPL: 0.5 {RATIO} (ref 1–2)
ALP LIVER SERPL-CCNC: 106 U/L (ref 55–142)
ALT SERPL-CCNC: 34 U/L (ref 13–56)
ANION GAP SERPL CALC-SCNC: 8 MMOL/L (ref 0–18)
AST SERPL-CCNC: 23 U/L (ref 15–37)
BASOPHILS # BLD AUTO: 0.03 X10(3) UL (ref 0–0.2)
BASOPHILS NFR BLD AUTO: 0.5 %
BILIRUB SERPL-MCNC: 0.2 MG/DL (ref 0.1–2)
BUN BLD-MCNC: 58 MG/DL (ref 7–18)
BUN/CREAT SERPL: 24.6 (ref 10–20)
CALCIUM BLD-MCNC: 9.1 MG/DL (ref 8.5–10.1)
CHLORIDE SERPL-SCNC: 110 MMOL/L (ref 98–112)
CO2 SERPL-SCNC: 17 MMOL/L (ref 21–32)
CREAT BLD-MCNC: 2.36 MG/DL (ref 0.55–1.02)
DEPRECATED RDW RBC AUTO: 47.9 FL (ref 35.1–46.3)
EOSINOPHIL # BLD AUTO: 0.34 X10(3) UL (ref 0–0.7)
EOSINOPHIL NFR BLD AUTO: 5.7 %
ERYTHROCYTE [DISTWIDTH] IN BLOOD BY AUTOMATED COUNT: 15.8 % (ref 11–15)
GLOBULIN PLAS-MCNC: 5 G/DL (ref 2.8–4.4)
GLUCOSE BLD-MCNC: 120 MG/DL (ref 70–99)
HCT VFR BLD AUTO: 25 % (ref 35–48)
HGB BLD-MCNC: 7.8 G/DL (ref 12–16)
IMM GRANULOCYTES # BLD AUTO: 0.08 X10(3) UL (ref 0–1)
IMM GRANULOCYTES NFR BLD: 1.3 %
LYMPHOCYTES # BLD AUTO: 2.53 X10(3) UL (ref 1–4)
LYMPHOCYTES NFR BLD AUTO: 42.2 %
M PROTEIN MFR SERPL ELPH: 7.5 G/DL (ref 6.4–8.2)
MCH RBC QN AUTO: 25.8 PG (ref 26–34)
MCHC RBC AUTO-ENTMCNC: 31.2 G/DL (ref 31–37)
MCV RBC AUTO: 82.8 FL (ref 80–100)
MONOCYTES # BLD AUTO: 0.57 X10(3) UL (ref 0.1–1)
MONOCYTES NFR BLD AUTO: 9.5 %
NEUTROPHILS # BLD AUTO: 2.45 X10 (3) UL (ref 1.5–7.7)
NEUTROPHILS # BLD AUTO: 2.45 X10(3) UL (ref 1.5–7.7)
NEUTROPHILS NFR BLD AUTO: 40.8 %
OSMOLALITY SERPL CALC.SUM OF ELEC: 297 MOSM/KG (ref 275–295)
PATIENT FASTING Y/N/NP: YES
PLATELET # BLD AUTO: 471 10(3)UL (ref 150–450)
POTASSIUM SERPL-SCNC: 4.1 MMOL/L (ref 3.5–5.1)
RBC # BLD AUTO: 3.02 X10(6)UL (ref 3.8–5.3)
SODIUM SERPL-SCNC: 135 MMOL/L (ref 136–145)
WBC # BLD AUTO: 6 X10(3) UL (ref 4–11)

## 2019-12-30 PROCEDURE — 85025 COMPLETE CBC W/AUTO DIFF WBC: CPT

## 2019-12-30 PROCEDURE — 99309 SBSQ NF CARE MODERATE MDM 30: CPT | Performed by: NURSE PRACTITIONER

## 2019-12-30 PROCEDURE — 36415 COLL VENOUS BLD VENIPUNCTURE: CPT

## 2019-12-30 PROCEDURE — 80053 COMPREHEN METABOLIC PANEL: CPT

## 2019-12-30 NOTE — PROGRESS NOTES
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     Lab Results   Component Value Date    WBC 6.0 12/30/2019    RBC 3.02 (L) 12/3 BID  5. Sucralfate 1 gm QID  6. Zofran ODT 4 mg AC and HS  7. Tylenol 650 mg TID q prn plus 500 mg q AM  8. F/U w/ Dr Mendes/surgery prn  9. F/U w/ Dr Montano/urology <1 wks     ABLA  1. CBC weekly  2. FeSO4 325 mg daily  3.  S/P transfusion 1u PRBCs     General

## 2019-12-30 NOTE — PROGRESS NOTES
[facility] Hospitalist Progress Note     Tuan Vuong Patient Status:  Inpatient    1935 MRN [id number]   [facility] 4SW-A Attending Uvaldo Roa MD   Hosp Day # 15 PCP Kalli Silvestre MD     CC: follow up    SUBJECTIVE:  Very poor PO- son a 11/22/19  1201 11/22/19  1711 11/22/19  2114 11/23/19  0703 11/23/19  1129   PGLU 123* 142* 119* 130* 167*         Meds:   Scheduled Medication:  • Albumin Human  250 mL Intravenous Q6H   • Levothyroxine Sodium  125 mcg Oral Before breakfast   • Heparin So connections:     Talks on phone: None     Gets together: None     Attends Restoration service: None     Active member of club or organization: None     Attends meetings of clubs or organizations: None     Relationship status: None    Intimate partner violence:     Fear of current or ex partner: None     Emotionally abused: None     Physically abused: None     Forced sexual activity: None   Other Topics Concern    None   Social History Narrative    None     Current Facility-Administered Medications   Medication Dose Route Frequency Provider Last Rate Last Dose    ceFAZolin (ANCEF) 2 g in dextrose 4 % 100 mL IVPB (premix)  2 g Intravenous On Call to Aurelia Britton MD        sodium chloride flush 0.9 % injection 10 mL  10 mL Intravenous 2 times per day Liz Aguero MD        sodium chloride flush 0.9 % injection 10 mL  10 mL Intravenous PRN Liz Aguero MD        HYDROmorphone (DILAUDID) injection 0.25 mg  0.25 mg Intravenous Q5 Min PRN Pb Chaudhari MD        HYDROmorphone (DILAUDID) injection 0.5 mg  0.5 mg Intravenous Q5 Min PRN Pb Chaudhari MD        fentaNYL (SUBLIMAZE) injection 25 mcg  25 mcg Intravenous Q5 Min PRN Pb Chaudhari MD        fentaNYL (SUBLIMAZE) injection 50 mcg  50 mcg Intravenous Q5 Min PRN Pb Chaudhari MD        HYDROcodone-acetaminophen [facility]) 5-325 MG per tablet 1 tablet  1 tablet Oral PRN Pb Chaudhari MD        Or    HYDROcodone-acetaminophen (NORCO) 5-325 MG per tablet 2 tablet  2 tablet Oral PRN Pb Chaudhari MD        diphenhydrAMINE (BENADRYL) injection 12.5 mg  12.5 mg Intravenous Once PRN Pb Chaudhari MD        promethazine (PHENERGAN) injection 6.25 mg  6.25 mg Intravenous Q30 Min PRN Pb Chaudhari MD        meperidine (DEMEROL) injection 12.5 mg  12.5 mg Intravenous Q5 Min PRN Pb Chaudhari MD        metronidazole (FLAGYL) 500 mg in NaCl 100 mL IVPB premix  500 mg Intravenous Once Pb Chaudhari MD       [facility] ha1C 6.8%  - -160s  - SSI with QID accuchecks    # ANTONIO - improving- though increase in creat noted when off IVFs. Renal following. Albumin ordered today.   - Push PO    # hyponatremia - resolved    # h/o PE  - Per daughter, this was several years ago,

## 2020-01-02 ENCOUNTER — NURSE ONLY (OUTPATIENT)
Dept: LAB | Age: 85
End: 2020-01-02
Attending: FAMILY MEDICINE
Payer: MEDICARE

## 2020-01-02 DIAGNOSIS — R69 DIAGNOSIS UNKNOWN: Primary | ICD-10-CM

## 2020-01-02 LAB
ALBUMIN SERPL-MCNC: 2.7 G/DL (ref 3.4–5)
ALBUMIN/GLOB SERPL: 0.5 {RATIO} (ref 1–2)
ALP LIVER SERPL-CCNC: 109 U/L (ref 55–142)
ALT SERPL-CCNC: 42 U/L (ref 13–56)
ANION GAP SERPL CALC-SCNC: 8 MMOL/L (ref 0–18)
AST SERPL-CCNC: 30 U/L (ref 15–37)
BASOPHILS # BLD AUTO: 0.06 X10(3) UL (ref 0–0.2)
BASOPHILS NFR BLD AUTO: 1 %
BILIRUB SERPL-MCNC: 0.1 MG/DL (ref 0.1–2)
BUN BLD-MCNC: 57 MG/DL (ref 7–18)
BUN/CREAT SERPL: 26.1 (ref 10–20)
CALCIUM BLD-MCNC: 9.6 MG/DL (ref 8.5–10.1)
CHLORIDE SERPL-SCNC: 113 MMOL/L (ref 98–112)
CO2 SERPL-SCNC: 14 MMOL/L (ref 21–32)
CREAT BLD-MCNC: 2.18 MG/DL (ref 0.55–1.02)
DEPRECATED RDW RBC AUTO: 49.4 FL (ref 35.1–46.3)
EOSINOPHIL # BLD AUTO: 0.33 X10(3) UL (ref 0–0.7)
EOSINOPHIL NFR BLD AUTO: 5.6 %
ERYTHROCYTE [DISTWIDTH] IN BLOOD BY AUTOMATED COUNT: 16.2 % (ref 11–15)
GLOBULIN PLAS-MCNC: 5.1 G/DL (ref 2.8–4.4)
GLUCOSE BLD-MCNC: 196 MG/DL (ref 70–99)
HAV IGM SER QL: 2.1 MG/DL (ref 1.6–2.6)
HCT VFR BLD AUTO: 25.3 % (ref 35–48)
HGB BLD-MCNC: 7.9 G/DL (ref 12–16)
IMM GRANULOCYTES # BLD AUTO: 0.08 X10(3) UL (ref 0–1)
IMM GRANULOCYTES NFR BLD: 1.4 %
LYMPHOCYTES # BLD AUTO: 2.06 X10(3) UL (ref 1–4)
LYMPHOCYTES NFR BLD AUTO: 34.8 %
M PROTEIN MFR SERPL ELPH: 7.8 G/DL (ref 6.4–8.2)
MCH RBC QN AUTO: 25.8 PG (ref 26–34)
MCHC RBC AUTO-ENTMCNC: 31.2 G/DL (ref 31–37)
MCV RBC AUTO: 82.7 FL (ref 80–100)
MONOCYTES # BLD AUTO: 0.46 X10(3) UL (ref 0.1–1)
MONOCYTES NFR BLD AUTO: 7.8 %
NEUTROPHILS # BLD AUTO: 2.93 X10 (3) UL (ref 1.5–7.7)
NEUTROPHILS # BLD AUTO: 2.93 X10(3) UL (ref 1.5–7.7)
NEUTROPHILS NFR BLD AUTO: 49.4 %
OSMOLALITY SERPL CALC.SUM OF ELEC: 301 MOSM/KG (ref 275–295)
PATIENT FASTING Y/N/NP: NO
PLATELET # BLD AUTO: 437 10(3)UL (ref 150–450)
POTASSIUM SERPL-SCNC: 5 MMOL/L (ref 3.5–5.1)
RBC # BLD AUTO: 3.06 X10(6)UL (ref 3.8–5.3)
SODIUM SERPL-SCNC: 135 MMOL/L (ref 136–145)
TROPONIN I SERPL-MCNC: <0.045 NG/ML (ref ?–0.04)
WBC # BLD AUTO: 5.9 X10(3) UL (ref 4–11)

## 2020-01-02 PROCEDURE — 36415 COLL VENOUS BLD VENIPUNCTURE: CPT

## 2020-01-02 PROCEDURE — 83735 ASSAY OF MAGNESIUM: CPT

## 2020-01-02 PROCEDURE — 84484 ASSAY OF TROPONIN QUANT: CPT

## 2020-01-02 PROCEDURE — 80053 COMPREHEN METABOLIC PANEL: CPT

## 2020-01-02 PROCEDURE — 85025 COMPLETE CBC W/AUTO DIFF WBC: CPT

## 2020-01-03 ENCOUNTER — SNF VISIT (OUTPATIENT)
Dept: INTERNAL MEDICINE CLINIC | Age: 85
End: 2020-01-03

## 2020-01-03 ENCOUNTER — NURSE ONLY (OUTPATIENT)
Dept: LAB | Age: 85
End: 2020-01-03
Attending: FAMILY MEDICINE
Payer: MEDICARE

## 2020-01-03 VITALS — WEIGHT: 156.38 LBS | HEART RATE: 64 BPM | OXYGEN SATURATION: 98 % | BODY MASS INDEX: 31 KG/M2

## 2020-01-03 DIAGNOSIS — R60.0 LOCALIZED EDEMA: ICD-10-CM

## 2020-01-03 DIAGNOSIS — R53.1 WEAKNESS GENERALIZED: ICD-10-CM

## 2020-01-03 DIAGNOSIS — B96.29 UTI DUE TO EXTENDED-SPECTRUM BETA LACTAMASE (ESBL) PRODUCING ESCHERICHIA COLI: Primary | ICD-10-CM

## 2020-01-03 DIAGNOSIS — I82.403 ACUTE DEEP VEIN THROMBOSIS (DVT) OF BOTH LOWER EXTREMITIES, UNSPECIFIED VEIN (HCC): ICD-10-CM

## 2020-01-03 DIAGNOSIS — R53.1 WEAKNESS: Primary | ICD-10-CM

## 2020-01-03 DIAGNOSIS — Z16.12 UTI DUE TO EXTENDED-SPECTRUM BETA LACTAMASE (ESBL) PRODUCING ESCHERICHIA COLI: Primary | ICD-10-CM

## 2020-01-03 DIAGNOSIS — R31.0 GROSS HEMATURIA: ICD-10-CM

## 2020-01-03 DIAGNOSIS — N39.0 UTI DUE TO EXTENDED-SPECTRUM BETA LACTAMASE (ESBL) PRODUCING ESCHERICHIA COLI: Primary | ICD-10-CM

## 2020-01-03 DIAGNOSIS — Z74.09 IMPAIRED MOBILITY AND ADLS: ICD-10-CM

## 2020-01-03 DIAGNOSIS — Z78.9 IMPAIRED MOBILITY AND ADLS: ICD-10-CM

## 2020-01-03 LAB
ANION GAP SERPL CALC-SCNC: 9 MMOL/L (ref 0–18)
BUN BLD-MCNC: 53 MG/DL (ref 7–18)
BUN/CREAT SERPL: 25.9 (ref 10–20)
CALCIUM BLD-MCNC: 9.7 MG/DL (ref 8.5–10.1)
CHLORIDE SERPL-SCNC: 114 MMOL/L (ref 98–112)
CO2 SERPL-SCNC: 13 MMOL/L (ref 21–32)
CREAT BLD-MCNC: 2.05 MG/DL (ref 0.55–1.02)
GLUCOSE BLD-MCNC: 124 MG/DL (ref 70–99)
OSMOLALITY SERPL CALC.SUM OF ELEC: 298 MOSM/KG (ref 275–295)
PATIENT FASTING Y/N/NP: YES
POTASSIUM SERPL-SCNC: 4.7 MMOL/L (ref 3.5–5.1)
SODIUM SERPL-SCNC: 136 MMOL/L (ref 136–145)

## 2020-01-03 PROCEDURE — 87086 URINE CULTURE/COLONY COUNT: CPT

## 2020-01-03 PROCEDURE — 81001 URINALYSIS AUTO W/SCOPE: CPT

## 2020-01-03 PROCEDURE — 80048 BASIC METABOLIC PNL TOTAL CA: CPT

## 2020-01-03 PROCEDURE — 36415 COLL VENOUS BLD VENIPUNCTURE: CPT

## 2020-01-03 PROCEDURE — 99309 SBSQ NF CARE MODERATE MDM 30: CPT | Performed by: NURSE PRACTITIONER

## 2020-01-03 RX ORDER — SIMETHICONE 80 MG
80 TABLET,CHEWABLE ORAL EVERY 6 HOURS PRN
COMMUNITY
End: 2020-06-18

## 2020-01-03 NOTE — PROGRESS NOTES
Tuan Vuong, 68/1935, 81year old, female    Chief Complaint:  Patient presents with:   Follow - Up: KARSTEN/E Coli ESBL UTI/Acute b/l DVTs  Gastro-esophageal Reflux: indigestion  Edema  Weakness  Hematuria       Subjective:   PMH significant for Depression normal active BS+, soft, nondistended; no organomegaly, no masses; no bruits; nontender, no guarding, no rebound tenderness. :Deferred  LYMPHATIC:no lymphedema  MUSCULOSKELETAL: no acute synovitis upper or lower extremity.   Weakness R/T recent hospitali Coli ESBL UTI/Gross hematuria  1. Ciprofloxacin change to Bactrim 2/2 C&S  2. Bactrim /160 mg BID until 12.31.19  3. Stat UA C&S     Acute B/L calf DVTs and non occlusive thrombus to left superficial femoral vein  1.  Xarelto change to Eliquis 2/2 rubi wt  3. Ensure TID  4. Megace 400 mg daily  5. Mirtazapine 15 mg q HS     Insomnia  1.  Melatonin 3 mg nightly    CHENG Green  1/3/2019  10:30  AM

## 2020-01-04 ENCOUNTER — NURSE ONLY (OUTPATIENT)
Dept: LAB | Age: 85
End: 2020-01-04
Attending: FAMILY MEDICINE
Payer: MEDICARE

## 2020-01-04 DIAGNOSIS — R82.998 DARK URINE: Primary | ICD-10-CM

## 2020-01-04 LAB
BILIRUB UR QL STRIP.AUTO: NEGATIVE
COLOR UR AUTO: YELLOW
GLUCOSE UR STRIP.AUTO-MCNC: NEGATIVE MG/DL
KETONES UR STRIP.AUTO-MCNC: NEGATIVE MG/DL
NITRITE UR QL STRIP.AUTO: NEGATIVE
PH UR STRIP.AUTO: 6 [PH] (ref 4.5–8)
PROT UR STRIP.AUTO-MCNC: 100 MG/DL
RBC #/AREA URNS AUTO: >10 /HPF
SP GR UR STRIP.AUTO: 1.01 (ref 1–1.03)
UROBILINOGEN UR STRIP.AUTO-MCNC: <2 MG/DL
WBC #/AREA URNS AUTO: >50 /HPF

## 2020-01-06 ENCOUNTER — SNF VISIT (OUTPATIENT)
Dept: INTERNAL MEDICINE CLINIC | Age: 85
End: 2020-01-06

## 2020-01-06 ENCOUNTER — TELEPHONE (OUTPATIENT)
Dept: FAMILY MEDICINE CLINIC | Facility: CLINIC | Age: 85
End: 2020-01-06

## 2020-01-06 ENCOUNTER — NURSE ONLY (OUTPATIENT)
Dept: LAB | Age: 85
End: 2020-01-06
Attending: FAMILY MEDICINE
Payer: MEDICARE

## 2020-01-06 VITALS
RESPIRATION RATE: 18 BRPM | OXYGEN SATURATION: 97 % | HEART RATE: 77 BPM | SYSTOLIC BLOOD PRESSURE: 116 MMHG | TEMPERATURE: 97 F | DIASTOLIC BLOOD PRESSURE: 72 MMHG

## 2020-01-06 DIAGNOSIS — E11.42 TYPE 2 DIABETES MELLITUS WITH DIABETIC POLYNEUROPATHY, WITHOUT LONG-TERM CURRENT USE OF INSULIN (HCC): ICD-10-CM

## 2020-01-06 DIAGNOSIS — N39.0 UTI DUE TO EXTENDED-SPECTRUM BETA LACTAMASE (ESBL) PRODUCING ESCHERICHIA COLI: Primary | ICD-10-CM

## 2020-01-06 DIAGNOSIS — M54.50 BILATERAL LOW BACK PAIN WITHOUT SCIATICA, UNSPECIFIED CHRONICITY: ICD-10-CM

## 2020-01-06 DIAGNOSIS — E11.9 DM (DIABETES MELLITUS) (HCC): Primary | ICD-10-CM

## 2020-01-06 DIAGNOSIS — Z16.12 UTI DUE TO EXTENDED-SPECTRUM BETA LACTAMASE (ESBL) PRODUCING ESCHERICHIA COLI: Primary | ICD-10-CM

## 2020-01-06 DIAGNOSIS — Z79.899 MEDICATION MANAGEMENT: ICD-10-CM

## 2020-01-06 DIAGNOSIS — N28.9 ACUTE RENAL INSUFFICIENCY: ICD-10-CM

## 2020-01-06 DIAGNOSIS — I82.403 ACUTE DEEP VEIN THROMBOSIS (DVT) OF BOTH LOWER EXTREMITIES, UNSPECIFIED VEIN (HCC): ICD-10-CM

## 2020-01-06 DIAGNOSIS — E46 MALNUTRITION, UNSPECIFIED TYPE (HCC): ICD-10-CM

## 2020-01-06 DIAGNOSIS — B96.29 UTI DUE TO EXTENDED-SPECTRUM BETA LACTAMASE (ESBL) PRODUCING ESCHERICHIA COLI: Primary | ICD-10-CM

## 2020-01-06 LAB
ALBUMIN SERPL-MCNC: 2.6 G/DL (ref 3.4–5)
ALBUMIN/GLOB SERPL: 0.6 {RATIO} (ref 1–2)
ALP LIVER SERPL-CCNC: 93 U/L (ref 55–142)
ALT SERPL-CCNC: 30 U/L (ref 13–56)
ANION GAP SERPL CALC-SCNC: 8 MMOL/L (ref 0–18)
AST SERPL-CCNC: 15 U/L (ref 15–37)
BASOPHILS # BLD AUTO: 0.05 X10(3) UL (ref 0–0.2)
BASOPHILS NFR BLD AUTO: 0.7 %
BILIRUB SERPL-MCNC: 0.2 MG/DL (ref 0.1–2)
BUN BLD-MCNC: 58 MG/DL (ref 7–18)
BUN/CREAT SERPL: 26.4 (ref 10–20)
CALCIUM BLD-MCNC: 9.9 MG/DL (ref 8.5–10.1)
CHLORIDE SERPL-SCNC: 117 MMOL/L (ref 98–112)
CO2 SERPL-SCNC: 15 MMOL/L (ref 21–32)
CREAT BLD-MCNC: 2.2 MG/DL (ref 0.55–1.02)
DEPRECATED RDW RBC AUTO: 51.2 FL (ref 35.1–46.3)
EOSINOPHIL # BLD AUTO: 0.32 X10(3) UL (ref 0–0.7)
EOSINOPHIL NFR BLD AUTO: 4.6 %
ERYTHROCYTE [DISTWIDTH] IN BLOOD BY AUTOMATED COUNT: 16.4 % (ref 11–15)
GLOBULIN PLAS-MCNC: 4.7 G/DL (ref 2.8–4.4)
GLUCOSE BLD-MCNC: 148 MG/DL (ref 70–99)
HCT VFR BLD AUTO: 24.4 % (ref 35–48)
HGB BLD-MCNC: 7.3 G/DL (ref 12–16)
IMM GRANULOCYTES # BLD AUTO: 0.1 X10(3) UL (ref 0–1)
IMM GRANULOCYTES NFR BLD: 1.4 %
LYMPHOCYTES # BLD AUTO: 3.2 X10(3) UL (ref 1–4)
LYMPHOCYTES NFR BLD AUTO: 46.2 %
M PROTEIN MFR SERPL ELPH: 7.3 G/DL (ref 6.4–8.2)
MCH RBC QN AUTO: 25.3 PG (ref 26–34)
MCHC RBC AUTO-ENTMCNC: 29.9 G/DL (ref 31–37)
MCV RBC AUTO: 84.4 FL (ref 80–100)
MONOCYTES # BLD AUTO: 0.4 X10(3) UL (ref 0.1–1)
MONOCYTES NFR BLD AUTO: 5.8 %
NEUTROPHILS # BLD AUTO: 2.86 X10 (3) UL (ref 1.5–7.7)
NEUTROPHILS # BLD AUTO: 2.86 X10(3) UL (ref 1.5–7.7)
NEUTROPHILS NFR BLD AUTO: 41.3 %
OSMOLALITY SERPL CALC.SUM OF ELEC: 309 MOSM/KG (ref 275–295)
PATIENT FASTING Y/N/NP: YES
PLATELET # BLD AUTO: 403 10(3)UL (ref 150–450)
POTASSIUM SERPL-SCNC: 4.8 MMOL/L (ref 3.5–5.1)
RBC # BLD AUTO: 2.89 X10(6)UL (ref 3.8–5.3)
SODIUM SERPL-SCNC: 140 MMOL/L (ref 136–145)
WBC # BLD AUTO: 6.9 X10(3) UL (ref 4–11)

## 2020-01-06 PROCEDURE — 99309 SBSQ NF CARE MODERATE MDM 30: CPT | Performed by: NURSE PRACTITIONER

## 2020-01-06 PROCEDURE — 85025 COMPLETE CBC W/AUTO DIFF WBC: CPT

## 2020-01-06 PROCEDURE — 36415 COLL VENOUS BLD VENIPUNCTURE: CPT

## 2020-01-06 PROCEDURE — 80053 COMPREHEN METABOLIC PANEL: CPT

## 2020-01-06 RX ORDER — LIDOCAINE 4 G/G
1 PATCH TOPICAL EVERY 24 HOURS
Status: ON HOLD | COMMUNITY
End: 2020-05-12

## 2020-01-06 NOTE — PROGRESS NOTES
Tuan TORO Isidrodel, 68/1935, 81year old, female    Chief Complaint:  Patient presents with:   Follow - Up: KARSTEN/E Coli ESBL UTI/Acute b/l DVTs  Back Pain  Hyperglycemia  Lab Results       Subjective:   PMH significant for Depression, HTN, HL, Hx of PE, Hypothy to rehab and improve strength, endurance and independence w/ ADLs. No erythema or edema noted at major joints, able to move but c/o pain w/ movement. After light backrub, says \"Thank you\" and indicates that that was helpful.   EXTREMITIES/VASCULAR:no c OA  1. Tylenol 650 mg TID prn--give dose stat  2. Add Lidocaine 4% patch to low back daily    Acute B/L calf DVTs and non occlusive thrombus to left superficial femoral vein  1. Xarelto change to Eliquis 2/2 renal fxn  2.  Eliquis 10 mg BID until 12.27.19 t daily  5. Mirtazapine 15 mg q HS     Insomnia  1.  Melatonin 3 mg nightly    Diana Adam, APRN  1/6/2020  11:30  AM

## 2020-01-07 ENCOUNTER — NURSE ONLY (OUTPATIENT)
Dept: LAB | Age: 85
End: 2020-01-07
Attending: FAMILY MEDICINE
Payer: MEDICARE

## 2020-01-07 DIAGNOSIS — N39.0 UTI (URINARY TRACT INFECTION): Primary | ICD-10-CM

## 2020-01-07 LAB
GLUCOSE UR STRIP.AUTO-MCNC: NEGATIVE MG/DL
KETONES UR STRIP.AUTO-MCNC: NEGATIVE MG/DL
NITRITE UR QL STRIP.AUTO: NEGATIVE
PH UR STRIP.AUTO: 6 [PH] (ref 4.5–8)
PROT UR STRIP.AUTO-MCNC: >=300 MG/DL
RBC #/AREA URNS AUTO: >10 /HPF
SP GR UR STRIP.AUTO: 1.02 (ref 1–1.03)
UROBILINOGEN UR STRIP.AUTO-MCNC: 0.2 MG/DL
WBC #/AREA URNS AUTO: >50 /HPF
WBC CLUMPS UR QL AUTO: PRESENT
YEAST URINE: PRESENT

## 2020-01-07 PROCEDURE — 87086 URINE CULTURE/COLONY COUNT: CPT

## 2020-01-07 PROCEDURE — 81001 URINALYSIS AUTO W/SCOPE: CPT

## 2020-01-08 ENCOUNTER — SNF VISIT (OUTPATIENT)
Dept: INTERNAL MEDICINE CLINIC | Age: 85
End: 2020-01-08

## 2020-01-08 ENCOUNTER — APPOINTMENT (OUTPATIENT)
Dept: CT IMAGING | Facility: HOSPITAL | Age: 85
DRG: 690 | End: 2020-01-08
Attending: EMERGENCY MEDICINE
Payer: MEDICARE

## 2020-01-08 ENCOUNTER — HOSPITAL ENCOUNTER (INPATIENT)
Facility: HOSPITAL | Age: 85
LOS: 2 days | Discharge: NURSING, REHAB OR LONG-TERM CARE | DRG: 690 | End: 2020-01-10
Attending: EMERGENCY MEDICINE | Admitting: INTERNAL MEDICINE
Payer: MEDICARE

## 2020-01-08 VITALS — DIASTOLIC BLOOD PRESSURE: 70 MMHG | HEART RATE: 69 BPM | SYSTOLIC BLOOD PRESSURE: 126 MMHG | OXYGEN SATURATION: 96 %

## 2020-01-08 DIAGNOSIS — I82.403 ACUTE DEEP VEIN THROMBOSIS (DVT) OF BOTH LOWER EXTREMITIES, UNSPECIFIED VEIN (HCC): ICD-10-CM

## 2020-01-08 DIAGNOSIS — N13.30 HYDRONEPHROSIS, UNSPECIFIED HYDRONEPHROSIS TYPE: ICD-10-CM

## 2020-01-08 DIAGNOSIS — N30.01 ACUTE CYSTITIS WITH HEMATURIA: Primary | ICD-10-CM

## 2020-01-08 DIAGNOSIS — N39.0 URINARY TRACT INFECTION WITHOUT HEMATURIA, SITE UNSPECIFIED: Primary | ICD-10-CM

## 2020-01-08 LAB
ALBUMIN SERPL-MCNC: 2.7 G/DL (ref 3.4–5)
ALBUMIN/GLOB SERPL: 0.6 {RATIO} (ref 1–2)
ALP LIVER SERPL-CCNC: 98 U/L (ref 55–142)
ALT SERPL-CCNC: 26 U/L (ref 13–56)
ANION GAP SERPL CALC-SCNC: 7 MMOL/L (ref 0–18)
APTT PPP: 34.3 SECONDS (ref 25.4–36.1)
AST SERPL-CCNC: 13 U/L (ref 15–37)
BASOPHILS # BLD AUTO: 0.03 X10(3) UL (ref 0–0.2)
BASOPHILS NFR BLD AUTO: 0.5 %
BILIRUB SERPL-MCNC: 0.2 MG/DL (ref 0.1–2)
BILIRUB UR QL STRIP.AUTO: NEGATIVE
BUN BLD-MCNC: 61 MG/DL (ref 7–18)
BUN/CREAT SERPL: 32.1 (ref 10–20)
CALCIUM BLD-MCNC: 9.6 MG/DL (ref 8.5–10.1)
CHLORIDE SERPL-SCNC: 115 MMOL/L (ref 98–112)
CO2 SERPL-SCNC: 17 MMOL/L (ref 21–32)
CREAT BLD-MCNC: 1.9 MG/DL (ref 0.55–1.02)
DEPRECATED RDW RBC AUTO: 49.8 FL (ref 35.1–46.3)
EOSINOPHIL # BLD AUTO: 0.3 X10(3) UL (ref 0–0.7)
EOSINOPHIL NFR BLD AUTO: 4.5 %
ERYTHROCYTE [DISTWIDTH] IN BLOOD BY AUTOMATED COUNT: 16.4 % (ref 11–15)
GLOBULIN PLAS-MCNC: 4.6 G/DL (ref 2.8–4.4)
GLUCOSE BLD-MCNC: 103 MG/DL (ref 70–99)
GLUCOSE BLD-MCNC: 185 MG/DL (ref 70–99)
GLUCOSE BLD-MCNC: 239 MG/DL (ref 70–99)
GLUCOSE UR STRIP.AUTO-MCNC: NEGATIVE MG/DL
HCT VFR BLD AUTO: 25.1 % (ref 35–48)
HGB BLD-MCNC: 7.8 G/DL (ref 12–16)
IMM GRANULOCYTES # BLD AUTO: 0.07 X10(3) UL (ref 0–1)
IMM GRANULOCYTES NFR BLD: 1.1 %
INR BLD: 1.27 (ref 0.9–1.1)
KETONES UR STRIP.AUTO-MCNC: NEGATIVE MG/DL
LYMPHOCYTES # BLD AUTO: 2.63 X10(3) UL (ref 1–4)
LYMPHOCYTES NFR BLD AUTO: 39.6 %
M PROTEIN MFR SERPL ELPH: 7.3 G/DL (ref 6.4–8.2)
MCH RBC QN AUTO: 25.7 PG (ref 26–34)
MCHC RBC AUTO-ENTMCNC: 31.1 G/DL (ref 31–37)
MCV RBC AUTO: 82.8 FL (ref 80–100)
MONOCYTES # BLD AUTO: 0.48 X10(3) UL (ref 0.1–1)
MONOCYTES NFR BLD AUTO: 7.2 %
NEUTROPHILS # BLD AUTO: 3.13 X10 (3) UL (ref 1.5–7.7)
NEUTROPHILS # BLD AUTO: 3.13 X10(3) UL (ref 1.5–7.7)
NEUTROPHILS NFR BLD AUTO: 47.1 %
NITRITE UR QL STRIP.AUTO: POSITIVE
OSMOLALITY SERPL CALC.SUM OF ELEC: 310 MOSM/KG (ref 275–295)
PH UR STRIP.AUTO: 6 [PH] (ref 4.5–8)
PLATELET # BLD AUTO: 362 10(3)UL (ref 150–450)
POTASSIUM SERPL-SCNC: 4.4 MMOL/L (ref 3.5–5.1)
PROT UR STRIP.AUTO-MCNC: 100 MG/DL
PSA SERPL DL<=0.01 NG/ML-MCNC: 16.5 SECONDS (ref 12.5–14.7)
RBC # BLD AUTO: 3.03 X10(6)UL (ref 3.8–5.3)
RBC #/AREA URNS AUTO: >10 /HPF
SODIUM SERPL-SCNC: 139 MMOL/L (ref 136–145)
SP GR UR STRIP.AUTO: 1.01 (ref 1–1.03)
UROBILINOGEN UR STRIP.AUTO-MCNC: <2 MG/DL
WBC # BLD AUTO: 6.6 X10(3) UL (ref 4–11)
WBC #/AREA URNS AUTO: >50 /HPF

## 2020-01-08 PROCEDURE — 82962 GLUCOSE BLOOD TEST: CPT

## 2020-01-08 PROCEDURE — 80053 COMPREHEN METABOLIC PANEL: CPT | Performed by: EMERGENCY MEDICINE

## 2020-01-08 PROCEDURE — 99285 EMERGENCY DEPT VISIT HI MDM: CPT

## 2020-01-08 PROCEDURE — 81001 URINALYSIS AUTO W/SCOPE: CPT | Performed by: EMERGENCY MEDICINE

## 2020-01-08 PROCEDURE — 74176 CT ABD & PELVIS W/O CONTRAST: CPT | Performed by: EMERGENCY MEDICINE

## 2020-01-08 PROCEDURE — 85025 COMPLETE CBC W/AUTO DIFF WBC: CPT | Performed by: EMERGENCY MEDICINE

## 2020-01-08 PROCEDURE — 85610 PROTHROMBIN TIME: CPT | Performed by: EMERGENCY MEDICINE

## 2020-01-08 PROCEDURE — 85730 THROMBOPLASTIN TIME PARTIAL: CPT | Performed by: EMERGENCY MEDICINE

## 2020-01-08 PROCEDURE — 99283 EMERGENCY DEPT VISIT LOW MDM: CPT

## 2020-01-08 PROCEDURE — 96365 THER/PROPH/DIAG IV INF INIT: CPT

## 2020-01-08 PROCEDURE — 99310 SBSQ NF CARE HIGH MDM 45: CPT | Performed by: NURSE PRACTITIONER

## 2020-01-08 RX ORDER — ACETAMINOPHEN 325 MG/1
650 TABLET ORAL EVERY 6 HOURS PRN
Status: DISCONTINUED | OUTPATIENT
Start: 2020-01-08 | End: 2020-01-10

## 2020-01-08 RX ORDER — MEGESTROL ACETATE 40 MG/ML
400 SUSPENSION ORAL DAILY
Status: DISCONTINUED | OUTPATIENT
Start: 2020-01-08 | End: 2020-01-10

## 2020-01-08 RX ORDER — LEVOTHYROXINE SODIUM 0.12 MG/1
125 TABLET ORAL
Status: DISCONTINUED | OUTPATIENT
Start: 2020-01-08 | End: 2020-01-10

## 2020-01-08 RX ORDER — PANTOPRAZOLE SODIUM 40 MG/1
40 TABLET, DELAYED RELEASE ORAL
Status: DISCONTINUED | OUTPATIENT
Start: 2020-01-09 | End: 2020-01-10

## 2020-01-08 RX ORDER — NITROGLYCERIN 0.4 MG/1
0.4 TABLET SUBLINGUAL EVERY 5 MIN PRN
Status: ON HOLD | COMMUNITY
End: 2020-05-11

## 2020-01-08 RX ORDER — SIMETHICONE 80 MG
80 TABLET,CHEWABLE ORAL 4 TIMES DAILY PRN
Status: DISCONTINUED | OUTPATIENT
Start: 2020-01-08 | End: 2020-01-10

## 2020-01-08 RX ORDER — SUCRALFATE 1 G/1
1 TABLET ORAL
Status: DISCONTINUED | OUTPATIENT
Start: 2020-01-08 | End: 2020-01-10

## 2020-01-08 RX ORDER — DEXTROSE MONOHYDRATE 25 G/50ML
50 INJECTION, SOLUTION INTRAVENOUS
Status: DISCONTINUED | OUTPATIENT
Start: 2020-01-08 | End: 2020-01-10

## 2020-01-08 RX ORDER — MELATONIN
325
Status: DISCONTINUED | OUTPATIENT
Start: 2020-01-09 | End: 2020-01-10

## 2020-01-08 RX ORDER — ONDANSETRON 2 MG/ML
4 INJECTION INTRAMUSCULAR; INTRAVENOUS EVERY 6 HOURS PRN
Status: DISCONTINUED | OUTPATIENT
Start: 2020-01-08 | End: 2020-01-10

## 2020-01-08 RX ORDER — METOCLOPRAMIDE HYDROCHLORIDE 5 MG/ML
10 INJECTION INTRAMUSCULAR; INTRAVENOUS EVERY 8 HOURS PRN
Status: DISCONTINUED | OUTPATIENT
Start: 2020-01-08 | End: 2020-01-09

## 2020-01-08 RX ORDER — MAGNESIUM OXIDE 400 MG (241.3 MG MAGNESIUM) TABLET
3 TABLET NIGHTLY
Status: DISCONTINUED | OUTPATIENT
Start: 2020-01-08 | End: 2020-01-10

## 2020-01-08 RX ORDER — GABAPENTIN 300 MG/1
600 CAPSULE ORAL 3 TIMES DAILY
Status: DISCONTINUED | OUTPATIENT
Start: 2020-01-08 | End: 2020-01-10

## 2020-01-08 RX ORDER — MIRTAZAPINE 15 MG/1
15 TABLET, FILM COATED ORAL NIGHTLY
Status: DISCONTINUED | OUTPATIENT
Start: 2020-01-08 | End: 2020-01-10

## 2020-01-08 RX ORDER — BUMETANIDE 1 MG/1
0.5 TABLET ORAL DAILY
Status: DISCONTINUED | OUTPATIENT
Start: 2020-01-08 | End: 2020-01-10

## 2020-01-08 NOTE — CONSULTS
[facility]    Report of Consultation    Tuan CORTEZ Yevgeniy Patient Status:  Emergency    1935 MRN [id number]   Location [address] [facility] Attending Will, Tony Crockett MD   Hosp Day # 0 PCP Roma Rosas MD     Date of Admission:   • COLECTOMY N/A 11/12/2019    Performed by Ira Colin MD at [facility] MAIN OR   • COLON SURGERY     • LEFT PHACOEMULSIFICATION OF CATARACT WITH INTRAOCULAR LENS IMPLANT [id number] Left 5/17/2017    Performed by Eddi Artis MD at [address] [place] [address]   • NS Allergies    Review of Systems:     Unable to obtain a complete ROS secondary to language barrier, but through the son he reports progressive malaise, gross hematuria for a week, abdominal pain for a week, no N, V, F, SOB, CP.     Physical Exam:   Blood pre again noted in place. The prior noted associated peripelvic fat stranding of the right kidney is not currently identified. Of note, the distal ureters are  suboptimally visualized. 2. The prior noted mild subhepatic ascites is not currently appreciated. SLAVA Rivera  1/8/2020

## 2020-01-08 NOTE — ED PROVIDER NOTES
Patient Seen in: [facility] Emergency Department      History   Patient presents with:  Urinary Symptoms    Stated Complaint: blood in urine fluid by kidney per nursing home     HPI    The patient is an 81-year-old female with multiple past medical Never Used    Alcohol use: No    Drug use: No             Review of Systems    Positive for stated complaint: blood in urine fluid by kidney per nursing home   Other systems are as noted in HPI. Constitutional and vital signs reviewed.       All other syst Labs Reviewed   COMP METABOLIC PANEL (14) - Abnormal; Notable for the following components:       Result Value    Glucose 185 (*)     Chloride 115 (*)     CO2 17.0 (*)     BUN 61 (*)     Creatinine 1.90 (*)     BUN/CREA Ratio 32.1 (*)     Calculated Os Final Result    PROCEDURE:  CT ABDOMEN+PELVIS (LIJ=87417)         COMPARISON:  [facility] , CT, CT ABDOMEN+PELVIS KIDNEYSTONE 2D RNDR(NO IV,NO     ORAL)(CPT=74176), 12/10/2019, 11:44.          INDICATIONS:  Patient presents with history of hematuria followed by midline staples and left lower quadrant percutaneous surgical drain     removed. BONES:  No acute osseous abnormalities. Moderate to extensive bilateral     SI joint arthropathy.   Mild to moderate multilevel disc changes and facet     degenerative chauhan required at this point. Patient was admitted in stable condition.       Admission disposition: 1/8/2020  3:48 PM                   Disposition and Plan     Clinical Impression:  Urinary tract infection without hematuria, site unspecified  (primary encounte

## 2020-01-08 NOTE — H&P
[facility] Hospitalist Team  History and Physical       Assessment/Plan:     #Hematuria, BL hydronephrosis  -urology consulted  -she was due for stent removal next week  -UA dirty, urine culture pending  -Started on iv ceftriaxone  -CT showing BL hydro    #CKD- i [address]   •      • OTHER      SHOULDER, KNEES   • OTHER SURGICAL HISTORY      KNEE SURGERY   • OTHER SURGICAL HISTORY      GALLBLADDER   • OTHER SURGICAL HISTORY      STOMACH SURGERY   • OTHER SURGICAL HISTORY      RECONSTUCTION RT UPPER A Oral Tab, Take 2 tablets (10 mg total) by mouth 2 (two) times daily for 2 days, THEN 1 tablet (5 mg total) 2 (two) times daily. bumetanide 1 MG Oral Tab, Take 1 tablet (1 mg total) by mouth daily.   ferrous sulfate 325 (65 FE) MG Oral Tab EC, Take 325 mg b trachea midline   Lungs:   Clear to auscultation bilaterally.  Normal effort   Chest wall:  No tenderness or deformity   Heart:  Regular rate and rhythm, S1, S2 normal, no murmur, rub or gallop appreciated   Abdomen:   Soft, ostomy +, BS+, pt moans intermit INDICATIONS:  rule out DVT  TECHNIQUE:  Real time, grey scale, and duplex ultrasound was used to evaluate the lower extremity venous system. B-mode two-dimensional images of the vascular structures, Doppler spectral analysis, and color flow.   Doppler imagi PATIENT STATED HISTORY: (As transcribed by Technologist)  Unable to state history. FINDINGS:  KIDNEYS:  Left ureteral stent again noted with suggestion of mild to moderate hydronephrosis.   There is also again evidence of moderate right-sided hydronephro CONCLUSION:  1. There is again suggestion of bilateral hydronephrosis with the prior noted left ureteral stent again noted in place. The prior noted associated peripelvic fat stranding of the right kidney is not currently identified.   Of note, the dis INDICATIONS:  right hydronephrosis, uti  TECHNIQUE:  Transabdominal gray scale ultrasound imaging of the bilateral kidneys and bladder was performed. Routine technique was utilized.    PATIENT STATED HISTORY: (As transcribed by Technologist)     FINDINGS: stent is noted. 2. 13 mm cyst seen at the inferior left kidney.     Dictated by: Rosalino Aguero MD on 12/09/2019 at 23:15     Approved by: Rosalino Aguero MD on 12/09/2019 at 23:17          Ct Abdomen+pelvis Kidneystone 2d Rndr(no Iv,no Oral)(cpt=74176 Superficial midline staples and left lower quadrant percutaneous surgical drain removed. BONES:  No bony lesion or fracture. Mild degenerative change. PELVIC ORGANS:  Niño catheter removed.   There is air fluid level within the bladder with distal portion

## 2020-01-08 NOTE — PROGRESS NOTES
Tuan CORTEZ Yevgeniy, 68/1935, 81year old, female    Chief Complaint:  Patient presents with:   Follow - Up: KARSTEN/E Coli ESBL UTI/Acute b/l DVTs       Subjective:   PMH significant for Depression, HTN, HL, Hx of PE, Hypothyroidism, T2 DM w/ PN, Diverticulitis/ch hospitalization/diagnoses/sequelae; will undergo therapies to rehab and improve strength, endurance and independence w/ ADLs.      EXTREMITIES/VASCULAR:no cyanosis, clubbing or edema, radial pulses 2+ and dorsalis pedal pulses 2+   B/L LEs w/ 1+ edema  NEUR Seen PresentAbnormal     Resulting Agency [facility]         Specimen Collected: 01/07/20  5:40 AM Last Resulted: 01/07/20  9:23 AM           Assessment and plan:  Acute renal insufficiency 2/2 mild right hydronephrosis  1.  Now w/ b/l hydronephrosis R>L; s HS     HTN/HL/Hx of PE  1. VS q shift  2. Daily weight  3. Metoprolol succinate 25 mg daily--On hold until B/P improves  4. Amlodipine 10 mg daily--On hold until B/P improves  5. Change Bumex 0.5 mg to QOD  6. Eliquis as above     Hypothyroidism  1.  LT4 12

## 2020-01-08 NOTE — PROGRESS NOTES
MARIA VICTORIA Ontiveros patient post left stented [facility] with CRI, bilateral hydronephrosis, stent in good position, admitted for hematuria and contaminated, perhaps infected urine, malaise. Recent obstructing colonic malignancy/resection.    I personally reviewed the marcella urine   Component  Ref Range & Units 1/3/20  7:48 AM   Urine Color  Yellow Yellow    Clarity Urine  Clear CloudyAbnormal     Spec Gravity  1.001 - 1.030 1.014    Glucose Urine  Negative mg/dl Negative    Bilirubin Urine  Negative Negative    Ketones Urine perinephric fluid or other findings. .  2. No additional findings. Dictated by: Ruperto Ortiz DO on 12/21/2019 at 19:19       Approved by:  Ruperto Ortiz DO on 12/21/2019 at 19:21                     I am certainly available for any urologic surgic

## 2020-01-08 NOTE — PLAN OF CARE
PT is Indonesian speaking only. No family at bedside. Awaiting nursing home papers. Unable to complete admission at this time. More confused with phone . Pt resting on cart comfortably. On ra and .  Pt tearful and yelling out on occasion was ab

## 2020-01-08 NOTE — ED NOTES
With use of the Language line plan of care discussed.  Patient is c/o being hungry and thirsty instructed that waiting for test results

## 2020-01-08 NOTE — ED NOTES
Attempted to use  phone line w/pt. Alert, Ox2. Pt understands she is in the hospital doesn't know why, per . Pt has colostomy to Rt side abdomen.

## 2020-01-09 LAB
ANION GAP SERPL CALC-SCNC: 8 MMOL/L (ref 0–18)
BASOPHILS # BLD AUTO: 0.04 X10(3) UL (ref 0–0.2)
BASOPHILS NFR BLD AUTO: 0.6 %
BUN BLD-MCNC: 53 MG/DL (ref 7–18)
BUN/CREAT SERPL: 28.5 (ref 10–20)
CALCIUM BLD-MCNC: 9.6 MG/DL (ref 8.5–10.1)
CHLORIDE SERPL-SCNC: 116 MMOL/L (ref 98–112)
CO2 SERPL-SCNC: 15 MMOL/L (ref 21–32)
CREAT BLD-MCNC: 1.86 MG/DL (ref 0.55–1.02)
DEPRECATED RDW RBC AUTO: 49.7 FL (ref 35.1–46.3)
EOSINOPHIL # BLD AUTO: 0.3 X10(3) UL (ref 0–0.7)
EOSINOPHIL NFR BLD AUTO: 4.7 %
ERYTHROCYTE [DISTWIDTH] IN BLOOD BY AUTOMATED COUNT: 16.3 % (ref 11–15)
GLUCOSE BLD-MCNC: 114 MG/DL (ref 70–99)
GLUCOSE BLD-MCNC: 118 MG/DL (ref 70–99)
GLUCOSE BLD-MCNC: 197 MG/DL (ref 70–99)
GLUCOSE BLD-MCNC: 235 MG/DL (ref 70–99)
GLUCOSE BLD-MCNC: 277 MG/DL (ref 70–99)
HCT VFR BLD AUTO: 24.5 % (ref 35–48)
HGB BLD-MCNC: 7.6 G/DL (ref 12–16)
IMM GRANULOCYTES # BLD AUTO: 0.08 X10(3) UL (ref 0–1)
IMM GRANULOCYTES NFR BLD: 1.3 %
LYMPHOCYTES # BLD AUTO: 2.69 X10(3) UL (ref 1–4)
LYMPHOCYTES NFR BLD AUTO: 42.4 %
MCH RBC QN AUTO: 25.8 PG (ref 26–34)
MCHC RBC AUTO-ENTMCNC: 31 G/DL (ref 31–37)
MCV RBC AUTO: 83.1 FL (ref 80–100)
MONOCYTES # BLD AUTO: 0.48 X10(3) UL (ref 0.1–1)
MONOCYTES NFR BLD AUTO: 7.6 %
NEUTROPHILS # BLD AUTO: 2.75 X10 (3) UL (ref 1.5–7.7)
NEUTROPHILS # BLD AUTO: 2.75 X10(3) UL (ref 1.5–7.7)
NEUTROPHILS NFR BLD AUTO: 43.4 %
OSMOLALITY SERPL CALC.SUM OF ELEC: 303 MOSM/KG (ref 275–295)
PLATELET # BLD AUTO: 351 10(3)UL (ref 150–450)
POTASSIUM SERPL-SCNC: 4.7 MMOL/L (ref 3.5–5.1)
RBC # BLD AUTO: 2.95 X10(6)UL (ref 3.8–5.3)
SODIUM SERPL-SCNC: 139 MMOL/L (ref 136–145)
WBC # BLD AUTO: 6.3 X10(3) UL (ref 4–11)

## 2020-01-09 PROCEDURE — 82962 GLUCOSE BLOOD TEST: CPT

## 2020-01-09 PROCEDURE — 87040 BLOOD CULTURE FOR BACTERIA: CPT | Performed by: INTERNAL MEDICINE

## 2020-01-09 PROCEDURE — 80048 BASIC METABOLIC PNL TOTAL CA: CPT | Performed by: INTERNAL MEDICINE

## 2020-01-09 PROCEDURE — 85025 COMPLETE CBC W/AUTO DIFF WBC: CPT | Performed by: INTERNAL MEDICINE

## 2020-01-09 RX ORDER — METOCLOPRAMIDE HYDROCHLORIDE 5 MG/ML
5 INJECTION INTRAMUSCULAR; INTRAVENOUS EVERY 8 HOURS PRN
Status: DISCONTINUED | OUTPATIENT
Start: 2020-01-09 | End: 2020-01-10

## 2020-01-09 NOTE — CONSULTS
[facility]  Inpatient Wound Care Contact Note  Spoke with patient's nurse and informed her that they should continue using the barrier powder like the night nurse did and to add in a barrier ring if he is leaking. She states she understands.     Gena Morris

## 2020-01-09 NOTE — PROGRESS NOTES
Multidisciplinary Discharge Rounds held 1/9/2020. Treatment team members present today include , , Charge Nurse, Nurse, RT, PT and Pharmacy caring for [employer].      Other care providers present:    Mobility Goal:    Readmi

## 2020-01-09 NOTE — PROGRESS NOTES
Colostomy bag changed on 1/9 at 6:30am. Skin around colostomy is red , cleaned and applied skin protectant powder

## 2020-01-09 NOTE — CM/SW NOTE
Patient admitted from [facility]. SW spoke with son and Lakisha Arroyo, he would like to try and transfer the patient to another facility in [facility] duo to the patient having a concern that at times it takes a long time for staff to address he

## 2020-01-09 NOTE — PROGRESS NOTES
[facility]    Progress Note    Tuan Vuong Patient Status:  Inpatient    1935 MRN [id number]   [facility] 5NW-A Attending Alin Escoto, [address] Day # 1 PCP Cristel Contreras MD         Subjective:   Tuan Coon is a(n) 02 yea 1.27 (H) 01/08/2020    T4F 1.1 12/10/2019    TSH 5.880 (H) 12/10/2019     11/11/2019    MG 2.1 01/02/2020    PHOS 3.3 12/24/2019    TROP <0.045 01/02/2020     09/25/2015    B12 685 11/20/2019       Ct Abdomen+pelvis(cpt=74176)    Result Date:

## 2020-01-09 NOTE — PLAN OF CARE
Problem: Patient/Family Goals  Goal: Patient/Family Long Term Goal  Description  Patient's Long Term Goal:   Discharge to [facility]    Interventions:  - follow plan of care  - See additional Care Plan goals for specific interventions   Outcome: Progress

## 2020-01-09 NOTE — PROGRESS NOTES
[facility] Hospitalist Team  Progress Note      Tuan Vuong  8/8/1935    Assessment/Plan:       #Hematuria, BL hydronephrosis  -urology consulted  -she was due for stent removal next week  -UA dirty, urine culture pending  -Started on iv ceftriaxone  -CT showi 103* 239* 118* 235*       Meds:   Scheduled:   • Insulin Aspart Pen  2-10 Units Subcutaneous TID CC and HS   • cefTRIAXone  1 g Intravenous Q24H   • bumetanide  0.5 mg Oral Daily   • ferrous sulfate  325 mg Oral Daily with breakfast   • gabapentin  600 mg

## 2020-01-09 NOTE — CONSULTS
formerly [facility] Pharmacy Note:  Renal Dose Adjustment for Metoclopramide (REGLAN)    Tuan Vuong has been prescribed Metoclopramide (REGLAN) 10 mg every 8 hours as needed for n/v.    Estimated Creatinine Clearance: 16.5 mL/min (A) (based on SCr of 1.86 mg/dL (H)).

## 2020-01-09 NOTE — PROGRESS NOTES
01/09/20 4617   Provider Notification   Reason for Communication Review case   Provider Name Other (comment)  Levi Simmons)   Method of Communication Page   Response Waiting for response   Notification Time [id number]

## 2020-01-10 VITALS
HEART RATE: 70 BPM | OXYGEN SATURATION: 98 % | BODY MASS INDEX: 31 KG/M2 | SYSTOLIC BLOOD PRESSURE: 123 MMHG | WEIGHT: 161.13 LBS | RESPIRATION RATE: 18 BRPM | DIASTOLIC BLOOD PRESSURE: 76 MMHG | TEMPERATURE: 98 F

## 2020-01-10 LAB
BASOPHILS # BLD AUTO: 0.05 X10(3) UL (ref 0–0.2)
BASOPHILS NFR BLD AUTO: 0.6 %
DEPRECATED RDW RBC AUTO: 50.1 FL (ref 35.1–46.3)
EOSINOPHIL # BLD AUTO: 0.33 X10(3) UL (ref 0–0.7)
EOSINOPHIL NFR BLD AUTO: 4 %
ERYTHROCYTE [DISTWIDTH] IN BLOOD BY AUTOMATED COUNT: 16.7 % (ref 11–15)
GLUCOSE BLD-MCNC: 131 MG/DL (ref 70–99)
GLUCOSE BLD-MCNC: 141 MG/DL (ref 70–99)
HCT VFR BLD AUTO: 27.9 % (ref 35–48)
HGB BLD-MCNC: 8.4 G/DL (ref 12–16)
IMM GRANULOCYTES # BLD AUTO: 0.08 X10(3) UL (ref 0–1)
IMM GRANULOCYTES NFR BLD: 1 %
LYMPHOCYTES # BLD AUTO: 3.24 X10(3) UL (ref 1–4)
LYMPHOCYTES NFR BLD AUTO: 39.3 %
MCH RBC QN AUTO: 25 PG (ref 26–34)
MCHC RBC AUTO-ENTMCNC: 30.1 G/DL (ref 31–37)
MCV RBC AUTO: 83 FL (ref 80–100)
MONOCYTES # BLD AUTO: 0.66 X10(3) UL (ref 0.1–1)
MONOCYTES NFR BLD AUTO: 8 %
NEUTROPHILS # BLD AUTO: 3.88 X10 (3) UL (ref 1.5–7.7)
NEUTROPHILS # BLD AUTO: 3.88 X10(3) UL (ref 1.5–7.7)
NEUTROPHILS NFR BLD AUTO: 47.1 %
PLATELET # BLD AUTO: 353 10(3)UL (ref 150–450)
RBC # BLD AUTO: 3.36 X10(6)UL (ref 3.8–5.3)
WBC # BLD AUTO: 8.2 X10(3) UL (ref 4–11)

## 2020-01-10 PROCEDURE — 94640 AIRWAY INHALATION TREATMENT: CPT

## 2020-01-10 PROCEDURE — 82962 GLUCOSE BLOOD TEST: CPT

## 2020-01-10 PROCEDURE — 85025 COMPLETE CBC W/AUTO DIFF WBC: CPT | Performed by: INTERNAL MEDICINE

## 2020-01-10 RX ORDER — SERTRALINE HYDROCHLORIDE 100 MG/1
150 TABLET, FILM COATED ORAL DAILY
Status: SHIPPED | COMMUNITY
Start: 2020-01-10 | End: 2020-04-04

## 2020-01-10 RX ORDER — BUMETANIDE 0.5 MG/1
0.5 TABLET ORAL DAILY
Status: ON HOLD | COMMUNITY
Start: 2020-01-10 | End: 2020-01-22

## 2020-01-10 NOTE — PLAN OF CARE
Problem: UTI, hematuria  Data: Pt is AOx2-3, primarily Czech speaking. on room air. No tele ordered. VSS, afebrile. Eliquis ordered to be resumed tonight. Ileostomy to LLQ, bag changed this shift. Skin under ostomy is reddened, MD and ostomy nurse aware.  B injury  Description  INTERVENTIONS:  - Assess pt frequently for physical needs  - Identify cognitive and physical deficits and behaviors that affect risk of falls.   - [place] fall precautions as indicated by assessment.  - Educate pt/family on patient sa

## 2020-01-10 NOTE — DIETARY NOTE
[facility]   CLINICAL NUTRITION    Tuan Vuong     Admitting diagnosis:  Hydronephrosis, unspecified hydronephrosis type [N13.30]  Urinary tract infection without hematuria, site unspecified [N39.0]    Ht:  152.4 cm (5')  Wt: 73.1 kg (161 lb 1.6 oz

## 2020-01-10 NOTE — PLAN OF CARE
Problem: Patient/Family Goals  Goal: Patient/Family Long Term Goal  Description  Patient's Long Term Goal:   Discharge to [facility]    Interventions:  - follow plan of care  - See additional Care Plan goals for specific interventions   Outcome: Progress DISCHARGE PLANNING  Goal: Discharge to home or other facility with appropriate resources  Description  INTERVENTIONS:  - Identify barriers to discharge w/pt and caregiver  - Include patient/family/discharge partner in discharge planning  - Arrange for need

## 2020-01-10 NOTE — PROGRESS NOTES
Multidisciplinary Discharge Rounds held 1/10/2020. Treatment team members present today include , , Charge Nurse, Nurse, RT, PT and Pharmacy caring for [employer].      Other care providers present:    Mobility Goal:    Readm

## 2020-01-10 NOTE — DISCHARGE SUMMARY
General Medicine Discharge Summary     Patient ID:  Елена Márquez  81year old  8/8/1935    Admit date: 1/8/2020    Discharge date and time: 1/10/20    Attending Physician: Amrita Whipple DO CHANGED    nitroGLYCERIN 0.4 MG Sublingual SL Tab  Place 0.4 mg under the tongue every 5 (five) minutes as needed for Chest pain.    lidocaine 4 % External Patch  Place 1 patch onto the skin daily.  Apply to low back    insulin glargine (LANTUS SOLOSTAR) 10 meals.    sucralfate 1 g Oral Tab  Take 1 tablet (1 g total) by mouth 4 (four) times daily before meals and nightly.     GABAPENTIN 300 MG Oral Cap  TAKE 2 CAPSULES(600 MG) BY MOUTH THREE TIMES DAILY    Acetaminophen  MG Oral Tab CR  Take 1 tablet (65

## 2020-01-10 NOTE — CM/SW NOTE
NILES followed up with  [facility], [address], [facility], [place] [facility] could accept under [place] but at at this time they do not have a private room available.     NILES fo

## 2020-01-11 ENCOUNTER — NURSE ONLY (OUTPATIENT)
Dept: LAB | Age: 85
End: 2020-01-11
Attending: FAMILY MEDICINE
Payer: MEDICARE

## 2020-01-11 DIAGNOSIS — N17.9 ACUTE KIDNEY FAILURE, UNSPECIFIED (HCC): Primary | ICD-10-CM

## 2020-01-11 LAB
ALBUMIN SERPL-MCNC: 2.6 G/DL (ref 3.4–5)
ALBUMIN/GLOB SERPL: 0.5 {RATIO} (ref 1–2)
ALP LIVER SERPL-CCNC: 90 U/L (ref 55–142)
ALT SERPL-CCNC: 18 U/L (ref 13–56)
ANION GAP SERPL CALC-SCNC: 10 MMOL/L (ref 0–18)
AST SERPL-CCNC: 10 U/L (ref 15–37)
BASOPHILS # BLD AUTO: 0.04 X10(3) UL (ref 0–0.2)
BASOPHILS NFR BLD AUTO: 0.5 %
BILIRUB SERPL-MCNC: 0.3 MG/DL (ref 0.1–2)
BUN BLD-MCNC: 58 MG/DL (ref 7–18)
BUN/CREAT SERPL: 24.1 (ref 10–20)
CALCIUM BLD-MCNC: 9.2 MG/DL (ref 8.5–10.1)
CHLORIDE SERPL-SCNC: 114 MMOL/L (ref 98–112)
CO2 SERPL-SCNC: 13 MMOL/L (ref 21–32)
CREAT BLD-MCNC: 2.41 MG/DL (ref 0.55–1.02)
DEPRECATED RDW RBC AUTO: 50.2 FL (ref 35.1–46.3)
EOSINOPHIL # BLD AUTO: 0.36 X10(3) UL (ref 0–0.7)
EOSINOPHIL NFR BLD AUTO: 4.8 %
ERYTHROCYTE [DISTWIDTH] IN BLOOD BY AUTOMATED COUNT: 16.7 % (ref 11–15)
GLOBULIN PLAS-MCNC: 5.1 G/DL (ref 2.8–4.4)
GLUCOSE BLD-MCNC: 122 MG/DL (ref 70–99)
HCT VFR BLD AUTO: 25.1 % (ref 35–48)
HGB BLD-MCNC: 7.6 G/DL (ref 12–16)
IMM GRANULOCYTES # BLD AUTO: 0.05 X10(3) UL (ref 0–1)
IMM GRANULOCYTES NFR BLD: 0.7 %
LYMPHOCYTES # BLD AUTO: 2.61 X10(3) UL (ref 1–4)
LYMPHOCYTES NFR BLD AUTO: 34.9 %
M PROTEIN MFR SERPL ELPH: 7.7 G/DL (ref 6.4–8.2)
MCH RBC QN AUTO: 25 PG (ref 26–34)
MCHC RBC AUTO-ENTMCNC: 30.3 G/DL (ref 31–37)
MCV RBC AUTO: 82.6 FL (ref 80–100)
MONOCYTES # BLD AUTO: 0.56 X10(3) UL (ref 0.1–1)
MONOCYTES NFR BLD AUTO: 7.5 %
NEUTROPHILS # BLD AUTO: 3.86 X10 (3) UL (ref 1.5–7.7)
NEUTROPHILS # BLD AUTO: 3.86 X10(3) UL (ref 1.5–7.7)
NEUTROPHILS NFR BLD AUTO: 51.6 %
OSMOLALITY SERPL CALC.SUM OF ELEC: 301 MOSM/KG (ref 275–295)
PATIENT FASTING Y/N/NP: YES
PLATELET # BLD AUTO: 310 10(3)UL (ref 150–450)
POTASSIUM SERPL-SCNC: 4.6 MMOL/L (ref 3.5–5.1)
RBC # BLD AUTO: 3.04 X10(6)UL (ref 3.8–5.3)
SODIUM SERPL-SCNC: 137 MMOL/L (ref 136–145)
WBC # BLD AUTO: 7.5 X10(3) UL (ref 4–11)

## 2020-01-11 PROCEDURE — 36415 COLL VENOUS BLD VENIPUNCTURE: CPT

## 2020-01-11 PROCEDURE — 85025 COMPLETE CBC W/AUTO DIFF WBC: CPT

## 2020-01-11 PROCEDURE — 80053 COMPREHEN METABOLIC PANEL: CPT

## 2020-01-13 ENCOUNTER — NURSE ONLY (OUTPATIENT)
Dept: LAB | Age: 85
End: 2020-01-13
Attending: FAMILY MEDICINE
Payer: MEDICARE

## 2020-01-13 ENCOUNTER — HOSPITAL ENCOUNTER (INPATIENT)
Facility: HOSPITAL | Age: 85
LOS: 9 days | Discharge: NURSING, REHAB OR LONG-TERM CARE | DRG: 682 | End: 2020-01-22
Attending: EMERGENCY MEDICINE | Admitting: INTERNAL MEDICINE
Payer: MEDICARE

## 2020-01-13 ENCOUNTER — APPOINTMENT (OUTPATIENT)
Dept: GENERAL RADIOLOGY | Facility: HOSPITAL | Age: 85
DRG: 682 | End: 2020-01-13
Attending: EMERGENCY MEDICINE
Payer: MEDICARE

## 2020-01-13 DIAGNOSIS — R69 DIAGNOSIS UNKNOWN: ICD-10-CM

## 2020-01-13 DIAGNOSIS — E87.5 HYPERKALEMIA: ICD-10-CM

## 2020-01-13 DIAGNOSIS — R77.8 ELEVATED TROPONIN: ICD-10-CM

## 2020-01-13 DIAGNOSIS — N17.9 ACUTE RENAL FAILURE, UNSPECIFIED ACUTE RENAL FAILURE TYPE (HCC): Primary | ICD-10-CM

## 2020-01-13 PROBLEM — R79.89 ELEVATED TROPONIN: Status: ACTIVE | Noted: 2020-01-13

## 2020-01-13 LAB
ALBUMIN SERPL-MCNC: 2.7 G/DL (ref 3.4–5)
ALBUMIN SERPL-MCNC: 2.8 G/DL (ref 3.4–5)
ALBUMIN/GLOB SERPL: 0.5 {RATIO} (ref 1–2)
ALBUMIN/GLOB SERPL: 0.5 {RATIO} (ref 1–2)
ALP LIVER SERPL-CCNC: 110 U/L (ref 55–142)
ALP LIVER SERPL-CCNC: 123 U/L (ref 55–142)
ALT SERPL-CCNC: 16 U/L (ref 13–56)
ALT SERPL-CCNC: 20 U/L (ref 13–56)
ANION GAP SERPL CALC-SCNC: 12 MMOL/L (ref 0–18)
ANION GAP SERPL CALC-SCNC: 13 MMOL/L (ref 0–18)
APTT PPP: 50.9 SECONDS (ref 25.4–36.1)
AST SERPL-CCNC: 16 U/L (ref 15–37)
AST SERPL-CCNC: 17 U/L (ref 15–37)
ATRIAL RATE: 75 BPM
BASOPHILS # BLD AUTO: 0.03 X10(3) UL (ref 0–0.2)
BASOPHILS # BLD AUTO: 0.03 X10(3) UL (ref 0–0.2)
BASOPHILS NFR BLD AUTO: 0.3 %
BASOPHILS NFR BLD AUTO: 0.4 %
BILIRUB SERPL-MCNC: 0.2 MG/DL (ref 0.1–2)
BILIRUB SERPL-MCNC: 0.3 MG/DL (ref 0.1–2)
BILIRUB UR QL STRIP.AUTO: NEGATIVE
BUN BLD-MCNC: 82 MG/DL (ref 7–18)
BUN BLD-MCNC: 92 MG/DL (ref 7–18)
BUN/CREAT SERPL: 17.1 (ref 10–20)
BUN/CREAT SERPL: 18.9 (ref 10–20)
CALCIUM BLD-MCNC: 10.1 MG/DL (ref 8.5–10.1)
CALCIUM BLD-MCNC: 10.1 MG/DL (ref 8.5–10.1)
CHLORIDE SERPL-SCNC: 106 MMOL/L (ref 98–112)
CHLORIDE SERPL-SCNC: 109 MMOL/L (ref 98–112)
CO2 SERPL-SCNC: 10 MMOL/L (ref 21–32)
CO2 SERPL-SCNC: 12 MMOL/L (ref 21–32)
COLOR UR AUTO: YELLOW
CREAT BLD-MCNC: 4.79 MG/DL (ref 0.55–1.02)
CREAT BLD-MCNC: 4.88 MG/DL (ref 0.55–1.02)
DEPRECATED RDW RBC AUTO: 49.3 FL (ref 35.1–46.3)
DEPRECATED RDW RBC AUTO: 50.9 FL (ref 35.1–46.3)
EOSINOPHIL # BLD AUTO: 0.17 X10(3) UL (ref 0–0.7)
EOSINOPHIL # BLD AUTO: 0.23 X10(3) UL (ref 0–0.7)
EOSINOPHIL NFR BLD AUTO: 1.5 %
EOSINOPHIL NFR BLD AUTO: 2.7 %
ERYTHROCYTE [DISTWIDTH] IN BLOOD BY AUTOMATED COUNT: 16.4 % (ref 11–15)
ERYTHROCYTE [DISTWIDTH] IN BLOOD BY AUTOMATED COUNT: 16.5 % (ref 11–15)
GLOBULIN PLAS-MCNC: 5.7 G/DL (ref 2.8–4.4)
GLOBULIN PLAS-MCNC: 5.9 G/DL (ref 2.8–4.4)
GLUCOSE BLD-MCNC: 166 MG/DL (ref 70–99)
GLUCOSE BLD-MCNC: 169 MG/DL (ref 70–99)
GLUCOSE BLD-MCNC: 178 MG/DL (ref 70–99)
GLUCOSE UR STRIP.AUTO-MCNC: NEGATIVE MG/DL
HCT VFR BLD AUTO: 26 % (ref 35–48)
HCT VFR BLD AUTO: 26.6 % (ref 35–48)
HGB BLD-MCNC: 7.8 G/DL (ref 12–16)
HGB BLD-MCNC: 8 G/DL (ref 12–16)
IMM GRANULOCYTES # BLD AUTO: 0.05 X10(3) UL (ref 0–1)
IMM GRANULOCYTES # BLD AUTO: 0.07 X10(3) UL (ref 0–1)
IMM GRANULOCYTES NFR BLD: 0.6 %
IMM GRANULOCYTES NFR BLD: 0.6 %
KETONES UR STRIP.AUTO-MCNC: NEGATIVE MG/DL
LACTATE SERPL-SCNC: 1.1 MMOL/L (ref 0.4–2)
LYMPHOCYTES # BLD AUTO: 2.31 X10(3) UL (ref 1–4)
LYMPHOCYTES # BLD AUTO: 2.98 X10(3) UL (ref 1–4)
LYMPHOCYTES NFR BLD AUTO: 26.8 %
LYMPHOCYTES NFR BLD AUTO: 27.1 %
M PROTEIN MFR SERPL ELPH: 8.4 G/DL (ref 6.4–8.2)
M PROTEIN MFR SERPL ELPH: 8.7 G/DL (ref 6.4–8.2)
MCH RBC QN AUTO: 24.5 PG (ref 26–34)
MCH RBC QN AUTO: 25.2 PG (ref 26–34)
MCHC RBC AUTO-ENTMCNC: 30 G/DL (ref 31–37)
MCHC RBC AUTO-ENTMCNC: 30.1 G/DL (ref 31–37)
MCV RBC AUTO: 81.8 FL (ref 80–100)
MCV RBC AUTO: 83.9 FL (ref 80–100)
MONOCYTES # BLD AUTO: 0.55 X10(3) UL (ref 0.1–1)
MONOCYTES # BLD AUTO: 0.71 X10(3) UL (ref 0.1–1)
MONOCYTES NFR BLD AUTO: 6.4 %
MONOCYTES NFR BLD AUTO: 6.5 %
NEUTROPHILS # BLD AUTO: 5.35 X10 (3) UL (ref 1.5–7.7)
NEUTROPHILS # BLD AUTO: 5.35 X10(3) UL (ref 1.5–7.7)
NEUTROPHILS # BLD AUTO: 7.18 X10 (3) UL (ref 1.5–7.7)
NEUTROPHILS # BLD AUTO: 7.18 X10(3) UL (ref 1.5–7.7)
NEUTROPHILS NFR BLD AUTO: 62.7 %
NEUTROPHILS NFR BLD AUTO: 64.4 %
NITRITE UR QL STRIP.AUTO: NEGATIVE
OSMOLALITY SERPL CALC.SUM OF ELEC: 303 MOSM/KG (ref 275–295)
OSMOLALITY SERPL CALC.SUM OF ELEC: 303 MOSM/KG (ref 275–295)
P AXIS: 40 DEGREES
P-R INTERVAL: 194 MS
PATIENT FASTING Y/N/NP: YES
PH UR STRIP.AUTO: 5 [PH] (ref 4.5–8)
PLATELET # BLD AUTO: 348 10(3)UL (ref 150–450)
PLATELET # BLD AUTO: 384 10(3)UL (ref 150–450)
POTASSIUM SERPL-SCNC: 5.5 MMOL/L (ref 3.5–5.1)
POTASSIUM SERPL-SCNC: 5.6 MMOL/L (ref 3.5–5.1)
PROT UR STRIP.AUTO-MCNC: 100 MG/DL
Q-T INTERVAL: 368 MS
QRS DURATION: 78 MS
QTC CALCULATION (BEZET): 410 MS
R AXIS: -21 DEGREES
RBC # BLD AUTO: 3.17 X10(6)UL (ref 3.8–5.3)
RBC # BLD AUTO: 3.18 X10(6)UL (ref 3.8–5.3)
RBC #/AREA URNS AUTO: >10 /HPF
SODIUM SERPL-SCNC: 130 MMOL/L (ref 136–145)
SODIUM SERPL-SCNC: 132 MMOL/L (ref 136–145)
SP GR UR STRIP.AUTO: 1.01 (ref 1–1.03)
T AXIS: 84 DEGREES
TROPONIN I SERPL-MCNC: 2.4 NG/ML (ref ?–0.04)
UROBILINOGEN UR STRIP.AUTO-MCNC: <2 MG/DL
VENTRICULAR RATE: 75 BPM
WBC # BLD AUTO: 11.1 X10(3) UL (ref 4–11)
WBC # BLD AUTO: 8.5 X10(3) UL (ref 4–11)
WBC #/AREA URNS AUTO: >50 /HPF
WBC CLUMPS UR QL AUTO: PRESENT

## 2020-01-13 PROCEDURE — 99223 1ST HOSP IP/OBS HIGH 75: CPT | Performed by: INTERNAL MEDICINE

## 2020-01-13 PROCEDURE — 87449 NOS EACH ORGANISM AG IA: CPT | Performed by: INTERNAL MEDICINE

## 2020-01-13 PROCEDURE — 87086 URINE CULTURE/COLONY COUNT: CPT | Performed by: EMERGENCY MEDICINE

## 2020-01-13 PROCEDURE — 71045 X-RAY EXAM CHEST 1 VIEW: CPT | Performed by: EMERGENCY MEDICINE

## 2020-01-13 PROCEDURE — 82962 GLUCOSE BLOOD TEST: CPT

## 2020-01-13 PROCEDURE — 93005 ELECTROCARDIOGRAM TRACING: CPT

## 2020-01-13 PROCEDURE — 36415 COLL VENOUS BLD VENIPUNCTURE: CPT

## 2020-01-13 PROCEDURE — 87077 CULTURE AEROBIC IDENTIFY: CPT | Performed by: EMERGENCY MEDICINE

## 2020-01-13 PROCEDURE — 85730 THROMBOPLASTIN TIME PARTIAL: CPT | Performed by: EMERGENCY MEDICINE

## 2020-01-13 PROCEDURE — 87186 SC STD MICRODIL/AGAR DIL: CPT | Performed by: EMERGENCY MEDICINE

## 2020-01-13 PROCEDURE — 99285 EMERGENCY DEPT VISIT HI MDM: CPT

## 2020-01-13 PROCEDURE — 93010 ELECTROCARDIOGRAM REPORT: CPT

## 2020-01-13 PROCEDURE — 96365 THER/PROPH/DIAG IV INF INIT: CPT

## 2020-01-13 PROCEDURE — 83605 ASSAY OF LACTIC ACID: CPT | Performed by: EMERGENCY MEDICINE

## 2020-01-13 PROCEDURE — 85025 COMPLETE CBC W/AUTO DIFF WBC: CPT | Performed by: EMERGENCY MEDICINE

## 2020-01-13 PROCEDURE — 84484 ASSAY OF TROPONIN QUANT: CPT | Performed by: INTERNAL MEDICINE

## 2020-01-13 PROCEDURE — 80053 COMPREHEN METABOLIC PANEL: CPT

## 2020-01-13 PROCEDURE — 87040 BLOOD CULTURE FOR BACTERIA: CPT | Performed by: EMERGENCY MEDICINE

## 2020-01-13 PROCEDURE — 80053 COMPREHEN METABOLIC PANEL: CPT | Performed by: EMERGENCY MEDICINE

## 2020-01-13 PROCEDURE — 81001 URINALYSIS AUTO W/SCOPE: CPT | Performed by: EMERGENCY MEDICINE

## 2020-01-13 PROCEDURE — 96367 TX/PROPH/DG ADDL SEQ IV INF: CPT

## 2020-01-13 PROCEDURE — 85025 COMPLETE CBC W/AUTO DIFF WBC: CPT

## 2020-01-13 PROCEDURE — 96361 HYDRATE IV INFUSION ADD-ON: CPT

## 2020-01-13 RX ORDER — MIRTAZAPINE 15 MG/1
15 TABLET, FILM COATED ORAL NIGHTLY
Status: DISCONTINUED | OUTPATIENT
Start: 2020-01-13 | End: 2020-01-22

## 2020-01-13 RX ORDER — MEGESTROL ACETATE 40 MG/ML
400 SUSPENSION ORAL DAILY
Status: DISCONTINUED | OUTPATIENT
Start: 2020-01-13 | End: 2020-01-22

## 2020-01-13 RX ORDER — SODIUM BICARBONATE 650 MG/1
650 TABLET ORAL 2 TIMES DAILY
Status: ON HOLD | COMMUNITY
Start: 2020-01-12 | End: 2020-01-22

## 2020-01-13 RX ORDER — METOPROLOL SUCCINATE 25 MG/1
25 TABLET, EXTENDED RELEASE ORAL DAILY
COMMUNITY
End: 2020-01-13 | Stop reason: CLARIF

## 2020-01-13 RX ORDER — ACETAMINOPHEN 325 MG/1
650 TABLET ORAL EVERY 6 HOURS PRN
Status: DISCONTINUED | OUTPATIENT
Start: 2020-01-13 | End: 2020-01-22

## 2020-01-13 RX ORDER — FUROSEMIDE 40 MG/1
40 TABLET ORAL 2 TIMES DAILY
COMMUNITY
End: 2020-01-13 | Stop reason: CLARIF

## 2020-01-13 RX ORDER — ONDANSETRON 2 MG/ML
4 INJECTION INTRAMUSCULAR; INTRAVENOUS EVERY 6 HOURS PRN
Status: DISCONTINUED | OUTPATIENT
Start: 2020-01-13 | End: 2020-01-22

## 2020-01-13 RX ORDER — HEPARIN SODIUM AND DEXTROSE 10000; 5 [USP'U]/100ML; G/100ML
INJECTION INTRAVENOUS CONTINUOUS
Status: DISCONTINUED | OUTPATIENT
Start: 2020-01-13 | End: 2020-01-14

## 2020-01-13 RX ORDER — OMEPRAZOLE 40 MG/1
40 CAPSULE, DELAYED RELEASE ORAL DAILY
COMMUNITY
Start: 2019-12-23 | End: 2020-01-13 | Stop reason: CLARIF

## 2020-01-13 RX ORDER — SPIRONOLACTONE AND HYDROCHLOROTHIAZIDE 25; 25 MG/1; MG/1
1 TABLET ORAL DAILY
COMMUNITY
Start: 2019-12-29 | End: 2020-01-13 | Stop reason: CLARIF

## 2020-01-13 RX ORDER — DULOXETIN HYDROCHLORIDE 60 MG/1
60 CAPSULE, DELAYED RELEASE ORAL DAILY
COMMUNITY
End: 2020-01-13 | Stop reason: CLARIF

## 2020-01-13 RX ORDER — GABAPENTIN 300 MG/1
600 CAPSULE ORAL 3 TIMES DAILY
COMMUNITY
End: 2020-02-14

## 2020-01-13 RX ORDER — AMLODIPINE BESYLATE 10 MG/1
10 TABLET ORAL DAILY
COMMUNITY
End: 2020-01-13 | Stop reason: CLARIF

## 2020-01-13 RX ORDER — ACETAMINOPHEN 500 MG
500 TABLET ORAL DAILY PRN
COMMUNITY
End: 2020-01-24

## 2020-01-13 RX ORDER — LEVOTHYROXINE SODIUM 0.12 MG/1
125 TABLET ORAL
Status: DISCONTINUED | OUTPATIENT
Start: 2020-01-14 | End: 2020-01-22

## 2020-01-13 RX ORDER — SIMETHICONE 80 MG
80 TABLET,CHEWABLE ORAL EVERY 6 HOURS PRN
Status: DISCONTINUED | OUTPATIENT
Start: 2020-01-13 | End: 2020-01-22

## 2020-01-13 RX ORDER — MELATONIN
325
Status: DISCONTINUED | OUTPATIENT
Start: 2020-01-14 | End: 2020-01-22

## 2020-01-13 RX ORDER — HEPARIN SODIUM AND DEXTROSE 10000; 5 [USP'U]/100ML; G/100ML
12 INJECTION INTRAVENOUS ONCE
Status: COMPLETED | OUTPATIENT
Start: 2020-01-13 | End: 2020-01-14

## 2020-01-13 RX ORDER — MAGNESIUM OXIDE 400 MG (241.3 MG MAGNESIUM) TABLET
3 TABLET NIGHTLY
Status: DISCONTINUED | OUTPATIENT
Start: 2020-01-13 | End: 2020-01-22

## 2020-01-13 RX ORDER — HEPARIN SODIUM 5000 [USP'U]/ML
60 INJECTION INTRAVENOUS; SUBCUTANEOUS ONCE
Status: COMPLETED | OUTPATIENT
Start: 2020-01-13 | End: 2020-01-13

## 2020-01-13 RX ORDER — GABAPENTIN 300 MG/1
600 CAPSULE ORAL 3 TIMES DAILY
Status: DISCONTINUED | OUTPATIENT
Start: 2020-01-13 | End: 2020-01-15

## 2020-01-13 RX ORDER — PANTOPRAZOLE SODIUM 40 MG/1
40 TABLET, DELAYED RELEASE ORAL
Status: DISCONTINUED | OUTPATIENT
Start: 2020-01-14 | End: 2020-01-22

## 2020-01-13 RX ORDER — TOLTERODINE 4 MG/1
4 CAPSULE, EXTENDED RELEASE ORAL DAILY
COMMUNITY
End: 2020-01-13

## 2020-01-13 RX ORDER — MIRTAZAPINE 15 MG/1
30 TABLET, FILM COATED ORAL NIGHTLY
Status: ON HOLD | COMMUNITY
Start: 2019-12-04 | End: 2020-05-12

## 2020-01-13 RX ORDER — ONDANSETRON 4 MG/1
4 TABLET, ORALLY DISINTEGRATING ORAL
Status: ON HOLD | COMMUNITY
End: 2020-05-11

## 2020-01-13 NOTE — ED PROVIDER NOTES
Patient Seen in: [facility] Emergency Department      History   Patient presents with:  Abnormal Result    Stated Complaint: abnormal BUN and Creatinine. HPI    This is a pleasant 59-year-old female sent in for abnormal labs.   Patient has a histo Abnormal; Notable for the following components:    Glucose 178 (*)     Sodium 130 (*)     Potassium 5.5 (*)     CO2 12.0 (*)     BUN 92 (*)     Creatinine 4.88 (*)     Calculated Osmolality 303 (*)     GFR, Non- 8 (*)     GFR, -Ameri time.  Patient was also discussed with urology about the patient's stents and increasing renal function patient will be kept n.p.o. should the stent need to be exchanged.   Patient was ordered antibiotics and was also discussed with the hospitalist.

## 2020-01-13 NOTE — H&P
[facility] Hospitalist H&P       CC: Patient presents with:  Abnormal Result       PCP: Jacky Talbot MD    History of Present Illness:  Pt is a 81year old F with PMHx HTN, hx PE, hypothyroidism, depression, and b/l hydro s/p L ureteral stent with recent admissi REIMPLANTATION Left 11/12/2019    Performed by Catalina Dillon MD at [facility] MAIN OR        ALL:  No Known Allergies     Home Medications:  gabapentin 300 MG Oral Cap, Take 600 mg by mouth 3 (three) times daily. , Disp: , Rfl:   Rivaroxaban (XARELTO) 20 MG Oral T 01/13/20  0755    139 137 132*   K 4.4 4.7 4.6 5.6*   * 116* 114* 109   CO2 17.0* 15.0* 13.0* 10.0*   BUN 61* 53* 58* 82*   CREATSERUM 1.90* 1.86* 2.41* 4.79*   * 114* 122* 166*   CA 9.6 9.6 9.2 10.1       Recent Labs   Lab 01/08/20  113 DVT in 1 of the 2 paired right posterior tibial veins, midportion and 1 of the 2 peroneal veins.   There is partially obstructing DVT involving the left superficial femoral vein proximal.  There is occlusive thrombus in 1 of 2 left posterior tibial veins pr biliary dilatation. PANCREAS:  No lesion, fluid collection, ductal dilatation, or atrophy. SPLEEN:  No enlargement or focal lesion. AORTA/VASCULAR:  No aneurysm. Atherosclerosis. RETROPERITONEUM:  No mass or adenopathy.   BOWEL/MESENTERY:  Postsurgical Date: 12/21/2019  PROCEDURE:  US KIDNEY/BLADDER (CPT=76770)  COMPARISON:  Naveen Samples, US KIDNEY/BLADDER (CPT=76770), 12/14/2019, 8:18.   INDICATIONS:  sam, right hydronephrosis  TECHNIQUE:  Transabdominal gray scale ultrasound imaging of the bilateral kidn Possible chest pressure  - check EKG, trop  - CXR  - echo    # Hx DVT  - hold AC for now pending urology recs    # HTN  - no longer on meds    # DMII  - hold orals  - SQ SSI, accuchecks    # Hypothyroidism  - synthroid    # Depression  - home meds    # [facility]

## 2020-01-13 NOTE — PROGRESS NOTES
[facility] Pharmacy Note:  Renal Adjustment for meropenem (MERREM)    Korey Washington is a 81year old female who has been prescribed meropenem (MERREM) 500 mg every 8 hrs. CrCl is estimated creatinine clearance is 6.6 mL/min (A) (based on SCr of 4.88 mg/dL (H)).

## 2020-01-14 ENCOUNTER — APPOINTMENT (OUTPATIENT)
Dept: CV DIAGNOSTICS | Facility: HOSPITAL | Age: 85
DRG: 682 | End: 2020-01-14
Attending: INTERNAL MEDICINE
Payer: MEDICARE

## 2020-01-14 LAB
ANION GAP SERPL CALC-SCNC: 10 MMOL/L (ref 0–18)
ANION GAP SERPL CALC-SCNC: 12 MMOL/L (ref 0–18)
ANTIBODY SCREEN: NEGATIVE
APTT PPP: 63.3 SECONDS (ref 25.4–36.1)
APTT PPP: 65.6 SECONDS (ref 25.4–36.1)
ARTERIAL BLD GAS O2 SATURATION: 96 % (ref 92–100)
ARTERIAL BLOOD GAS BASE EXCESS: -14.6 MMOL/L (ref ?–2)
ARTERIAL BLOOD GAS HCO3: 9.8 MEQ/L (ref 22–26)
ARTERIAL BLOOD GAS PCO2: 20 MM HG (ref 35–45)
ARTERIAL BLOOD GAS PH: 7.32 (ref 7.35–7.45)
ARTERIAL BLOOD GAS PO2: 106 MM HG (ref 80–105)
BASOPHILS # BLD AUTO: 0.03 X10(3) UL (ref 0–0.2)
BASOPHILS NFR BLD AUTO: 0.6 %
BUN BLD-MCNC: 76 MG/DL (ref 7–18)
BUN BLD-MCNC: 76 MG/DL (ref 7–18)
BUN/CREAT SERPL: 16.1 (ref 10–20)
BUN/CREAT SERPL: 17 (ref 10–20)
CALCIUM BLD-MCNC: 8.9 MG/DL (ref 8.5–10.1)
CALCIUM BLD-MCNC: 9 MG/DL (ref 8.5–10.1)
CALCULATED O2 SATURATION: 97 % (ref 92–100)
CARBOXYHEMOGLOBIN: 1.2 % SAT (ref 0–3)
CHLORIDE SERPL-SCNC: 112 MMOL/L (ref 98–112)
CHLORIDE SERPL-SCNC: 115 MMOL/L (ref 98–112)
CO2 SERPL-SCNC: 10 MMOL/L (ref 21–32)
CO2 SERPL-SCNC: 16 MMOL/L (ref 21–32)
CREAT BLD-MCNC: 4.47 MG/DL (ref 0.55–1.02)
CREAT BLD-MCNC: 4.71 MG/DL (ref 0.55–1.02)
CREAT UR-SCNC: 38.4 MG/DL
DEPRECATED HBV CORE AB SER IA-ACNC: 153.1 NG/ML (ref 18–340)
DEPRECATED RDW RBC AUTO: 47.2 FL (ref 35.1–46.3)
DEPRECATED RDW RBC AUTO: 48.3 FL (ref 35.1–46.3)
EOSINOPHIL # BLD AUTO: 0.19 X10(3) UL (ref 0–0.7)
EOSINOPHIL NFR BLD AUTO: 3.8 %
ERYTHROCYTE [DISTWIDTH] IN BLOOD BY AUTOMATED COUNT: 16.4 % (ref 11–15)
ERYTHROCYTE [DISTWIDTH] IN BLOOD BY AUTOMATED COUNT: 16.6 % (ref 11–15)
FIO2: 21 %
GLUCOSE BLD-MCNC: 110 MG/DL (ref 70–99)
GLUCOSE BLD-MCNC: 186 MG/DL (ref 70–99)
HCT VFR BLD AUTO: 21.8 % (ref 35–48)
HCT VFR BLD AUTO: 23.3 % (ref 35–48)
HGB BLD-MCNC: 6.7 G/DL (ref 12–16)
HGB BLD-MCNC: 7.3 G/DL (ref 12–16)
IMM GRANULOCYTES # BLD AUTO: 0.02 X10(3) UL (ref 0–1)
IMM GRANULOCYTES NFR BLD: 0.4 %
IONIZED CALCIUM: 1.38 MMOL/L (ref 1.12–1.32)
IRON SATURATION: 5 % (ref 15–50)
IRON SERPL-MCNC: 13 UG/DL (ref 50–170)
LACTATE SERPL-SCNC: 0.9 MMOL/L (ref 0.4–2)
LACTIC ACID ARTERIAL: <1.6 MMOL/L (ref 0.5–2)
LYMPHOCYTES # BLD AUTO: 1.18 X10(3) UL (ref 1–4)
LYMPHOCYTES NFR BLD AUTO: 23.3 %
MCH RBC QN AUTO: 24.7 PG (ref 26–34)
MCH RBC QN AUTO: 25 PG (ref 26–34)
MCHC RBC AUTO-ENTMCNC: 30.7 G/DL (ref 31–37)
MCHC RBC AUTO-ENTMCNC: 31.3 G/DL (ref 31–37)
MCV RBC AUTO: 79.8 FL (ref 80–100)
MCV RBC AUTO: 80.4 FL (ref 80–100)
METHEMOGLOBIN: 0.6 % SAT (ref 0.4–1.5)
MONOCYTES # BLD AUTO: 0.45 X10(3) UL (ref 0.1–1)
MONOCYTES NFR BLD AUTO: 8.9 %
NEUTROPHILS # BLD AUTO: 3.19 X10 (3) UL (ref 1.5–7.7)
NEUTROPHILS # BLD AUTO: 3.19 X10(3) UL (ref 1.5–7.7)
NEUTROPHILS NFR BLD AUTO: 63 %
OSMOLALITY SERPL CALC.SUM OF ELEC: 307 MOSM/KG (ref 275–295)
OSMOLALITY SERPL CALC.SUM OF ELEC: 313 MOSM/KG (ref 275–295)
PATIENT TEMPERATURE: 98.7 F
PLATELET # BLD AUTO: 289 10(3)UL (ref 150–450)
PLATELET # BLD AUTO: 296 10(3)UL (ref 150–450)
POTASSIUM BLOOD GAS: 5.1 MMOL/L (ref 3.6–5.1)
POTASSIUM SERPL-SCNC: 4.1 MMOL/L (ref 3.5–5.1)
POTASSIUM SERPL-SCNC: 5.3 MMOL/L (ref 3.5–5.1)
RBC # BLD AUTO: 2.71 X10(6)UL (ref 3.8–5.3)
RBC # BLD AUTO: 2.92 X10(6)UL (ref 3.8–5.3)
RH BLOOD TYPE: POSITIVE
SODIUM BLOOD GAS: 137 MMOL/L (ref 136–144)
SODIUM SERPL-SCNC: 137 MMOL/L (ref 136–145)
SODIUM SERPL-SCNC: 138 MMOL/L (ref 136–145)
SODIUM SERPL-SCNC: 72 MMOL/L
STREP PNEUMO ANTIGEN, URINE: NEGATIVE
TOTAL HEMOGLOBIN: 8.2 G/DL (ref 11.7–16)
TOTAL IRON BINDING CAPACITY: 271 UG/DL (ref 240–450)
TRANSFERRIN SERPL-MCNC: 182 MG/DL (ref 200–360)
WBC # BLD AUTO: 5.1 X10(3) UL (ref 4–11)
WBC # BLD AUTO: 6 X10(3) UL (ref 4–11)

## 2020-01-14 PROCEDURE — 83540 ASSAY OF IRON: CPT | Performed by: INTERNAL MEDICINE

## 2020-01-14 PROCEDURE — 86901 BLOOD TYPING SEROLOGIC RH(D): CPT | Performed by: INTERNAL MEDICINE

## 2020-01-14 PROCEDURE — 85730 THROMBOPLASTIN TIME PARTIAL: CPT | Performed by: INTERNAL MEDICINE

## 2020-01-14 PROCEDURE — 93010 ELECTROCARDIOGRAM REPORT: CPT | Performed by: INTERNAL MEDICINE

## 2020-01-14 PROCEDURE — 36430 TRANSFUSION BLD/BLD COMPNT: CPT

## 2020-01-14 PROCEDURE — 93005 ELECTROCARDIOGRAM TRACING: CPT

## 2020-01-14 PROCEDURE — 83550 IRON BINDING TEST: CPT | Performed by: INTERNAL MEDICINE

## 2020-01-14 PROCEDURE — 82375 ASSAY CARBOXYHB QUANT: CPT | Performed by: INTERNAL MEDICINE

## 2020-01-14 PROCEDURE — 85025 COMPLETE CBC W/AUTO DIFF WBC: CPT | Performed by: INTERNAL MEDICINE

## 2020-01-14 PROCEDURE — 80048 BASIC METABOLIC PNL TOTAL CA: CPT | Performed by: INTERNAL MEDICINE

## 2020-01-14 PROCEDURE — 30233N1 TRANSFUSION OF NONAUTOLOGOUS RED BLOOD CELLS INTO PERIPHERAL VEIN, PERCUTANEOUS APPROACH: ICD-10-PCS | Performed by: INTERNAL MEDICINE

## 2020-01-14 PROCEDURE — 30233H1 TRANSFUSION OF NONAUTOLOGOUS WHOLE BLOOD INTO PERIPHERAL VEIN, PERCUTANEOUS APPROACH: ICD-10-PCS | Performed by: INTERNAL MEDICINE

## 2020-01-14 PROCEDURE — 82570 ASSAY OF URINE CREATININE: CPT | Performed by: INTERNAL MEDICINE

## 2020-01-14 PROCEDURE — 83605 ASSAY OF LACTIC ACID: CPT | Performed by: INTERNAL MEDICINE

## 2020-01-14 PROCEDURE — 99233 SBSQ HOSP IP/OBS HIGH 50: CPT | Performed by: INTERNAL MEDICINE

## 2020-01-14 PROCEDURE — 86900 BLOOD TYPING SEROLOGIC ABO: CPT | Performed by: INTERNAL MEDICINE

## 2020-01-14 PROCEDURE — 82803 BLOOD GASES ANY COMBINATION: CPT | Performed by: INTERNAL MEDICINE

## 2020-01-14 PROCEDURE — 84300 ASSAY OF URINE SODIUM: CPT | Performed by: INTERNAL MEDICINE

## 2020-01-14 PROCEDURE — 83050 HGB METHEMOGLOBIN QUAN: CPT | Performed by: INTERNAL MEDICINE

## 2020-01-14 PROCEDURE — 36600 WITHDRAWAL OF ARTERIAL BLOOD: CPT | Performed by: INTERNAL MEDICINE

## 2020-01-14 PROCEDURE — 84295 ASSAY OF SERUM SODIUM: CPT | Performed by: INTERNAL MEDICINE

## 2020-01-14 PROCEDURE — 82728 ASSAY OF FERRITIN: CPT | Performed by: INTERNAL MEDICINE

## 2020-01-14 PROCEDURE — 93306 TTE W/DOPPLER COMPLETE: CPT | Performed by: INTERNAL MEDICINE

## 2020-01-14 PROCEDURE — 85018 HEMOGLOBIN: CPT | Performed by: INTERNAL MEDICINE

## 2020-01-14 PROCEDURE — 85027 COMPLETE CBC AUTOMATED: CPT | Performed by: INTERNAL MEDICINE

## 2020-01-14 PROCEDURE — 86920 COMPATIBILITY TEST SPIN: CPT

## 2020-01-14 PROCEDURE — 84132 ASSAY OF SERUM POTASSIUM: CPT | Performed by: INTERNAL MEDICINE

## 2020-01-14 PROCEDURE — 86850 RBC ANTIBODY SCREEN: CPT | Performed by: INTERNAL MEDICINE

## 2020-01-14 PROCEDURE — 82330 ASSAY OF CALCIUM: CPT | Performed by: INTERNAL MEDICINE

## 2020-01-14 RX ORDER — DEXTROSE MONOHYDRATE 50 MG/ML
INJECTION, SOLUTION INTRAVENOUS CONTINUOUS
Status: DISCONTINUED | OUTPATIENT
Start: 2020-01-14 | End: 2020-01-14

## 2020-01-14 RX ORDER — SODIUM CHLORIDE 9 MG/ML
INJECTION, SOLUTION INTRAVENOUS ONCE
Status: DISCONTINUED | OUTPATIENT
Start: 2020-01-14 | End: 2020-01-16

## 2020-01-14 RX ORDER — TRAMADOL HYDROCHLORIDE 50 MG/1
50 TABLET ORAL EVERY 12 HOURS PRN
Status: DISCONTINUED | OUTPATIENT
Start: 2020-01-14 | End: 2020-01-22

## 2020-01-14 NOTE — PLAN OF CARE
NURSING ADMISSION NOTE      Patient admitted via Cart  Oriented to room. Safety precautions initiated. Bed in low position. Call light in reach. Navigator complete. Report given to Michel de oliveira RN.

## 2020-01-14 NOTE — PLAN OF CARE
Paged Dr Christy Santos with critical labs, Waiting call back. Dr Gris Roldan notified of critical CO2, stat blood gases ordered and results to be called to Dr Gris Roldan. REsp aware.

## 2020-01-14 NOTE — PLAN OF CARE
Lethargic , NPO ,spit out AM meds, HGB =6.3, RECVD 1 unit PRBC's, IV fluids per Renal service , Language barrier , use of  I PAD, , cystogram cancelled, diet resumed , medicated for generalized discomfort , remained at bedrest throughout day ,, i

## 2020-01-14 NOTE — PROGRESS NOTES
[facility] Hospitalist Progress Note     Tuan Vuong Patient Status:  Inpatient    1935 MRN [id number]   [facility] 4NW-A Attending Jose Anglin MD   Hosp Day # 1 PCP Petros lAcantara MD     CC: follow up    SUBJECTIVE:  Pt interviewed 13* 10* 16 17   ALB 2.7* 2.6* 2.7* 2.8*       Recent Labs   Lab 01/09/20  1803 01/09/20  2106 01/10/20  0806 01/10/20  1220 01/13/20  2202   PGLU 277* 197* 131* 141* 169*       Imaging:  Xr Chest Ap Portable  (cpt=71045)    Result Date: 1/13/2020  CONCLUSI Acidosis  # Hyperkalemia   - likely 2/2 ANTONIO  - renal consulted by ED  - sodium bicarb gtt, defer further IVFs to renal  - mgmt of ANTONIO above     # Chest pressure  # Elevated troponin  - EKG without overt ischemic changes  - possible demand 2/2 above  - on h

## 2020-01-14 NOTE — CONSULTS
[facility] - Nephrology  Report of Consultation    Farazdebora TORO Yevgeniy Patient Status:  Inpatient    1935 MRN [id number]   [facility] 4NW-A Attending Gema Howard MD   Hosp Day # 1 PCP Luis Dumont MD     Reason for consult: Aliza   •      • OTHER      SHOULDER, KNEES   • OTHER SURGICAL HISTORY      KNEE SURGERY   • OTHER SURGICAL HISTORY      GALLBLADDER   • OTHER SURGICAL HISTORY      STOMACH SURGERY   • OTHER SURGICAL HISTORY      RECONSTUCTION RT UPPER ARM   • PA tab TABS 3 mg, 3 mg, Oral, Nightly  •  mirtazapine (REMERON) tab 15 mg, 15 mg, Oral, Nightly  •  Pantoprazole Sodium (PROTONIX) EC tab 40 mg, 40 mg, Oral, BID AC  •  Sertraline HCl (ZOLOFT) tab 150 mg, 150 mg, Oral, Daily  •  simethicone (MYLICON) chewable daily.  Apply to low back in am and remove at pm , Disp: , Rfl: , 1/12/2020 at 1100  insulin glargine (LANTUS SOLOSTAR) 100 UNIT/ML Subcutaneous Solution Pen-injector, Inject 4 Units into the skin nightly., Disp: , Rfl: , 1/12/2020 at 2100  simethAbrazo [place] 81 M kg)      General: pleasant, well appearing  HEENT: NCAT  Neck: Supple  Cardiac: Regular rate and rhythm, no murmurs  Lungs: CTAB, no crackles   Abdomen: Soft, non-tender, non-distended  Extremities: no LE edema   Neurologic/Psych: mentating well    LABORAT 01/13/2020    BLOODURINE Large (A) 01/13/2020    PHURINE 5.0 01/13/2020    PROUR 100  (A) 01/13/2020    UROBILINOGEN <2.0 01/13/2020    NITRITE Negative 01/13/2020    LEUUR Large (A) 01/13/2020    NMIC Microscopic not indicated 11/11/2019    WBCUR >50 (A)

## 2020-01-14 NOTE — PHYSICAL THERAPY NOTE
PT orders received, chart reviewed. Noted abnormal labs, including Hgb 6.7 and Hct 21.8. Will hold PT evaluation and follow-up, pending medical status.

## 2020-01-14 NOTE — PROGRESS NOTES
[facility]  Cardiology Progress Note    Tuan Vuong Patient Status:  Inpatient    1935 MRN [id number]   [facility] 4NW-A Attending Kaushal Cortes MD   Hosp Day # 1 PCP Lucas Hanks MD     Subjective:   stat Intravenous Once   • meropenem  500 mg Intravenous Q24H   • azithromycin  500 mg Intravenous Q24H   • ferrous sulfate  325 mg Oral Daily with breakfast   • gabapentin  600 mg Oral TID   • Levothyroxine Sodium  125 mcg Oral Before breakfast   • lidocaine-me troponin. Elevated Trop:  -Reproducible chest pain and elevated trop, likely type II MI from demand ischemia in the setting of her infection and anemia  -Echo and EKG today.    -Given her worsening anemia would hold heparin for now  - planning cystogra

## 2020-01-14 NOTE — OCCUPATIONAL THERAPY NOTE
OT orders received, chart reviewed. Noted abnormal labs, including Hgb 6.7. Will hold OT evaluation and follow-up, pending medical status.

## 2020-01-14 NOTE — CONSULTS
[facility] Urology   Consultation Note    Tuan Vuong Patient Status:  Inpatient    1935 MRN [id number]   [facility] 4NW-A Attending Juve Kinsey MD   Hosp Day # 1 PCP Gracia Fu MD     Reason for Consultation:  [facility] COLON SURGERY     • LEFT PHACOEMULSIFICATION OF CATARACT WITH INTRAOCULAR LENS IMPLANT 20702 Left 2017    Performed by Narayan Ureña MD at [facility]   •      • OTHER      SHOULDER, KNEES   • OTHER SURGICAL HISTORY      KNEE SURGER lidocaine-menthol 4-1 % 1 patch, 1 patch, Transdermal, Q24H  •  Megestrol Acetate (MEGACE) 40 MG/ML oral suspension 400 mg, 400 mg, Oral, Daily  •  melatonin tab TABS 3 mg, 3 mg, Oral, Nightly  •  mirtazapine (REMERON) tab 15 mg, 15 mg, Oral, Nightly  •  P 1.86-->2.41-->4.79-->4.88-->4.71    UA 1/13/20: >50 WBC/hpf, >10 RBC/hpf, 3+ bacteria  Urine culture 1/13/20: pending    2/2 blood culture 1/13/20: pending    Imaging:  CT Scan  Abdominal/pelvic CT scan without contrast 1/8/20:  FINDINGS:    KIDNEYS:  Left Slight elevation of the left hemidiaphragm with left lower lobe increased interstitial and nonspecific ground-glass density most likely due to atelectasis.       =====  CONCLUSION:    1.  There is again suggestion of bilateral hydronephrosis with the prior type [facility])     Hyperkalemia     Elevated troponin      ANTONIO    Bilateral hydronephrosis, left ureteral stent in good position based on CT from 1/8/20. Abnormal UA. Urine culture pending. On abx.     S/p ex lap, subtotal colectomy, creation of end ileost

## 2020-01-14 NOTE — CONSULTS
[facility]    Inpatient Cardiology Consultation Note    Tuan Vuong Patient Status:  Inpatient    1935 MRN [id number]   [facility] 4NW-A Attending Joe Nichols MD   Hosp Day # 0 PCP Melissa Be MD       HPI Cardiology    --------------------------------------------------------------------------------------------------------------------------------  10 point ROS performed.  All negative, except as documented above  ROS    History:  Past Medical History:   Diagn filed at 1/13/2020 1900  Gross per 24 hour   Intake 1200 ml   Output 500 ml   Net 700 ml       Medications: Allergies: No Known Allergies  gabapentin 300 MG Oral Cap, Take 600 mg by mouth 3 (three) times daily.   mirtazapine 15 MG Oral Tab, Take 15 mg by m breakfast.  Pantoprazole Sodium 40 MG Oral Tab EC, Take 1 tablet (40 mg total) by mouth 2 (two) times daily before meals. sucralfate 1 g Oral Tab, Take 1 tablet (1 g total) by mouth 4 (four) times daily before meals and nightly.   Acetaminophen  MG O

## 2020-01-14 NOTE — CM/SW NOTE
01/14/20 1200   CM/SW Referral Data   Referral Source Social Work (self-referral)   Reason for Referral Discharge planning   Informant Children   Patient Info   Patient's Mental Status Alert;Oriented   Patient's Home Environment Sub-acute Rehab   Pt is

## 2020-01-14 NOTE — PLAN OF CARE
Anxious and distressed at start of shift, son at bedside. NPO except sips with meds. Voided on bedpan, clear urine. pure wick in place. Took all meds without difficulty. Breathing appeared labored , sats 93% , tachycardic at 111.  Son no longer here to trans

## 2020-01-15 ENCOUNTER — APPOINTMENT (OUTPATIENT)
Dept: GENERAL RADIOLOGY | Facility: HOSPITAL | Age: 85
DRG: 682 | End: 2020-01-15
Attending: PHYSICIAN ASSISTANT
Payer: MEDICARE

## 2020-01-15 LAB
ANION GAP SERPL CALC-SCNC: 9 MMOL/L (ref 0–18)
APTT PPP: 38.6 SECONDS (ref 25.4–36.1)
ATRIAL RATE: 73 BPM
BILIRUB UR QL STRIP.AUTO: NEGATIVE
BLOOD TYPE BARCODE: 6200
BUN BLD-MCNC: 68 MG/DL (ref 7–18)
BUN/CREAT SERPL: 16.2 (ref 10–20)
CALCIUM BLD-MCNC: 8.8 MG/DL (ref 8.5–10.1)
CHLORIDE SERPL-SCNC: 110 MMOL/L (ref 98–112)
CO2 SERPL-SCNC: 20 MMOL/L (ref 21–32)
COLOR UR AUTO: YELLOW
CREAT BLD-MCNC: 4.19 MG/DL (ref 0.55–1.02)
DEPRECATED RDW RBC AUTO: 46.7 FL (ref 35.1–46.3)
ERYTHROCYTE [DISTWIDTH] IN BLOOD BY AUTOMATED COUNT: 16.3 % (ref 11–15)
GLUCOSE BLD-MCNC: 138 MG/DL (ref 70–99)
GLUCOSE UR STRIP.AUTO-MCNC: NEGATIVE MG/DL
HCT VFR BLD AUTO: 22.4 % (ref 35–48)
HGB BLD-MCNC: 7.1 G/DL (ref 12–16)
KETONES UR STRIP.AUTO-MCNC: NEGATIVE MG/DL
L PNEUMO AG UR QL: NEGATIVE
MCH RBC QN AUTO: 25 PG (ref 26–34)
MCHC RBC AUTO-ENTMCNC: 31.7 G/DL (ref 31–37)
MCV RBC AUTO: 78.9 FL (ref 80–100)
NITRITE UR QL STRIP.AUTO: NEGATIVE
OSMOLALITY SERPL CALC.SUM OF ELEC: 310 MOSM/KG (ref 275–295)
P-R INTERVAL: 160 MS
PH UR STRIP.AUTO: 6 [PH] (ref 4.5–8)
PLATELET # BLD AUTO: 295 10(3)UL (ref 150–450)
POTASSIUM SERPL-SCNC: 3.9 MMOL/L (ref 3.5–5.1)
PROT UR STRIP.AUTO-MCNC: 100 MG/DL
Q-T INTERVAL: 394 MS
QRS DURATION: 68 MS
QTC CALCULATION (BEZET): 434 MS
R AXIS: -33 DEGREES
RBC # BLD AUTO: 2.84 X10(6)UL (ref 3.8–5.3)
RBC #/AREA URNS AUTO: >10 /HPF
SODIUM SERPL-SCNC: 139 MMOL/L (ref 136–145)
SP GR UR STRIP.AUTO: 1.01 (ref 1–1.03)
T AXIS: 164 DEGREES
UROBILINOGEN UR STRIP.AUTO-MCNC: <2 MG/DL
VENTRICULAR RATE: 73 BPM
WBC # BLD AUTO: 3.8 X10(3) UL (ref 4–11)
WBC #/AREA URNS AUTO: >50 /HPF

## 2020-01-15 PROCEDURE — 85027 COMPLETE CBC AUTOMATED: CPT | Performed by: INTERNAL MEDICINE

## 2020-01-15 PROCEDURE — 97530 THERAPEUTIC ACTIVITIES: CPT

## 2020-01-15 PROCEDURE — 81001 URINALYSIS AUTO W/SCOPE: CPT | Performed by: UROLOGY

## 2020-01-15 PROCEDURE — 97161 PT EVAL LOW COMPLEX 20 MIN: CPT

## 2020-01-15 PROCEDURE — 80048 BASIC METABOLIC PNL TOTAL CA: CPT | Performed by: INTERNAL MEDICINE

## 2020-01-15 PROCEDURE — 99233 SBSQ HOSP IP/OBS HIGH 50: CPT | Performed by: INTERNAL MEDICINE

## 2020-01-15 PROCEDURE — 36410 VNPNXR 3YR/> PHY/QHP DX/THER: CPT

## 2020-01-15 PROCEDURE — 74430 CONTRAST X-RAY BLADDER: CPT | Performed by: PHYSICIAN ASSISTANT

## 2020-01-15 PROCEDURE — 76937 US GUIDE VASCULAR ACCESS: CPT

## 2020-01-15 PROCEDURE — 85730 THROMBOPLASTIN TIME PARTIAL: CPT | Performed by: INTERNAL MEDICINE

## 2020-01-15 PROCEDURE — 97165 OT EVAL LOW COMPLEX 30 MIN: CPT

## 2020-01-15 PROCEDURE — BT1F1ZZ FLUOROSCOPY OF LEFT KIDNEY, URETER AND BLADDER USING LOW OSMOLAR CONTRAST: ICD-10-PCS | Performed by: RADIOLOGY

## 2020-01-15 PROCEDURE — 51600 INJECTION FOR BLADDER X-RAY: CPT | Performed by: PHYSICIAN ASSISTANT

## 2020-01-15 RX ORDER — GABAPENTIN 300 MG/1
300 CAPSULE ORAL DAILY
Status: DISCONTINUED | OUTPATIENT
Start: 2020-01-16 | End: 2020-01-22

## 2020-01-15 RX ORDER — SODIUM CHLORIDE, SODIUM LACTATE, POTASSIUM CHLORIDE, CALCIUM CHLORIDE 600; 310; 30; 20 MG/100ML; MG/100ML; MG/100ML; MG/100ML
INJECTION, SOLUTION INTRAVENOUS CONTINUOUS
Status: DISCONTINUED | OUTPATIENT
Start: 2020-01-15 | End: 2020-01-17

## 2020-01-15 RX ORDER — SODIUM CHLORIDE 0.9 % (FLUSH) 0.9 %
10 SYRINGE (ML) INJECTION EVERY 12 HOURS
Status: DISCONTINUED | OUTPATIENT
Start: 2020-01-15 | End: 2020-01-22

## 2020-01-15 NOTE — PHYSICAL THERAPY NOTE
PHYSICAL THERAPY EVALUATION - INPATIENT     Room Number: 430/430-A  Evaluation Date: 1/15/2020  Type of Evaluation: Initial  Physician Order: PT Eval and Treat    Presenting Problem: abnormal labs  Reason for Therapy: Mobility Dysfunction and Dischar Right 4/26/2017    Performed by Judy Cummings MD at [place] [address] Left 11/12/2019    Performed by Maury Knott MD at [address] [place] [address]  Type of Home: House   Home Layout: Two level; Able to live on ma a bed to a chair (including a wheelchair)?: A Lot   -   Need to walk in hospital room?: Total   -   Climbing 3-5 steps with a railing?: Total       AM-PAC Score:  Raw Score: 10   Approx Degree of Impairment: 76.75%   Standardized Score (AM-PAC Scale): 32. 2 and would benefit from skilled inpatient PT to address the above deficits to assist patient in returning to prior to level of function.   DISCHARGE RECOMMENDATIONS  PT Discharge Recommendations: Sub-acute rehabilitation(11-14 days)    PLAN  PT Treatment Cherelle

## 2020-01-15 NOTE — OCCUPATIONAL THERAPY NOTE
OCCUPATIONAL THERAPY EVALUATION - INPATIENT     Room Number: 430/430-A  Evaluation Date: 1/15/2020  Type of Evaluation: Initial  Presenting Problem: renal failure    Physician Order: IP Consult to Occupational Therapy  Reason for Therapy: ADL/IADL Dysfunct Eze Abbott MD at [address] [facility]   • URETERAL REIMPLANTATION Left 11/12/2019    Performed by Esteban Esparza MD at [address] [facility] SITUATION  Type of Home: House  Home Layout: Two level; Able to live on main level  Live clothing?: Total  -   Bathing (including washing, rinsing, drying)?: A Lot  -   Toileting, which includes using toilet, bedpan or urinal? : Total  -   Putting on and taking off regular upper body clothing?: A Little  -   Taking care of personal grooming fernandez activities of daily living, rest and sleep, work, leisure and social participation.      The patient is functioning below her previous functional level and would benefit from skilled inpatient OT to address the above deficits, maximizing patient’s ability t

## 2020-01-15 NOTE — PLAN OF CARE
Pt is drowsy. Does not appear to be in distress. VSS. Took all medications with applesauce. Purewick in place and draining. IV fluids and ABX running. Resting comfortably. Will continue to monitor.

## 2020-01-15 NOTE — OCCUPATIONAL THERAPY NOTE
Attempted to see pt for OT. Pt off the floor for cystogram. Will re-attempt to see pt as appropriate and as able.

## 2020-01-15 NOTE — PROGRESS NOTES
[facility]  Urology Progress Note    Farazdebora TORO Yevgeniy Patient Status:  Inpatient    1935 MRN [id number]   [facility] 4NW-A Attending Glenn Perry MD   Hosp Day # 2 PCP Melissa Be MD     Subjective:  Matthew Segal is a(n) 81 ye of bladder leak. No evidence of vesicoureteral reflux. A left ureteral stent is in place with distal tip coiled in the urinary bladder. There is no evidence of contrast reflux up the left ureteral stent. Films reviewed by Dr. Becky Patel.     Recommend

## 2020-01-15 NOTE — PHYSICAL THERAPY NOTE
Attempted to see patient this AM for Physical Therapy evaluation RN reports patient currently off floor for cystogram.  Will reattempt PT services as able.

## 2020-01-15 NOTE — PROGRESS NOTES
[facility]  Cardiology Progress Note    Subjective:  No chest pain or shortness of breath. Completed cystogram earlier today.       Objective:  /67 (BP Location: Right arm)   Pulse 70   Temp 97.7 °F (36.5 °C) (Oral)   Resp 20   Ht 5' 1\" (1.549 thickness was at the     upper limits of normal. Systolic function was normal. The estimated     ejection fraction was 55-60%. There was no diagnostic evidence for     regional wall motion abnormalities.  Doppler parameters are consistent     with abnormal comment. As outlined yesterday, patient and family requesting conservative management of cardiac issues. CHENG Rosenberg  1/15/2020  11:12 AM    Addendum:  Stephanie Roman reviewed with DENNISE Reynolds w/ urology.   Will need cardiac clearance for cysto

## 2020-01-15 NOTE — CONSULTS
INFECTIOUS DISEASE CONSULTATION    Tuan Vuong Patient Status:  Inpatient    1935 MRN [id number]   [facility] 4NW-A Attending Sapna Brown MD   Hosp Day # 2 PCP Kimi Maki MD MD NAIF at [facility]   • URETERAL REIMPLANTATION Left 11/12/2019    Performed by Amrita Pitt MD at [facility] MAIN OR     Family History   Problem Relation Age of Onset   • Diabetes Daughter    • Hypertension Daughter    • Diabetes Son    • Hyperte mouth 3 (three) times daily. , Disp: , Rfl:   mirtazapine 15 MG Oral Tab, Take 15 mg by mouth nightly.  , Disp: , Rfl:   apixaban 5 MG Oral Tab, Take 5 mg by mouth 2 (two) times daily. , Disp: , Rfl:   sodium bicarbonate 650 MG Oral Tab, Take 650 mg by mouth Tab, Take 1 tablet (125 mcg total) by mouth before breakfast., Disp: 30 tablet, Rfl: 0  Pantoprazole Sodium 40 MG Oral Tab EC, Take 1 tablet (40 mg total) by mouth 2 (two) times daily before meals. , Disp: 60 tablet, Rfl: 0  sucralfate 1 g Oral Tab, Take 1 2. 6* 2.7* 2.8*  --   --   --     132* 130* 137 138 139   K 4.6 5.6* 5.5* 5.3* 4.1 3.9   * 109 106 115* 112 110   CO2 13.0* 10.0* 12.0* 10.0* 16.0* 20.0*   ALKPHO 90 110 123  --   --   --    AST 10* 16 17  --   --   --    ALT 18 16 20  --   -- of breath     Pulmonary embolus (HCC)     Chest pain     Type 2 diabetes mellitus with hyperglycemia ([address]     History of pulmonary embolism     At risk for falling     Type 2 diabetes mellitus with diabetic polyneuropathy (HCC)     Recurrent major depress

## 2020-01-15 NOTE — PROGRESS NOTES
[facility]    Nephrology Progress Note    Tuan TORO Yevgeniy Attending:  China Solano MD       SUBJECTIVE:   Miguel Knowles has just returned from her cystogram. Unfortunately, video interpretor services cannot be located, however Mrs. Vuong's son w Skin: Skin is warm and dry. No rash noted. She is not diaphoretic. No erythema.           LABORATORY DATA:     Lab Results   Component Value Date     (H) 01/15/2020    BUN 68 (H) 01/15/2020    BUNCREA 16.2 01/15/2020    CREATSERUM 4.19 (H) 01/15/20 LEUUR Large (A) 01/15/2020    NMIC Microscopic not indicated 11/11/2019    WBCUR >50 (A) 01/15/2020    RBCUR >10 (A) 01/15/2020    EPIUR None Seen 01/15/2020    BACUR Rare (A) 01/15/2020    CAOXUR Rare (A) 11/26/2019         IMAGING:     CT abdomen images 1. 2-1.6 with frequent ANTONIO's    #. ESBL E. Coli UTI/pyelonephritis  - On meropenem    #. Metabolic Acidosis  - Improving with bicarb drip    #. Hyperkalemia   - Resolved. #. Bilateral Hydronephrosis  - Urology following  - Cystogram completed today.

## 2020-01-15 NOTE — PROGRESS NOTES
[facility] Hospitalist Progress Note     Tuan Vuong Patient Status:  Inpatient    1935 MRN [id number]   [facility] 4NW-A Attending Mary Monte MD   Hosp Day # 2 PCP Junito Nixon MD     CC: follow up    SUBJECTIVE:  No real compla Intravenous Q12H   • sodium chloride   Intravenous Once   • meropenem  500 mg Intravenous Q24H   • azithromycin  500 mg Intravenous Q24H   • ferrous sulfate  325 mg Oral Daily with breakfast   • gabapentin  600 mg Oral TID   • Levothyroxine Sodium  125 mcg 1U pRBC  - monitor CBC    # Possible LLL PNA  - geo  - f/u sputum culture, BCx  - urine strep negative  - f/u urine legionella    # Hx DVT  - on eliquis as OP  - hep gtt on hold d/t acute HgB drop - plan to restart eliquis tomorrow if Hgb stable and no pr

## 2020-01-16 ENCOUNTER — ANESTHESIA EVENT (OUTPATIENT)
Dept: SURGERY | Facility: HOSPITAL | Age: 85
DRG: 682 | End: 2020-01-16
Payer: MEDICARE

## 2020-01-16 ENCOUNTER — ANESTHESIA (OUTPATIENT)
Dept: SURGERY | Facility: HOSPITAL | Age: 85
DRG: 682 | End: 2020-01-16
Payer: MEDICARE

## 2020-01-16 ENCOUNTER — APPOINTMENT (OUTPATIENT)
Dept: GENERAL RADIOLOGY | Facility: HOSPITAL | Age: 85
DRG: 682 | End: 2020-01-16
Attending: UROLOGY
Payer: MEDICARE

## 2020-01-16 LAB
ANION GAP SERPL CALC-SCNC: 6 MMOL/L (ref 0–18)
BASOPHILS # BLD AUTO: 0.01 X10(3) UL (ref 0–0.2)
BASOPHILS NFR BLD AUTO: 0.2 %
BUN BLD-MCNC: 53 MG/DL (ref 7–18)
BUN/CREAT SERPL: 16 (ref 10–20)
CALCIUM BLD-MCNC: 8 MG/DL (ref 8.5–10.1)
CHLORIDE SERPL-SCNC: 103 MMOL/L (ref 98–112)
CO2 SERPL-SCNC: 28 MMOL/L (ref 21–32)
CREAT BLD-MCNC: 3.31 MG/DL (ref 0.55–1.02)
DEPRECATED RDW RBC AUTO: 45.5 FL (ref 35.1–46.3)
EOSINOPHIL # BLD AUTO: 0.24 X10(3) UL (ref 0–0.7)
EOSINOPHIL NFR BLD AUTO: 4.8 %
ERYTHROCYTE [DISTWIDTH] IN BLOOD BY AUTOMATED COUNT: 15.9 % (ref 11–15)
GLUCOSE BLD-MCNC: 100 MG/DL (ref 70–99)
GLUCOSE BLD-MCNC: 102 MG/DL (ref 70–99)
GLUCOSE BLD-MCNC: 88 MG/DL (ref 70–99)
GLUCOSE BLD-MCNC: 91 MG/DL (ref 70–99)
GLUCOSE BLD-MCNC: 96 MG/DL (ref 70–99)
GLUCOSE BLD-MCNC: 96 MG/DL (ref 70–99)
HAV IGM SER QL: 1.6 MG/DL (ref 1.6–2.6)
HCT VFR BLD AUTO: 23 % (ref 35–48)
HGB BLD-MCNC: 7.3 G/DL (ref 12–16)
IMM GRANULOCYTES # BLD AUTO: 0.02 X10(3) UL (ref 0–1)
IMM GRANULOCYTES NFR BLD: 0.4 %
LYMPHOCYTES # BLD AUTO: 2.14 X10(3) UL (ref 1–4)
LYMPHOCYTES NFR BLD AUTO: 42.6 %
MCH RBC QN AUTO: 24.9 PG (ref 26–34)
MCHC RBC AUTO-ENTMCNC: 31.7 G/DL (ref 31–37)
MCV RBC AUTO: 78.5 FL (ref 80–100)
MONOCYTES # BLD AUTO: 0.45 X10(3) UL (ref 0.1–1)
MONOCYTES NFR BLD AUTO: 9 %
NEUTROPHILS # BLD AUTO: 2.16 X10 (3) UL (ref 1.5–7.7)
NEUTROPHILS # BLD AUTO: 2.16 X10(3) UL (ref 1.5–7.7)
NEUTROPHILS NFR BLD AUTO: 43 %
OSMOLALITY SERPL CALC.SUM OF ELEC: 298 MOSM/KG (ref 275–295)
PLATELET # BLD AUTO: 284 10(3)UL (ref 150–450)
POTASSIUM SERPL-SCNC: 3.7 MMOL/L (ref 3.5–5.1)
RBC # BLD AUTO: 2.93 X10(6)UL (ref 3.8–5.3)
SODIUM SERPL-SCNC: 137 MMOL/L (ref 136–145)
WBC # BLD AUTO: 5 X10(3) UL (ref 4–11)

## 2020-01-16 PROCEDURE — 85025 COMPLETE CBC W/AUTO DIFF WBC: CPT | Performed by: PHYSICIAN ASSISTANT

## 2020-01-16 PROCEDURE — 0TP98DZ REMOVAL OF INTRALUMINAL DEVICE FROM URETER, VIA NATURAL OR ARTIFICIAL OPENING ENDOSCOPIC: ICD-10-PCS | Performed by: UROLOGY

## 2020-01-16 PROCEDURE — BT141ZZ FLUOROSCOPY OF KIDNEYS, URETERS AND BLADDER USING LOW OSMOLAR CONTRAST: ICD-10-PCS | Performed by: UROLOGY

## 2020-01-16 PROCEDURE — 83735 ASSAY OF MAGNESIUM: CPT | Performed by: HOSPITALIST

## 2020-01-16 PROCEDURE — 99233 SBSQ HOSP IP/OBS HIGH 50: CPT | Performed by: INTERNAL MEDICINE

## 2020-01-16 PROCEDURE — 82962 GLUCOSE BLOOD TEST: CPT

## 2020-01-16 PROCEDURE — 80048 BASIC METABOLIC PNL TOTAL CA: CPT | Performed by: INTERNAL MEDICINE

## 2020-01-16 RX ORDER — NALOXONE HYDROCHLORIDE 0.4 MG/ML
80 INJECTION, SOLUTION INTRAMUSCULAR; INTRAVENOUS; SUBCUTANEOUS AS NEEDED
Status: DISCONTINUED | OUTPATIENT
Start: 2020-01-16 | End: 2020-01-16 | Stop reason: HOSPADM

## 2020-01-16 RX ORDER — DEXAMETHASONE SODIUM PHOSPHATE 4 MG/ML
4 VIAL (ML) INJECTION AS NEEDED
Status: DISCONTINUED | OUTPATIENT
Start: 2020-01-16 | End: 2020-01-16 | Stop reason: HOSPADM

## 2020-01-16 RX ORDER — MEPERIDINE HYDROCHLORIDE 25 MG/ML
12.5 INJECTION INTRAMUSCULAR; INTRAVENOUS; SUBCUTANEOUS AS NEEDED
Status: DISCONTINUED | OUTPATIENT
Start: 2020-01-16 | End: 2020-01-16 | Stop reason: HOSPADM

## 2020-01-16 RX ORDER — ONDANSETRON 2 MG/ML
4 INJECTION INTRAMUSCULAR; INTRAVENOUS AS NEEDED
Status: DISCONTINUED | OUTPATIENT
Start: 2020-01-16 | End: 2020-01-16 | Stop reason: HOSPADM

## 2020-01-16 RX ORDER — METOCLOPRAMIDE HYDROCHLORIDE 5 MG/ML
10 INJECTION INTRAMUSCULAR; INTRAVENOUS AS NEEDED
Status: DISCONTINUED | OUTPATIENT
Start: 2020-01-16 | End: 2020-01-16 | Stop reason: HOSPADM

## 2020-01-16 RX ORDER — DEXTROSE MONOHYDRATE 25 G/50ML
50 INJECTION, SOLUTION INTRAVENOUS
Status: DISCONTINUED | OUTPATIENT
Start: 2020-01-16 | End: 2020-01-16 | Stop reason: HOSPADM

## 2020-01-16 RX ORDER — INSULIN ASPART 100 [IU]/ML
INJECTION, SOLUTION INTRAVENOUS; SUBCUTANEOUS ONCE
Status: DISCONTINUED | OUTPATIENT
Start: 2020-01-16 | End: 2020-01-16 | Stop reason: HOSPADM

## 2020-01-16 RX ORDER — ECHINACEA PURPUREA EXTRACT 125 MG
1 TABLET ORAL
Status: DISCONTINUED | OUTPATIENT
Start: 2020-01-16 | End: 2020-01-22

## 2020-01-16 RX ORDER — HYDROMORPHONE HYDROCHLORIDE 1 MG/ML
0.4 INJECTION, SOLUTION INTRAMUSCULAR; INTRAVENOUS; SUBCUTANEOUS EVERY 5 MIN PRN
Status: DISCONTINUED | OUTPATIENT
Start: 2020-01-16 | End: 2020-01-16 | Stop reason: HOSPADM

## 2020-01-16 RX ORDER — SODIUM CHLORIDE, SODIUM LACTATE, POTASSIUM CHLORIDE, CALCIUM CHLORIDE 600; 310; 30; 20 MG/100ML; MG/100ML; MG/100ML; MG/100ML
INJECTION, SOLUTION INTRAVENOUS CONTINUOUS
Status: DISCONTINUED | OUTPATIENT
Start: 2020-01-16 | End: 2020-01-16 | Stop reason: HOSPADM

## 2020-01-16 RX ORDER — MIDAZOLAM HYDROCHLORIDE 1 MG/ML
1 INJECTION INTRAMUSCULAR; INTRAVENOUS EVERY 5 MIN PRN
Status: DISCONTINUED | OUTPATIENT
Start: 2020-01-16 | End: 2020-01-16 | Stop reason: HOSPADM

## 2020-01-16 RX ORDER — LABETALOL HYDROCHLORIDE 5 MG/ML
5 INJECTION, SOLUTION INTRAVENOUS EVERY 5 MIN PRN
Status: DISCONTINUED | OUTPATIENT
Start: 2020-01-16 | End: 2020-01-16 | Stop reason: HOSPADM

## 2020-01-16 RX ORDER — MIDAZOLAM HYDROCHLORIDE 1 MG/ML
INJECTION INTRAMUSCULAR; INTRAVENOUS AS NEEDED
Status: DISCONTINUED | OUTPATIENT
Start: 2020-01-16 | End: 2020-01-16 | Stop reason: SURG

## 2020-01-16 RX ORDER — LIDOCAINE HYDROCHLORIDE 10 MG/ML
INJECTION, SOLUTION EPIDURAL; INFILTRATION; INTRACAUDAL; PERINEURAL AS NEEDED
Status: DISCONTINUED | OUTPATIENT
Start: 2020-01-16 | End: 2020-01-16 | Stop reason: SURG

## 2020-01-16 RX ORDER — DEXTROSE MONOHYDRATE 25 G/50ML
50 INJECTION, SOLUTION INTRAVENOUS
Status: DISCONTINUED | OUTPATIENT
Start: 2020-01-16 | End: 2020-01-22

## 2020-01-16 RX ADMIN — LIDOCAINE HYDROCHLORIDE 50 MG: 10 INJECTION, SOLUTION EPIDURAL; INFILTRATION; INTRACAUDAL; PERINEURAL at 17:47:00

## 2020-01-16 RX ADMIN — MIDAZOLAM HYDROCHLORIDE 2 MG: 1 INJECTION INTRAMUSCULAR; INTRAVENOUS at 17:36:00

## 2020-01-16 RX ADMIN — SODIUM CHLORIDE, SODIUM LACTATE, POTASSIUM CHLORIDE, CALCIUM CHLORIDE: 600; 310; 30; 20 INJECTION, SOLUTION INTRAVENOUS at 18:21:00

## 2020-01-16 NOTE — PROGRESS NOTES
Assumed care in AM , albert catheter placed , w/ return of purulent urine , Language barrier though pleasant , cooperative , , worked w PT , remains up in chair appetite small though ate at all meals , meds w/ applesauce medicated for chronic discomfort Com

## 2020-01-16 NOTE — PLAN OF CARE
Consent obtained for 'cystourethroscopy, bilateral retrograde pyelogram, left ureteral stent removal, possible ureteroscopy, possible insertion of bilateral ureteral stents with Dr. Noble Meza' from patient's [address] [place] [address] @ [phone].

## 2020-01-16 NOTE — PLAN OF CARE
1930 received  Patient in handoff. Daughter at bedside. Farrell Olszewski the day RN talked with Caio Ferraro about possible surgical  procedure in am. No orders to consent yet. She is to be NPO after midnight. IV fluids and antibiotics as prescribed.  Language barrier

## 2020-01-16 NOTE — PROGRESS NOTES
[facility]  Cardiology Progress Note    Farazdebora TORO Isidrodel Patient Status:  Inpatient    1935 MRN [id number]   [facility] 4NW-A Attending Kerrin Cowden, MD   Hosp Day # 3 PCP Yesika Cardoso MD     Subjective:  Much more alert today and Acetate  400 mg Oral Daily   • melatonin  3 mg Oral Nightly   • mirtazapine  15 mg Oral Nightly   • Pantoprazole Sodium  40 mg Oral BID AC   • Sertraline HCl  150 mg Oral Daily     • lactated ringers 60 mL/hr at 01/16/20 1141       Assessment:  · Elevated Stratification:  -Transthoracic echo demonstrates normal systolic function with LVEF of 55 to 60% with no evidence of regional wall motion abnormalities.   -Echo alsp reveals a bicuspid aortic valve with moderately calcified leaflets and severe aortic steno

## 2020-01-16 NOTE — PROGRESS NOTES
[facility]                INFECTIOUS DISEASE PROGRESS NOTE    Tuan Vuong Patient Status:  Inpatient    1935 MRN [id number]   [facility] 4NW-A Attending China Solano MD   Hosp Day # 3 PCP Petros Alcantara MD     Antibiotics --   --   --     < > = values in this interval not displayed. No results found for: [facility] Encounter on 01/13/20   1.  BLOOD CULTURE     Status: None (Preliminary result)    Collection Time: 01/13/20  5:08 PM   Result Value R Adhesive capsulitis of left shoulder     OAB (overactive bladder)     Hypothyroidism     PE (pulmonary thromboembolism) (HCC)     Nonrheumatic aortic valve stenosis     Acute chest pain     Acute diverticulitis     Hyponatremia     Anemia     Hyperglycemi

## 2020-01-16 NOTE — PLAN OF CARE
A&Ox4, VSS. Patient complaining of persistent chest discomfort- MDs aware, workup in progress. Patient states pain is better today than yesterday. Ileostomy patent, draining well. NPO for procedure. Accucheck QID with Novolog sliding scale.  No hypo/hyper Educate and encourage patient/family in tolerated functional activity level and precautions during self-care     Outcome: Progressing     Problem: Impaired Functional Mobility  Goal: Achieve highest/safest level of mobility/gait  Description  Interventions and optimal renal function maintained  Description  INTERVENTIONS:  - Monitor labs and assess for signs and symptoms of volume excess or deficit  - Monitor intake, output and patient weight  - Monitor urine specific gravity, serum osmolarity and serum sodi

## 2020-01-16 NOTE — PROGRESS NOTES
[facility]  Urology Progress Note    Tuan Vuong Patient Status:  Inpatient    1935 MRN [id number]   [facility] 4NW-A Attending Regla Healy MD   Hosp Day # 3 PCP Lashell Johnston MD     Subjective:  Karen Tay is a(n 81 ye anesthesia (MI, blood clot, stroke, death), bladder/ureteral injury, use of nephrostomy tube, bleeding, infection/worsening infection, and ureteral stent related symptoms with patient's daughter at bedside and [facility] via phone who unders

## 2020-01-16 NOTE — PROGRESS NOTES
[facility]    Nephrology Progress Note    Tuan Vuong Attending:  Lalito Riggs MD       SUBJECTIVE:   Maral Mclaughlin is accompanied by her daughter this morning. Tuan is feeling about the same today.  She describes feeling achy all over, stuff Component Value Date     (H) 01/16/2020    BUN 53 (H) 01/16/2020    BUNCREA 16.0 01/16/2020    CREATSERUM 3.31 (H) 01/16/2020    ANIONGAP 6 01/16/2020    GFR 69 11/29/2016    GFRNAA 12 (L) 01/16/2020    GFRAA 14 (L) 01/16/2020    CA 8.0 (L) 01/16/ EPIUR None Seen 01/15/2020    BACUR Rare (A) 01/15/2020    CAOXUR Rare (A) 11/26/2019         IMAGING:     New studies reviewed.     MEDICATIONS:     glucose (DEX4) oral liquid 15 g, 15 g, Oral, Q15 Min PRN    Or  Glucose-Vitamin C (DEX-4) chewable tab 4 creatinine 4.88, improved to 3.31 this morning    #. CKD Stage 3B-4  - Baseline creatinine appears to range from 1.2-1.6 with frequent ANTONIO's    #.  ESBL E. Coli UTI/pyelonephritis  - On meropenem    #. Bilateral Hydronephrosis  - Urology following, procedur

## 2020-01-16 NOTE — PROGRESS NOTES
[facility] Hospitalist Progress Note     Tuan Vuong Patient Status:  Inpatient    1935 MRN [id number]   [facility] 4NW-A Attending Armen Major MD   Hosp Day # 3 PCP Deya Christine MD     CC: follow up    SUBJECTIVE:  Daughter repor 01/13/20  2202 01/16/20  1020 01/16/20  1350   PGLU 131* 141* 169* 102* 96       Imaging:        Meds:   Scheduled Medication:  • Insulin Aspart Pen  1-5 Units Subcutaneous TID CC and HS   • lidocaine-menthol  2 patch Transdermal Q24H   • Normal Saline Flu to renal  - mgmt of ANTONIO above     # Chest pressure  # Elevated troponin  - EKG without overt ischemic changes  - possible demand 2/2 above  - cards following  - FU reviewed    # Anemia, acute on chronic  - no signs of bleeding  - possibly reactive to acute

## 2020-01-16 NOTE — ANESTHESIA PREPROCEDURE EVALUATION
PRE-OP EVALUATION    Patient Name: Sandee Laguna    Pre-op Diagnosis: Ureteral calculi [N20.1]    Procedure(s):  CYSTOSCOPY, BILATERAL RETROGRADE, PYELOGRAM, REMOVAL OF LEFT URETERAL STENT, POSSIBLE URETEROSCOPY, POSSIBLE INSERTION OF BILATERAL URETERAL S Once  ferrous sulfate EC tab 325 mg, 325 mg, Oral, Daily with breakfast  Levothyroxine Sodium tab 125 mcg, 125 mcg, Oral, Before breakfast  Megestrol Acetate (MEGACE) 40 MG/ML oral suspension 400 mg, 400 mg, Oral, Daily  melatonin tab TABS 3 mg, 3 mg, Oral lidocaine 4 % External Patch, Place 1 patch onto the skin daily.  Apply to low back in am and remove at pm , Disp: , Rfl:   insulin glargine (LANTUS SOLOSTAR) 100 UNIT/ML Subcutaneous Solution Pen-injector, Inject 4 Units into the skin nightly., Disp: , R dysfunction. 2. Aortic valve: Transvalvular velocity was increased. There was moderate to     severe stenosis. Mild regurgitation. 3. Left atrium: The left atrial volume was mildly increased. ECG reviewed.   Exercise tolerance: good     MET: <=4  Unabl .0 01/16/2020     Lab Results   Component Value Date     01/16/2020    K 3.7 01/16/2020     01/16/2020    CO2 28.0 01/16/2020    BUN 53 (H) 01/16/2020    CREATSERUM 3.31 (H) 01/16/2020     (H) 01/16/2020    CA 8.0 (L) 01/16/202

## 2020-01-17 ENCOUNTER — APPOINTMENT (OUTPATIENT)
Dept: INTERVENTIONAL RADIOLOGY/VASCULAR | Facility: HOSPITAL | Age: 85
DRG: 682 | End: 2020-01-17
Attending: PHYSICIAN ASSISTANT
Payer: MEDICARE

## 2020-01-17 LAB
ANION GAP SERPL CALC-SCNC: 5 MMOL/L (ref 0–18)
BUN BLD-MCNC: 43 MG/DL (ref 7–18)
BUN/CREAT SERPL: 15.2 (ref 10–20)
CALCIUM BLD-MCNC: 9.1 MG/DL (ref 8.5–10.1)
CHLORIDE SERPL-SCNC: 106 MMOL/L (ref 98–112)
CO2 SERPL-SCNC: 26 MMOL/L (ref 21–32)
CREAT BLD-MCNC: 2.82 MG/DL (ref 0.55–1.02)
GLUCOSE BLD-MCNC: 105 MG/DL (ref 70–99)
GLUCOSE BLD-MCNC: 111 MG/DL (ref 70–99)
GLUCOSE BLD-MCNC: 116 MG/DL (ref 70–99)
GLUCOSE BLD-MCNC: 117 MG/DL (ref 70–99)
GLUCOSE BLD-MCNC: 97 MG/DL (ref 70–99)
OSMOLALITY SERPL CALC.SUM OF ELEC: 295 MOSM/KG (ref 275–295)
POTASSIUM SERPL-SCNC: 3.9 MMOL/L (ref 3.5–5.1)
SODIUM SERPL-SCNC: 137 MMOL/L (ref 136–145)

## 2020-01-17 PROCEDURE — 0T9030Z DRAINAGE OF RIGHT KIDNEY WITH DRAINAGE DEVICE, PERCUTANEOUS APPROACH: ICD-10-PCS | Performed by: RADIOLOGY

## 2020-01-17 PROCEDURE — 99152 MOD SED SAME PHYS/QHP 5/>YRS: CPT

## 2020-01-17 PROCEDURE — 82962 GLUCOSE BLOOD TEST: CPT

## 2020-01-17 PROCEDURE — 50432 PLMT NEPHROSTOMY CATHETER: CPT

## 2020-01-17 PROCEDURE — 99232 SBSQ HOSP IP/OBS MODERATE 35: CPT | Performed by: INTERNAL MEDICINE

## 2020-01-17 PROCEDURE — 80048 BASIC METABOLIC PNL TOTAL CA: CPT | Performed by: UROLOGY

## 2020-01-17 RX ORDER — MIDAZOLAM HYDROCHLORIDE 1 MG/ML
INJECTION INTRAMUSCULAR; INTRAVENOUS
Status: COMPLETED
Start: 2020-01-17 | End: 2020-01-17

## 2020-01-17 RX ORDER — LEVOFLOXACIN 5 MG/ML
INJECTION, SOLUTION INTRAVENOUS
Status: COMPLETED
Start: 2020-01-17 | End: 2020-01-17

## 2020-01-17 RX ORDER — LIDOCAINE HYDROCHLORIDE 10 MG/ML
INJECTION, SOLUTION INFILTRATION; PERINEURAL
Status: COMPLETED
Start: 2020-01-17 | End: 2020-01-17

## 2020-01-17 NOTE — PROGRESS NOTES
[facility]                INFECTIOUS DISEASE PROGRESS NOTE    Tuan Vuong Patient Status:  Inpatient    1935 MRN [id number]   [facility] 4NW-A Attending Lara Gutierrez MD   Hosp Day # 4 PCP Corrina Vargas MD     Antibiotics this interval not displayed. No results found for: [facility] Encounter on 01/13/20   1.  BLOOD CULTURE     Status: None (Preliminary result)    Collection Time: 01/13/20  5:08 PM   Result Value Ref Range    Blood Culture Result N shoulder     OAB (overactive bladder)     Hypothyroidism     PE (pulmonary thromboembolism) (HCC)     Nonrheumatic aortic valve stenosis     Acute chest pain     Acute diverticulitis     Hyponatremia     Anemia     Hyperglycemia     Abdominal pain, acute

## 2020-01-17 NOTE — PROCEDURES
[facility]  Procedure Note    Tuan Vuong Patient Status:  Inpatient    1935 MRN [id number]   [facility] Kalie Rosas [facility]   Hosp Day # 4 PCP Deya Christine MD     Procedure: R perc nephrostomy

## 2020-01-17 NOTE — PROGRESS NOTES
[facility]  Urology Progress Note    Tuan CORTEZ Yevgeniy Patient Status:  Inpatient    1935 MRN [id number]   [facility] 4NW-A Attending Glenn Perry MD   Hosp Day # 4 PCP Melissa Be MD     Subjective:  Matthew Segal is a(n) 81 ye this reason, no right sided stent could be placed.  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      s/p ex lap, subtotal colectomy, creation of end ileostomy, lysis of adhesions, left ureteral reimplant, left ureteral stent i

## 2020-01-17 NOTE — PLAN OF CARE
2100- Received pt back from cystoscopy. Alert and awake. Vital signs stable. ivf infusing. Ileostomy with watery stool. Niño catheter intact with clear yellow urine noted. NPO at midnight for possible IR intervention per Dr. Arnold Javier. Meds given per mar.  Bed

## 2020-01-17 NOTE — ANESTHESIA POSTPROCEDURE EVALUATION
[facility] Patient Status:  Inpatient   Age/Gender 81year old female MRN [id number]   Location [address] [facility] Attending Mini Vogel MD   Hosp Day # 3 PCP Juan Carlos Chandler MD       Anesthesia Post-op Note

## 2020-01-17 NOTE — PROGRESS NOTES
[facility]    Nephrology Progress Note    Tuan Vuong Attending:  Amaya Kilgore MD       SUBJECTIVE:   Monika Cueto has just returned from right sided perc neph tube placement. Her son Jose Guadalupe Mccarty is available on the phone.  She able to eat and  01/17/2020    GFR 69 11/29/2016    GFRNAA 15 (L) 01/17/2020    GFRAA 17 (L) 01/17/2020    CA 9.1 01/17/2020    OSMOCALC 295 01/17/2020    ALKPHO 123 01/13/2020    AST 17 01/13/2020    ALT 20 01/13/2020    BILT 0.2 01/13/2020    TP 8.7 (H) 01/13/2020    ALB glucose (DEX4) oral liquid 15 g, 15 g, Oral, Q15 Min PRN    Or  Glucose-Vitamin C (DEX-4) chewable tab 4 tablet, 4 tablet, Oral, Q15 Min PRN    Or  dextrose 50 % injection 50 mL, 50 mL, Intravenous, Q15 Min PRN    Or  glucose (DEX4) oral liquid 30 g, 3 Baseline creatinine appears to range from 1.2-1.6 with frequent ANTONIO's    #. ESBL E. Coli UTI/pyelonephritis  - On meropenem    #. Bilateral Hydronephrosis  - Urology following: L ureteral stent removed 1/16/2020; Right perc neph tube placed 1/17/2020    #.

## 2020-01-17 NOTE — PROGRESS NOTES
[facility] Cardiology Progress Note    Subjective:  Family at bedside translating. She has pain \"all over her body\". She wants to eat and get out of bed if possible.      Objective:  /78   Pulse 76   Temp 97.7 °F (36.5 °C) (Oral)   Resp 18   Ht 154.9 31.10 kg/m²      Gen: Alert and oriented  CV: RRR nl S1 S2  Pulm: CTA b/l  Ext: No CCE, 2+ distal pulse     Assessment and Plan:    81 year old female with PMHx sig for exploratory laparotomy, subtotal colectomy, creation of end ileostomy, extensive intra-

## 2020-01-17 NOTE — H&P
[facility]   History & Physical    Tuan Vuong Patient Status:  Inpatient    1935 MRN [id number]   Location [address] [facility] Attending Lawrence Tse MD   Hosp Day # 4 PCP Daniel Moran MD     Admitting Diagnosis:   Hydr Diabetes Son    • Hypertension Son        Allergies/Medications: Allergies:  No Known Allergies    Medications:  No current outpatient medications on file. Physical Exam & Review of Systems:   Physical Exam:    /78   Pulse 72   Temp 97.7 °F (36.

## 2020-01-17 NOTE — OPERATIVE REPORT
OPERATIVE NOTE    PATIENT NAME: Tuan Cox Row OF BIRTH: 8/8/1935  DATE OF SERVICE: 1/16/2020    SURGEON:  Agatha Mcclellan MD    ASSISTANT:  None    PREOPERATIVE DIAGNOSIS:  Prior left ureteral reimplant, new right hydronephrosis    POSTOPERATIVE DIAGNO The patient was brought to the operating room and placed in the supine position on the OR table. SCD's were applied and all pressure points were carefully padded. At this point, anesthesia was successfully induced.   The patient was then given the periope Please note that I was present for, and completed the entirety of this operation myself.     PLAN:  I discussed with the patient's son that she will likely require a right nephrostomy tube placement tomorrow by our interventional radiologists to decompress

## 2020-01-18 LAB
ANION GAP SERPL CALC-SCNC: 9 MMOL/L (ref 0–18)
BUN BLD-MCNC: 32 MG/DL (ref 7–18)
BUN/CREAT SERPL: 14.8 (ref 10–20)
CALCIUM BLD-MCNC: 8.8 MG/DL (ref 8.5–10.1)
CHLORIDE SERPL-SCNC: 101 MMOL/L (ref 98–112)
CO2 SERPL-SCNC: 25 MMOL/L (ref 21–32)
CREAT BLD-MCNC: 2.16 MG/DL (ref 0.55–1.02)
GLUCOSE BLD-MCNC: 103 MG/DL (ref 70–99)
GLUCOSE BLD-MCNC: 121 MG/DL (ref 70–99)
GLUCOSE BLD-MCNC: 127 MG/DL (ref 70–99)
GLUCOSE BLD-MCNC: 143 MG/DL (ref 70–99)
GLUCOSE BLD-MCNC: 92 MG/DL (ref 70–99)
OSMOLALITY SERPL CALC.SUM OF ELEC: 287 MOSM/KG (ref 275–295)
POTASSIUM SERPL-SCNC: 3.5 MMOL/L (ref 3.5–5.1)
SODIUM SERPL-SCNC: 135 MMOL/L (ref 136–145)

## 2020-01-18 PROCEDURE — 80048 BASIC METABOLIC PNL TOTAL CA: CPT | Performed by: UROLOGY

## 2020-01-18 PROCEDURE — 82962 GLUCOSE BLOOD TEST: CPT

## 2020-01-18 NOTE — PROGRESS NOTES
[facility] Hospitalist Progress Note     Tuan CORTEZ Yevgeniy Patient Status:  Inpatient    1935 MRN [id number]   [facility] 4NW-A Attending Angella oPpe MD   Hosp Day # 5 PCP Jessee Donato MD     CC: follow up    SUBJECTIVE:  Resting in bed.  P Meds:   Scheduled Medication:  • meropenem  500 mg Intravenous Q12H   • Insulin Aspart Pen  1-5 Units Subcutaneous TID CC and HS   • lidocaine-menthol  2 patch Transdermal Q24H   • Normal Saline Flush  10 mL Intravenous Q12H   • gabapentin  300 mg Oral blood-tinged output from NT  - monitor CBC  - hold AC for now - d/w urology     # Possible LLL PNA  - geo  - f/u sputum culture, BCx  - urine strep negative     # Hx DVT Dec 2019  # Remote PE  # Possible hypercoagulable state  - on eliquis as OP  - was on

## 2020-01-18 NOTE — PROGRESS NOTES
[facility] Hospitalist Progress Note     Tuan CORTEZ Yevgeniy Patient Status:  Inpatient    1935 MRN [id number]   [facility] 4NW-A Attending April Kim MD   Hosp Day # 4 PCP Dhara Ramon MD     CC: follow up    SUBJECTIVE:  SP PCN today 1/17/2020  CONCLUSION:  Technically successful right percutaneous nephrostomy tube placement.      Dictated by: Chani eRynoso MD on 1/17/2020 at 12:24     Approved by: Chani Reynoso MD on 1/17/2020 at 12:26          Xr Or - N/c    Result Date: 1/16/2020  CONCLUSI coli- Ucx now with ESBL- will consult ID for ongoing abx recs today  - UCx 1/13 growing E coli, await suscpetibilities  - geo for now      # Acidosis  # Hyperkalemia   - 2/2 ANTONIO  - renal consulted by ED  - sodium bicarb gtt, defer further IVFs to renal  -

## 2020-01-18 NOTE — PLAN OF CARE
Patient received this am alert, lying on left side, lungs diminished on room air. Abdomen soft, ostomy appliance functioning, albert catheter draining yellow urine, right side nephrostomy tube dressing CDI, draining sanguinous urine.  Temp 100.3, MD's aware,

## 2020-01-18 NOTE — CONSULTS
[facility] Pharmacy Note:  Renal Adjustment for meropenem (MERREM)    Garlon Galeazzi is a 81year old female who has been prescribed renally adjusted meropenem (MERREM) 500 mg every 24 hrs.   CrCl is estimated creatinine clearance is 14.9 mL/min (A) (based on SCr

## 2020-01-18 NOTE — PROGRESS NOTES
[facility]  Progress Note      Farazdebora TOOR Isidrodel Patient Status:  Inpatient    1935 MRN [id number]   [facility] 4NW-A Attending Nico Dye MD   Hosp Day # 5 PCP Karan Shelton MD       Subjective:   No complaints this AM    Object

## 2020-01-18 NOTE — PROGRESS NOTES
[facility]    Nephrology Progress Note    Tuan Vuong Attending:  Glenn Perry MD       SUBJECTIVE:   Matthew Segal has a poor appetite this morning.  Does not want to eat breakfast.     PHYSICAL EXAM:     Vital Signs: /63 (BP Location: 01/18/2020    GFRAA 24 (L) 01/18/2020    CA 8.8 01/18/2020    OSMOCALC 287 01/18/2020    ALKPHO 123 01/13/2020    AST 17 01/13/2020    ALT 20 01/13/2020    BILT 0.2 01/13/2020    TP 8.7 (H) 01/13/2020    ALB 2.8 (L) 01/13/2020    GLOBULIN 5.9 (H) 01/13/202 100 mL MBP, 500 mg, Intravenous, Q12H  glucose (DEX4) oral liquid 15 g, 15 g, Oral, Q15 Min PRN    Or  Glucose-Vitamin C (DEX-4) chewable tab 4 tablet, 4 tablet, Oral, Q15 Min PRN    Or  dextrose 50 % injection 50 mL, 50 mL, Intravenous, Q15 Min PRN    Or on exam and blood pressures have been normal to high. #. CKD Stage 3B-4  - Baseline creatinine appears to range from 1.2-1.6 with frequent ANTONIO's    #.  ESBL E. Coli UTI/pyelonephritis  - On meropenem    #. Bilateral Hydronephrosis  - Urology following:

## 2020-01-18 NOTE — PLAN OF CARE
Right sided nephrostomy tube placement in IR this morning- patient tolerated procedure well, nephrostomy draining yellow urine. No blood noted. Site looks good, dressing C/D/I. Niño patent, draining yellow urine. Ileostomy draining liquid stool.    Up to

## 2020-01-18 NOTE — PROGRESS NOTES
[facility]  Urology Progress Note    Tuan CORTEZ Yevgeniy Patient Status:  Inpatient    1935 MRN [id number]   [facility] 4NW-A Attending Desi Baugh MD   Hosp Day # 5 PCP Roma Rosas MD     Subjective:  Zuleyma Blackmon is a(n 81 ye ending on retrograde.  For this reason, no right sided stent could be placed. 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      S/p right NT placement 1/17/20     s/p ex lap, subtotal colectomy, creation of end ileostomy, lysis

## 2020-01-19 LAB
ANION GAP SERPL CALC-SCNC: 10 MMOL/L (ref 0–18)
BUN BLD-MCNC: 29 MG/DL (ref 7–18)
BUN/CREAT SERPL: 15.1 (ref 10–20)
CALCIUM BLD-MCNC: 8.5 MG/DL (ref 8.5–10.1)
CHLORIDE SERPL-SCNC: 101 MMOL/L (ref 98–112)
CO2 SERPL-SCNC: 23 MMOL/L (ref 21–32)
CREAT BLD-MCNC: 1.92 MG/DL (ref 0.55–1.02)
GLUCOSE BLD-MCNC: 106 MG/DL (ref 70–99)
GLUCOSE BLD-MCNC: 111 MG/DL (ref 70–99)
GLUCOSE BLD-MCNC: 114 MG/DL (ref 70–99)
GLUCOSE BLD-MCNC: 121 MG/DL (ref 70–99)
GLUCOSE BLD-MCNC: 181 MG/DL (ref 70–99)
OSMOLALITY SERPL CALC.SUM OF ELEC: 285 MOSM/KG (ref 275–295)
POTASSIUM SERPL-SCNC: 3.1 MMOL/L (ref 3.5–5.1)
SODIUM SERPL-SCNC: 134 MMOL/L (ref 136–145)

## 2020-01-19 PROCEDURE — 82962 GLUCOSE BLOOD TEST: CPT

## 2020-01-19 PROCEDURE — 80048 BASIC METABOLIC PNL TOTAL CA: CPT | Performed by: UROLOGY

## 2020-01-19 RX ORDER — POTASSIUM CHLORIDE 1.5 G/1.77G
40 POWDER, FOR SOLUTION ORAL ONCE
Status: COMPLETED | OUTPATIENT
Start: 2020-01-19 | End: 2020-01-19

## 2020-01-19 NOTE — PROGRESS NOTES
[facility]    Nephrology Progress Note    Tuan Vuong Attending:  Glenn Perry MD       SUBJECTIVE:   Matthew Segal is asking for water this morning. PHYSICAL EXAM:     Vital Signs: /69 (BP Location: Right arm)   Pulse 74   Temp 99. 01/19/2020    CA 8.5 01/19/2020    OSMOCALC 285 01/19/2020    ALKPHO 123 01/13/2020    AST 17 01/13/2020    ALT 20 01/13/2020    BILT 0.2 01/13/2020    TP 8.7 (H) 01/13/2020    ALB 2.8 (L) 01/13/2020    GLOBULIN 5.9 (H) 01/13/2020    AGRATIO 1.6 04/27/2016 Intravenous, Q12H  glucose (DEX4) oral liquid 15 g, 15 g, Oral, Q15 Min PRN    Or  Glucose-Vitamin C (DEX-4) chewable tab 4 tablet, 4 tablet, Oral, Q15 Min PRN    Or  dextrose 50 % injection 50 mL, 50 mL, Intravenous, Q15 Min PRN    Or  glucose (DEX4) oral ANTONIO's    #. ESBL E. Coli UTI/pyelonephritis  - On meropenem    #. Bilateral Hydronephrosis  - Urology following: L ureteral stent removed 1/16/2020; Right perc neph tube placed 1/17/2020    #. Severe aortic stenosis with Grade 1 diastolic dysfunction.     #

## 2020-01-19 NOTE — PLAN OF CARE
Patient received this am alert and oriented x ?, afebrile resting in bed, lungs clear, diminished, on room air, occasional congested cough, abdomen soft, bowel sounds present, ostomy functioning, appliance CDI, poor appetite this am, consult to dietary who

## 2020-01-19 NOTE — PROGRESS NOTES
[facility] Hospitalist Progress Note     Tuan Vuong Patient Status:  Inpatient    1935 MRN [id number]   [facility] 4NW-A Attending Ivonne Chua MD   Hosp Day # 6 PCP Dhara Ramon MD     CC: follow up    SUBJECTIVE:  SILAS overnight  Al lidocaine-menthol  2 patch Transdermal Q24H   • Normal Saline Flush  10 mL Intravenous Q12H   • gabapentin  300 mg Oral Daily   • ferrous sulfate  325 mg Oral Daily with breakfast   • Levothyroxine Sodium  125 mcg Oral Before breakfast   • Megestrol Acetat hep gtt, held d/t HgB drop  - d/w  1/18 - hold off resuming AC d/t blood-tinged output from NT  - resume AC when ok w/      # HTN  - no longer on meds     # DMII  - hold orals  - SQ SSI, accuchecks     # Hypothyroidism  - synthroid     # Depression  -

## 2020-01-19 NOTE — PROGRESS NOTES
[facility] Urology Progress Note    Tuan Vuong Patient Status:  Inpatient    1935 MRN [id number]   [facility] 4NW-A Attending Sapna Brown MD   Hosp Day # 6 PCP Kimi Maki MD     Subjective:  Tuan Vuong is a(n) 8 stenosis     Acute chest pain     Acute diverticulitis     Hyponatremia     Anemia     Hyperglycemia     Abdominal pain, acute     Abdominal distension     Fecal impaction of colon (HCC)     Intestinal obstruction, unspecified cause, unspecified whether pa stabilizes  3. Will follow      Discussed with GARY.     [place], [address] [place] [address] Urology    1/19/2020  9:58 AM

## 2020-01-19 NOTE — DIETARY NOTE
[facility]   CLINICAL NUTRITION    Tuan Vuong     Admitting diagnosis:  Hyperkalemia [E87.5]  Elevated troponin [R79.89]  Acute renal failure, unspecified acute renal failure type ([address] [N17.9]    Ht: 154.9 cm (5' 1\")  Wt: 77.2 kg (170 lb 1.6 oz)

## 2020-01-20 LAB
ANION GAP SERPL CALC-SCNC: 9 MMOL/L (ref 0–18)
BUN BLD-MCNC: 32 MG/DL (ref 7–18)
BUN/CREAT SERPL: 19.4 (ref 10–20)
CALCIUM BLD-MCNC: 8.2 MG/DL (ref 8.5–10.1)
CHLORIDE SERPL-SCNC: 101 MMOL/L (ref 98–112)
CO2 SERPL-SCNC: 25 MMOL/L (ref 21–32)
CREAT BLD-MCNC: 1.65 MG/DL (ref 0.55–1.02)
DEPRECATED RDW RBC AUTO: 44.4 FL (ref 35.1–46.3)
ERYTHROCYTE [DISTWIDTH] IN BLOOD BY AUTOMATED COUNT: 15.6 % (ref 11–15)
GLUCOSE BLD-MCNC: 101 MG/DL (ref 70–99)
GLUCOSE BLD-MCNC: 102 MG/DL (ref 70–99)
GLUCOSE BLD-MCNC: 108 MG/DL (ref 70–99)
GLUCOSE BLD-MCNC: 136 MG/DL (ref 70–99)
GLUCOSE BLD-MCNC: 166 MG/DL (ref 70–99)
GLUCOSE BLD-MCNC: 182 MG/DL (ref 70–99)
HCT VFR BLD AUTO: 24.7 % (ref 35–48)
HGB BLD-MCNC: 7.8 G/DL (ref 12–16)
MCH RBC QN AUTO: 24.8 PG (ref 26–34)
MCHC RBC AUTO-ENTMCNC: 31.6 G/DL (ref 31–37)
MCV RBC AUTO: 78.7 FL (ref 80–100)
OSMOLALITY SERPL CALC.SUM OF ELEC: 287 MOSM/KG (ref 275–295)
PLATELET # BLD AUTO: 241 10(3)UL (ref 150–450)
POTASSIUM SERPL-SCNC: 3.1 MMOL/L (ref 3.5–5.1)
RBC # BLD AUTO: 3.14 X10(6)UL (ref 3.8–5.3)
SODIUM SERPL-SCNC: 135 MMOL/L (ref 136–145)
WBC # BLD AUTO: 5.6 X10(3) UL (ref 4–11)

## 2020-01-20 PROCEDURE — 87070 CULTURE OTHR SPECIMN AEROBIC: CPT | Performed by: INTERNAL MEDICINE

## 2020-01-20 PROCEDURE — 85027 COMPLETE CBC AUTOMATED: CPT | Performed by: INTERNAL MEDICINE

## 2020-01-20 PROCEDURE — 80048 BASIC METABOLIC PNL TOTAL CA: CPT | Performed by: UROLOGY

## 2020-01-20 PROCEDURE — 87205 SMEAR GRAM STAIN: CPT | Performed by: INTERNAL MEDICINE

## 2020-01-20 PROCEDURE — 82962 GLUCOSE BLOOD TEST: CPT

## 2020-01-20 PROCEDURE — 99232 SBSQ HOSP IP/OBS MODERATE 35: CPT | Performed by: NURSE PRACTITIONER

## 2020-01-20 RX ORDER — POTASSIUM CHLORIDE 1.5 G/1.77G
40 POWDER, FOR SOLUTION ORAL ONCE
Status: COMPLETED | OUTPATIENT
Start: 2020-01-20 | End: 2020-01-20

## 2020-01-20 NOTE — PROGRESS NOTES
[facility]    Nephrology Progress Note    Tuan Vuong Attending:  Javon Larios MD     Cc: ANTONIO    SUBJECTIVE     No complaints  Noting that her bag is filling up    PHYSICAL EXAM   Vital signs: /58 (BP Location: Right arm)   Pulse 74   Te injection 4 mg, 4 mg, Intravenous, Q6H PRN  ferrous sulfate EC tab 325 mg, 325 mg, Oral, Daily with breakfast  Levothyroxine Sodium tab 125 mcg, 125 mcg, Oral, Before breakfast  Megestrol Acetate (MEGACE) 40 MG/ML oral suspension 400 mg, 400 mg, Oral, Rosenda Hydronephrosis  - Urology following: L ureteral stent removed 1/16/2020; Right perc neph tube placed 1/17/2020     #. Severe aortic stenosis with Grade 1 diastolic dysfunction.     #. Anemia  - Ferritin 153.1, Iron Sat 5%  - Avoiding IV iron in the setting

## 2020-01-20 NOTE — PROGRESS NOTES
[facility]  Cardiology Progress Note    uTan CORTEZ Yevgeniy Patient Status:  Inpatient    1935 MRN [id number]   [facility] 4NW-A Attending Lawrence Tse MD   Hosp Day # 7 PCP Daniel Moran MD     Subjective:  No cardiac complaints with breakfast   • Levothyroxine Sodium  125 mcg Oral Before breakfast   • Megestrol Acetate  400 mg Oral Daily   • melatonin  3 mg Oral Nightly   • mirtazapine  15 mg Oral Nightly   • Pantoprazole Sodium  40 mg Oral BID AC   • Sertraline HCl  150 mg Oral

## 2020-01-20 NOTE — PLAN OF CARE
Remains stable on room air. Right nephrostomy tube patent, flushed easily with bloody drainage noted. Niño patent with clear yellow urine. Vital signs stable. Repositioned for comfort through the night. Slept well.    Problem: GENITOURINARY - ADULT  Goal:

## 2020-01-20 NOTE — PROGRESS NOTES
[facility]  Urology Progress Note    Tuan CORTEZ Yevgeniy Patient Status:  Inpatient    1935 MRN [id number]   [facility] 4NW-A Attending Elicia Sweeney MD   Hosp Day # 7 PCP Denisa Dos Santos MD     Subjective:  Honorio Garces is a(n) 81 ye using a 0.035 wire. This coiled in the same area as noted to be blind ending on retrograde.  For this reason, no right sided stent could be placed.  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      S/p right NT placement 1/17/

## 2020-01-20 NOTE — PLAN OF CARE
Problem: Diabetes/Glucose Control  Goal: Glucose maintained within prescribed range  Description  INTERVENTIONS:  - Monitor Blood Glucose as ordered  - Assess for signs and symptoms of hyperglycemia and hypoglycemia  - Administer ordered medications to m signs and symptoms of hyperglycemia and hypoglycemia  - Administer ordered medications to maintain glucose within target range  - Assess barriers to adequate nutritional intake and initiate nutrition consult as needed  - Instruct patient on self management monitor.

## 2020-01-20 NOTE — PROGRESS NOTES
[facility] Hospitalist Progress Note     Tuan Vuong Patient Status:  Inpatient    1935 MRN [id number]   [facility] 4NW-A Attending James Cross MD   Hosp Day # 7 PCP Omid Toscano MD     CC: follow up    SUBJECTIVE:  SILAS overnight  NT 500 mg Intravenous Q12H   • Insulin Aspart Pen  1-5 Units Subcutaneous TID CC and HS   • lidocaine-menthol  2 patch Transdermal Q24H   • Normal Saline Flush  10 mL Intravenous Q12H   • gabapentin  300 mg Oral Daily   • ferrous sulfate  325 mg Oral Daily wi blood-tinged output from NT- d/w urology 1/18      # Possible LLL PNA  - geo  - f/u sputum culture, BCx  - urine strep negative     # Hx DVT Dec 2019  # Remote PE  # Possible hypercoagulable state  - on eliquis as OP  - was on hep gtt, held d/t HgB drop a

## 2020-01-20 NOTE — CM/SW NOTE
Sent updated clinicals to [facility] via [facility] [address].

## 2020-01-20 NOTE — PROGRESS NOTES
[facility]                INFECTIOUS DISEASE PROGRESS NOTE    Tuan Vuong Patient Status:  Inpatient    1935 MRN [id number]   [facility] 4NW-A Attending Chip Vance MD   Hosp Day # 7 PCP Kendra Padilla MD     Antibiotics Collection Time: 01/13/20  5:08 PM   Result Value Ref Range    Blood Culture Result No Growth 5 Days N/A   2. URINE CULTURE, ROUTINE     Status: Abnormal    Collection Time: 01/13/20  3:51 PM   Result Value Ref Range    Urine Culture >100,000 CFU/ML Escher diverticulitis     Hyponatremia     Anemia     Hyperglycemia     Abdominal pain, acute     Abdominal distension     Fecal impaction of colon (HCC)     Intestinal obstruction, unspecified cause, unspecified whether partial or complete (HCC)     Lactic acido

## 2020-01-21 LAB
ALBUMIN SERPL-MCNC: 2.4 G/DL (ref 3.4–5)
ANION GAP SERPL CALC-SCNC: 9 MMOL/L (ref 0–18)
BUN BLD-MCNC: 24 MG/DL (ref 7–18)
BUN/CREAT SERPL: 18.3 (ref 10–20)
CALCIUM BLD-MCNC: 8.4 MG/DL (ref 8.5–10.1)
CHLORIDE SERPL-SCNC: 101 MMOL/L (ref 98–112)
CO2 SERPL-SCNC: 23 MMOL/L (ref 21–32)
CREAT BLD-MCNC: 1.31 MG/DL (ref 0.55–1.02)
GLUCOSE BLD-MCNC: 123 MG/DL (ref 70–99)
GLUCOSE BLD-MCNC: 126 MG/DL (ref 70–99)
GLUCOSE BLD-MCNC: 132 MG/DL (ref 70–99)
GLUCOSE BLD-MCNC: 147 MG/DL (ref 70–99)
GLUCOSE BLD-MCNC: 148 MG/DL (ref 70–99)
HAV IGM SER QL: 1.1 MG/DL (ref 1.6–2.6)
OSMOLALITY SERPL CALC.SUM OF ELEC: 282 MOSM/KG (ref 275–295)
PHOSPHATE SERPL-MCNC: 2.2 MG/DL (ref 2.5–4.9)
POTASSIUM SERPL-SCNC: 3.3 MMOL/L (ref 3.5–5.1)
SODIUM SERPL-SCNC: 133 MMOL/L (ref 136–145)

## 2020-01-21 PROCEDURE — 82962 GLUCOSE BLOOD TEST: CPT

## 2020-01-21 PROCEDURE — 97530 THERAPEUTIC ACTIVITIES: CPT

## 2020-01-21 PROCEDURE — 80069 RENAL FUNCTION PANEL: CPT | Performed by: INTERNAL MEDICINE

## 2020-01-21 PROCEDURE — 83735 ASSAY OF MAGNESIUM: CPT | Performed by: INTERNAL MEDICINE

## 2020-01-21 RX ORDER — POTASSIUM CHLORIDE 20 MEQ/1
20 TABLET, EXTENDED RELEASE ORAL ONCE
Status: COMPLETED | OUTPATIENT
Start: 2020-01-21 | End: 2020-01-21

## 2020-01-21 NOTE — PROGRESS NOTES
[facility]  Progress Note      Farazdebora TORO Isidrodel Patient Status:  Inpatient    1935 MRN [id number]   [facility] 4NW-A Attending Davide Jarquin MD   Hosp Day # 7 PCP Junito Nixon MD       Subjective:   Resting in bed    Objective:

## 2020-01-21 NOTE — PROGRESS NOTES
[facility]                INFECTIOUS DISEASE PROGRESS NOTE    Tuan Vuong Patient Status:  Inpatient    1935 MRN [id number]   [facility] 4NW-A Attending China Solano MD   Hosp Day # 8 PCP Petros Alcantara MD     Antibiotics Susceptibility    Escherichia coli  ESBL Pos -  (no method available)     Cefazolin >=64 Resistant      Cefepime  Resistant      Ceftazidime  Resistant      Ceftriaxone >=64 Resistant      Ciprofloxacin >=4 Resistant      Gentamicin* <=1 Sensitive planning and goals of care     Palliative care encounter     Malnutrition [facility])     Acute renal insufficiency     ANTONIO (acute kidney injury) ([facility] [address]     Urinary tract infection without hematuria     Urinary tract infection without hematuria, site unspecified

## 2020-01-21 NOTE — PROGRESS NOTES
[facility]  Urology Progress Note    Farazdebora TORO Yevgeniy Patient Status:  Inpatient    1935 MRN [id number]   [facility] 4NW-A Attending Kalie Rosas MD   Hosp Day # 8 PCP Deya Christine MD     Subjective:  Iftikhar Garcia is a(n) 81 ye wire. This coiled in the same area as noted to be blind ending on retrograde.  For this reason, no right sided stent could be placed.  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      S/p right NT placement 1/17/20  -draining

## 2020-01-21 NOTE — OCCUPATIONAL THERAPY NOTE
OCCUPATIONAL THERAPY TREATMENT NOTE - INPATIENT     Room Number: 430/430-A  Session: 1/5   Number of Visits to Meet Established Goals: 5    Presenting Problem: renal failure    History related to current admission: Pt admitted 1/13/2020 for Hyperkalemia [E at [facility] Left 11/12/2019    Performed by Joao Mcclellan MD at [address]  \"[place]\"    Patient self-stated goal is not stated    OBJECTIVE  Precautions: Bed/chair alarm;  needed    [address] addressed;SCDs in place    ASSESSMENT   Pt seen this day for transfer training to facilitate IND with toilet transfers. Pt continues to require supine > sit MOD A x2 and sit > stand MOD A.  The pt continues to funciton below previous functional level and wo

## 2020-01-21 NOTE — PROGRESS NOTES
[facility] Hospitalist Progress Note     Tuan Vuong Patient Status:  Inpatient    1935 MRN [id number]   [facility] 4NW-A Attending Oz Martin MD   Hosp Day # 8 PCP Alexander Alejandra MD     CC: follow up    SUBJECTIVE:  Resting in bed  A Once   • meropenem  500 mg Intravenous Q12H   • Insulin Aspart Pen  1-5 Units Subcutaneous TID CC and HS   • lidocaine-menthol  2 patch Transdermal Q24H   • Normal Saline Flush  10 mL Intravenous Q12H   • gabapentin  300 mg Oral Daily   • ferrous sulfate monitor CBC     # Possible LLL PNA  - geo  - f/u sputum culture, BCx  - urine strep negative     # Hx DVT Dec 2019  # Remote PE  # Possible hypercoagulable state  - on eliquis as OP  - was on hep gtt, held d/t HgB drop and then with blood-tinged OP from N

## 2020-01-21 NOTE — CM/SW NOTE
Care Progression Note:  Active Acute Medical Issue: Acute renal failure, unspecified acute renal failure type [facility])   Other Contributing Medical Factors/Dx. : LLL possible pneumonia  Length of stay: 8  Avoidable Delays:   Discharge Barriers:   Expected disc (4) walks frequently

## 2020-01-21 NOTE — PHYSICAL THERAPY NOTE
PHYSICAL THERAPY TREATMENT NOTE - INPATIENT    Room Number: 430/430-A     Session: 1   Number of Visits to Meet Established Goals: 5    Presenting Problem: abnormal labs    Problem List  Principal Problem:    Acute renal failure, unspecified acute renal f RESTRICTION  Weight Bearing Restriction: None                PAIN ASSESSMENT              BALANCE                                                                                                                     Static Sitting: Fair +  Dynamic Sitting: F agreeable to therapy. Pt remains well below her baseline level of function and would benefit from skilled inpt PT followed by [facility].     DISCHARGE RECOMMENDATIONS  PT Discharge Recommendations: Sub-acute rehabilitation     PLAN  PT Treatment Plan: Bed mobilit

## 2020-01-21 NOTE — PROGRESS NOTES
[facility]    Nephrology Progress Note    Tuan Vuong Attending:  Ivonne Chua MD     Cc: ANTONIO    SUBJECTIVE     No complaints  Po intake not great this am per RN    PHYSICAL EXAM   Vital signs: /77 (BP Location: Right arm)   Pulse 78   T HCl (ZOFRAN) injection 4 mg, 4 mg, Intravenous, Q6H PRN  ferrous sulfate EC tab 325 mg, 325 mg, Oral, Daily with breakfast  Levothyroxine Sodium tab 125 mcg, 125 mcg, Oral, Before breakfast  Megestrol Acetate (MEGACE) 40 MG/ML oral suspension 400 mg, 400 m #Hypophosphatemia  -- replete and monitor     #Hyponatremia  -- mild. Monitor      #.  ESBL E. Coli UTI/pyelonephritis  - abx per ID     #. Bilateral Hydronephrosis  - Urology following: L ureteral stent removed 1/16/2020; Right perc neph tube placed 1/

## 2020-01-21 NOTE — PLAN OF CARE
Pt aox4. VSS. All evening meds administered per MAR. No insulin coverage needed this shift. Lidocaine patch removed. IV abx per MAR. NS midline flush performed. Nephrostomy bag draining blood tinged urine. 10cc flush and irrigation performed.  Niño drainin

## 2020-01-22 VITALS
BODY MASS INDEX: 30.66 KG/M2 | WEIGHT: 162.38 LBS | TEMPERATURE: 99 F | HEIGHT: 61 IN | SYSTOLIC BLOOD PRESSURE: 137 MMHG | RESPIRATION RATE: 16 BRPM | DIASTOLIC BLOOD PRESSURE: 76 MMHG | OXYGEN SATURATION: 90 % | HEART RATE: 76 BPM

## 2020-01-22 LAB
ALBUMIN SERPL-MCNC: 2.4 G/DL (ref 3.4–5)
ANION GAP SERPL CALC-SCNC: 9 MMOL/L (ref 0–18)
BUN BLD-MCNC: 20 MG/DL (ref 7–18)
BUN/CREAT SERPL: 17.2 (ref 10–20)
CALCIUM BLD-MCNC: 8.7 MG/DL (ref 8.5–10.1)
CHLORIDE SERPL-SCNC: 101 MMOL/L (ref 98–112)
CO2 SERPL-SCNC: 22 MMOL/L (ref 21–32)
CREAT BLD-MCNC: 1.16 MG/DL (ref 0.55–1.02)
GLUCOSE BLD-MCNC: 108 MG/DL (ref 70–99)
GLUCOSE BLD-MCNC: 135 MG/DL (ref 70–99)
GLUCOSE BLD-MCNC: 135 MG/DL (ref 70–99)
GLUCOSE BLD-MCNC: 163 MG/DL (ref 70–99)
HAV IGM SER QL: 2 MG/DL (ref 1.6–2.6)
OSMOLALITY SERPL CALC.SUM OF ELEC: 279 MOSM/KG (ref 275–295)
PHOSPHATE SERPL-MCNC: 2.1 MG/DL (ref 2.5–4.9)
POTASSIUM SERPL-SCNC: 3.5 MMOL/L (ref 3.5–5.1)
POTASSIUM SERPL-SCNC: 4.5 MMOL/L (ref 3.5–5.1)
SODIUM SERPL-SCNC: 132 MMOL/L (ref 136–145)

## 2020-01-22 PROCEDURE — 83735 ASSAY OF MAGNESIUM: CPT | Performed by: INTERNAL MEDICINE

## 2020-01-22 PROCEDURE — 82962 GLUCOSE BLOOD TEST: CPT

## 2020-01-22 PROCEDURE — 84132 ASSAY OF SERUM POTASSIUM: CPT | Performed by: INTERNAL MEDICINE

## 2020-01-22 PROCEDURE — 80069 RENAL FUNCTION PANEL: CPT | Performed by: INTERNAL MEDICINE

## 2020-01-22 RX ORDER — POTASSIUM CHLORIDE 20 MEQ/1
40 TABLET, EXTENDED RELEASE ORAL EVERY 4 HOURS
Status: COMPLETED | OUTPATIENT
Start: 2020-01-22 | End: 2020-01-22

## 2020-01-22 NOTE — CM/SW NOTE
01/22/20 1400   Discharge disposition   Expected discharge disposition Skilled Nurs   Discharge transportation Anders Cevallos   BLS set up for 530pm. Awaiting final dc order. Rn to inform family of dc and call report.  Marko Reason aware pt will dc to facili

## 2020-01-22 NOTE — PHYSICAL THERAPY NOTE
Attempted to see Pt this M - RN aware of attempt. Pt not willing to participate in therapy even with max encouragement. Will f/u later today if time permits, after all other patients are attempted per tentative schedule. No deformity or limitation of movement

## 2020-01-22 NOTE — PLAN OF CARE
Assumed care @ 0730. Patient denies discomfort. Patient has productive cough, lungs sound diminished. Nephrostomy draining clear yellow urine, albert catheter draining clear yellow urine. Patient afebrile. Patient has poor oral intake.  Patient's vital signs

## 2020-01-22 NOTE — DISCHARGE SUMMARY
General Medicine Discharge Summary     Patient ID:  Angelique   81year old  8/8/1935    Admit date: 1/13/2020    Discharge date and time: 1/22/20    Attending Physician: Barb Parada DO improved     # Hx hydro s/p ureteral stent  - CT AP 1/8 demonstrates b/l hydro with L ureteral stent in appropriate position  - s/p cystoscopy, bilateral RGPG, left ureteral stent removal 1/16/20  - s/p cysto and NT placement 1/17/20  - mgmt per urology    daily.    ondansetron 4 MG Oral Tablet Dispersible  Take 4 mg by mouth 3 (three) times daily before meals. acetaminophen (TYLENOL EXTRA STRENGTH) 500 MG Oral Tab  Take 500 mg by mouth daily as needed for Pain.     Sertraline HCl 100 MG Oral Tab  Take 1.5 650 MG Oral Tab          Activity: activity as tolerated  Diet: cardiac diet  Wound Care: as directed  Code Status: Full Code      Exam on day of discharge:      01/22/20  1100   BP: 137/76   Pulse: 76   Resp: 16   Temp: 98.6 °F (37 °C)       General: no a

## 2020-01-22 NOTE — PROGRESS NOTES
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  For this reason, no right sided stent could be placed.  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      S/p right NT placement 1/17/20  -draining well, urine yellow today

## 2020-01-22 NOTE — PLAN OF CARE
Resting in bed,awake & alert. Remains with poor appetite but drinking lots of water & likes drinking ensure. Food re offered during meals. On megace supplement. Nephrostomy intacr & draining w/ light yellow output. Ileostomy care rendered.  Remains on purewi

## 2020-01-22 NOTE — PROGRESS NOTES
[facility]    Nephrology Progress Note    Tuan Vuong Attending:  Ivonne Chua MD     Cc: ANTONIO    SUBJECTIVE     No complaints    PHYSICAL EXAM   Vital signs: /76 (BP Location: Right arm)   Pulse 76   Temp 98.6 °F (37 °C) (Oral)   Resp 16 PRN  ondansetron HCl (ZOFRAN) injection 4 mg, 4 mg, Intravenous, Q6H PRN  ferrous sulfate EC tab 325 mg, 325 mg, Oral, Daily with breakfast  Levothyroxine Sodium tab 125 mcg, 125 mcg, Oral, Before breakfast  Megestrol Acetate (MEGACE) 40 MG/ML oral suspens #Hypophosphatemia  -- replete and monitor     #Hyponatremia  -- mild. Monitor . Encourage po intake  -- order BMP in 1 week after discharge, f/u with Dr. Darline Villavicencio in 2-3 weeks     #.  ESBL E. Coli UTI/pyelonephritis  - abx per ID     #. Bilateral Hydronephr

## 2020-01-22 NOTE — PROGRESS NOTES
[facility]                INFECTIOUS DISEASE PROGRESS NOTE    Tuan TORO Yevgeniy Patient Status:  Inpatient    1935 MRN [id number]   [facility] 4NW-A Attending Alexis Sibley MD   Hosp Day # 9 PCP Beverly Leo MD     Antibiotics coli  ESBL Pos (A) N/A       Susceptibility    Escherichia coli  ESBL Pos -  (no method available)     Cefazolin >=64 Resistant      Cefepime  Resistant      Ceftazidime  Resistant      Ceftriaxone >=64 Resistant      Ciprofloxacin >=4 Resistant      Genta regarding advance care planning and goals of care     Palliative care encounter     Malnutrition [facility])     Acute renal insufficiency     ANTONIO (acute kidney injury) ([facility] [address]     Urinary tract infection without hematuria     Urinary tract infection without hemat

## 2020-01-23 ENCOUNTER — NURSE ONLY (OUTPATIENT)
Dept: LAB | Age: 85
End: 2020-01-23
Attending: FAMILY MEDICINE
Payer: MEDICARE

## 2020-01-23 DIAGNOSIS — R53.1 WEAKNESS: Primary | ICD-10-CM

## 2020-01-23 LAB
ALBUMIN SERPL-MCNC: 2.4 G/DL (ref 3.4–5)
ALBUMIN/GLOB SERPL: 0.4 {RATIO} (ref 1–2)
ALP LIVER SERPL-CCNC: 84 U/L (ref 55–142)
ALT SERPL-CCNC: 29 U/L (ref 13–56)
ANION GAP SERPL CALC-SCNC: 10 MMOL/L (ref 0–18)
AST SERPL-CCNC: 29 U/L (ref 15–37)
BASOPHILS # BLD AUTO: 0.04 X10(3) UL (ref 0–0.2)
BASOPHILS NFR BLD AUTO: 0.7 %
BILIRUB SERPL-MCNC: 0.4 MG/DL (ref 0.1–2)
BUN BLD-MCNC: 20 MG/DL (ref 7–18)
BUN/CREAT SERPL: 19.8 (ref 10–20)
CALCIUM BLD-MCNC: 8.8 MG/DL (ref 8.5–10.1)
CHLORIDE SERPL-SCNC: 101 MMOL/L (ref 98–112)
CO2 SERPL-SCNC: 21 MMOL/L (ref 21–32)
CREAT BLD-MCNC: 1.01 MG/DL (ref 0.55–1.02)
DEPRECATED RDW RBC AUTO: 43.8 FL (ref 35.1–46.3)
EOSINOPHIL # BLD AUTO: 0.21 X10(3) UL (ref 0–0.7)
EOSINOPHIL NFR BLD AUTO: 3.4 %
ERYTHROCYTE [DISTWIDTH] IN BLOOD BY AUTOMATED COUNT: 15.7 % (ref 11–15)
GLOBULIN PLAS-MCNC: 5.6 G/DL (ref 2.8–4.4)
GLUCOSE BLD-MCNC: 112 MG/DL (ref 70–99)
HCT VFR BLD AUTO: 29.4 % (ref 35–48)
HGB BLD-MCNC: 9.1 G/DL (ref 12–16)
IMM GRANULOCYTES # BLD AUTO: 0.04 X10(3) UL (ref 0–1)
IMM GRANULOCYTES NFR BLD: 0.7 %
LYMPHOCYTES # BLD AUTO: 2.36 X10(3) UL (ref 1–4)
LYMPHOCYTES NFR BLD AUTO: 38.5 %
M PROTEIN MFR SERPL ELPH: 8 G/DL (ref 6.4–8.2)
MCH RBC QN AUTO: 24.1 PG (ref 26–34)
MCHC RBC AUTO-ENTMCNC: 31 G/DL (ref 31–37)
MCV RBC AUTO: 78 FL (ref 80–100)
MONOCYTES # BLD AUTO: 0.45 X10(3) UL (ref 0.1–1)
MONOCYTES NFR BLD AUTO: 7.3 %
NEUTROPHILS # BLD AUTO: 3.03 X10 (3) UL (ref 1.5–7.7)
NEUTROPHILS # BLD AUTO: 3.03 X10(3) UL (ref 1.5–7.7)
NEUTROPHILS NFR BLD AUTO: 49.4 %
OSMOLALITY SERPL CALC.SUM OF ELEC: 277 MOSM/KG (ref 275–295)
PATIENT FASTING Y/N/NP: YES
PLATELET # BLD AUTO: 349 10(3)UL (ref 150–450)
POTASSIUM SERPL-SCNC: 4.1 MMOL/L (ref 3.5–5.1)
RBC # BLD AUTO: 3.77 X10(6)UL (ref 3.8–5.3)
SODIUM SERPL-SCNC: 132 MMOL/L (ref 136–145)
WBC # BLD AUTO: 6.1 X10(3) UL (ref 4–11)

## 2020-01-23 PROCEDURE — 80053 COMPREHEN METABOLIC PANEL: CPT

## 2020-01-23 PROCEDURE — 36415 COLL VENOUS BLD VENIPUNCTURE: CPT

## 2020-01-23 PROCEDURE — 85025 COMPLETE CBC W/AUTO DIFF WBC: CPT

## 2020-01-24 ENCOUNTER — INITIAL APN SNF VISIT (OUTPATIENT)
Dept: INTERNAL MEDICINE CLINIC | Age: 85
End: 2020-01-24

## 2020-01-24 VITALS — OXYGEN SATURATION: 99 % | HEART RATE: 76 BPM

## 2020-01-24 DIAGNOSIS — N32.81 OAB (OVERACTIVE BLADDER): ICD-10-CM

## 2020-01-24 DIAGNOSIS — I26.99 PE (PULMONARY THROMBOEMBOLISM) (HCC): ICD-10-CM

## 2020-01-24 DIAGNOSIS — E46 MALNUTRITION, UNSPECIFIED TYPE (HCC): ICD-10-CM

## 2020-01-24 DIAGNOSIS — E87.5 HYPERKALEMIA: Primary | ICD-10-CM

## 2020-01-24 DIAGNOSIS — Z74.09 IMPAIRED MOBILITY AND ADLS: ICD-10-CM

## 2020-01-24 DIAGNOSIS — J18.9 PNEUMONIA OF LEFT LOWER LOBE DUE TO INFECTIOUS ORGANISM: ICD-10-CM

## 2020-01-24 DIAGNOSIS — N17.9 ACUTE RENAL FAILURE, UNSPECIFIED ACUTE RENAL FAILURE TYPE (HCC): ICD-10-CM

## 2020-01-24 DIAGNOSIS — Z93.2 ILEOSTOMY, HAS CURRENTLY (HCC): ICD-10-CM

## 2020-01-24 DIAGNOSIS — B96.20 E-COLI UTI: ICD-10-CM

## 2020-01-24 DIAGNOSIS — E11.42 TYPE 2 DIABETES MELLITUS WITH DIABETIC POLYNEUROPATHY, WITHOUT LONG-TERM CURRENT USE OF INSULIN (HCC): ICD-10-CM

## 2020-01-24 DIAGNOSIS — Z78.9 IMPAIRED MOBILITY AND ADLS: ICD-10-CM

## 2020-01-24 DIAGNOSIS — N13.30 HYDRONEPHROSIS, UNSPECIFIED HYDRONEPHROSIS TYPE: ICD-10-CM

## 2020-01-24 DIAGNOSIS — N39.0 E-COLI UTI: ICD-10-CM

## 2020-01-24 DIAGNOSIS — I82.403 ACUTE DEEP VEIN THROMBOSIS (DVT) OF BOTH LOWER EXTREMITIES, UNSPECIFIED VEIN (HCC): ICD-10-CM

## 2020-01-24 DIAGNOSIS — K56.609 COLON OBSTRUCTION (HCC): ICD-10-CM

## 2020-01-24 DIAGNOSIS — I10 ESSENTIAL HYPERTENSION: ICD-10-CM

## 2020-01-24 DIAGNOSIS — M54.50 BILATERAL LOW BACK PAIN WITHOUT SCIATICA, UNSPECIFIED CHRONICITY: ICD-10-CM

## 2020-01-24 DIAGNOSIS — D50.8 OTHER IRON DEFICIENCY ANEMIA: ICD-10-CM

## 2020-01-24 DIAGNOSIS — R53.1 WEAKNESS GENERALIZED: ICD-10-CM

## 2020-01-24 PROCEDURE — 99310 SBSQ NF CARE HIGH MDM 45: CPT | Performed by: NURSE PRACTITIONER

## 2020-01-24 RX ORDER — GUAIFENESIN/DEXTROMETHORPHAN 100-10MG/5
10 SYRUP ORAL 4 TIMES DAILY PRN
Status: ON HOLD | COMMUNITY
End: 2020-05-11

## 2020-01-24 NOTE — PROGRESS NOTES
Miguel Alvarezdenise  : 1935  Age 81year old  female patient is admitted to Facility: [place] [address] for  [facility] after KARSTEN/E Coli ESBL UTI/Acute b/l DVTs    [facility] Admit date:    19 after colon obstruction s/p colectomy and left ure recently, weekly labs demonstrated hyperkalemia, acidosis and ARF therefore readmitted to hospital on 1.13.2020. ARF 2/2 hydronephrosis, ANTONIO and dehydration. Left ureteral stent removed on 1.16.2020.   Cysto and right nephrostomy tube placed on right side II-XII, follows commands  PSYCHIATRIC: ---awake and alert, affect appropriate to situation      DIAGNOSTICS REVIEWED AT THIS VISIT:  Lab Results   Component Value Date    WBC 6.1 01/23/2020    RBC 3.77 (L) 01/23/2020    HGB 9.1 (L) 01/23/2020    HCT 29.4 ( 10 mg BID until 12.27.19 then change to 5 mg BID until 1.25.20  3.  F/U w/ Dr Moss/beverly      Sigmoid colon obstruction and ischemic bowel s/p subtotal colectomy, formation of end ileostomy, BALBIR, removal of mesh and reimplantation of left ureter w/ st orders to establish plan of care in [facility].     Prasad Mars, APRN  1/24/2020   10:50  AM

## 2020-01-27 ENCOUNTER — SNF VISIT (OUTPATIENT)
Dept: INTERNAL MEDICINE CLINIC | Age: 85
End: 2020-01-27

## 2020-01-27 ENCOUNTER — NURSE ONLY (OUTPATIENT)
Dept: LAB | Age: 85
End: 2020-01-27
Attending: FAMILY MEDICINE
Payer: MEDICARE

## 2020-01-27 VITALS
TEMPERATURE: 98 F | OXYGEN SATURATION: 94 % | DIASTOLIC BLOOD PRESSURE: 77 MMHG | RESPIRATION RATE: 18 BRPM | HEART RATE: 83 BPM | SYSTOLIC BLOOD PRESSURE: 121 MMHG

## 2020-01-27 DIAGNOSIS — R69 DIAGNOSIS UNKNOWN: Primary | ICD-10-CM

## 2020-01-27 DIAGNOSIS — B96.20 E-COLI UTI: ICD-10-CM

## 2020-01-27 DIAGNOSIS — J18.9 PNEUMONIA OF LEFT LOWER LOBE DUE TO INFECTIOUS ORGANISM: ICD-10-CM

## 2020-01-27 DIAGNOSIS — N13.30 HYDRONEPHROSIS, UNSPECIFIED HYDRONEPHROSIS TYPE: ICD-10-CM

## 2020-01-27 DIAGNOSIS — N39.0 E-COLI UTI: ICD-10-CM

## 2020-01-27 DIAGNOSIS — N17.9 ACUTE RENAL FAILURE, UNSPECIFIED ACUTE RENAL FAILURE TYPE (HCC): ICD-10-CM

## 2020-01-27 DIAGNOSIS — E87.5 HYPERKALEMIA: Primary | ICD-10-CM

## 2020-01-27 LAB
ALBUMIN SERPL-MCNC: 2.5 G/DL (ref 3.4–5)
ALBUMIN/GLOB SERPL: 0.5 {RATIO} (ref 1–2)
ALP LIVER SERPL-CCNC: 82 U/L (ref 55–142)
ALT SERPL-CCNC: 29 U/L (ref 13–56)
ANION GAP SERPL CALC-SCNC: 8 MMOL/L (ref 0–18)
AST SERPL-CCNC: 26 U/L (ref 15–37)
BASOPHILS # BLD AUTO: 0.06 X10(3) UL (ref 0–0.2)
BASOPHILS NFR BLD AUTO: 0.9 %
BILIRUB SERPL-MCNC: 0.3 MG/DL (ref 0.1–2)
BUN BLD-MCNC: 44 MG/DL (ref 7–18)
BUN/CREAT SERPL: 30.6 (ref 10–20)
CALCIUM BLD-MCNC: 8.9 MG/DL (ref 8.5–10.1)
CHLORIDE SERPL-SCNC: 105 MMOL/L (ref 98–112)
CO2 SERPL-SCNC: 21 MMOL/L (ref 21–32)
CREAT BLD-MCNC: 1.44 MG/DL (ref 0.55–1.02)
DEPRECATED RDW RBC AUTO: 44.8 FL (ref 35.1–46.3)
EOSINOPHIL # BLD AUTO: 0.37 X10(3) UL (ref 0–0.7)
EOSINOPHIL NFR BLD AUTO: 5.3 %
ERYTHROCYTE [DISTWIDTH] IN BLOOD BY AUTOMATED COUNT: 15.8 % (ref 11–15)
GLOBULIN PLAS-MCNC: 5.2 G/DL (ref 2.8–4.4)
GLUCOSE BLD-MCNC: 101 MG/DL (ref 70–99)
HCT VFR BLD AUTO: 28.7 % (ref 35–48)
HGB BLD-MCNC: 8.9 G/DL (ref 12–16)
IMM GRANULOCYTES # BLD AUTO: 0.03 X10(3) UL (ref 0–1)
IMM GRANULOCYTES NFR BLD: 0.4 %
LYMPHOCYTES # BLD AUTO: 3.51 X10(3) UL (ref 1–4)
LYMPHOCYTES NFR BLD AUTO: 50.6 %
M PROTEIN MFR SERPL ELPH: 7.7 G/DL (ref 6.4–8.2)
MCH RBC QN AUTO: 24.3 PG (ref 26–34)
MCHC RBC AUTO-ENTMCNC: 31 G/DL (ref 31–37)
MCV RBC AUTO: 78.4 FL (ref 80–100)
MONOCYTES # BLD AUTO: 0.47 X10(3) UL (ref 0.1–1)
MONOCYTES NFR BLD AUTO: 6.8 %
NEUTROPHILS # BLD AUTO: 2.5 X10 (3) UL (ref 1.5–7.7)
NEUTROPHILS # BLD AUTO: 2.5 X10(3) UL (ref 1.5–7.7)
NEUTROPHILS NFR BLD AUTO: 36 %
OSMOLALITY SERPL CALC.SUM OF ELEC: 289 MOSM/KG (ref 275–295)
PATIENT FASTING Y/N/NP: YES
PLATELET # BLD AUTO: 451 10(3)UL (ref 150–450)
POTASSIUM SERPL-SCNC: 4.9 MMOL/L (ref 3.5–5.1)
RBC # BLD AUTO: 3.66 X10(6)UL (ref 3.8–5.3)
SODIUM SERPL-SCNC: 134 MMOL/L (ref 136–145)
WBC # BLD AUTO: 6.9 X10(3) UL (ref 4–11)

## 2020-01-27 PROCEDURE — 36415 COLL VENOUS BLD VENIPUNCTURE: CPT

## 2020-01-27 PROCEDURE — 85025 COMPLETE CBC W/AUTO DIFF WBC: CPT

## 2020-01-27 PROCEDURE — 80053 COMPREHEN METABOLIC PANEL: CPT

## 2020-01-27 PROCEDURE — 99309 SBSQ NF CARE MODERATE MDM 30: CPT | Performed by: NURSE PRACTITIONER

## 2020-01-27 NOTE — PROGRESS NOTES
Tuan Vuong, 68/1935, 81year old, female    Chief Complaint:  Patient presents with:   Follow - Up: E Coli ESBL UTI/Possible LLL PNA/ARF/Status post right Nephrostomy tube placement  Lab Results       Subjective:    Pt only understands and speaks [place] distress; +sitting in wheelchair at bedside; +contact isolation  LINES, TUBES, DRAINS:  peripheral IV - location LUE, right nephrostomy tube  SKIN: no rashes, no suspicious lesions, pale, warm, dry  WOUND:   RLQ ileostomy:  Pink and moist stoma, draining s  (H) 01/27/2020    BUN 44 (H) 01/27/2020    BUNCREA 30.6 (H) 01/27/2020    CREATSERUM 1.44 (H) 01/27/2020    ANIONGAP 8 01/27/2020    GFR 69 11/29/2016    GFRNAA 34 (L) 01/27/2020    GFRAA 39 (L) 01/27/2020    CA 8.9 01/27/2020    OSMOCALC 289 01 prn  7. Add Cholestyramine 4 gm daily  8. Tylenol 650 mg TID q prn plus 500 mg q AM  9. F/U w/ Dr Chuck Razo  10. F/U w/ Dr Montano/urology in 2 wks     Small left pleural effusion  1. Resolved     ABLA  1. CBC weekly  2. FeSO4 325 mg   3.  S/P transfusi

## 2020-01-29 ENCOUNTER — NURSE ONLY (OUTPATIENT)
Dept: LAB | Age: 85
End: 2020-01-29
Attending: FAMILY MEDICINE
Payer: MEDICARE

## 2020-01-29 ENCOUNTER — SNF VISIT (OUTPATIENT)
Dept: INTERNAL MEDICINE CLINIC | Age: 85
End: 2020-01-29

## 2020-01-29 VITALS
HEART RATE: 98 BPM | OXYGEN SATURATION: 93 % | RESPIRATION RATE: 18 BRPM | TEMPERATURE: 98 F | SYSTOLIC BLOOD PRESSURE: 117 MMHG | DIASTOLIC BLOOD PRESSURE: 76 MMHG

## 2020-01-29 DIAGNOSIS — N39.0 E-COLI UTI: ICD-10-CM

## 2020-01-29 DIAGNOSIS — E87.5 HYPERKALEMIA: Primary | ICD-10-CM

## 2020-01-29 DIAGNOSIS — J18.9 PNEUMONIA OF LEFT LOWER LOBE DUE TO INFECTIOUS ORGANISM: ICD-10-CM

## 2020-01-29 DIAGNOSIS — B96.20 E-COLI UTI: ICD-10-CM

## 2020-01-29 DIAGNOSIS — N17.9 ACUTE RENAL FAILURE, UNSPECIFIED ACUTE RENAL FAILURE TYPE (HCC): ICD-10-CM

## 2020-01-29 DIAGNOSIS — N13.30 HYDRONEPHROSIS: Primary | ICD-10-CM

## 2020-01-29 DIAGNOSIS — R53.1 WEAKNESS GENERALIZED: ICD-10-CM

## 2020-01-29 DIAGNOSIS — E46 MALNUTRITION, UNSPECIFIED TYPE (HCC): ICD-10-CM

## 2020-01-29 LAB
ANION GAP SERPL CALC-SCNC: 7 MMOL/L (ref 0–18)
BUN BLD-MCNC: 37 MG/DL (ref 7–18)
BUN/CREAT SERPL: 29.1 (ref 10–20)
CALCIUM BLD-MCNC: 9.6 MG/DL (ref 8.5–10.1)
CHLORIDE SERPL-SCNC: 109 MMOL/L (ref 98–112)
CO2 SERPL-SCNC: 18 MMOL/L (ref 21–32)
CREAT BLD-MCNC: 1.27 MG/DL (ref 0.55–1.02)
GLUCOSE BLD-MCNC: 92 MG/DL (ref 70–99)
HAV IGM SER QL: 1.9 MG/DL (ref 1.6–2.6)
OSMOLALITY SERPL CALC.SUM OF ELEC: 286 MOSM/KG (ref 275–295)
PATIENT FASTING Y/N/NP: YES
PHOSPHATE SERPL-MCNC: 3.3 MG/DL (ref 2.5–4.9)
POTASSIUM SERPL-SCNC: 5.6 MMOL/L (ref 3.5–5.1)
SODIUM SERPL-SCNC: 134 MMOL/L (ref 136–145)

## 2020-01-29 PROCEDURE — 84100 ASSAY OF PHOSPHORUS: CPT

## 2020-01-29 PROCEDURE — 83735 ASSAY OF MAGNESIUM: CPT

## 2020-01-29 PROCEDURE — 80048 BASIC METABOLIC PNL TOTAL CA: CPT

## 2020-01-29 PROCEDURE — 36415 COLL VENOUS BLD VENIPUNCTURE: CPT

## 2020-01-29 PROCEDURE — 99309 SBSQ NF CARE MODERATE MDM 30: CPT | Performed by: NURSE PRACTITIONER

## 2020-01-29 RX ORDER — CHOLESTYRAMINE 4 G/9G
4 POWDER, FOR SUSPENSION ORAL 2 TIMES DAILY
COMMUNITY
End: 2020-03-06

## 2020-01-29 NOTE — PROGRESS NOTES
Tuan Vuong, 68/1935, 81year old, female    Chief Complaint:  Patient presents with: Follow - Up: E Coli ESBL UTI/Possible LLL PNA/ARF/Status post right Nephrostomy tube placement  Weakness       Subjective:    Pt only understands and speaks English. nourished, in no apparent distress; +sitting in wheelchair at bedside, repositioned w/ pillow behind back and relief of pain; +contact isolation  LINES, TUBES, DRAINS:  peripheral IV - location LUE, right nephrostomy tube  SKIN: no rashes, no suspicious le 01/29/2020    ALKPHO 82 01/27/2020    AST 26 01/27/2020    ALT 29 01/27/2020    BILT 0.3 01/27/2020    TP 7.7 01/27/2020    ALB 2.5 (L) 01/27/2020    GLOBULIN 5.2 (H) 01/27/2020    AGRATIO 1.6 04/27/2016     (L) 01/29/2020    K 5.6 (H) 01/29/2020 w/ Dr Montano/urology in 2 wks     Small left pleural effusion  1. Resolved     ABLA  1. CBC weekly  2. FeSO4 325 mg   3. S/P transfusion 1u PRBCs     Generalized weakness/Impaired mobility and ADLs  1. PT/OT eval and tx  2. ELOS 14-17 days  3.  Plan DC on or b

## 2020-01-31 ENCOUNTER — NURSE ONLY (OUTPATIENT)
Dept: LAB | Age: 85
End: 2020-01-31
Attending: FAMILY MEDICINE
Payer: MEDICARE

## 2020-01-31 DIAGNOSIS — N39.0 URINARY TRACT INFECTION DUE TO ESBL KLEBSIELLA: Primary | ICD-10-CM

## 2020-01-31 DIAGNOSIS — B96.89 URINARY TRACT INFECTION DUE TO ESBL KLEBSIELLA: Primary | ICD-10-CM

## 2020-01-31 LAB
BILIRUB UR QL STRIP.AUTO: NEGATIVE
COLOR UR AUTO: YELLOW
GLUCOSE UR STRIP.AUTO-MCNC: NEGATIVE MG/DL
HYALINE CASTS #/AREA URNS AUTO: PRESENT /LPF
KETONES UR STRIP.AUTO-MCNC: NEGATIVE MG/DL
NITRITE UR QL STRIP.AUTO: NEGATIVE
PH UR STRIP.AUTO: 6 [PH] (ref 4.5–8)
PROT UR STRIP.AUTO-MCNC: 100 MG/DL
RBC #/AREA URNS AUTO: >10 /HPF
SP GR UR STRIP.AUTO: 1.01 (ref 1–1.03)
UROBILINOGEN UR STRIP.AUTO-MCNC: <2 MG/DL

## 2020-01-31 PROCEDURE — 81001 URINALYSIS AUTO W/SCOPE: CPT

## 2020-01-31 PROCEDURE — 87086 URINE CULTURE/COLONY COUNT: CPT

## 2020-02-03 ENCOUNTER — NURSE ONLY (OUTPATIENT)
Dept: LAB | Age: 85
End: 2020-02-03
Attending: FAMILY MEDICINE
Payer: MEDICARE

## 2020-02-03 ENCOUNTER — SNF VISIT (OUTPATIENT)
Dept: INTERNAL MEDICINE CLINIC | Age: 85
End: 2020-02-03

## 2020-02-03 VITALS
OXYGEN SATURATION: 94 % | SYSTOLIC BLOOD PRESSURE: 134 MMHG | HEART RATE: 61 BPM | DIASTOLIC BLOOD PRESSURE: 66 MMHG | RESPIRATION RATE: 18 BRPM | TEMPERATURE: 98 F

## 2020-02-03 DIAGNOSIS — N17.9 ACUTE RENAL FAILURE, UNSPECIFIED ACUTE RENAL FAILURE TYPE (HCC): ICD-10-CM

## 2020-02-03 DIAGNOSIS — J18.9 PNEUMONIA OF LEFT LOWER LOBE DUE TO INFECTIOUS ORGANISM: ICD-10-CM

## 2020-02-03 DIAGNOSIS — E87.5 HYPERKALEMIA: Primary | ICD-10-CM

## 2020-02-03 DIAGNOSIS — N13.30 HYDRONEPHROSIS: Primary | ICD-10-CM

## 2020-02-03 DIAGNOSIS — E46 MALNUTRITION, UNSPECIFIED TYPE (HCC): ICD-10-CM

## 2020-02-03 DIAGNOSIS — F32.1 CURRENT MODERATE EPISODE OF MAJOR DEPRESSIVE DISORDER, UNSPECIFIED WHETHER RECURRENT (HCC): ICD-10-CM

## 2020-02-03 DIAGNOSIS — N39.0 E-COLI UTI: ICD-10-CM

## 2020-02-03 DIAGNOSIS — D50.8 OTHER IRON DEFICIENCY ANEMIA: ICD-10-CM

## 2020-02-03 DIAGNOSIS — N13.30 HYDRONEPHROSIS, UNSPECIFIED HYDRONEPHROSIS TYPE: ICD-10-CM

## 2020-02-03 DIAGNOSIS — B96.20 E-COLI UTI: ICD-10-CM

## 2020-02-03 LAB
ALBUMIN SERPL-MCNC: 2.6 G/DL (ref 3.4–5)
ALBUMIN/GLOB SERPL: 0.5 {RATIO} (ref 1–2)
ALP LIVER SERPL-CCNC: 91 U/L (ref 55–142)
ALT SERPL-CCNC: 32 U/L (ref 13–56)
ANION GAP SERPL CALC-SCNC: 6 MMOL/L (ref 0–18)
AST SERPL-CCNC: 21 U/L (ref 15–37)
BASOPHILS # BLD AUTO: 0.05 X10(3) UL (ref 0–0.2)
BASOPHILS NFR BLD AUTO: 0.8 %
BILIRUB SERPL-MCNC: 0.2 MG/DL (ref 0.1–2)
BUN BLD-MCNC: 39 MG/DL (ref 7–18)
BUN/CREAT SERPL: 28.5 (ref 10–20)
CALCIUM BLD-MCNC: 9.9 MG/DL (ref 8.5–10.1)
CHLORIDE SERPL-SCNC: 111 MMOL/L (ref 98–112)
CO2 SERPL-SCNC: 16 MMOL/L (ref 21–32)
CREAT BLD-MCNC: 1.37 MG/DL (ref 0.55–1.02)
DEPRECATED RDW RBC AUTO: 48 FL (ref 35.1–46.3)
EOSINOPHIL # BLD AUTO: 0.34 X10(3) UL (ref 0–0.7)
EOSINOPHIL NFR BLD AUTO: 5.4 %
ERYTHROCYTE [DISTWIDTH] IN BLOOD BY AUTOMATED COUNT: 16.8 % (ref 11–15)
GLOBULIN PLAS-MCNC: 5.2 G/DL (ref 2.8–4.4)
GLUCOSE BLD-MCNC: 93 MG/DL (ref 70–99)
HCT VFR BLD AUTO: 28.1 % (ref 35–48)
HGB BLD-MCNC: 8.6 G/DL (ref 12–16)
IMM GRANULOCYTES # BLD AUTO: 0.04 X10(3) UL (ref 0–1)
IMM GRANULOCYTES NFR BLD: 0.6 %
LYMPHOCYTES # BLD AUTO: 2.99 X10(3) UL (ref 1–4)
LYMPHOCYTES NFR BLD AUTO: 47.6 %
M PROTEIN MFR SERPL ELPH: 7.8 G/DL (ref 6.4–8.2)
MCH RBC QN AUTO: 24.2 PG (ref 26–34)
MCHC RBC AUTO-ENTMCNC: 30.6 G/DL (ref 31–37)
MCV RBC AUTO: 78.9 FL (ref 80–100)
MONOCYTES # BLD AUTO: 0.4 X10(3) UL (ref 0.1–1)
MONOCYTES NFR BLD AUTO: 6.4 %
NEUTROPHILS # BLD AUTO: 2.46 X10 (3) UL (ref 1.5–7.7)
NEUTROPHILS # BLD AUTO: 2.46 X10(3) UL (ref 1.5–7.7)
NEUTROPHILS NFR BLD AUTO: 39.2 %
OSMOLALITY SERPL CALC.SUM OF ELEC: 285 MOSM/KG (ref 275–295)
PATIENT FASTING Y/N/NP: YES
PLATELET # BLD AUTO: 343 10(3)UL (ref 150–450)
POTASSIUM SERPL-SCNC: 4.9 MMOL/L (ref 3.5–5.1)
RBC # BLD AUTO: 3.56 X10(6)UL (ref 3.8–5.3)
SODIUM SERPL-SCNC: 133 MMOL/L (ref 136–145)
WBC # BLD AUTO: 6.3 X10(3) UL (ref 4–11)

## 2020-02-03 PROCEDURE — 80053 COMPREHEN METABOLIC PANEL: CPT

## 2020-02-03 PROCEDURE — 99309 SBSQ NF CARE MODERATE MDM 30: CPT | Performed by: NURSE PRACTITIONER

## 2020-02-03 PROCEDURE — 36415 COLL VENOUS BLD VENIPUNCTURE: CPT

## 2020-02-03 PROCEDURE — 85025 COMPLETE CBC W/AUTO DIFF WBC: CPT

## 2020-02-03 NOTE — PROGRESS NOTES
Tuan Vuong, 68/1935, 81year old, female    Chief Complaint:  Patient presents with:   Follow - Up: E Coli ESBL UTI/Possible LLL PNA/ARF/Status post right Nephrostomy tube placement  Depression  Lab Results       Subjective:    Pt only understands and lesions, pale, warm, dry  WOUND:   RLQ ileostomy:  Pink and moist stoma, draining soft brown stool, less liquid content today  EYES: PERRLA, EOMI, sclera anicteric, conjunctiva normal; there is no nystagmus, no drainage from eyes; +IOP b/l  HENT: normoceph ALB 2.6 (L) 02/03/2020    GLOBULIN 5.2 (H) 02/03/2020    AGRATIO 1.6 04/27/2016     (L) 02/03/2020    K 4.9 02/03/2020     02/03/2020    CO2 16.0 (L) 02/03/2020       Assessment and plan:  Acute renal insufficiency 2/2 b/l hydronephrosis/sta before 2.8.20/TBD; SW to assist w/ DC plan     Hyponatremia/Hyperkalemia  1. Monitor labs     Depression  1. Monitor mood; consult psych prn  2. Sertraline 150 mg daily  3. Increase Mirtazapine to 30 mg q HS  4.  Consult psych     HTN/HL/Hx of PE  1. VS q s

## 2020-02-05 ENCOUNTER — SNF VISIT (OUTPATIENT)
Dept: INTERNAL MEDICINE CLINIC | Age: 85
End: 2020-02-05

## 2020-02-05 VITALS
RESPIRATION RATE: 16 BRPM | DIASTOLIC BLOOD PRESSURE: 77 MMHG | OXYGEN SATURATION: 94 % | TEMPERATURE: 98 F | HEART RATE: 71 BPM | SYSTOLIC BLOOD PRESSURE: 136 MMHG

## 2020-02-05 DIAGNOSIS — Z78.9 IMPAIRED MOBILITY AND ADLS: ICD-10-CM

## 2020-02-05 DIAGNOSIS — N13.30 HYDRONEPHROSIS, UNSPECIFIED HYDRONEPHROSIS TYPE: ICD-10-CM

## 2020-02-05 DIAGNOSIS — E87.5 HYPERKALEMIA: Primary | ICD-10-CM

## 2020-02-05 DIAGNOSIS — E46 MALNUTRITION, UNSPECIFIED TYPE (HCC): ICD-10-CM

## 2020-02-05 DIAGNOSIS — N17.9 ACUTE RENAL FAILURE, UNSPECIFIED ACUTE RENAL FAILURE TYPE (HCC): ICD-10-CM

## 2020-02-05 DIAGNOSIS — R53.1 WEAKNESS GENERALIZED: ICD-10-CM

## 2020-02-05 DIAGNOSIS — Z74.09 IMPAIRED MOBILITY AND ADLS: ICD-10-CM

## 2020-02-05 PROCEDURE — 99309 SBSQ NF CARE MODERATE MDM 30: CPT | Performed by: NURSE PRACTITIONER

## 2020-02-05 NOTE — PROGRESS NOTES
Tuan Vuong, 68/1935, 81year old, female    Chief Complaint:  Patient presents with:   Follow - Up: E Coli ESBL UTI/Possible LLL PNA/ARF/Status post right Nephrostomy tube placement  Poor Feed Anorexia  Weakness       Subjective:    Pt only understands isolation  LINES, TUBES, DRAINS:  right nephrostomy tube  SKIN: no rashes, no suspicious lesions, pale, warm, dry  WOUND:   RLQ ileostomy:  Pink and moist stoma, soft/mushy green stool content today  EYES: PERRLA, EOMI, sclera anicteric, conjunctiva normal isolation  3. Meropenem 500 mg IV BID until 1.28.2020     LLL PNA  1. O2 @ 1-2 LPM NC prn to keep O2 sat >92%  2. IV Meropenem as above  3. Duoneb QID x 10 days  4. Robitussin DM QID prn     Back pain/Generalized pain likely OA  1.  Tylenol 650 mg TID prn diet  2. Accu check AC and HS  3. Metformin DCd d/t KARSTEN  4. Gabapentin 600 mg TID  5. Novolog SS AC and HS  6. Lantus 4u q HS--on hold     Diverticulitis/chronic constipation  1. Monitor stool output     Malnutrition  1. Dietician lopez  2.  Liberalize diet

## 2020-02-06 ENCOUNTER — NURSE ONLY (OUTPATIENT)
Dept: LAB | Age: 85
End: 2020-02-06
Attending: FAMILY MEDICINE
Payer: MEDICARE

## 2020-02-06 DIAGNOSIS — Z16.12 UTI DUE TO EXTENDED-SPECTRUM BETA LACTAMASE (ESBL) PRODUCING ESCHERICHIA COLI: Primary | ICD-10-CM

## 2020-02-06 DIAGNOSIS — N39.0 UTI DUE TO EXTENDED-SPECTRUM BETA LACTAMASE (ESBL) PRODUCING ESCHERICHIA COLI: Primary | ICD-10-CM

## 2020-02-06 DIAGNOSIS — B96.29 UTI DUE TO EXTENDED-SPECTRUM BETA LACTAMASE (ESBL) PRODUCING ESCHERICHIA COLI: Primary | ICD-10-CM

## 2020-02-06 LAB
BILIRUB UR QL STRIP.AUTO: NEGATIVE
COLOR UR AUTO: YELLOW
GLUCOSE UR STRIP.AUTO-MCNC: NEGATIVE MG/DL
KETONES UR STRIP.AUTO-MCNC: NEGATIVE MG/DL
NITRITE UR QL STRIP.AUTO: NEGATIVE
PH UR STRIP.AUTO: 5 [PH] (ref 4.5–8)
PROT UR STRIP.AUTO-MCNC: 100 MG/DL
SP GR UR STRIP.AUTO: 1.01 (ref 1–1.03)
UROBILINOGEN UR STRIP.AUTO-MCNC: <2 MG/DL

## 2020-02-06 PROCEDURE — 81001 URINALYSIS AUTO W/SCOPE: CPT

## 2020-02-06 PROCEDURE — 87077 CULTURE AEROBIC IDENTIFY: CPT

## 2020-02-06 PROCEDURE — 87186 SC STD MICRODIL/AGAR DIL: CPT

## 2020-02-06 PROCEDURE — 87086 URINE CULTURE/COLONY COUNT: CPT

## 2020-02-10 ENCOUNTER — SNF VISIT (OUTPATIENT)
Dept: INTERNAL MEDICINE CLINIC | Age: 85
End: 2020-02-10

## 2020-02-10 ENCOUNTER — NURSE ONLY (OUTPATIENT)
Dept: LAB | Age: 85
End: 2020-02-10
Attending: FAMILY MEDICINE
Payer: MEDICARE

## 2020-02-10 VITALS
DIASTOLIC BLOOD PRESSURE: 72 MMHG | HEART RATE: 75 BPM | OXYGEN SATURATION: 98 % | TEMPERATURE: 99 F | SYSTOLIC BLOOD PRESSURE: 124 MMHG | RESPIRATION RATE: 18 BRPM

## 2020-02-10 DIAGNOSIS — D50.8 OTHER IRON DEFICIENCY ANEMIA: ICD-10-CM

## 2020-02-10 DIAGNOSIS — N17.9 ACUTE RENAL FAILURE, UNSPECIFIED ACUTE RENAL FAILURE TYPE (HCC): ICD-10-CM

## 2020-02-10 DIAGNOSIS — E11.42 TYPE 2 DIABETES MELLITUS WITH DIABETIC POLYNEUROPATHY, WITHOUT LONG-TERM CURRENT USE OF INSULIN (HCC): ICD-10-CM

## 2020-02-10 DIAGNOSIS — N13.30 HYDRONEPHROSIS: Primary | ICD-10-CM

## 2020-02-10 DIAGNOSIS — N13.30 HYDRONEPHROSIS, UNSPECIFIED HYDRONEPHROSIS TYPE: ICD-10-CM

## 2020-02-10 DIAGNOSIS — E46 MALNUTRITION, UNSPECIFIED TYPE (HCC): ICD-10-CM

## 2020-02-10 DIAGNOSIS — E87.5 HYPERKALEMIA: Primary | ICD-10-CM

## 2020-02-10 LAB
ALBUMIN SERPL-MCNC: 2.6 G/DL (ref 3.4–5)
ALBUMIN/GLOB SERPL: 0.5 {RATIO} (ref 1–2)
ALP LIVER SERPL-CCNC: 108 U/L (ref 55–142)
ALT SERPL-CCNC: 28 U/L (ref 13–56)
ANION GAP SERPL CALC-SCNC: 9 MMOL/L (ref 0–18)
AST SERPL-CCNC: 18 U/L (ref 15–37)
BASOPHILS # BLD AUTO: 0.01 X10(3) UL (ref 0–0.2)
BASOPHILS NFR BLD AUTO: 0.1 %
BILIRUB SERPL-MCNC: 0.3 MG/DL (ref 0.1–2)
BUN BLD-MCNC: 40 MG/DL (ref 7–18)
BUN/CREAT SERPL: 26 (ref 10–20)
CALCIUM BLD-MCNC: 10 MG/DL (ref 8.5–10.1)
CHLORIDE SERPL-SCNC: 116 MMOL/L (ref 98–112)
CO2 SERPL-SCNC: 12 MMOL/L (ref 21–32)
CREAT BLD-MCNC: 1.54 MG/DL (ref 0.55–1.02)
DEPRECATED RDW RBC AUTO: 51.4 FL (ref 35.1–46.3)
EOSINOPHIL # BLD AUTO: 0.31 X10(3) UL (ref 0–0.7)
EOSINOPHIL NFR BLD AUTO: 4.1 %
ERYTHROCYTE [DISTWIDTH] IN BLOOD BY AUTOMATED COUNT: 17.5 % (ref 11–15)
GLOBULIN PLAS-MCNC: 5.5 G/DL (ref 2.8–4.4)
GLUCOSE BLD-MCNC: 132 MG/DL (ref 70–99)
HCT VFR BLD AUTO: 29.8 % (ref 35–48)
HGB BLD-MCNC: 8.8 G/DL (ref 12–16)
IMM GRANULOCYTES # BLD AUTO: 0.04 X10(3) UL (ref 0–1)
IMM GRANULOCYTES NFR BLD: 0.5 %
LYMPHOCYTES # BLD AUTO: 1.14 X10(3) UL (ref 1–4)
LYMPHOCYTES NFR BLD AUTO: 15.1 %
M PROTEIN MFR SERPL ELPH: 8.1 G/DL (ref 6.4–8.2)
MCH RBC QN AUTO: 23.7 PG (ref 26–34)
MCHC RBC AUTO-ENTMCNC: 29.5 G/DL (ref 31–37)
MCV RBC AUTO: 80.3 FL (ref 80–100)
MONOCYTES # BLD AUTO: 0.21 X10(3) UL (ref 0.1–1)
MONOCYTES NFR BLD AUTO: 2.8 %
NEUTROPHILS # BLD AUTO: 5.85 X10 (3) UL (ref 1.5–7.7)
NEUTROPHILS # BLD AUTO: 5.85 X10(3) UL (ref 1.5–7.7)
NEUTROPHILS NFR BLD AUTO: 77.4 %
OSMOLALITY SERPL CALC.SUM OF ELEC: 296 MOSM/KG (ref 275–295)
PATIENT FASTING Y/N/NP: YES
PLATELET # BLD AUTO: 264 10(3)UL (ref 150–450)
POTASSIUM SERPL-SCNC: 4.9 MMOL/L (ref 3.5–5.1)
RBC # BLD AUTO: 3.71 X10(6)UL (ref 3.8–5.3)
SODIUM SERPL-SCNC: 137 MMOL/L (ref 136–145)
WBC # BLD AUTO: 7.6 X10(3) UL (ref 4–11)

## 2020-02-10 PROCEDURE — 99308 SBSQ NF CARE LOW MDM 20: CPT | Performed by: NURSE PRACTITIONER

## 2020-02-10 PROCEDURE — 80053 COMPREHEN METABOLIC PANEL: CPT

## 2020-02-10 PROCEDURE — 85025 COMPLETE CBC W/AUTO DIFF WBC: CPT

## 2020-02-10 PROCEDURE — 36415 COLL VENOUS BLD VENIPUNCTURE: CPT

## 2020-02-10 NOTE — PROGRESS NOTES
Tuan Vuong, 68/1935, 81year old, female    Chief Complaint:  Patient presents with:   Follow - Up: E Coli ESBL UTI/Possible LLL PNA/ARF/Status post right Nephrostomy tube placement  Lab Results       Subjective:    Pt only understands and speaks [place] ileostomy:  Pink and moist stoma, soft/mushy green stool content today  EYES: PERRLA, EOMI, sclera anicteric, conjunctiva normal; there is no nystagmus, no drainage from eyes; +IOP b/l  HENT: normocephalic; normal nose, no nasal drainage, mucous membranes 02/10/2020    CA 10.0 02/10/2020    OSMOCALC 296 (H) 02/10/2020    ALKPHO 108 02/10/2020    AST 18 02/10/2020    ALT 28 02/10/2020    BILT 0.3 02/10/2020    TP 8.1 02/10/2020    ALB 2.6 (L) 02/10/2020    GLOBULIN 5.5 (H) 02/10/2020    AGRATIO 1.6 04/27/201 effusion  1. Resolved     ABLA  1. CBC weekly  2. FeSO4 325 mg   3. S/P transfusion 1u PRBCs     Generalized weakness/Impaired mobility and ADLs  1. PT/OT eval and tx  2. ELOS 14-17 days  3.  Plan DC on or before 2.14.20/TBD; SW to assist w/ DC plan     Hyp

## 2020-02-12 ENCOUNTER — SNF DISCHARGE (OUTPATIENT)
Dept: INTERNAL MEDICINE CLINIC | Age: 85
End: 2020-02-12

## 2020-02-12 VITALS
DIASTOLIC BLOOD PRESSURE: 63 MMHG | HEART RATE: 85 BPM | OXYGEN SATURATION: 98 % | RESPIRATION RATE: 18 BRPM | SYSTOLIC BLOOD PRESSURE: 131 MMHG

## 2020-02-12 DIAGNOSIS — K56.609 COLON OBSTRUCTION (HCC): ICD-10-CM

## 2020-02-12 DIAGNOSIS — N17.9 ACUTE RENAL FAILURE, UNSPECIFIED ACUTE RENAL FAILURE TYPE (HCC): ICD-10-CM

## 2020-02-12 DIAGNOSIS — B96.20 E-COLI UTI: ICD-10-CM

## 2020-02-12 DIAGNOSIS — N13.30 HYDRONEPHROSIS, UNSPECIFIED HYDRONEPHROSIS TYPE: ICD-10-CM

## 2020-02-12 DIAGNOSIS — R53.1 WEAKNESS GENERALIZED: ICD-10-CM

## 2020-02-12 DIAGNOSIS — Z78.9 IMPAIRED MOBILITY AND ADLS: ICD-10-CM

## 2020-02-12 DIAGNOSIS — E11.42 TYPE 2 DIABETES MELLITUS WITH DIABETIC POLYNEUROPATHY, WITHOUT LONG-TERM CURRENT USE OF INSULIN (HCC): ICD-10-CM

## 2020-02-12 DIAGNOSIS — F32.1 CURRENT MODERATE EPISODE OF MAJOR DEPRESSIVE DISORDER, UNSPECIFIED WHETHER RECURRENT (HCC): ICD-10-CM

## 2020-02-12 DIAGNOSIS — N39.0 E-COLI UTI: ICD-10-CM

## 2020-02-12 DIAGNOSIS — D50.8 OTHER IRON DEFICIENCY ANEMIA: ICD-10-CM

## 2020-02-12 DIAGNOSIS — E46 MALNUTRITION, UNSPECIFIED TYPE (HCC): ICD-10-CM

## 2020-02-12 DIAGNOSIS — I26.99 PE (PULMONARY THROMBOEMBOLISM) (HCC): ICD-10-CM

## 2020-02-12 DIAGNOSIS — J18.9 PNEUMONIA OF LEFT LOWER LOBE DUE TO INFECTIOUS ORGANISM: ICD-10-CM

## 2020-02-12 DIAGNOSIS — Z74.09 IMPAIRED MOBILITY AND ADLS: ICD-10-CM

## 2020-02-12 DIAGNOSIS — N32.81 OAB (OVERACTIVE BLADDER): ICD-10-CM

## 2020-02-12 DIAGNOSIS — I82.403 ACUTE DEEP VEIN THROMBOSIS (DVT) OF BOTH LOWER EXTREMITIES, UNSPECIFIED VEIN (HCC): ICD-10-CM

## 2020-02-12 DIAGNOSIS — Z93.2 ILEOSTOMY, HAS CURRENTLY (HCC): ICD-10-CM

## 2020-02-12 DIAGNOSIS — E87.5 HYPERKALEMIA: Primary | ICD-10-CM

## 2020-02-12 DIAGNOSIS — M54.50 BILATERAL LOW BACK PAIN WITHOUT SCIATICA, UNSPECIFIED CHRONICITY: ICD-10-CM

## 2020-02-12 DIAGNOSIS — I10 ESSENTIAL HYPERTENSION: ICD-10-CM

## 2020-02-12 PROCEDURE — 99316 NF DSCHRG MGMT 30 MIN+: CPT | Performed by: NURSE PRACTITIONER

## 2020-02-12 NOTE — PROGRESS NOTES
Tuan TORO Yevgeniy, 68/1935, 81year old, female is being discharged from Facility: [address]    Date of Admission:  11.11.19    Date of Discharge:  Anticipated on 2.15.2020                               Admitting Diagnos bag.  :Deferred  LYMPHATIC:no lymphedema  MUSCULOSKELETAL: no acute synovitis upper or lower extremity.  Weakness R/T recent hospitalization/diagnoses/sequelae; will undergo therapies to rehab and improve strength, endurance and independence w/ ADLs.    E Diagnoses w/ current management:  Acute renal insufficiency 2/2 b/l hydronephrosis/status post placement of right nephrostomy tube  3. Minimize nephrotoxic meds as able  4. Nephrostomy tube site care and drsg change daily  5.  Flush and aspirate nephrostomy Bufalino/CV in 2 wks     Hypothyroidism  1. LT4 125 mcg daily    2. Recheck TFTs in 3-4 wks     T2 DM w/ PN  1. Accu check AC and HS  2. Metformin DCd d/t KARSTEN  3. Gabapentin 600 mg TID  4. Novolog SS AC and HS  5.  Lantus 4u q HS--on hold      Diverticuliti

## 2020-02-15 ENCOUNTER — HOSPITAL ENCOUNTER (EMERGENCY)
Facility: HOSPITAL | Age: 85
Discharge: HOME | End: 2020-02-15
Attending: EMERGENCY MEDICINE
Payer: MEDICARE

## 2020-02-15 ENCOUNTER — APPOINTMENT (OUTPATIENT)
Dept: INTERVENTIONAL RADIOLOGY/VASCULAR | Facility: HOSPITAL | Age: 85
End: 2020-02-15
Attending: EMERGENCY MEDICINE
Payer: MEDICARE

## 2020-02-15 VITALS
SYSTOLIC BLOOD PRESSURE: 97 MMHG | DIASTOLIC BLOOD PRESSURE: 58 MMHG | TEMPERATURE: 98 F | OXYGEN SATURATION: 100 % | HEART RATE: 78 BPM | RESPIRATION RATE: 14 BRPM

## 2020-02-15 DIAGNOSIS — T83.022A DISPLACEMENT OF NEPHROSTOMY TUBE (HCC): Primary | ICD-10-CM

## 2020-02-15 PROCEDURE — 99285 EMERGENCY DEPT VISIT HI MDM: CPT

## 2020-02-15 PROCEDURE — 50432 PLMT NEPHROSTOMY CATHETER: CPT

## 2020-02-15 PROCEDURE — 99153 MOD SED SAME PHYS/QHP EA: CPT

## 2020-02-15 PROCEDURE — 99283 EMERGENCY DEPT VISIT LOW MDM: CPT

## 2020-02-15 PROCEDURE — 99152 MOD SED SAME PHYS/QHP 5/>YRS: CPT

## 2020-02-15 RX ORDER — MIDAZOLAM HYDROCHLORIDE 1 MG/ML
INJECTION INTRAMUSCULAR; INTRAVENOUS
Status: COMPLETED
Start: 2020-02-15 | End: 2020-02-15

## 2020-02-15 RX ORDER — LIDOCAINE HYDROCHLORIDE 10 MG/ML
INJECTION, SOLUTION INFILTRATION; PERINEURAL
Status: COMPLETED
Start: 2020-02-15 | End: 2020-02-15

## 2020-02-15 RX ORDER — LEVOFLOXACIN 5 MG/ML
INJECTION, SOLUTION INTRAVENOUS
Status: COMPLETED
Start: 2020-02-15 | End: 2020-02-15

## 2020-02-15 NOTE — PROCEDURES
1.  Sinogram performed. Prior tract is no longer open. 2.  New 8F PNT placed via post inf calyx. To gravity bag. Comp-none.

## 2020-02-15 NOTE — ED NOTES
Pt drinking sips of water. Son at bedside. Pt would like feta cheese to eat. Dietary called. They will call back and let us know if there is any available.

## 2020-02-15 NOTE — ED PROVIDER NOTES
Patient Seen in: [facility] Emergency Department      History   Patient presents with:  Cath Tube Problem: nephrostomy tube pulled out, needs reinsertion    Stated Complaint:     HPI    13-year-old female was sent to the emergency department from a Social History    Tobacco Use      Smoking status: Never Smoker      Smokeless tobacco: Never Used    Alcohol use: No    Drug use: No             Review of Systems    Positive for stated complaint:   Other systems are as noted in HPI.   Constituti

## 2020-02-16 ENCOUNTER — HOSPITAL ENCOUNTER (INPATIENT)
Facility: HOSPITAL | Age: 85
LOS: 4 days | Discharge: NURSING, REHAB OR LONG-TERM CARE | DRG: 872 | End: 2020-02-20
Attending: EMERGENCY MEDICINE | Admitting: HOSPITALIST
Payer: MEDICARE

## 2020-02-16 ENCOUNTER — APPOINTMENT (OUTPATIENT)
Dept: GENERAL RADIOLOGY | Facility: HOSPITAL | Age: 85
DRG: 872 | End: 2020-02-16
Attending: EMERGENCY MEDICINE
Payer: MEDICARE

## 2020-02-16 ENCOUNTER — APPOINTMENT (OUTPATIENT)
Dept: CT IMAGING | Facility: HOSPITAL | Age: 85
DRG: 872 | End: 2020-02-16
Attending: EMERGENCY MEDICINE
Payer: MEDICARE

## 2020-02-16 DIAGNOSIS — N17.9 ACUTE RENAL FAILURE, UNSPECIFIED ACUTE RENAL FAILURE TYPE (HCC): Primary | ICD-10-CM

## 2020-02-16 DIAGNOSIS — N39.0 URINARY TRACT INFECTION ASSOCIATED WITH NEPHROSTOMY CATHETER, INITIAL ENCOUNTER (HCC): ICD-10-CM

## 2020-02-16 DIAGNOSIS — T83.512A URINARY TRACT INFECTION ASSOCIATED WITH NEPHROSTOMY CATHETER, INITIAL ENCOUNTER (HCC): ICD-10-CM

## 2020-02-16 DIAGNOSIS — E87.2 METABOLIC ACIDOSIS: ICD-10-CM

## 2020-02-16 DIAGNOSIS — D64.9 ANEMIA, UNSPECIFIED TYPE: ICD-10-CM

## 2020-02-16 PROBLEM — E87.20 METABOLIC ACIDOSIS: Status: ACTIVE | Noted: 2020-02-16

## 2020-02-16 LAB
ALBUMIN SERPL-MCNC: 2.8 G/DL (ref 3.4–5)
ALBUMIN/GLOB SERPL: 0.5 {RATIO} (ref 1–2)
ALLENS TEST: POSITIVE
ALP LIVER SERPL-CCNC: 125 U/L (ref 55–142)
ALT SERPL-CCNC: 27 U/L (ref 13–56)
AMMONIA PLAS-MCNC: <10 UMOL/L (ref 11–32)
ANION GAP SERPL CALC-SCNC: 10 MMOL/L (ref 0–18)
ARTERIAL BLD GAS O2 SATURATION: 97 % (ref 92–100)
ARTERIAL BLOOD GAS BASE EXCESS: -18.9 MMOL/L (ref ?–2)
ARTERIAL BLOOD GAS HCO3: 6 MEQ/L (ref 22–26)
ARTERIAL BLOOD GAS PCO2: 14 MM HG (ref 35–45)
ARTERIAL BLOOD GAS PH: 7.26 (ref 7.35–7.45)
ARTERIAL BLOOD GAS PO2: 139 MM HG (ref 80–105)
AST SERPL-CCNC: 25 U/L (ref 15–37)
BASOPHILS # BLD AUTO: 0.02 X10(3) UL (ref 0–0.2)
BASOPHILS NFR BLD AUTO: 0.2 %
BILIRUB SERPL-MCNC: 0.3 MG/DL (ref 0.1–2)
BILIRUB UR QL STRIP.AUTO: NEGATIVE
BUN BLD-MCNC: 94 MG/DL (ref 7–18)
BUN/CREAT SERPL: 20.2 (ref 10–20)
CALCIUM BLD-MCNC: 10.7 MG/DL (ref 8.5–10.1)
CALCULATED O2 SATURATION: 98 % (ref 92–100)
CARBOXYHEMOGLOBIN: 1.2 % SAT (ref 0–3)
CHLORIDE SERPL-SCNC: 115 MMOL/L (ref 98–112)
CO2 SERPL-SCNC: 10 MMOL/L (ref 21–32)
COLOR UR AUTO: YELLOW
CREAT BLD-MCNC: 4.65 MG/DL (ref 0.55–1.02)
DEPRECATED RDW RBC AUTO: 51.8 FL (ref 35.1–46.3)
EOSINOPHIL # BLD AUTO: 0.01 X10(3) UL (ref 0–0.7)
EOSINOPHIL NFR BLD AUTO: 0.1 %
ERYTHROCYTE [DISTWIDTH] IN BLOOD BY AUTOMATED COUNT: 18.9 % (ref 11–15)
GLOBULIN PLAS-MCNC: 6 G/DL (ref 2.8–4.4)
GLUCOSE BLD-MCNC: 138 MG/DL (ref 70–99)
GLUCOSE BLD-MCNC: 161 MG/DL (ref 70–99)
GLUCOSE UR STRIP.AUTO-MCNC: NEGATIVE MG/DL
HCT VFR BLD AUTO: 27.9 % (ref 35–48)
HGB BLD-MCNC: 8.9 G/DL (ref 12–16)
IMM GRANULOCYTES # BLD AUTO: 0.12 X10(3) UL (ref 0–1)
IMM GRANULOCYTES NFR BLD: 0.9 %
IONIZED CALCIUM: 1.56 MMOL/L (ref 1.12–1.32)
KETONES UR STRIP.AUTO-MCNC: NEGATIVE MG/DL
L/M: 2 L/MIN
LACTIC ACID ARTERIAL: <1.6 MMOL/L (ref 0.5–2)
LYMPHOCYTES # BLD AUTO: 1.88 X10(3) UL (ref 1–4)
LYMPHOCYTES NFR BLD AUTO: 14.3 %
M PROTEIN MFR SERPL ELPH: 8.8 G/DL (ref 6.4–8.2)
MCH RBC QN AUTO: 24.1 PG (ref 26–34)
MCHC RBC AUTO-ENTMCNC: 31.9 G/DL (ref 31–37)
MCV RBC AUTO: 75.6 FL (ref 80–100)
METHEMOGLOBIN: 0.7 % SAT (ref 0.4–1.5)
MONOCYTES # BLD AUTO: 0.69 X10(3) UL (ref 0.1–1)
MONOCYTES NFR BLD AUTO: 5.2 %
NEUTROPHILS # BLD AUTO: 10.45 X10 (3) UL (ref 1.5–7.7)
NEUTROPHILS # BLD AUTO: 10.45 X10(3) UL (ref 1.5–7.7)
NEUTROPHILS NFR BLD AUTO: 79.3 %
NITRITE UR QL STRIP.AUTO: NEGATIVE
OSMOLALITY SERPL CALC.SUM OF ELEC: 313 MOSM/KG (ref 275–295)
P/F RATIO: 663.5 MMHG
PATIENT TEMPERATURE: 98.6 F
PH UR STRIP.AUTO: 5 [PH] (ref 4.5–8)
PLATELET # BLD AUTO: 380 10(3)UL (ref 150–450)
POTASSIUM BLOOD GAS: 5 MMOL/L (ref 3.6–5.1)
POTASSIUM SERPL-SCNC: 5.4 MMOL/L (ref 3.5–5.1)
PROT UR STRIP.AUTO-MCNC: 100 MG/DL
RBC # BLD AUTO: 3.69 X10(6)UL (ref 3.8–5.3)
RBC #/AREA URNS AUTO: >10 /HPF
SODIUM BLOOD GAS: 137 MMOL/L (ref 136–144)
SODIUM SERPL-SCNC: 135 MMOL/L (ref 136–145)
SP GR UR STRIP.AUTO: 1.01 (ref 1–1.03)
TOTAL HEMOGLOBIN: 9.4 G/DL (ref 11.7–16)
UROBILINOGEN UR STRIP.AUTO-MCNC: <2 MG/DL
WBC # BLD AUTO: 13.2 X10(3) UL (ref 4–11)
WBC #/AREA URNS AUTO: >50 /HPF
WBC CLUMPS UR QL AUTO: PRESENT

## 2020-02-16 PROCEDURE — 87150 DNA/RNA AMPLIFIED PROBE: CPT | Performed by: EMERGENCY MEDICINE

## 2020-02-16 PROCEDURE — 80053 COMPREHEN METABOLIC PANEL: CPT | Performed by: EMERGENCY MEDICINE

## 2020-02-16 PROCEDURE — 87086 URINE CULTURE/COLONY COUNT: CPT | Performed by: EMERGENCY MEDICINE

## 2020-02-16 PROCEDURE — 87186 SC STD MICRODIL/AGAR DIL: CPT | Performed by: EMERGENCY MEDICINE

## 2020-02-16 PROCEDURE — 71045 X-RAY EXAM CHEST 1 VIEW: CPT | Performed by: EMERGENCY MEDICINE

## 2020-02-16 PROCEDURE — 87040 BLOOD CULTURE FOR BACTERIA: CPT | Performed by: EMERGENCY MEDICINE

## 2020-02-16 PROCEDURE — 87088 URINE BACTERIA CULTURE: CPT | Performed by: EMERGENCY MEDICINE

## 2020-02-16 PROCEDURE — 99285 EMERGENCY DEPT VISIT HI MDM: CPT

## 2020-02-16 PROCEDURE — 87077 CULTURE AEROBIC IDENTIFY: CPT | Performed by: EMERGENCY MEDICINE

## 2020-02-16 PROCEDURE — 36600 WITHDRAWAL OF ARTERIAL BLOOD: CPT | Performed by: EMERGENCY MEDICINE

## 2020-02-16 PROCEDURE — 82962 GLUCOSE BLOOD TEST: CPT

## 2020-02-16 PROCEDURE — 74176 CT ABD & PELVIS W/O CONTRAST: CPT | Performed by: EMERGENCY MEDICINE

## 2020-02-16 PROCEDURE — 84132 ASSAY OF SERUM POTASSIUM: CPT | Performed by: EMERGENCY MEDICINE

## 2020-02-16 PROCEDURE — 82803 BLOOD GASES ANY COMBINATION: CPT | Performed by: EMERGENCY MEDICINE

## 2020-02-16 PROCEDURE — 84295 ASSAY OF SERUM SODIUM: CPT | Performed by: EMERGENCY MEDICINE

## 2020-02-16 PROCEDURE — 36415 COLL VENOUS BLD VENIPUNCTURE: CPT

## 2020-02-16 PROCEDURE — 85025 COMPLETE CBC W/AUTO DIFF WBC: CPT | Performed by: EMERGENCY MEDICINE

## 2020-02-16 PROCEDURE — 83605 ASSAY OF LACTIC ACID: CPT | Performed by: EMERGENCY MEDICINE

## 2020-02-16 PROCEDURE — 82140 ASSAY OF AMMONIA: CPT | Performed by: EMERGENCY MEDICINE

## 2020-02-16 PROCEDURE — 96367 TX/PROPH/DG ADDL SEQ IV INF: CPT

## 2020-02-16 PROCEDURE — 96361 HYDRATE IV INFUSION ADD-ON: CPT

## 2020-02-16 PROCEDURE — 85018 HEMOGLOBIN: CPT | Performed by: EMERGENCY MEDICINE

## 2020-02-16 PROCEDURE — 82330 ASSAY OF CALCIUM: CPT | Performed by: EMERGENCY MEDICINE

## 2020-02-16 PROCEDURE — 82375 ASSAY CARBOXYHB QUANT: CPT | Performed by: EMERGENCY MEDICINE

## 2020-02-16 PROCEDURE — 81001 URINALYSIS AUTO W/SCOPE: CPT | Performed by: EMERGENCY MEDICINE

## 2020-02-16 PROCEDURE — 83050 HGB METHEMOGLOBIN QUAN: CPT | Performed by: EMERGENCY MEDICINE

## 2020-02-16 PROCEDURE — 96365 THER/PROPH/DIAG IV INF INIT: CPT

## 2020-02-16 RX ORDER — ACETAMINOPHEN 500 MG
1000 TABLET ORAL EVERY 6 HOURS PRN
Status: DISCONTINUED | OUTPATIENT
Start: 2020-02-16 | End: 2020-02-20

## 2020-02-16 RX ORDER — SODIUM CHLORIDE 9 MG/ML
INJECTION, SOLUTION INTRAVENOUS CONTINUOUS
Status: DISCONTINUED | OUTPATIENT
Start: 2020-02-16 | End: 2020-02-18

## 2020-02-16 RX ORDER — SIMETHICONE 80 MG
80 TABLET,CHEWABLE ORAL EVERY 6 HOURS PRN
Status: DISCONTINUED | OUTPATIENT
Start: 2020-02-16 | End: 2020-02-20

## 2020-02-16 RX ORDER — CHOLESTYRAMINE LIGHT 4 G/5.7G
4 POWDER, FOR SUSPENSION ORAL DAILY
Status: DISCONTINUED | OUTPATIENT
Start: 2020-02-17 | End: 2020-02-20

## 2020-02-16 RX ORDER — HEPARIN SODIUM 5000 [USP'U]/ML
5000 INJECTION, SOLUTION INTRAVENOUS; SUBCUTANEOUS EVERY 8 HOURS SCHEDULED
Status: DISCONTINUED | OUTPATIENT
Start: 2020-02-16 | End: 2020-02-18

## 2020-02-16 RX ORDER — MAGNESIUM OXIDE 400 MG (241.3 MG MAGNESIUM) TABLET
3 TABLET NIGHTLY
Status: DISCONTINUED | OUTPATIENT
Start: 2020-02-16 | End: 2020-02-20

## 2020-02-16 RX ORDER — MIRTAZAPINE 15 MG/1
30 TABLET, FILM COATED ORAL NIGHTLY
Status: DISCONTINUED | OUTPATIENT
Start: 2020-02-16 | End: 2020-02-20

## 2020-02-16 RX ORDER — SUCRALFATE 1 G/1
1 TABLET ORAL
Status: DISCONTINUED | OUTPATIENT
Start: 2020-02-16 | End: 2020-02-20

## 2020-02-16 RX ORDER — GABAPENTIN 300 MG/1
600 CAPSULE ORAL 3 TIMES DAILY
Status: DISCONTINUED | OUTPATIENT
Start: 2020-02-16 | End: 2020-02-16

## 2020-02-16 RX ORDER — MELATONIN
325
Status: DISCONTINUED | OUTPATIENT
Start: 2020-02-17 | End: 2020-02-20

## 2020-02-16 RX ORDER — GABAPENTIN 100 MG/1
200 CAPSULE ORAL 3 TIMES DAILY
Status: DISCONTINUED | OUTPATIENT
Start: 2020-02-16 | End: 2020-02-17

## 2020-02-16 RX ORDER — DEXTROSE MONOHYDRATE 25 G/50ML
50 INJECTION, SOLUTION INTRAVENOUS
Status: DISCONTINUED | OUTPATIENT
Start: 2020-02-16 | End: 2020-02-20

## 2020-02-16 RX ORDER — LEVOTHYROXINE SODIUM 0.12 MG/1
125 TABLET ORAL
Status: DISCONTINUED | OUTPATIENT
Start: 2020-02-16 | End: 2020-02-20

## 2020-02-16 RX ORDER — MEGESTROL ACETATE 40 MG/ML
400 SUSPENSION ORAL DAILY
Status: DISCONTINUED | OUTPATIENT
Start: 2020-02-17 | End: 2020-02-20

## 2020-02-16 RX ORDER — PANTOPRAZOLE SODIUM 40 MG/1
40 TABLET, DELAYED RELEASE ORAL
Status: DISCONTINUED | OUTPATIENT
Start: 2020-02-17 | End: 2020-02-20

## 2020-02-16 NOTE — ED INITIAL ASSESSMENT (HPI)
Seen here last niaght and had urostomy tube replaced. Family wanted her returned today because she has had increased weakness for some time now. Nursing home had to increase oxygen from 2L NC to 3 L NC today.

## 2020-02-17 ENCOUNTER — NURSE ONLY (OUTPATIENT)
Dept: LAB | Age: 85
End: 2020-02-17
Attending: FAMILY MEDICINE
Payer: MEDICARE

## 2020-02-17 DIAGNOSIS — R69 DIAGNOSIS UNKNOWN: Primary | ICD-10-CM

## 2020-02-17 LAB
ALBUMIN SERPL-MCNC: 2 G/DL (ref 3.4–5)
ALBUMIN SERPL-MCNC: 2.2 G/DL (ref 3.4–5)
ANION GAP SERPL CALC-SCNC: 11 MMOL/L (ref 0–18)
ANION GAP SERPL CALC-SCNC: 13 MMOL/L (ref 0–18)
BASOPHILS # BLD AUTO: 0.02 X10(3) UL (ref 0–0.2)
BASOPHILS NFR BLD AUTO: 0.2 %
BUN BLD-MCNC: 85 MG/DL (ref 7–18)
BUN BLD-MCNC: 88 MG/DL (ref 7–18)
BUN/CREAT SERPL: 22 (ref 10–20)
BUN/CREAT SERPL: 22.4 (ref 10–20)
CALCIUM BLD-MCNC: 9 MG/DL (ref 8.5–10.1)
CALCIUM BLD-MCNC: 9.3 MG/DL (ref 8.5–10.1)
CHLORIDE SERPL-SCNC: 105 MMOL/L (ref 98–112)
CHLORIDE SERPL-SCNC: 108 MMOL/L (ref 98–112)
CO2 SERPL-SCNC: 16 MMOL/L (ref 21–32)
CO2 SERPL-SCNC: 19 MMOL/L (ref 21–32)
CREAT BLD-MCNC: 3.87 MG/DL (ref 0.55–1.02)
CREAT BLD-MCNC: 3.93 MG/DL (ref 0.55–1.02)
CREAT UR-SCNC: 82.5 MG/DL
DEPRECATED RDW RBC AUTO: 50 FL (ref 35.1–46.3)
EOSINOPHIL # BLD AUTO: 0.05 X10(3) UL (ref 0–0.7)
EOSINOPHIL NFR BLD AUTO: 0.5 %
ERYTHROCYTE [DISTWIDTH] IN BLOOD BY AUTOMATED COUNT: 18.7 % (ref 11–15)
GLUCOSE BLD-MCNC: 139 MG/DL (ref 70–99)
GLUCOSE BLD-MCNC: 142 MG/DL (ref 70–99)
GLUCOSE BLD-MCNC: 145 MG/DL (ref 70–99)
GLUCOSE BLD-MCNC: 177 MG/DL (ref 70–99)
GLUCOSE BLD-MCNC: 204 MG/DL (ref 70–99)
GLUCOSE BLD-MCNC: 243 MG/DL (ref 70–99)
HCT VFR BLD AUTO: 23 % (ref 35–48)
HGB BLD-MCNC: 7 G/DL (ref 12–16)
HGB BLD-MCNC: 7.4 G/DL (ref 12–16)
IMM GRANULOCYTES # BLD AUTO: 0.1 X10(3) UL (ref 0–1)
IMM GRANULOCYTES NFR BLD: 1 %
LYMPHOCYTES # BLD AUTO: 1.43 X10(3) UL (ref 1–4)
LYMPHOCYTES NFR BLD AUTO: 13.7 %
MCH RBC QN AUTO: 23.9 PG (ref 26–34)
MCHC RBC AUTO-ENTMCNC: 32.2 G/DL (ref 31–37)
MCV RBC AUTO: 74.2 FL (ref 80–100)
MONOCYTES # BLD AUTO: 0.6 X10(3) UL (ref 0.1–1)
MONOCYTES NFR BLD AUTO: 5.7 %
NEUTROPHILS # BLD AUTO: 8.25 X10 (3) UL (ref 1.5–7.7)
NEUTROPHILS # BLD AUTO: 8.25 X10(3) UL (ref 1.5–7.7)
NEUTROPHILS NFR BLD AUTO: 78.9 %
OSMOLALITY SERPL CALC.SUM OF ELEC: 310 MOSM/KG (ref 275–295)
OSMOLALITY SERPL CALC.SUM OF ELEC: 317 MOSM/KG (ref 275–295)
PHOSPHATE SERPL-MCNC: 3.4 MG/DL (ref 2.5–4.9)
PHOSPHATE SERPL-MCNC: 4.2 MG/DL (ref 2.5–4.9)
PLATELET # BLD AUTO: 354 10(3)UL (ref 150–450)
POTASSIUM SERPL-SCNC: 3.4 MMOL/L (ref 3.5–5.1)
POTASSIUM SERPL-SCNC: 3.8 MMOL/L (ref 3.5–5.1)
RBC # BLD AUTO: 3.1 X10(6)UL (ref 3.8–5.3)
SODIUM SERPL-SCNC: 135 MMOL/L (ref 136–145)
SODIUM SERPL-SCNC: 137 MMOL/L (ref 136–145)
SODIUM SERPL-SCNC: 37 MMOL/L
WBC # BLD AUTO: 10.5 X10(3) UL (ref 4–11)

## 2020-02-17 PROCEDURE — 87040 BLOOD CULTURE FOR BACTERIA: CPT | Performed by: INTERNAL MEDICINE

## 2020-02-17 PROCEDURE — 80069 RENAL FUNCTION PANEL: CPT | Performed by: INTERNAL MEDICINE

## 2020-02-17 PROCEDURE — 85025 COMPLETE CBC W/AUTO DIFF WBC: CPT | Performed by: INTERNAL MEDICINE

## 2020-02-17 PROCEDURE — 82570 ASSAY OF URINE CREATININE: CPT | Performed by: INTERNAL MEDICINE

## 2020-02-17 PROCEDURE — 84300 ASSAY OF URINE SODIUM: CPT | Performed by: INTERNAL MEDICINE

## 2020-02-17 PROCEDURE — 85018 HEMOGLOBIN: CPT | Performed by: INTERNAL MEDICINE

## 2020-02-17 PROCEDURE — 82962 GLUCOSE BLOOD TEST: CPT

## 2020-02-17 RX ORDER — POTASSIUM CHLORIDE 14.9 MG/ML
20 INJECTION INTRAVENOUS ONCE
Status: COMPLETED | OUTPATIENT
Start: 2020-02-17 | End: 2020-02-17

## 2020-02-17 RX ORDER — GABAPENTIN 100 MG/1
100 CAPSULE ORAL NIGHTLY
Status: DISCONTINUED | OUTPATIENT
Start: 2020-02-17 | End: 2020-02-20

## 2020-02-17 RX ORDER — ONDANSETRON 2 MG/ML
4 INJECTION INTRAMUSCULAR; INTRAVENOUS EVERY 6 HOURS PRN
Status: DISCONTINUED | OUTPATIENT
Start: 2020-02-17 | End: 2020-02-20

## 2020-02-17 NOTE — ED PROVIDER NOTES
Patient Seen in: [facility] Emergency Department      History   Patient presents with:  Fatigue    Stated Complaint: weakness    HPI    81 old female presents to the emergency department for evaluation of being acutely weak.   Patient has a history o HISTORY      GALLBLADDER   • OTHER SURGICAL HISTORY      STOMACH SURGERY   • OTHER SURGICAL HISTORY      RECONSTUCTION RT UPPER ARM   • PART REMOVAL COLON W END COLOSTOMY  11/12/2019   • REMOVAL GALLBLADDER     • RIGHT PHACOEMULSIFICATION OF CATARACT WITH Patient is hyperventilating although lungs are clear to auscultation at this time I do not appreciate any significant wheezing rhonchi or rales  Abdominal:      General: Bowel sounds are normal.      Palpations: Abdomen is soft. Tenderness:  There is t limits   ABG PANEL W ELECT AND LACTATE - Abnormal; Notable for the following components:    ABG pH 7.26 (*)     ABG pCO2 14 (*)     ABG pO2 139 (*)     ABG HCO3 6.0 (*)     ABG Base Excess -18.9 (*)     Total Hemoglobin 9.4 (*)     Ionized Calcium 1.56 (*) her rapid breathing. Patient's urinalysis is greater than 50 WBCs but this was obtained from the nephrostomy tube and she may have chronic colonization.   However, when she was recently admitted she was placed on meropenem and until we have cultures we manohar

## 2020-02-17 NOTE — PROGRESS NOTES
NURSING ADMISSION NOTE      Patient admitted via cart to room 432. Oriented to room. Safety precautions initiated. Bed in low position. Call light in reach. Pt Maced[place] speaking only. No family with pt. Used  to communicate.  Pt speaking

## 2020-02-17 NOTE — VASCULAR ACCESS
Consulted to place an extended PIV. Per Dr Borja's note pt may need a midline 2/19. Primary RN Tashia Wright states that she has adequate access at this time. No need to start an Extended PIV. Will Dc Consult.

## 2020-02-17 NOTE — H&P
[facility] Hospitalist H&P       CC: Patient presents with:  Fatigue       PCP: Angella Mohr MD    History of Present Illness:  Clarissa Solorzano is an 82 yo female with PMH of DM type II, HTN HLD, s/p ex-;ap and subtotal colectomy with creation of end-ileostomy and le GALLBLADDER   • OTHER SURGICAL HISTORY      STOMACH SURGERY   • OTHER SURGICAL HISTORY      RECONSTUCTION RT UPPER ARM   • PART REMOVAL COLON W END COLOSTOMY  11/12/2019   • REMOVAL GALLBLADDER     • RIGHT PHACOEMULSIFICATION OF CATARACT WITH INTRAOCULAR L History    Tobacco Use      Smoking status: Never Smoker      Smokeless tobacco: Never Used    Alcohol use: No       Fam Hx  Family History   Problem Relation Age of Onset   • Diabetes Daughter    • Hypertension Daughter    • Diabetes Son    • Hypertension CXR    Radiology: Ct Abdomen+pelvis Kidneystone 2d Rndr(no Iv,no Oral)(cpt=74176)    Result Date: 2/16/2020  PROCEDURE:  CT ABDOMEN/PELVIS KIDNEYSTONE 2D RNDR (NO IV,NO ORAL) (CPT=74176)  COMPARISON:  [facility] , CT, CT ABDOMEN+PELVIS(CPT=74176), 1/08/2020, 1 clinical significance.     Dictated by: Samina Vogel MD on 2/16/2020 at 17:54     Approved by: Samina Vogel MD on 2/16/2020 at 18:01          Xr Chest Ap Portable  (cpt=71045)    Result Date: 2/16/2020  PROCEDURE:  XR CHEST AP PORTABLE  (CPT=71045)  ADITYA from the patient's family. Time-out was performed by the staff. Preprocedure antibiotics were administered. She was positioned prone. The back was prepped in the usual sterile manner. All elements of maximum sterile barrier technique were used.   Mckayla Briscoe PLAN:     April Angeles is an 80 yo female with PMH of DM type II, HTN HLD, s/p ex-;ap and subtotal colectomy with creation of end-ileostomy and left ureteral transplant. , hx PE, bilateral hydronephrosis s/p left ureteral stent who presented from NH with AMS a Certification    Patient will require inpatient services that will reasonably be expected to span two midnight's based on the clinical documentation in H+P.    Based on patients current state of illness, I anticipate that, after discharge, patient will requ

## 2020-02-17 NOTE — PROGRESS NOTES
[facility] Dosing Service  Antibiotic Dosing    Garlon Galeazzi is a 81year old female for whom pharmacy is dosing meropenem for treatment of  UTI. Allergies: has No Known Allergies. Vitals: /68 (BP Location: Right arm)   Pulse 51   Temp 97. 9

## 2020-02-17 NOTE — CONSULTS
[facility]    Report of Consultation    Tuan Vuong Patient Status:  Inpatient    1935 MRN [id number]   [facility] 4NW-A Attending Irma Gutierrez MD   Hosp Day # 1 PCP Rey Martino MD       REASON FOR CONSULT:     ANTONIO, acidosis, without mention of complication, not stated as uncontrolled    • Vision loss     need cataract surgery     Past Surgical History:   Procedure Laterality Date   •      • COLECTOMY N/A 2019    Performed by Praveen Buckner MD at [facility] MAIN OR   • COL cholestyramine light (PREVALITE) powder packet 4 g, 4 g, Oral, Daily  •  ferrous sulfate EC tab 325 mg, 325 mg, Oral, Daily with breakfast  •  Levothyroxine Sodium tab 125 mcg, 125 mcg, Oral, Before breakfast  •  lidocaine-menthol 4-1 % 1 patch, 1 patch, T kg)  01/21/20 : 162 lb 6.4 oz (73.7 kg)  01/09/20 : 161 lb 1.6 oz (73.1 kg)    Gen - frail, laying in bed,  HEENT - missing multiple teeth  CV - RRR, 3/6 systolic murmur  Resp - CTAB   Abd - soft, ostomy draining brown stool  Ext - warm, no LE edema   - COLORUR Yellow 02/16/2020    CLARITY Cloudy (A) 02/16/2020    SPECGRAVITY 1.015 02/16/2020    GLUUR Negative 02/16/2020    BILUR Negative 02/16/2020    KETUR Negative 02/16/2020    BLOODURINE Large (A) 02/16/2020    PHURINE 5.0 02/16/2020    PROUR 100  (A) of renal failure, ?ostomy outputs being high  -- bicarbonate gtt per above    Anemia:  -- on ferrous sulfate; no IV iron in the setting of bacteremia  -- transfusions per primary service    E coli bacteremia:  -- had recent E coli ESBL UTI  -- currently on

## 2020-02-17 NOTE — CONSULTS
INFECTIOUS DISEASE CONSULTATION    Tuan Vuong Patient Status:  Inpatient    1935 MRN [id number]   [facility] 4NW-A Attending Makayla Vasquez MD   Hosp Day # 1 PCP Emre Roca MD PHACOEMULSIFICATION OF CATARACT WITH INTRAOCULAR LENS IMPLANT 69916 Right 4/26/2017    Performed by Ronny Atkinson MD at [place] [id number] Left 11/12/2019    Performed by Tanisha Lama MD at [place] [address] PRN **OR** dextrose 50 % injection 50 mL, 50 mL, Intravenous, Q15 Min PRN **OR** glucose (DEX4) oral liquid 30 g, 30 g, Oral, Q15 Min PRN **OR** Glucose-Vitamin C (DEX-4) chewable tab 8 tablet, 8 tablet, Oral, Q15 Min PRN  •  Insulin Aspart Pen (NOVOLOG) 1 every 8 (eight) hours as needed for Pain., Disp: 90 tablet, Rfl: 2  METFORMIN HCL 1000 MG Oral Tab, TAKE 1 TABLET(1000 MG) BY MOUTH TWICE DAILY, Disp: 180 tablet, Rfl: 0  guaiFENesin--10 MG/5ML Oral Syrup, Take 30 mL by mouth 4 (four) times daily as MCH 23.9*  --    MCHC 32.2  --    RDW 18.7*  --    NEPRELIM 8.25*  --    WBC 10.5  --    .0  --        Recent Labs   Lab 02/16/20  1711 02/17/20  0938 02/17/20  1539   * 204* 177*   BUN 94* 88* 85*   CREATSERUM 4.65* 3.93* 3.87*   GFRAA 9* regarding advance care planning and goals of care     Palliative care encounter     Malnutrition [facility])     Acute renal insufficiency     ANTONIO (acute kidney injury) ([facility] [address]     Urinary tract infection without hematuria     Urinary tract infection without hemat

## 2020-02-18 LAB
ALBUMIN SERPL-MCNC: 1.8 G/DL (ref 3.4–5)
ANION GAP SERPL CALC-SCNC: 7 MMOL/L (ref 0–18)
ANTIBODY SCREEN: NEGATIVE
BASOPHILS # BLD AUTO: 0.03 X10(3) UL (ref 0–0.2)
BASOPHILS NFR BLD AUTO: 0.4 %
BUN BLD-MCNC: 76 MG/DL (ref 7–18)
BUN/CREAT SERPL: 23.2 (ref 10–20)
CALCIUM BLD-MCNC: 8.4 MG/DL (ref 8.5–10.1)
CHLORIDE SERPL-SCNC: 102 MMOL/L (ref 98–112)
CO2 SERPL-SCNC: 27 MMOL/L (ref 21–32)
CREAT BLD-MCNC: 3.28 MG/DL (ref 0.55–1.02)
DEPRECATED HBV CORE AB SER IA-ACNC: 349.6 NG/ML (ref 18–340)
DEPRECATED RDW RBC AUTO: 48.9 FL (ref 35.1–46.3)
EOSINOPHIL # BLD AUTO: 0.23 X10(3) UL (ref 0–0.7)
EOSINOPHIL NFR BLD AUTO: 2.8 %
ERYTHROCYTE [DISTWIDTH] IN BLOOD BY AUTOMATED COUNT: 18.4 % (ref 11–15)
GLUCOSE BLD-MCNC: 107 MG/DL (ref 70–99)
GLUCOSE BLD-MCNC: 114 MG/DL (ref 70–99)
GLUCOSE BLD-MCNC: 129 MG/DL (ref 70–99)
GLUCOSE BLD-MCNC: 94 MG/DL (ref 70–99)
GLUCOSE BLD-MCNC: 97 MG/DL (ref 70–99)
HAV IGM SER QL: 1.7 MG/DL (ref 1.6–2.6)
HCT VFR BLD AUTO: 21 % (ref 35–48)
HCT VFR BLD AUTO: 23.4 % (ref 35–48)
HGB BLD-MCNC: 6.7 G/DL (ref 12–16)
HGB BLD-MCNC: 8 G/DL (ref 12–16)
HGB RETIC QN AUTO: 25.4 PG (ref 28.2–36.6)
IMM GRANULOCYTES # BLD AUTO: 0.06 X10(3) UL (ref 0–1)
IMM GRANULOCYTES NFR BLD: 0.7 %
IMM RETICS NFR: 0.14 RATIO (ref 0.1–0.3)
IRON SATURATION: 7 % (ref 15–50)
IRON SERPL-MCNC: 13 UG/DL (ref 50–170)
LYMPHOCYTES # BLD AUTO: 2.01 X10(3) UL (ref 1–4)
LYMPHOCYTES NFR BLD AUTO: 24.1 %
MCH RBC QN AUTO: 23.3 PG (ref 26–34)
MCHC RBC AUTO-ENTMCNC: 31.9 G/DL (ref 31–37)
MCV RBC AUTO: 73.2 FL (ref 80–100)
MONOCYTES # BLD AUTO: 0.58 X10(3) UL (ref 0.1–1)
MONOCYTES NFR BLD AUTO: 7 %
NEUTROPHILS # BLD AUTO: 5.42 X10 (3) UL (ref 1.5–7.7)
NEUTROPHILS # BLD AUTO: 5.42 X10(3) UL (ref 1.5–7.7)
NEUTROPHILS NFR BLD AUTO: 65 %
OSMOLALITY SERPL CALC.SUM OF ELEC: 304 MOSM/KG (ref 275–295)
PHOSPHATE SERPL-MCNC: 2.6 MG/DL (ref 2.5–4.9)
PLATELET # BLD AUTO: 337 10(3)UL (ref 150–450)
POTASSIUM SERPL-SCNC: 3.3 MMOL/L (ref 3.5–5.1)
RBC # BLD AUTO: 2.87 X10(6)UL (ref 3.8–5.3)
RETICS # AUTO: 25.3 X10(3) UL (ref 22.5–147.5)
RETICS/RBC NFR AUTO: 0.9 % (ref 0.5–2.5)
RH BLOOD TYPE: POSITIVE
SODIUM SERPL-SCNC: 136 MMOL/L (ref 136–145)
TOTAL IRON BINDING CAPACITY: 200 UG/DL (ref 240–450)
TRANSFERRIN SERPL-MCNC: 134 MG/DL (ref 200–360)
WBC # BLD AUTO: 8.3 X10(3) UL (ref 4–11)

## 2020-02-18 PROCEDURE — 85045 AUTOMATED RETICULOCYTE COUNT: CPT | Performed by: INTERNAL MEDICINE

## 2020-02-18 PROCEDURE — 83735 ASSAY OF MAGNESIUM: CPT | Performed by: INTERNAL MEDICINE

## 2020-02-18 PROCEDURE — 83550 IRON BINDING TEST: CPT | Performed by: INTERNAL MEDICINE

## 2020-02-18 PROCEDURE — 83540 ASSAY OF IRON: CPT | Performed by: INTERNAL MEDICINE

## 2020-02-18 PROCEDURE — 80069 RENAL FUNCTION PANEL: CPT | Performed by: INTERNAL MEDICINE

## 2020-02-18 PROCEDURE — 82962 GLUCOSE BLOOD TEST: CPT

## 2020-02-18 PROCEDURE — 82728 ASSAY OF FERRITIN: CPT | Performed by: INTERNAL MEDICINE

## 2020-02-18 PROCEDURE — 86900 BLOOD TYPING SEROLOGIC ABO: CPT | Performed by: INTERNAL MEDICINE

## 2020-02-18 PROCEDURE — 85014 HEMATOCRIT: CPT | Performed by: INTERNAL MEDICINE

## 2020-02-18 PROCEDURE — 85025 COMPLETE CBC W/AUTO DIFF WBC: CPT | Performed by: INTERNAL MEDICINE

## 2020-02-18 PROCEDURE — 86850 RBC ANTIBODY SCREEN: CPT | Performed by: INTERNAL MEDICINE

## 2020-02-18 PROCEDURE — 36430 TRANSFUSION BLD/BLD COMPNT: CPT

## 2020-02-18 PROCEDURE — 86920 COMPATIBILITY TEST SPIN: CPT

## 2020-02-18 PROCEDURE — 85018 HEMOGLOBIN: CPT | Performed by: INTERNAL MEDICINE

## 2020-02-18 PROCEDURE — 86901 BLOOD TYPING SEROLOGIC RH(D): CPT | Performed by: INTERNAL MEDICINE

## 2020-02-18 RX ORDER — SODIUM CHLORIDE 9 MG/ML
INJECTION, SOLUTION INTRAVENOUS CONTINUOUS
Status: DISCONTINUED | OUTPATIENT
Start: 2020-02-18 | End: 2020-02-20

## 2020-02-18 RX ORDER — SODIUM CHLORIDE 9 MG/ML
INJECTION, SOLUTION INTRAVENOUS ONCE
Status: DISCONTINUED | OUTPATIENT
Start: 2020-02-18 | End: 2020-02-20

## 2020-02-18 NOTE — CONSULTS
[facility]  Report of Consultation    Tuan Vuong Patient Status:  Inpatient    1935 MRN [id number]   [facility] 4NW-A Attending Angélica March MD   Hosp Day # 2 PCP Kenya Xiong MD     REASON FOR CONSULTATION:     Anemia     HI Performed by Kwesi Roland MD at [address] [place] [address]   • URETERAL REIMPLANTATION Left 11/12/2019    Performed by Joao Mcclellan MD at [facility] MAIN OR     Family History   Problem Relation Age of Onset   • Diabetes Daughter    • Hypertension Daughter    • (NOVOLOG) 100 UNIT/ML flexpen 1-5 Units, 1-5 Units, Subcutaneous, TID CC and HS  •  Meropenem (MERREM) 500 mg in sodium chloride 0.9% 100 mL MBP, 500 mg, Intravenous, Q24H    Review of Systems:  -    Physical Exam:   Blood pressure 116/58, pulse 66, temper 70 - 99 mg/dL    Sodium 136 136 - 145 mmol/L    Potassium 3.8 3.5 - 5.1 mmol/L    Chloride 104 98 - 112 mmol/L    CO2 22.0 21.0 - 32.0 mmol/L    Anion Gap 10 0 - 18 mmol/L    BUN 56 (H) 7 - 18 mg/dL    Creatinine 2.22 (H) 0.55 - 1.02 mg/dL    BUN/CREA Rati 1/20/2020       5.6 7.8 (L)   1/16/2020       5.0 7.3 (L)   1/15/2020       3.8 (L) 7.1 (L)   1/14/2020      6:37 PM 5.1 7.3 (L)   1/14/2020      5:56 AM 6.0 6.7 (LL)   1/13/2020      3:51 PM 8.5 7.8 (L)   1/13/2020      7:55 AM 11.1 (H) 8.0 (L)   1/11/2 380.0 75.6 (L)   2/10/2020       264.0 80.3   2/3/2020       343.0 78.9 (L)   1/27/2020       451.0 (H) 78.4 (L)   1/23/2020       349.0 78.0 (L)   1/20/2020       241.0 78.7 (L)   1/16/2020       284.0 78.5 (L)   1/15/2020       295.0 78.9 (L)   1/14/2020 hydronephrosis. Presumed colostomy with subtotal colectomy. Benign finding in the liver felt to be of no clinical significance.     Dictated by: Colleen Christopher MD on 2/16/2020 at 17:54     Approved by: oClleen Christopher MD on 2/16/2020 at 18:01          Xr C Essential hypertension     Hypo-osmolar hyponatremia     Counseling regarding advance care planning and goals of care     Palliative care encounter     Malnutrition ([facility] [address]     Acute renal insufficiency     ANTONIO (acute kidney injury) ([facility] [address]     Urinary tract i

## 2020-02-18 NOTE — PROGRESS NOTES
Assumed care in AM following RN report , Language barrier , use of computer translation system ; feels miserable, ileostomy w/ large ants liquid stool , right nephrostomy patent urine yellow , Pure Denisa Civatte in place , working well , Pale , weak , IV fluid w/ b

## 2020-02-18 NOTE — PROGRESS NOTES
[facility]                INFECTIOUS DISEASE PROGRESS NOTE    Tuan Vuong Patient Status:  Inpatient    1935 MRN [id number]   [facility] 4NW-A Attending Nicole Oropeza MD   Hosp Day # 2 PCP Ruperto Xavier MD     Antibiotics: Collection Time: 02/16/20  5:11 PM   Result Value Ref Range    Urine Culture >100,000 CFU/ML Escherichia coli  ESBL Pos (A) N/A       Susceptibility    Escherichia coli  ESBL Pos -  (no method available)     Cefazolin >=64 Resistant      Cefepime  [place] hydronephrosis type     Acute renal failure (HCC)     Acute renal failure, unspecified acute renal failure type (HCC)     Hyperkalemia     Elevated troponin     Displacement of nephrostomy tube (HCC)     Metabolic acidosis     Urinary tract infection assoc

## 2020-02-18 NOTE — PROGRESS NOTES
[facility]    Nephrology Progress Note    Tuan Vuong Attending:  Lupe Kaur MD       SUBJECTIVE:     Video Turkish  used  Patient not really answering questions  She seems thirsty and keeps asking for water and drinking water    PHYS 73.2 (L) 02/18/2020    MCH 23.3 (L) 02/18/2020    MCHC 31.9 02/18/2020    RDW 18.4 (H) 02/18/2020    NEPRELIM 5.42 02/18/2020    NEUTABS 5.85 11/20/2019    LYMPHABS 1.39 11/20/2019    EOSABS 0.23 11/20/2019    BASABS 0.08 11/20/2019    NEUT 75 11/20/2019 125 mcg, Oral, Before breakfast  lidocaine-menthol 4-1 % 1 patch, 1 patch, Transdermal, Q24H  Megestrol Acetate (MEGACE) 40 MG/ML oral suspension 400 mg, 400 mg, Oral, Daily  melatonin tab TABS 3 mg, 3 mg, Oral, Nightly  mirtazapine (REMERON) tab 30 mg, 30 of R sided obstruction.   -- change bicarbonate gtt to normal saline at 100 cc/hr  -- avoid NSAIDs, IV contrast and gadolinium  -- renally dose meds for GFR<15; dose gabapentin at 100 mg qhs for now  -- strict I/Os     Hypokalemia:  -- stop bicarbonate gtt

## 2020-02-18 NOTE — PROGRESS NOTES
No change overnight, took all night time meds. pure wick draining well. Right Nephrostomy tube is intact. Slept. Paged Gómez re HB, one unit of blood ordered. Consent over phone from son.  Dr Grazyna Lopez paged re ESBL in urine , no call back yet

## 2020-02-18 NOTE — CM/SW NOTE
02/18/20 1000   CM/SW Referral Data   Referral Source Social Work (self-referral)   Reason for Referral Discharge planning   Informant Children   Patient Info   Patient Communication Issues Language barrier   Patient's Home Environment Sub-acute Rehab

## 2020-02-18 NOTE — PROGRESS NOTES
[facility] Hospitalist Progress Note     [facility]      SUBJECTIVE:  Not feeling well this AM  Hgb down  Aching pain, dizziness    OBJECTIVE:  Temp:  [97.6 °F (36.4 °C)-99 °F (37.2 °C)] 99 °F (37.2 °C)  Pulse:  [66-72] 69  Resp:  [16-18] 18  BP: ()/ IV,NO ORAL) (CPT=74176)  COMPARISON:  [facility] , CT, CT ABDOMEN+PELVIS(CPT=74176), 1/08/2020, 14:34. INDICATIONS:  weakness  TECHNIQUE:  Unenhanced multislice CT scanning from above the kidneys to below the urinary bladder.   2D rendering are generated on th Portable  (cpt=71045)    Result Date: 2/16/2020  PROCEDURE:  XR CHEST AP PORTABLE  (CPT=71045)  TECHNIQUE:  AP chest radiograph was obtained. COMPARISON:  [facility] , CT, CT ABDOMEN+PELVIS KIDNEYSTONE 2D RNDR(NO IV,NO ORAL)(CPT=74176), 12/10/2019, 11:44.   ED in the usual sterile manner. All elements of maximum sterile barrier technique were used. Initially a sinogram was performed using a short Kumpe the catheter via the prior nephrostomy tube tract.   This demonstrated the tract, however there is no communic PRN  gabapentin (NEURONTIN) cap 100 mg, 100 mg, Oral, Nightly  sodium bicarbonate 150 mEq in Sterile Water for Injection 1,000 mL infusion, 150 mEq, Intravenous, Continuous  0.9% NaCl infusion, , Intravenous, Continuous  acetaminophen ([facility] without hydronephrosis  - IVF per renal  - avoid nephrotoxic agents  - renally dose medication    # Ecoli Bacteremia  # E colic ESBL UTI  - on IV meropenem, will need 2 weeks abx  - appreciate ID recommendations  - Nephrostomy tube had been dislodged and w

## 2020-02-19 LAB
ALBUMIN SERPL-MCNC: 1.7 G/DL (ref 3.4–5)
ANION GAP SERPL CALC-SCNC: 10 MMOL/L (ref 0–18)
BASOPHILS # BLD AUTO: 0.02 X10(3) UL (ref 0–0.2)
BASOPHILS NFR BLD AUTO: 0.3 %
BLOOD TYPE BARCODE: 6200
BUN BLD-MCNC: 56 MG/DL (ref 7–18)
BUN/CREAT SERPL: 25.2 (ref 10–20)
CALCIUM BLD-MCNC: 8.2 MG/DL (ref 8.5–10.1)
CHLORIDE SERPL-SCNC: 104 MMOL/L (ref 98–112)
CO2 SERPL-SCNC: 22 MMOL/L (ref 21–32)
CREAT BLD-MCNC: 2.22 MG/DL (ref 0.55–1.02)
DEPRECATED RDW RBC AUTO: 52.9 FL (ref 35.1–46.3)
EOSINOPHIL # BLD AUTO: 0.13 X10(3) UL (ref 0–0.7)
EOSINOPHIL NFR BLD AUTO: 1.6 %
ERYTHROCYTE [DISTWIDTH] IN BLOOD BY AUTOMATED COUNT: 18.6 % (ref 11–15)
GLUCOSE BLD-MCNC: 123 MG/DL (ref 70–99)
GLUCOSE BLD-MCNC: 123 MG/DL (ref 70–99)
GLUCOSE BLD-MCNC: 126 MG/DL (ref 70–99)
GLUCOSE BLD-MCNC: 89 MG/DL (ref 70–99)
GLUCOSE BLD-MCNC: 96 MG/DL (ref 70–99)
HCT VFR BLD AUTO: 24.1 % (ref 35–48)
HGB BLD-MCNC: 7.7 G/DL (ref 12–16)
IMM GRANULOCYTES # BLD AUTO: 0.08 X10(3) UL (ref 0–1)
IMM GRANULOCYTES NFR BLD: 1 %
LYMPHOCYTES # BLD AUTO: 1.93 X10(3) UL (ref 1–4)
LYMPHOCYTES NFR BLD AUTO: 24.5 %
MCH RBC QN AUTO: 24.9 PG (ref 26–34)
MCHC RBC AUTO-ENTMCNC: 32 G/DL (ref 31–37)
MCV RBC AUTO: 78 FL (ref 80–100)
MONOCYTES # BLD AUTO: 0.54 X10(3) UL (ref 0.1–1)
MONOCYTES NFR BLD AUTO: 6.9 %
NEUTROPHILS # BLD AUTO: 5.18 X10 (3) UL (ref 1.5–7.7)
NEUTROPHILS # BLD AUTO: 5.18 X10(3) UL (ref 1.5–7.7)
NEUTROPHILS NFR BLD AUTO: 65.7 %
OSMOLALITY SERPL CALC.SUM OF ELEC: 297 MOSM/KG (ref 275–295)
PHOSPHATE SERPL-MCNC: 2.4 MG/DL (ref 2.5–4.9)
PLATELET # BLD AUTO: 331 10(3)UL (ref 150–450)
POTASSIUM SERPL-SCNC: 3.8 MMOL/L (ref 3.5–5.1)
RBC # BLD AUTO: 3.09 X10(6)UL (ref 3.8–5.3)
SODIUM SERPL-SCNC: 136 MMOL/L (ref 136–145)
WBC # BLD AUTO: 7.9 X10(3) UL (ref 4–11)

## 2020-02-19 PROCEDURE — 76937 US GUIDE VASCULAR ACCESS: CPT

## 2020-02-19 PROCEDURE — 36410 VNPNXR 3YR/> PHY/QHP DX/THER: CPT

## 2020-02-19 PROCEDURE — 82962 GLUCOSE BLOOD TEST: CPT

## 2020-02-19 PROCEDURE — 85025 COMPLETE CBC W/AUTO DIFF WBC: CPT | Performed by: INTERNAL MEDICINE

## 2020-02-19 PROCEDURE — 80069 RENAL FUNCTION PANEL: CPT | Performed by: INTERNAL MEDICINE

## 2020-02-19 RX ORDER — SODIUM CHLORIDE 0.9 % (FLUSH) 0.9 %
10 SYRINGE (ML) INJECTION EVERY 12 HOURS
Status: DISCONTINUED | OUTPATIENT
Start: 2020-02-19 | End: 2020-02-20

## 2020-02-19 RX ORDER — HEPARIN SODIUM 5000 [USP'U]/ML
5000 INJECTION, SOLUTION INTRAVENOUS; SUBCUTANEOUS EVERY 8 HOURS SCHEDULED
Status: DISCONTINUED | OUTPATIENT
Start: 2020-02-19 | End: 2020-02-20

## 2020-02-19 NOTE — PLAN OF CARE
Pt alert, unable to assess orientation due to language barrier. Speaking Belarusian primarily, able to communicate using gestures/nodding. Colostomy bag was leaking, bag changed. Draining dark green/brown stool. Purewick in, clear urine draining.  Evening meds

## 2020-02-19 NOTE — PROGRESS NOTES
[facility]                INFECTIOUS DISEASE PROGRESS NOTE    Farazdebora TORO Yevgeniy Patient Status:  Inpatient    1935 MRN [id number]   [facility] 4NW-A Attending Juwan Huber MD   Hosp Day # 3 PCP Milagro Jacobo MD     Antibiotics: Result Value Ref Range    Blood Culture Result No Growth 1 Day N/A   2. URINE CULTURE, ROUTINE     Status: Abnormal    Collection Time: 02/16/20  5:11 PM   Result Value Ref Range    Urine Culture >100,000 CFU/ML Escherichia coli  ESBL Pos (A) N/A       S without hematuria     Urinary tract infection without hematuria, site unspecified     Hydronephrosis, unspecified hydronephrosis type     Acute renal failure (HCC)     Acute renal failure, unspecified acute renal failure type (HCC)     Hyperkalemia     Anika

## 2020-02-19 NOTE — PROGRESS NOTES
[facility]    Nephrology Progress Note    Tuan Vuong Attending:  Octavia Guillory MD       SUBJECTIVE:     History taken using video Hungarian interpretation services  She is not eating much  No urinary complaints  Has liquid output from her ostomy  No (L) 02/19/2020    .0 02/19/2020    MCV 78.0 (L) 02/19/2020    MCH 24.9 (L) 02/19/2020    MCHC 32.0 02/19/2020    RDW 18.6 (H) 02/19/2020    NEPRELIM 5.18 02/19/2020    NEUTABS 5.85 11/20/2019    LYMPHABS 1.39 11/20/2019    EOSABS 0.23 11/20/2019 g, 4 g, Oral, Daily  ferrous sulfate EC tab 325 mg, 325 mg, Oral, Daily with breakfast  Levothyroxine Sodium tab 125 mcg, 125 mcg, Oral, Before breakfast  lidocaine-menthol 4-1 % 1 patch, 1 patch, Transdermal, Q24H  Megestrol Acetate (MEGACE) 40 MG/ML oral some improvement since IV fluids were started and also urine sodium was 37 while on IV fluids. She also had recent relief of R sided obstruction.   -- s/p sodium bicarbonate infusion; now continue normal saline at 100 cc/hr  -- avoid NSAIDs, IV contrast and

## 2020-02-19 NOTE — PROGRESS NOTES
[facility] Hospitalist Progress Note     [facility]      SUBJECTIVE:  Feeling better this morning  Denies pain  No lightheadedness    OBJECTIVE:  Temp:  [98 °F (36.7 °C)-99 °F (37.2 °C)] 98 °F (36.7 °C)  Pulse:  [62-74] 62  Resp:  [18] 18  BP: (128-135)/(5 Imaging:  Ct Abdomen+pelvis Kidneystone 2d Rndr(no Iv,no Oral)(cpt=74176)    Result Date: 2/16/2020  PROCEDURE:  CT ABDOMEN/PELVIS KIDNEYSTONE 2D RNDR (NO IV,NO ORAL) (CPT=74176)  COMPARISON:  [facility], CT ABDOMEN+PELVIS(CPT=74176), 1/08/2020, 14: clinical significance.     Dictated by: Vinh Sibley MD on 2/16/2020 at 17:54     Approved by: Vinh Sibley MD on 2/16/2020 at 18:01          Xr Chest Ap Portable  (cpt=71045)    Result Date: 2/16/2020  PROCEDURE:  XR CHEST AP PORTABLE  (CPT=71045)  ADITYA from the patient's family. Time-out was performed by the staff. Preprocedure antibiotics were administered. She was positioned prone. The back was prepped in the usual sterile manner. All elements of maximum sterile barrier technique were used.   Macrina Desai chloride 0.9% SOLN 100 mL with Meropenem 500 MG SOLR 500 mg, Inject 500 mg into the vein daily for 12 days.         Normal Saline Flush 0.9 % injection 10 mL, 10 mL, Intravenous, Q12H  0.9% NaCl infusion, , Intravenous, Once  0.9% NaCl infusion, , Intraveno ureteral stent who presented from NH with AMS and ANTONIO.      # Acute Renal Failure  - Cr baseline around 1.3 - 1.5, Cr up to 4.65 on admit, improving --> 2.22 this AM  - Nephrology c/s  - Imaging without hydronephrosis  - IVF per renal  - avoid nephrotoxic

## 2020-02-19 NOTE — CM/SW NOTE
Spoke w/Garrison from [employer]. Informed him the pt is going to get a Midline and be on IV ABX.

## 2020-02-20 ENCOUNTER — APPOINTMENT (OUTPATIENT)
Dept: GENERAL RADIOLOGY | Facility: HOSPITAL | Age: 85
DRG: 872 | End: 2020-02-20
Attending: INTERNAL MEDICINE
Payer: MEDICARE

## 2020-02-20 VITALS
WEIGHT: 157.5 LBS | SYSTOLIC BLOOD PRESSURE: 161 MMHG | OXYGEN SATURATION: 99 % | TEMPERATURE: 98 F | HEART RATE: 65 BPM | HEIGHT: 63 IN | RESPIRATION RATE: 18 BRPM | BODY MASS INDEX: 27.91 KG/M2 | DIASTOLIC BLOOD PRESSURE: 73 MMHG

## 2020-02-20 LAB
ALBUMIN SERPL-MCNC: 1.6 G/DL (ref 3.4–5)
ANION GAP SERPL CALC-SCNC: 4 MMOL/L (ref 0–18)
BASOPHILS # BLD AUTO: 0.03 X10(3) UL (ref 0–0.2)
BASOPHILS NFR BLD AUTO: 0.4 %
BUN BLD-MCNC: 38 MG/DL (ref 7–18)
BUN/CREAT SERPL: 25.3 (ref 10–20)
CALCIUM BLD-MCNC: 7.8 MG/DL (ref 8.5–10.1)
CHLORIDE SERPL-SCNC: 112 MMOL/L (ref 98–112)
CO2 SERPL-SCNC: 22 MMOL/L (ref 21–32)
CREAT BLD-MCNC: 1.5 MG/DL (ref 0.55–1.02)
DEPRECATED RDW RBC AUTO: 51.6 FL (ref 35.1–46.3)
EOSINOPHIL # BLD AUTO: 0.32 X10(3) UL (ref 0–0.7)
EOSINOPHIL NFR BLD AUTO: 4.3 %
ERYTHROCYTE [DISTWIDTH] IN BLOOD BY AUTOMATED COUNT: 18.4 % (ref 11–15)
GLUCOSE BLD-MCNC: 104 MG/DL (ref 70–99)
GLUCOSE BLD-MCNC: 122 MG/DL (ref 70–99)
GLUCOSE BLD-MCNC: 96 MG/DL (ref 70–99)
GLUCOSE BLD-MCNC: 98 MG/DL (ref 70–99)
HCT VFR BLD AUTO: 26 % (ref 35–48)
HGB BLD-MCNC: 8.2 G/DL (ref 12–16)
IMM GRANULOCYTES # BLD AUTO: 0.11 X10(3) UL (ref 0–1)
IMM GRANULOCYTES NFR BLD: 1.5 %
LYMPHOCYTES # BLD AUTO: 2.06 X10(3) UL (ref 1–4)
LYMPHOCYTES NFR BLD AUTO: 27.6 %
MCH RBC QN AUTO: 24.7 PG (ref 26–34)
MCHC RBC AUTO-ENTMCNC: 31.5 G/DL (ref 31–37)
MCV RBC AUTO: 78.3 FL (ref 80–100)
MONOCYTES # BLD AUTO: 0.49 X10(3) UL (ref 0.1–1)
MONOCYTES NFR BLD AUTO: 6.6 %
NEUTROPHILS # BLD AUTO: 4.46 X10 (3) UL (ref 1.5–7.7)
NEUTROPHILS # BLD AUTO: 4.46 X10(3) UL (ref 1.5–7.7)
NEUTROPHILS NFR BLD AUTO: 59.6 %
OSMOLALITY SERPL CALC.SUM OF ELEC: 295 MOSM/KG (ref 275–295)
PHOSPHATE SERPL-MCNC: 3 MG/DL (ref 2.5–4.9)
PLATELET # BLD AUTO: 346 10(3)UL (ref 150–450)
POTASSIUM SERPL-SCNC: 4 MMOL/L (ref 3.5–5.1)
RBC # BLD AUTO: 3.32 X10(6)UL (ref 3.8–5.3)
SODIUM SERPL-SCNC: 138 MMOL/L (ref 136–145)
WBC # BLD AUTO: 7.5 X10(3) UL (ref 4–11)

## 2020-02-20 PROCEDURE — 82962 GLUCOSE BLOOD TEST: CPT

## 2020-02-20 PROCEDURE — 85025 COMPLETE CBC W/AUTO DIFF WBC: CPT | Performed by: INTERNAL MEDICINE

## 2020-02-20 PROCEDURE — 73030 X-RAY EXAM OF SHOULDER: CPT | Performed by: INTERNAL MEDICINE

## 2020-02-20 PROCEDURE — 80069 RENAL FUNCTION PANEL: CPT | Performed by: INTERNAL MEDICINE

## 2020-02-20 RX ORDER — SODIUM CHLORIDE, SODIUM LACTATE, POTASSIUM CHLORIDE, CALCIUM CHLORIDE 600; 310; 30; 20 MG/100ML; MG/100ML; MG/100ML; MG/100ML
INJECTION, SOLUTION INTRAVENOUS CONTINUOUS
Status: DISCONTINUED | OUTPATIENT
Start: 2020-02-20 | End: 2020-02-20

## 2020-02-20 RX ORDER — GABAPENTIN 100 MG/1
100 CAPSULE ORAL NIGHTLY
Qty: 30 CAPSULE | Refills: 0 | Status: ON HOLD | OUTPATIENT
Start: 2020-02-20 | End: 2020-05-11

## 2020-02-20 NOTE — CDS QUERY
Blood Culture Results  Saundra Peterson  Dear Doctor:  Clinical information (provided below) includes growth reported on blood culture results.  For accurate ICD-10-CM code assignment to reflect severity of illness and risk of morta

## 2020-02-20 NOTE — CM/SW NOTE
02/20/20 1500   Discharge disposition   Expected discharge disposition Skilled Nurs   Discharge [address]   BLS to arrive at [address] [facility]. RN to inform family. PCS on chart.  RN to call report  [facility]  P:   F: [phone]

## 2020-02-20 NOTE — DISCHARGE SUMMARY
General Medicine Discharge Summary     Patient ID:  Monica Rodriguez  81year old  8/8/1935    Admit date: 2/16/2020    Discharge date and time: 2/20/2020    Attending Physician: Lalo Whyte MD weeks abx  - appreciate ID recommendations  - Nephrostomy tube had been dislodged and was re-placed 3/64    # Metabolic Acidosis  - resolved, monitor labs.  Bicarb stable off drip  - appreciate nephrology recs     # Acute on chronic anemia  - monitor Hgb  - 100 MG Oral Tab  Take 1.5 tablets (150 mg total) by mouth daily. lidocaine 4 % External Patch  Place 1 patch onto the skin daily.  Apply to low back in am and remove at pm     Megestrol Acetate 40 MG/ML Oral Suspension  Take 10 mL (400 mg total) by mouth bilaterally, no active wheezing  Abdomen: nontender, nondistended, intact BS  Extremities: no pedal edema   Neuro: CN inact, no focal deficits      Total time coordinating care for discharge: Greater than 30 minutes    . Ebony Patel  [facility]  [phone]

## 2020-02-20 NOTE — PROGRESS NOTES
[facility]    Nephrology Progress Note    Tuan Vuong Attending:  Tom Gaines MD       SUBJECTIVE:     History taken using video Kiny[employer] interpretation services. Feeling about the same. PO intake not great per RN report.      PHYSICAL EXAM:     Vi 02/20/2020    MCH 24.7 (L) 02/20/2020    MCHC 31.5 02/20/2020    RDW 18.4 (H) 02/20/2020    NEPRELIM 4.46 02/20/2020    NEUTABS 5.85 11/20/2019    LYMPHABS 1.39 11/20/2019    EOSABS 0.23 11/20/2019    BASABS 0.08 11/20/2019    NEUT 75 11/20/2019    LYMPH 1 PRN  cholestyramine light (PREVALITE) powder packet 4 g, 4 g, Oral, Daily  ferrous sulfate EC tab 325 mg, 325 mg, Oral, Daily with breakfast  Levothyroxine Sodium tab 125 mcg, 125 mcg, Oral, Before breakfast  lidocaine-menthol 4-1 % 1 patch, 1 patch, Trans were started and also urine sodium was 37 while on IV fluids. She also had recent relief of R sided obstruction. - Creatinine improved to 1.5 today. Could d/c IVF if PO intake is optimized.      Hypokalemia:  -- improved with change in IVF/repletion     A

## 2020-02-20 NOTE — PROGRESS NOTES
[facility]  Progress Note    Farazdebora TORO Isidrodel Patient Status:  Inpatient    1935 MRN [id number]   [facility] 4NW-A Attending Yanna Fitzpatrick MD   Hosp Day # 4 PCP Angella Mohr MD     Subjective:    Feels same    Objective:  Blood pres pg    MCHC 31.5 31.0 - 37.0 g/dL    RDW 18.4 (H) 11.0 - 15.0 %    RDW-SD 51.6 (H) 35.1 - 46.3 fL    Neutrophil Absolute Prelim 4.46 1.50 - 7.70 x10 (3) uL    Neutrophil Absolute 4.46 1.50 - 7.70 x10(3) uL    Lymphocyte Absolute 2.06 1.00 - 4.00 x10(3) uL acute on chronic. Reticulocyte count is low at 0.9 suggesting hyperproliferative anemia. The iron sat is low but so is TIBC. Ferritin is high. likely anemia of chronic disease and superimposed iron deficiency as well.  With worsening renal status and chroni

## 2020-02-20 NOTE — PLAN OF CARE
Pt is alert. Unable to to assess orientation d/t language barrier. Follows commands and nods appropriately. VSS. Afebrile. Does not appear to be in distress. Took evening meds without difficulty. Ileostomy bag was leaking so it was changed.  Jessica in bibi

## 2020-02-20 NOTE — PROGRESS NOTES
[facility]                INFECTIOUS DISEASE PROGRESS NOTE    Tuan TORO Isidrodel Patient Status:  Inpatient    1935 MRN [id number]   [facility] 4NW-A Attending Nancy Oh MD   Hosp Day # 4 PCP Demetrice Stafford MD     Antibiotics: Result Value Ref Range    Blood Culture Result No Growth 2 Days N/A   2. URINE CULTURE, ROUTINE     Status: Abnormal    Collection Time: 02/16/20  5:11 PM   Result Value Ref Range    Urine Culture >100,000 CFU/ML Escherichia coli  ESBL Pos (A) N/A without hematuria     Urinary tract infection without hematuria, site unspecified     Hydronephrosis, unspecified hydronephrosis type     Acute renal failure (HCC)     Acute renal failure, unspecified acute renal failure type (HCC)     Hyperkalemia     Anika

## 2020-02-21 ENCOUNTER — EXTERNAL FACILITY (OUTPATIENT)
Dept: FAMILY MEDICINE CLINIC | Facility: CLINIC | Age: 85
End: 2020-02-21

## 2020-02-21 ENCOUNTER — SNF VISIT (OUTPATIENT)
Dept: INTERNAL MEDICINE CLINIC | Age: 85
End: 2020-02-21

## 2020-02-21 ENCOUNTER — NURSE ONLY (OUTPATIENT)
Dept: LAB | Age: 85
End: 2020-02-21
Attending: FAMILY MEDICINE
Payer: MEDICARE

## 2020-02-21 VITALS — HEART RATE: 79 BPM | OXYGEN SATURATION: 95 %

## 2020-02-21 DIAGNOSIS — N39.0 UTI DUE TO EXTENDED-SPECTRUM BETA LACTAMASE (ESBL) PRODUCING ESCHERICHIA COLI: ICD-10-CM

## 2020-02-21 DIAGNOSIS — N39.0 URINARY TRACT INFECTION WITHOUT HEMATURIA, SITE UNSPECIFIED: ICD-10-CM

## 2020-02-21 DIAGNOSIS — D64.9 ANEMIA, UNSPECIFIED TYPE: ICD-10-CM

## 2020-02-21 DIAGNOSIS — D50.8 OTHER IRON DEFICIENCY ANEMIA: ICD-10-CM

## 2020-02-21 DIAGNOSIS — E46 MALNUTRITION, UNSPECIFIED TYPE (HCC): ICD-10-CM

## 2020-02-21 DIAGNOSIS — E87.2 METABOLIC ACIDOSIS: ICD-10-CM

## 2020-02-21 DIAGNOSIS — N39.0 URINARY TRACT INFECTION ASSOCIATED WITH NEPHROSTOMY CATHETER, INITIAL ENCOUNTER (HCC): ICD-10-CM

## 2020-02-21 DIAGNOSIS — Z78.9 IMPAIRED MOBILITY AND ADLS: ICD-10-CM

## 2020-02-21 DIAGNOSIS — R78.81 E COLI BACTEREMIA: ICD-10-CM

## 2020-02-21 DIAGNOSIS — R53.1 WEAKNESS GENERALIZED: ICD-10-CM

## 2020-02-21 DIAGNOSIS — E11.42 TYPE 2 DIABETES MELLITUS WITH DIABETIC POLYNEUROPATHY, UNSPECIFIED WHETHER LONG TERM INSULIN USE (HCC): ICD-10-CM

## 2020-02-21 DIAGNOSIS — N17.9 AKI (ACUTE KIDNEY INJURY) (HCC): Primary | ICD-10-CM

## 2020-02-21 DIAGNOSIS — E11.9 DM (DIABETES MELLITUS) (HCC): Primary | ICD-10-CM

## 2020-02-21 DIAGNOSIS — T83.512A URINARY TRACT INFECTION ASSOCIATED WITH NEPHROSTOMY CATHETER, INITIAL ENCOUNTER (HCC): ICD-10-CM

## 2020-02-21 DIAGNOSIS — N17.9 ACUTE RENAL FAILURE, UNSPECIFIED ACUTE RENAL FAILURE TYPE (HCC): Primary | ICD-10-CM

## 2020-02-21 DIAGNOSIS — B96.20 E COLI BACTEREMIA: ICD-10-CM

## 2020-02-21 DIAGNOSIS — Z16.12 UTI DUE TO EXTENDED-SPECTRUM BETA LACTAMASE (ESBL) PRODUCING ESCHERICHIA COLI: ICD-10-CM

## 2020-02-21 DIAGNOSIS — Z74.09 IMPAIRED MOBILITY AND ADLS: ICD-10-CM

## 2020-02-21 DIAGNOSIS — B96.29 UTI DUE TO EXTENDED-SPECTRUM BETA LACTAMASE (ESBL) PRODUCING ESCHERICHIA COLI: ICD-10-CM

## 2020-02-21 LAB
ALBUMIN SERPL-MCNC: 1.7 G/DL (ref 3.4–5)
ALBUMIN/GLOB SERPL: 0.4 {RATIO} (ref 1–2)
ALP LIVER SERPL-CCNC: 77 U/L (ref 55–142)
ALT SERPL-CCNC: 13 U/L (ref 13–56)
ANION GAP SERPL CALC-SCNC: 6 MMOL/L (ref 0–18)
AST SERPL-CCNC: 19 U/L (ref 15–37)
BILIRUB SERPL-MCNC: 0.2 MG/DL (ref 0.1–2)
BUN BLD-MCNC: 28 MG/DL (ref 7–18)
BUN/CREAT SERPL: 21.5 (ref 10–20)
CALCIUM BLD-MCNC: 8 MG/DL (ref 8.5–10.1)
CHLORIDE SERPL-SCNC: 110 MMOL/L (ref 98–112)
CO2 SERPL-SCNC: 21 MMOL/L (ref 21–32)
CREAT BLD-MCNC: 1.3 MG/DL (ref 0.55–1.02)
GLOBULIN PLAS-MCNC: 4.4 G/DL (ref 2.8–4.4)
GLUCOSE BLD-MCNC: 77 MG/DL (ref 70–99)
M PROTEIN MFR SERPL ELPH: 6.1 G/DL (ref 6.4–8.2)
OSMOLALITY SERPL CALC.SUM OF ELEC: 288 MOSM/KG (ref 275–295)
PATIENT FASTING Y/N/NP: YES
POTASSIUM SERPL-SCNC: 3.9 MMOL/L (ref 3.5–5.1)
SODIUM SERPL-SCNC: 137 MMOL/L (ref 136–145)

## 2020-02-21 PROCEDURE — 99305 1ST NF CARE MODERATE MDM 35: CPT | Performed by: FAMILY MEDICINE

## 2020-02-21 PROCEDURE — 99310 SBSQ NF CARE HIGH MDM 45: CPT | Performed by: NURSE PRACTITIONER

## 2020-02-21 PROCEDURE — 85025 COMPLETE CBC W/AUTO DIFF WBC: CPT

## 2020-02-21 PROCEDURE — 80053 COMPREHEN METABOLIC PANEL: CPT

## 2020-02-21 PROCEDURE — 36415 COLL VENOUS BLD VENIPUNCTURE: CPT

## 2020-02-21 NOTE — PLAN OF CARE
Discharged to [facility] in stable condition per [employer]. Chart copy given to Ambulance dilip.

## 2020-02-21 NOTE — PLAN OF CARE
Pt. Alert; able to communicate using video . Pt. With right Nephrostomy, patent and draining clear yellow urine. Ileostomy draining brown liquid stool. Pt. Given IV antibiotics; tolerating well. Poor appetite.  QID accuchecks; no insulin given per

## 2020-02-21 NOTE — PROGRESS NOTES
Miguel Alvarezdenise  : 1935  Age 81year old  female patient is admitted to Facility: [place] [address] for  MARCELLA after ARF/E Coli ESBL UTI/E Coli Bacteremia    [facility] Admit date:    19 after colon obstruction s/p colectomy and left hospital on 12. 9.19 w/ KARSTEN, hyponatremia, and E Coli ESBL UTI. Imaging revealed mild hydronephrosis to right kidney. Subsequently readmitted for b/l hydronephrosis in early January.   Most recently, weekly labs demonstrated hyperkalemia, acidosis and ARF ---deferred  RESPIRATORY:---clear to auscultation anteriorly and posteriorly; no wheezing, on room air  CARDIOVASCULAR: S1, S2 normal, RRR; no S3, no S4; , no click; +systolic murmur  ABDOMEN:  normal active BS+, soft, nondistended; no organomegaly, no mas Josefina/nephrology in 2 wks     E Coli ESBL UTI/E Coli Bacteremia  1. Consult Dr Borja/ID  2. Contact isolation  3. Meropenem 500 mg IV BID until 3.2.2020    Back pain/Generalized pain likely OA  1. Tylenol 650 mg TID prn  2.  Lidocaine 4% patch to low back d HS  6. Lantus 4u q HS--on hold     Diverticulitis/chronic constipation  1. Monitor stool output  2. Cholestyramine 4 gm daily to bulk stool output     Malnutrition  1. Dietician eval  2. Monitor wt  3. Megace 400 mg daily     Insomnia  1.  Melatonin 3 mg ni

## 2020-02-22 ENCOUNTER — HOSPITAL ENCOUNTER (EMERGENCY)
Facility: HOSPITAL | Age: 85
Discharge: NURSING, REHAB OR LONG-TERM CARE | End: 2020-02-22
Attending: EMERGENCY MEDICINE
Payer: MEDICARE

## 2020-02-22 VITALS
TEMPERATURE: 98 F | HEIGHT: 60 IN | HEART RATE: 80 BPM | SYSTOLIC BLOOD PRESSURE: 98 MMHG | OXYGEN SATURATION: 97 % | WEIGHT: 157.44 LBS | BODY MASS INDEX: 30.91 KG/M2 | DIASTOLIC BLOOD PRESSURE: 73 MMHG | RESPIRATION RATE: 22 BRPM

## 2020-02-22 DIAGNOSIS — R04.0 EPISTAXIS: Primary | ICD-10-CM

## 2020-02-22 PROCEDURE — 99283 EMERGENCY DEPT VISIT LOW MDM: CPT

## 2020-02-22 RX ORDER — FLUTICASONE PROPIONATE 50 MCG
2 SPRAY, SUSPENSION (ML) NASAL DAILY
Qty: 16 G | Refills: 0 | Status: SHIPPED | OUTPATIENT
Start: 2020-02-22 | End: 2020-02-22

## 2020-02-22 RX ORDER — KETOTIFEN FUMARATE 0.35 MG/ML
1 SOLUTION/ DROPS OPHTHALMIC 2 TIMES DAILY
Qty: 5 ML | Refills: 0 | Status: SHIPPED | OUTPATIENT
Start: 2020-02-22 | End: 2020-02-22

## 2020-02-22 RX ORDER — LORATADINE 10 MG/1
10 TABLET ORAL DAILY
Qty: 30 TABLET | Refills: 0 | Status: SHIPPED | OUTPATIENT
Start: 2020-02-22 | End: 2020-02-22

## 2020-02-22 NOTE — ED PROVIDER NOTES
Patient Seen in: [facility] Emergency Department      History   Patient presents with:  Nose Bleed    Stated Complaint: epistaxis    HPI    Here concerned about epistaxis.   Per report from the nursing home, family states that she was bleeding from h MD NAIF at [place] [address] Left 11/12/2019    Performed by Ingris Murray MD at [facility] MAIN OR                    Social History    Tobacco Use      Smoking status: Never Smoker      Smokeless tobacco: Never Used    Alcohol u summarized above. Her CMP yesterday was nearly normal.        MDM     Epistaxis without any active bleeding. Instructions given to help avoid bleeding at home and advice given about if it were to recur. Follow-up with PCP.   Other return precaution dis

## 2020-02-22 NOTE — ED INITIAL ASSESSMENT (HPI)
Patient speaks Kazakh. Presents per medics from [place] in [place]. Patient is here for  epistaxis at 1230. Her son arrived at 12.  Bleeding subsided Here for evaluation

## 2020-02-22 NOTE — ED NOTES
[address] [facility] service called with update.  [facility] has picked up the call with eta of 6020

## 2020-02-22 NOTE — ED NOTES
Called and gave report to [facility], spoke to Linnea Munoz. Reviewed DC instructions & ED summary. Pt will be transported via ambulance service.

## 2020-02-22 NOTE — ED NOTES
[facility] called for patient transport to [place]. Eta is 7 pm. Writer asked lennox from dispatch to turn the call over.  States she will call back with updated eta

## 2020-02-23 LAB — E COLI DNA BLD POS QL NAA+NON-PROBE: DETECTED

## 2020-02-23 NOTE — PROGRESS NOTES
Tuan Vuong Author: Jorje Quintanilla MD     1935 MRN [id number]   [facility]  Admission  2020     Last Hospital Discharge  2020 PCP Jorje Quintanilla [facility]  [facility]        CC --readmission to [facility], acu Performed by Gisella Bear MD at [address]   • LEFT PHACOEMULSIFICATION OF CATARACT WITH INTRAOCULAR LENS IMPLANT 66959 Left 5/17/2017    Performed by Yi Riley MD at [address] [place] [address]   • RITA Abraham total) by mouth daily. • Insulin Aspart Pen 100 UNIT/ML Subcutaneous Solution Pen-injector Inject 2-10 units at mealtimes and at bedtime.     Give 2 unit if blood glucose 141-180 mg/dL  Give 3 units if blood glucose 181-220 mg/dL  Give 6 units if blood nausea, vomiting or diarrhea. No blood in stools.   Neurologic: No seizures, tremors, weakness or numbness    VITALS: Pulse 68/min, respiration 18/min blood pressure 140/85  PHYSICAL EXAM:  GENERAL: well developed, well nourished, in no apparent distress  S until March 2  Check CBC CMP on Monday  Violeta Gonzalez MD   [address] [place] [address]    Electronically signed

## 2020-02-24 ENCOUNTER — SNF VISIT (OUTPATIENT)
Dept: INTERNAL MEDICINE CLINIC | Age: 85
End: 2020-02-24

## 2020-02-24 VITALS
SYSTOLIC BLOOD PRESSURE: 141 MMHG | DIASTOLIC BLOOD PRESSURE: 67 MMHG | OXYGEN SATURATION: 97 % | TEMPERATURE: 98 F | HEART RATE: 77 BPM | RESPIRATION RATE: 18 BRPM

## 2020-02-24 DIAGNOSIS — E87.5 HYPERKALEMIA: ICD-10-CM

## 2020-02-24 DIAGNOSIS — D50.8 OTHER IRON DEFICIENCY ANEMIA: ICD-10-CM

## 2020-02-24 DIAGNOSIS — R78.81 E COLI BACTEREMIA: ICD-10-CM

## 2020-02-24 DIAGNOSIS — B96.20 E COLI BACTEREMIA: ICD-10-CM

## 2020-02-24 DIAGNOSIS — N17.9 ACUTE RENAL FAILURE, UNSPECIFIED ACUTE RENAL FAILURE TYPE (HCC): Primary | ICD-10-CM

## 2020-02-24 DIAGNOSIS — E11.42 TYPE 2 DIABETES MELLITUS WITH DIABETIC POLYNEUROPATHY, WITHOUT LONG-TERM CURRENT USE OF INSULIN (HCC): ICD-10-CM

## 2020-02-24 PROCEDURE — 99308 SBSQ NF CARE LOW MDM 20: CPT | Performed by: NURSE PRACTITIONER

## 2020-02-24 NOTE — PROGRESS NOTES
Tuan Vuong, 68/1935, 81year old, female    Chief Complaint:  Patient presents with: Follow - Up: AMS/ARF/E Coli UTI/E Coli Bacteremia  Lab Results       Subjective:   Pt only understands and speaks Persian.    PMH significant for Depression, HTN, HL, drainage, mucous membranes pink, moist, pharynx no exudate, no visible cerumen.   NECK: supple; FROM; no JVD, no TMG, no carotid bruits  BREAST: ---deferred  RESPIRATORY:---clear to auscultation anteriorly and posteriorly; no wheezing, on room air  CARDIOVA weekly  4. Nephrostomy tube site care and drsg change daily  5. Flush and aspirate nephrostomy tube q/ 10 ml sterile saline q 8 hrs  6. F/U w/ Dr Rocha/nephrology in 2 wks     E Coli ESBL UTI/E Coli Bacteremia  1. Consult Dr Borja/ID  2.  Contact isolation wks     T2 DM w/ PN  1. CHO controlled diet  2. Accu check AC and HS  3. Metformin 1,000 mg BID  4. Gabapentin 100 mg q HS  5. Novolog SS AC and HS  6. Lantus 4u q HS--on hold     Diverticulitis/chronic constipation  1. Monitor stool output  2.  Cholestyram

## 2020-02-26 ENCOUNTER — SNF VISIT (OUTPATIENT)
Dept: INTERNAL MEDICINE CLINIC | Age: 85
End: 2020-02-26

## 2020-02-26 ENCOUNTER — NURSE ONLY (OUTPATIENT)
Dept: LAB | Age: 85
End: 2020-02-26
Attending: FAMILY MEDICINE
Payer: MEDICARE

## 2020-02-26 VITALS
SYSTOLIC BLOOD PRESSURE: 94 MMHG | OXYGEN SATURATION: 98 % | DIASTOLIC BLOOD PRESSURE: 53 MMHG | RESPIRATION RATE: 18 BRPM | TEMPERATURE: 98 F | HEART RATE: 65 BPM

## 2020-02-26 DIAGNOSIS — Z16.12 UTI DUE TO EXTENDED-SPECTRUM BETA LACTAMASE (ESBL) PRODUCING ESCHERICHIA COLI: ICD-10-CM

## 2020-02-26 DIAGNOSIS — N39.0 UTI DUE TO EXTENDED-SPECTRUM BETA LACTAMASE (ESBL) PRODUCING ESCHERICHIA COLI: ICD-10-CM

## 2020-02-26 DIAGNOSIS — E11.9 DM (DIABETES MELLITUS) (HCC): ICD-10-CM

## 2020-02-26 DIAGNOSIS — R78.81 E COLI BACTEREMIA: ICD-10-CM

## 2020-02-26 DIAGNOSIS — B96.20 E COLI BACTEREMIA: ICD-10-CM

## 2020-02-26 DIAGNOSIS — R69 DIAGNOSIS UNKNOWN: Primary | ICD-10-CM

## 2020-02-26 DIAGNOSIS — B96.29 UTI DUE TO EXTENDED-SPECTRUM BETA LACTAMASE (ESBL) PRODUCING ESCHERICHIA COLI: ICD-10-CM

## 2020-02-26 DIAGNOSIS — N17.9 ACUTE RENAL FAILURE, UNSPECIFIED ACUTE RENAL FAILURE TYPE (HCC): Primary | ICD-10-CM

## 2020-02-26 DIAGNOSIS — Z74.09 IMPAIRED MOBILITY AND ADLS: ICD-10-CM

## 2020-02-26 DIAGNOSIS — D50.8 OTHER IRON DEFICIENCY ANEMIA: ICD-10-CM

## 2020-02-26 DIAGNOSIS — R53.1 WEAKNESS GENERALIZED: ICD-10-CM

## 2020-02-26 DIAGNOSIS — Z78.9 IMPAIRED MOBILITY AND ADLS: ICD-10-CM

## 2020-02-26 LAB
ALBUMIN SERPL-MCNC: 2.6 G/DL (ref 3.4–5)
ALBUMIN/GLOB SERPL: 0.5 {RATIO} (ref 1–2)
ALP LIVER SERPL-CCNC: 101 U/L (ref 55–142)
ALT SERPL-CCNC: 23 U/L (ref 13–56)
ANION GAP SERPL CALC-SCNC: 11 MMOL/L (ref 0–18)
AST SERPL-CCNC: 24 U/L (ref 15–37)
BASOPHILS # BLD AUTO: 0.06 X10(3) UL (ref 0–0.2)
BASOPHILS NFR BLD AUTO: 0.7 %
BILIRUB SERPL-MCNC: 0.4 MG/DL (ref 0.1–2)
BUN BLD-MCNC: 31 MG/DL (ref 7–18)
BUN/CREAT SERPL: 17.5 (ref 10–20)
CALCIUM BLD-MCNC: 9.5 MG/DL (ref 8.5–10.1)
CHLORIDE SERPL-SCNC: 107 MMOL/L (ref 98–112)
CO2 SERPL-SCNC: 18 MMOL/L (ref 21–32)
CREAT BLD-MCNC: 1.77 MG/DL (ref 0.55–1.02)
DEPRECATED RDW RBC AUTO: 52 FL (ref 35.1–46.3)
EOSINOPHIL # BLD AUTO: 0.4 X10(3) UL (ref 0–0.7)
EOSINOPHIL NFR BLD AUTO: 4.6 %
ERYTHROCYTE [DISTWIDTH] IN BLOOD BY AUTOMATED COUNT: 18.5 % (ref 11–15)
GLOBULIN PLAS-MCNC: 5.7 G/DL (ref 2.8–4.4)
GLUCOSE BLD-MCNC: 150 MG/DL (ref 70–99)
HCT VFR BLD AUTO: 33.1 % (ref 35–48)
HGB BLD-MCNC: 10.3 G/DL (ref 12–16)
IMM GRANULOCYTES # BLD AUTO: 0.05 X10(3) UL (ref 0–1)
IMM GRANULOCYTES NFR BLD: 0.6 %
LYMPHOCYTES # BLD AUTO: 2.72 X10(3) UL (ref 1–4)
LYMPHOCYTES NFR BLD AUTO: 31 %
M PROTEIN MFR SERPL ELPH: 8.3 G/DL (ref 6.4–8.2)
MCH RBC QN AUTO: 24.3 PG (ref 26–34)
MCHC RBC AUTO-ENTMCNC: 31.1 G/DL (ref 31–37)
MCV RBC AUTO: 78.1 FL (ref 80–100)
MONOCYTES # BLD AUTO: 0.5 X10(3) UL (ref 0.1–1)
MONOCYTES NFR BLD AUTO: 5.7 %
NEUTROPHILS # BLD AUTO: 5.05 X10 (3) UL (ref 1.5–7.7)
NEUTROPHILS # BLD AUTO: 5.05 X10(3) UL (ref 1.5–7.7)
NEUTROPHILS NFR BLD AUTO: 57.4 %
OSMOLALITY SERPL CALC.SUM OF ELEC: 291 MOSM/KG (ref 275–295)
PATIENT FASTING Y/N/NP: NO
PLATELET # BLD AUTO: 437 10(3)UL (ref 150–450)
POTASSIUM SERPL-SCNC: 4.1 MMOL/L (ref 3.5–5.1)
RBC # BLD AUTO: 4.24 X10(6)UL (ref 3.8–5.3)
SODIUM SERPL-SCNC: 136 MMOL/L (ref 136–145)
T4 FREE SERPL-MCNC: 0.8 NG/DL (ref 0.8–1.7)
TSI SER-ACNC: 47.1 MIU/ML (ref 0.36–3.74)
WBC # BLD AUTO: 8.8 X10(3) UL (ref 4–11)

## 2020-02-26 PROCEDURE — 84443 ASSAY THYROID STIM HORMONE: CPT

## 2020-02-26 PROCEDURE — 36415 COLL VENOUS BLD VENIPUNCTURE: CPT

## 2020-02-26 PROCEDURE — 99309 SBSQ NF CARE MODERATE MDM 30: CPT | Performed by: NURSE PRACTITIONER

## 2020-02-26 PROCEDURE — 85025 COMPLETE CBC W/AUTO DIFF WBC: CPT

## 2020-02-26 PROCEDURE — 80053 COMPREHEN METABOLIC PANEL: CPT

## 2020-02-26 PROCEDURE — 84439 ASSAY OF FREE THYROXINE: CPT

## 2020-02-26 NOTE — PROGRESS NOTES
Farazdebora TORO Yevgeniy, 68/1935, 81year old, female    Chief Complaint:  Patient presents with: Follow - Up: AMS/ARF/E Coli UTI/E Coli Bacteremia  Weakness       Subjective:   Pt only understands and speaks Romanian.    PMH significant for Depression, HTN, HL, Hx no visible cerumen.   NECK: supple; FROM; no JVD, no TMG, no carotid bruits  BREAST: ---deferred  RESPIRATORY:---clear to auscultation anteriorly and posteriorly; no wheezing, on room air  CARDIOVASCULAR: S1, S2 normal, RRR; no S3, no S4; , no click; +systo colon obstruction and ischemic bowel s/p subtotal colectomy, formation of end ileostomy, BALBIR, removal of mesh and reimplantation of left ureter w/ stent insertion  1. Ileostomy care q shift  2. Monitor ostomy output  3.  Monitor for fever, s/sxs of infectio

## 2020-02-28 ENCOUNTER — SNF VISIT (OUTPATIENT)
Dept: INTERNAL MEDICINE CLINIC | Age: 85
End: 2020-02-28

## 2020-02-28 ENCOUNTER — NURSE ONLY (OUTPATIENT)
Dept: LAB | Age: 85
End: 2020-02-28
Attending: FAMILY MEDICINE
Payer: MEDICARE

## 2020-02-28 VITALS — OXYGEN SATURATION: 93 % | HEART RATE: 67 BPM

## 2020-02-28 DIAGNOSIS — B96.20 E COLI BACTEREMIA: ICD-10-CM

## 2020-02-28 DIAGNOSIS — B99.9 INFECTION: Primary | ICD-10-CM

## 2020-02-28 DIAGNOSIS — N17.9 ACUTE RENAL FAILURE, UNSPECIFIED ACUTE RENAL FAILURE TYPE (HCC): Primary | ICD-10-CM

## 2020-02-28 DIAGNOSIS — Z16.12 UTI DUE TO EXTENDED-SPECTRUM BETA LACTAMASE (ESBL) PRODUCING ESCHERICHIA COLI: ICD-10-CM

## 2020-02-28 DIAGNOSIS — D50.8 OTHER IRON DEFICIENCY ANEMIA: ICD-10-CM

## 2020-02-28 DIAGNOSIS — M25.511 ACUTE PAIN OF RIGHT SHOULDER: ICD-10-CM

## 2020-02-28 DIAGNOSIS — N39.0 UTI DUE TO EXTENDED-SPECTRUM BETA LACTAMASE (ESBL) PRODUCING ESCHERICHIA COLI: ICD-10-CM

## 2020-02-28 DIAGNOSIS — R78.81 E COLI BACTEREMIA: ICD-10-CM

## 2020-02-28 DIAGNOSIS — B96.29 UTI DUE TO EXTENDED-SPECTRUM BETA LACTAMASE (ESBL) PRODUCING ESCHERICHIA COLI: ICD-10-CM

## 2020-02-28 LAB
ANION GAP SERPL CALC-SCNC: 8 MMOL/L (ref 0–18)
BUN BLD-MCNC: 42 MG/DL (ref 7–18)
BUN/CREAT SERPL: 15.6 (ref 10–20)
CALCIUM BLD-MCNC: 9.4 MG/DL (ref 8.5–10.1)
CHLORIDE SERPL-SCNC: 107 MMOL/L (ref 98–112)
CO2 SERPL-SCNC: 20 MMOL/L (ref 21–32)
CREAT BLD-MCNC: 2.69 MG/DL (ref 0.55–1.02)
GLUCOSE BLD-MCNC: 80 MG/DL (ref 70–99)
OSMOLALITY SERPL CALC.SUM OF ELEC: 289 MOSM/KG (ref 275–295)
PATIENT FASTING Y/N/NP: YES
POTASSIUM SERPL-SCNC: 4.9 MMOL/L (ref 3.5–5.1)
SODIUM SERPL-SCNC: 135 MMOL/L (ref 136–145)

## 2020-02-28 PROCEDURE — 80048 BASIC METABOLIC PNL TOTAL CA: CPT

## 2020-02-28 PROCEDURE — 99310 SBSQ NF CARE HIGH MDM 45: CPT | Performed by: NURSE PRACTITIONER

## 2020-02-28 PROCEDURE — 36415 COLL VENOUS BLD VENIPUNCTURE: CPT

## 2020-02-28 RX ORDER — LEVOTHYROXINE SODIUM 0.15 MG/1
150 TABLET ORAL
Status: ON HOLD | COMMUNITY
End: 2020-05-12

## 2020-02-28 NOTE — PROGRESS NOTES
Tuan CORTEZ Yevgeniy, 68/1935, 81year old, female    Chief Complaint:  Patient presents with: Follow - Up: AMS/ARF/E Coli UTI/E Coli Bacteremia  Shoulder Pain: right       Subjective:   Pt only understands and speaks French.    PMH significant for Depression, amount liquid brown stool  EYES: PERRLA, EOMI, sclera anicteric, conjunctiva normal; there is no nystagmus, no drainage from eyes; +IOP b/l  HENT: normocephalic; normal nose, no nasal drainage, mucous membranes pink, moist, pharynx no exudate, no visible c of b/l hydronephrosis/status post placement of right nephrostomy tube  1. Minimize nephrotoxic meds as able  2. Encourage po fluids  3. Initiate IVF 0.9 NS at 75cc/hr x 2 L then recheck chemistry stat once completed  4. CMP weekly  5.  Nephrostomy tube site psych prn  2. Sertraline 150 mg daily  3. Mirtazapine 30 mg q HS     HTN/HL/Hx of PE  1. VS q shift  2. NTG SL 0.4 mg SL prn for angina  3. Metoprolol succinate 25 mg daily--On hold until B/P improves  4.  Amlodipine 10 mg daily--On hold until B/P improves

## 2020-03-01 ENCOUNTER — NURSE ONLY (OUTPATIENT)
Dept: LAB | Age: 85
End: 2020-03-01
Attending: FAMILY MEDICINE
Payer: MEDICARE

## 2020-03-01 DIAGNOSIS — E87.5 HYPERKALEMIA: ICD-10-CM

## 2020-03-01 DIAGNOSIS — E87.0 HYPERNATREMIA: ICD-10-CM

## 2020-03-01 DIAGNOSIS — R79.9 ELEVATED BUN: Primary | ICD-10-CM

## 2020-03-01 LAB
ALBUMIN SERPL-MCNC: 2.5 G/DL (ref 3.4–5)
ALBUMIN/GLOB SERPL: 0.5 {RATIO} (ref 1–2)
ALP LIVER SERPL-CCNC: 103 U/L (ref 55–142)
ALT SERPL-CCNC: 27 U/L (ref 13–56)
ANION GAP SERPL CALC-SCNC: 7 MMOL/L (ref 0–18)
AST SERPL-CCNC: 25 U/L (ref 15–37)
BASOPHILS # BLD AUTO: 0.06 X10(3) UL (ref 0–0.2)
BASOPHILS NFR BLD AUTO: 1 %
BILIRUB SERPL-MCNC: 0.3 MG/DL (ref 0.1–2)
BUN BLD-MCNC: 41 MG/DL (ref 7–18)
BUN/CREAT SERPL: 18.3 (ref 10–20)
CALCIUM BLD-MCNC: 9.4 MG/DL (ref 8.5–10.1)
CHLORIDE SERPL-SCNC: 114 MMOL/L (ref 98–112)
CO2 SERPL-SCNC: 18 MMOL/L (ref 21–32)
CREAT BLD-MCNC: 2.24 MG/DL (ref 0.55–1.02)
DEPRECATED RDW RBC AUTO: 55.1 FL (ref 35.1–46.3)
EOSINOPHIL # BLD AUTO: 0.39 X10(3) UL (ref 0–0.7)
EOSINOPHIL NFR BLD AUTO: 6.4 %
ERYTHROCYTE [DISTWIDTH] IN BLOOD BY AUTOMATED COUNT: 18.6 % (ref 11–15)
GLOBULIN PLAS-MCNC: 5.2 G/DL (ref 2.8–4.4)
GLUCOSE BLD-MCNC: 79 MG/DL (ref 70–99)
HCT VFR BLD AUTO: 32.2 % (ref 35–48)
HGB BLD-MCNC: 9.7 G/DL (ref 12–16)
IMM GRANULOCYTES # BLD AUTO: 0.03 X10(3) UL (ref 0–1)
IMM GRANULOCYTES NFR BLD: 0.5 %
LYMPHOCYTES # BLD AUTO: 2.49 X10(3) UL (ref 1–4)
LYMPHOCYTES NFR BLD AUTO: 41 %
M PROTEIN MFR SERPL ELPH: 7.7 G/DL (ref 6.4–8.2)
MCH RBC QN AUTO: 24.1 PG (ref 26–34)
MCHC RBC AUTO-ENTMCNC: 30.1 G/DL (ref 31–37)
MCV RBC AUTO: 80.1 FL (ref 80–100)
MONOCYTES # BLD AUTO: 0.41 X10(3) UL (ref 0.1–1)
MONOCYTES NFR BLD AUTO: 6.8 %
NEUTROPHILS # BLD AUTO: 2.69 X10 (3) UL (ref 1.5–7.7)
NEUTROPHILS # BLD AUTO: 2.69 X10(3) UL (ref 1.5–7.7)
NEUTROPHILS NFR BLD AUTO: 44.3 %
OSMOLALITY SERPL CALC.SUM OF ELEC: 297 MOSM/KG (ref 275–295)
PATIENT FASTING Y/N/NP: YES
PLATELET # BLD AUTO: 361 10(3)UL (ref 150–450)
POTASSIUM SERPL-SCNC: 5.5 MMOL/L (ref 3.5–5.1)
RBC # BLD AUTO: 4.02 X10(6)UL (ref 3.8–5.3)
SODIUM SERPL-SCNC: 139 MMOL/L (ref 136–145)
WBC # BLD AUTO: 6.1 X10(3) UL (ref 4–11)

## 2020-03-01 PROCEDURE — 80053 COMPREHEN METABOLIC PANEL: CPT

## 2020-03-01 PROCEDURE — 87493 C DIFF AMPLIFIED PROBE: CPT

## 2020-03-01 PROCEDURE — 36415 COLL VENOUS BLD VENIPUNCTURE: CPT

## 2020-03-01 PROCEDURE — 85025 COMPLETE CBC W/AUTO DIFF WBC: CPT

## 2020-03-02 ENCOUNTER — NURSE ONLY (OUTPATIENT)
Dept: LAB | Age: 85
End: 2020-03-02
Attending: FAMILY MEDICINE
Payer: MEDICARE

## 2020-03-02 ENCOUNTER — SNF VISIT (OUTPATIENT)
Dept: INTERNAL MEDICINE CLINIC | Age: 85
End: 2020-03-02

## 2020-03-02 VITALS
DIASTOLIC BLOOD PRESSURE: 80 MMHG | OXYGEN SATURATION: 95 % | TEMPERATURE: 98 F | HEART RATE: 80 BPM | RESPIRATION RATE: 18 BRPM | SYSTOLIC BLOOD PRESSURE: 169 MMHG

## 2020-03-02 DIAGNOSIS — Z16.12 UTI DUE TO EXTENDED-SPECTRUM BETA LACTAMASE (ESBL) PRODUCING ESCHERICHIA COLI: ICD-10-CM

## 2020-03-02 DIAGNOSIS — B96.29 UTI DUE TO EXTENDED-SPECTRUM BETA LACTAMASE (ESBL) PRODUCING ESCHERICHIA COLI: ICD-10-CM

## 2020-03-02 DIAGNOSIS — N17.9 ACUTE RENAL FAILURE, UNSPECIFIED ACUTE RENAL FAILURE TYPE (HCC): Primary | ICD-10-CM

## 2020-03-02 DIAGNOSIS — E11.42 TYPE 2 DIABETES MELLITUS WITH DIABETIC POLYNEUROPATHY, WITHOUT LONG-TERM CURRENT USE OF INSULIN (HCC): ICD-10-CM

## 2020-03-02 DIAGNOSIS — Z00.00 ROUTINE GENERAL MEDICAL EXAMINATION AT A HEALTH CARE FACILITY: ICD-10-CM

## 2020-03-02 DIAGNOSIS — R78.81 E COLI BACTEREMIA: ICD-10-CM

## 2020-03-02 DIAGNOSIS — E87.5 HYPERKALEMIA: ICD-10-CM

## 2020-03-02 DIAGNOSIS — B96.20 E COLI BACTEREMIA: ICD-10-CM

## 2020-03-02 DIAGNOSIS — N39.0 UTI DUE TO EXTENDED-SPECTRUM BETA LACTAMASE (ESBL) PRODUCING ESCHERICHIA COLI: ICD-10-CM

## 2020-03-02 DIAGNOSIS — N39.0 UTI (URINARY TRACT INFECTION): Primary | ICD-10-CM

## 2020-03-02 DIAGNOSIS — D50.8 OTHER IRON DEFICIENCY ANEMIA: ICD-10-CM

## 2020-03-02 LAB
ALBUMIN SERPL-MCNC: 2.4 G/DL (ref 3.4–5)
ALBUMIN/GLOB SERPL: 0.5 {RATIO} (ref 1–2)
ALP LIVER SERPL-CCNC: 103 U/L (ref 55–142)
ALT SERPL-CCNC: 22 U/L (ref 13–56)
ANION GAP SERPL CALC-SCNC: 7 MMOL/L (ref 0–18)
AST SERPL-CCNC: 19 U/L (ref 15–37)
BASOPHILS # BLD AUTO: 0.06 X10(3) UL (ref 0–0.2)
BASOPHILS NFR BLD AUTO: 1.1 %
BILIRUB SERPL-MCNC: 0.3 MG/DL (ref 0.1–2)
BILIRUB UR QL STRIP.AUTO: NEGATIVE
BUN BLD-MCNC: 33 MG/DL (ref 7–18)
BUN/CREAT SERPL: 22.1 (ref 10–20)
C DIFF TOX B STL QL: NEGATIVE
CALCIUM BLD-MCNC: 9.1 MG/DL (ref 8.5–10.1)
CHLORIDE SERPL-SCNC: 112 MMOL/L (ref 98–112)
CO2 SERPL-SCNC: 18 MMOL/L (ref 21–32)
COLOR UR AUTO: YELLOW
CREAT BLD-MCNC: 1.49 MG/DL (ref 0.55–1.02)
DEPRECATED RDW RBC AUTO: 55 FL (ref 35.1–46.3)
EOSINOPHIL # BLD AUTO: 0.3 X10(3) UL (ref 0–0.7)
EOSINOPHIL NFR BLD AUTO: 5.5 %
ERYTHROCYTE [DISTWIDTH] IN BLOOD BY AUTOMATED COUNT: 19 % (ref 11–15)
GLOBULIN PLAS-MCNC: 5.2 G/DL (ref 2.8–4.4)
GLUCOSE BLD-MCNC: 135 MG/DL (ref 70–99)
GLUCOSE UR STRIP.AUTO-MCNC: 50 MG/DL
HCT VFR BLD AUTO: 30.9 % (ref 35–48)
HGB BLD-MCNC: 9.4 G/DL (ref 12–16)
HYALINE CASTS #/AREA URNS AUTO: PRESENT /LPF
IMM GRANULOCYTES # BLD AUTO: 0.03 X10(3) UL (ref 0–1)
IMM GRANULOCYTES NFR BLD: 0.5 %
KETONES UR STRIP.AUTO-MCNC: NEGATIVE MG/DL
LYMPHOCYTES # BLD AUTO: 2.4 X10(3) UL (ref 1–4)
LYMPHOCYTES NFR BLD AUTO: 43.6 %
M PROTEIN MFR SERPL ELPH: 7.6 G/DL (ref 6.4–8.2)
MCH RBC QN AUTO: 24.3 PG (ref 26–34)
MCHC RBC AUTO-ENTMCNC: 30.4 G/DL (ref 31–37)
MCV RBC AUTO: 79.8 FL (ref 80–100)
MONOCYTES # BLD AUTO: 0.34 X10(3) UL (ref 0.1–1)
MONOCYTES NFR BLD AUTO: 6.2 %
NEUTROPHILS # BLD AUTO: 2.37 X10 (3) UL (ref 1.5–7.7)
NEUTROPHILS # BLD AUTO: 2.37 X10(3) UL (ref 1.5–7.7)
NEUTROPHILS NFR BLD AUTO: 43.1 %
NITRITE UR QL STRIP.AUTO: NEGATIVE
OSMOLALITY SERPL CALC.SUM OF ELEC: 293 MOSM/KG (ref 275–295)
PATIENT FASTING Y/N/NP: YES
PH UR STRIP.AUTO: 5 [PH] (ref 4.5–8)
PLATELET # BLD AUTO: 321 10(3)UL (ref 150–450)
POTASSIUM SERPL-SCNC: 4.5 MMOL/L (ref 3.5–5.1)
PROT UR STRIP.AUTO-MCNC: 100 MG/DL
RBC # BLD AUTO: 3.87 X10(6)UL (ref 3.8–5.3)
RBC #/AREA URNS AUTO: >10 /HPF
SODIUM SERPL-SCNC: 137 MMOL/L (ref 136–145)
SP GR UR STRIP.AUTO: 1.02 (ref 1–1.03)
UROBILINOGEN UR STRIP.AUTO-MCNC: <2 MG/DL
WBC # BLD AUTO: 5.5 X10(3) UL (ref 4–11)
WBC #/AREA URNS AUTO: >50 /HPF
WBC CLUMPS UR QL AUTO: PRESENT

## 2020-03-02 PROCEDURE — 87086 URINE CULTURE/COLONY COUNT: CPT

## 2020-03-02 PROCEDURE — 87077 CULTURE AEROBIC IDENTIFY: CPT

## 2020-03-02 PROCEDURE — 80053 COMPREHEN METABOLIC PANEL: CPT

## 2020-03-02 PROCEDURE — 99309 SBSQ NF CARE MODERATE MDM 30: CPT | Performed by: NURSE PRACTITIONER

## 2020-03-02 PROCEDURE — 36415 COLL VENOUS BLD VENIPUNCTURE: CPT

## 2020-03-02 PROCEDURE — 81001 URINALYSIS AUTO W/SCOPE: CPT

## 2020-03-02 PROCEDURE — 85025 COMPLETE CBC W/AUTO DIFF WBC: CPT

## 2020-03-03 NOTE — PROGRESS NOTES
Tuan Vuong, 68/1935, 81year old, female    Chief Complaint:  No chief complaint on file. Subjective:   Pt only understands and speaks Frisian.    PMH significant for Depression, HTN, HL, Hx of PE/DVT, Hypothyroidism, T2 DM w/ PN, Diverticulitis/c membranes pink, moist, pharynx no exudate, no visible cerumen.   NECK: supple; FROM; no JVD, no TMG, no carotid bruits  BREAST: ---deferred  RESPIRATORY:---clear to auscultation anteriorly and posteriorly; no wheezing, on room air  CARDIOVASCULAR: S1, S2 no 03/02/2020    MCH 24.3 (L) 03/02/2020    MCHC 30.4 (L) 03/02/2020    RDW 19.0 (H) 03/02/2020    .0 03/02/2020     Lab Results   Component Value Date    COLORUR Yellow 03/02/2020    CLARITY Cloudy (A) 03/02/2020    SPECGRAVITY 1.018 03/02/2020    GLU DVTs and non occlusive thrombus to left superficial femoral vein  1. Xarelto change to Eliquis 2/2 renal fxn  2. Eliquis 10 mg BID until 12.27.19 then change to 5 mg BID until 1.25.20  3.  F/U w/ Dr Moss/hematology      Sigmoid colon obstruction and ischem Emelia Watts  3/2/2020  10:05  AM    ~16:20  RN called to inform that renal US visualized kidneys and that there is not any hydronephrosis but possibly small non-obstructing stones.

## 2020-03-04 ENCOUNTER — NURSE ONLY (OUTPATIENT)
Dept: LAB | Age: 85
End: 2020-03-04
Attending: FAMILY MEDICINE
Payer: MEDICARE

## 2020-03-04 ENCOUNTER — SNF VISIT (OUTPATIENT)
Dept: INTERNAL MEDICINE CLINIC | Age: 85
End: 2020-03-04

## 2020-03-04 VITALS
TEMPERATURE: 98 F | RESPIRATION RATE: 16 BRPM | SYSTOLIC BLOOD PRESSURE: 106 MMHG | OXYGEN SATURATION: 97 % | HEART RATE: 83 BPM | DIASTOLIC BLOOD PRESSURE: 55 MMHG

## 2020-03-04 DIAGNOSIS — N17.9 ACUTE RENAL FAILURE, UNSPECIFIED ACUTE RENAL FAILURE TYPE (HCC): Primary | ICD-10-CM

## 2020-03-04 DIAGNOSIS — R69 DIAGNOSIS UNKNOWN: Primary | ICD-10-CM

## 2020-03-04 DIAGNOSIS — B96.20 E COLI BACTEREMIA: ICD-10-CM

## 2020-03-04 DIAGNOSIS — B96.29 UTI DUE TO EXTENDED-SPECTRUM BETA LACTAMASE (ESBL) PRODUCING ESCHERICHIA COLI: ICD-10-CM

## 2020-03-04 DIAGNOSIS — Z78.9 IMPAIRED MOBILITY AND ADLS: ICD-10-CM

## 2020-03-04 DIAGNOSIS — S31.000A WOUND OF SACRAL REGION, INITIAL ENCOUNTER: ICD-10-CM

## 2020-03-04 DIAGNOSIS — N39.0 UTI DUE TO EXTENDED-SPECTRUM BETA LACTAMASE (ESBL) PRODUCING ESCHERICHIA COLI: ICD-10-CM

## 2020-03-04 DIAGNOSIS — Z74.09 IMPAIRED MOBILITY AND ADLS: ICD-10-CM

## 2020-03-04 DIAGNOSIS — N13.30 HYDRONEPHROSIS, UNSPECIFIED HYDRONEPHROSIS TYPE: ICD-10-CM

## 2020-03-04 DIAGNOSIS — R78.81 E COLI BACTEREMIA: ICD-10-CM

## 2020-03-04 DIAGNOSIS — Z16.12 UTI DUE TO EXTENDED-SPECTRUM BETA LACTAMASE (ESBL) PRODUCING ESCHERICHIA COLI: ICD-10-CM

## 2020-03-04 DIAGNOSIS — R53.1 WEAKNESS GENERALIZED: ICD-10-CM

## 2020-03-04 LAB
ANION GAP SERPL CALC-SCNC: 7 MMOL/L (ref 0–18)
BUN BLD-MCNC: 20 MG/DL (ref 7–18)
BUN/CREAT SERPL: 16 (ref 10–20)
CALCIUM BLD-MCNC: 8.9 MG/DL (ref 8.5–10.1)
CHLORIDE SERPL-SCNC: 108 MMOL/L (ref 98–112)
CO2 SERPL-SCNC: 18 MMOL/L (ref 21–32)
CREAT BLD-MCNC: 1.25 MG/DL (ref 0.55–1.02)
GLUCOSE BLD-MCNC: 172 MG/DL (ref 70–99)
OSMOLALITY SERPL CALC.SUM OF ELEC: 283 MOSM/KG (ref 275–295)
PATIENT FASTING Y/N/NP: NO
POTASSIUM SERPL-SCNC: 4.4 MMOL/L (ref 3.5–5.1)
SODIUM SERPL-SCNC: 133 MMOL/L (ref 136–145)

## 2020-03-04 PROCEDURE — 36415 COLL VENOUS BLD VENIPUNCTURE: CPT

## 2020-03-04 PROCEDURE — 99309 SBSQ NF CARE MODERATE MDM 30: CPT | Performed by: NURSE PRACTITIONER

## 2020-03-04 PROCEDURE — 1111F DSCHRG MED/CURRENT MED MERGE: CPT | Performed by: NURSE PRACTITIONER

## 2020-03-04 PROCEDURE — 80048 BASIC METABOLIC PNL TOTAL CA: CPT

## 2020-03-04 NOTE — PROGRESS NOTES
Tuan CORTEZ Yevgeniy, 68/1935, 81year old, female    Chief Complaint:  Patient presents with: Follow - Up: AMS/ARF/E Coli UTI/E Coli Bacteremia  Weakness  Wound  Lab Results       Subjective:   Pt only understands and speaks Slovak.    PMH significant for Depr pale, warm, dry  WOUND:   Left mid-back inferior to nephrostomy tube drsg:  ~3 cm square area of erythema which is non-blanchable, skin is intact  Sacral area:  Erythema to entire area, non-blanchable.   Left of gluteal fold appears to have ruptured blister (L) 03/04/2020    CA 8.9 03/04/2020     (L) 03/04/2020    K 4.4 03/04/2020     03/04/2020    CO2 18.0 (L) 03/04/2020    Donna [phone] 03/04/2020     Assessment and plan:  Acute renal failure w/ hx of b/l hydronephrosis/status post placement of r Green/surgery prn  9. F/U w/ Dr Montano/urology prn     ABLA/Acute on chronic anemia  1. CBC weekly  2. FeSO4 325 mg   3. S/P transfusion 2u PRBCs--second unit during most recent hospitalization     Generalized weakness/Impaired mobility and ADLs  1.  PT/OT hector

## 2020-03-06 ENCOUNTER — SNF DISCHARGE (OUTPATIENT)
Dept: INTERNAL MEDICINE CLINIC | Age: 85
End: 2020-03-06

## 2020-03-06 ENCOUNTER — NURSE ONLY (OUTPATIENT)
Dept: LAB | Age: 85
End: 2020-03-06
Attending: FAMILY MEDICINE
Payer: MEDICARE

## 2020-03-06 VITALS
SYSTOLIC BLOOD PRESSURE: 121 MMHG | OXYGEN SATURATION: 95 % | RESPIRATION RATE: 16 BRPM | TEMPERATURE: 98 F | HEART RATE: 82 BPM | DIASTOLIC BLOOD PRESSURE: 78 MMHG

## 2020-03-06 DIAGNOSIS — B96.20 E COLI BACTEREMIA: ICD-10-CM

## 2020-03-06 DIAGNOSIS — Z93.2 ILEOSTOMY, HAS CURRENTLY (HCC): ICD-10-CM

## 2020-03-06 DIAGNOSIS — E87.5 HYPERKALEMIA: ICD-10-CM

## 2020-03-06 DIAGNOSIS — D50.8 OTHER IRON DEFICIENCY ANEMIA: ICD-10-CM

## 2020-03-06 DIAGNOSIS — N39.0 UTI DUE TO EXTENDED-SPECTRUM BETA LACTAMASE (ESBL) PRODUCING ESCHERICHIA COLI: ICD-10-CM

## 2020-03-06 DIAGNOSIS — I10 ESSENTIAL HYPERTENSION: ICD-10-CM

## 2020-03-06 DIAGNOSIS — N17.9 ACUTE RENAL FAILURE, UNSPECIFIED ACUTE RENAL FAILURE TYPE (HCC): Primary | ICD-10-CM

## 2020-03-06 DIAGNOSIS — B96.29 UTI DUE TO EXTENDED-SPECTRUM BETA LACTAMASE (ESBL) PRODUCING ESCHERICHIA COLI: ICD-10-CM

## 2020-03-06 DIAGNOSIS — K56.609 COLON OBSTRUCTION (HCC): ICD-10-CM

## 2020-03-06 DIAGNOSIS — N13.30 HYDRONEPHROSIS, UNSPECIFIED HYDRONEPHROSIS TYPE: ICD-10-CM

## 2020-03-06 DIAGNOSIS — I82.403 ACUTE DEEP VEIN THROMBOSIS (DVT) OF BOTH LOWER EXTREMITIES, UNSPECIFIED VEIN (HCC): ICD-10-CM

## 2020-03-06 DIAGNOSIS — E46 MALNUTRITION, UNSPECIFIED TYPE (HCC): ICD-10-CM

## 2020-03-06 DIAGNOSIS — R69 DIAGNOSIS UNKNOWN: Primary | ICD-10-CM

## 2020-03-06 DIAGNOSIS — Z16.12 UTI DUE TO EXTENDED-SPECTRUM BETA LACTAMASE (ESBL) PRODUCING ESCHERICHIA COLI: ICD-10-CM

## 2020-03-06 DIAGNOSIS — I26.99 PE (PULMONARY THROMBOEMBOLISM) (HCC): ICD-10-CM

## 2020-03-06 DIAGNOSIS — R11.0 NAUSEA: ICD-10-CM

## 2020-03-06 DIAGNOSIS — Z78.9 IMPAIRED MOBILITY AND ADLS: ICD-10-CM

## 2020-03-06 DIAGNOSIS — Z74.09 IMPAIRED MOBILITY AND ADLS: ICD-10-CM

## 2020-03-06 DIAGNOSIS — E11.42 TYPE 2 DIABETES MELLITUS WITH DIABETIC POLYNEUROPATHY, WITHOUT LONG-TERM CURRENT USE OF INSULIN (HCC): ICD-10-CM

## 2020-03-06 DIAGNOSIS — R53.1 WEAKNESS GENERALIZED: ICD-10-CM

## 2020-03-06 DIAGNOSIS — S31.000A WOUND OF SACRAL REGION, INITIAL ENCOUNTER: ICD-10-CM

## 2020-03-06 DIAGNOSIS — R78.81 E COLI BACTEREMIA: ICD-10-CM

## 2020-03-06 LAB
ALBUMIN SERPL-MCNC: 2.6 G/DL (ref 3.4–5)
ALBUMIN/GLOB SERPL: 0.5 {RATIO} (ref 1–2)
ALP LIVER SERPL-CCNC: 91 U/L (ref 55–142)
ALT SERPL-CCNC: 18 U/L (ref 13–56)
ANION GAP SERPL CALC-SCNC: 7 MMOL/L (ref 0–18)
AST SERPL-CCNC: 16 U/L (ref 15–37)
BASOPHILS # BLD AUTO: 0.06 X10(3) UL (ref 0–0.2)
BASOPHILS NFR BLD AUTO: 1.2 %
BILIRUB SERPL-MCNC: 0.4 MG/DL (ref 0.1–2)
BUN BLD-MCNC: 24 MG/DL (ref 7–18)
BUN/CREAT SERPL: 19.2 (ref 10–20)
CALCIUM BLD-MCNC: 8.6 MG/DL (ref 8.5–10.1)
CHLORIDE SERPL-SCNC: 111 MMOL/L (ref 98–112)
CO2 SERPL-SCNC: 18 MMOL/L (ref 21–32)
CREAT BLD-MCNC: 1.25 MG/DL (ref 0.55–1.02)
DEPRECATED RDW RBC AUTO: 54 FL (ref 35.1–46.3)
EOSINOPHIL # BLD AUTO: 0.35 X10(3) UL (ref 0–0.7)
EOSINOPHIL NFR BLD AUTO: 6.9 %
ERYTHROCYTE [DISTWIDTH] IN BLOOD BY AUTOMATED COUNT: 19.3 % (ref 11–15)
GLOBULIN PLAS-MCNC: 4.8 G/DL (ref 2.8–4.4)
GLUCOSE BLD-MCNC: 92 MG/DL (ref 70–99)
HAV IGM SER QL: 1.4 MG/DL (ref 1.6–2.6)
HCT VFR BLD AUTO: 28.2 % (ref 35–48)
HGB BLD-MCNC: 8.9 G/DL (ref 12–16)
IMM GRANULOCYTES # BLD AUTO: 0.03 X10(3) UL (ref 0–1)
IMM GRANULOCYTES NFR BLD: 0.6 %
LYMPHOCYTES # BLD AUTO: 2.39 X10(3) UL (ref 1–4)
LYMPHOCYTES NFR BLD AUTO: 47 %
M PROTEIN MFR SERPL ELPH: 7.4 G/DL (ref 6.4–8.2)
MCH RBC QN AUTO: 24.5 PG (ref 26–34)
MCHC RBC AUTO-ENTMCNC: 31.6 G/DL (ref 31–37)
MCV RBC AUTO: 77.7 FL (ref 80–100)
MONOCYTES # BLD AUTO: 0.33 X10(3) UL (ref 0.1–1)
MONOCYTES NFR BLD AUTO: 6.5 %
NEUTROPHILS # BLD AUTO: 1.93 X10 (3) UL (ref 1.5–7.7)
NEUTROPHILS # BLD AUTO: 1.93 X10(3) UL (ref 1.5–7.7)
NEUTROPHILS NFR BLD AUTO: 37.8 %
OSMOLALITY SERPL CALC.SUM OF ELEC: 286 MOSM/KG (ref 275–295)
PATIENT FASTING Y/N/NP: YES
PLATELET # BLD AUTO: 305 10(3)UL (ref 150–450)
POTASSIUM SERPL-SCNC: 4.5 MMOL/L (ref 3.5–5.1)
RBC # BLD AUTO: 3.63 X10(6)UL (ref 3.8–5.3)
SODIUM SERPL-SCNC: 136 MMOL/L (ref 136–145)
WBC # BLD AUTO: 5.1 X10(3) UL (ref 4–11)

## 2020-03-06 PROCEDURE — 83735 ASSAY OF MAGNESIUM: CPT

## 2020-03-06 PROCEDURE — 36415 COLL VENOUS BLD VENIPUNCTURE: CPT

## 2020-03-06 PROCEDURE — 80053 COMPREHEN METABOLIC PANEL: CPT

## 2020-03-06 PROCEDURE — 99316 NF DSCHRG MGMT 30 MIN+: CPT | Performed by: NURSE PRACTITIONER

## 2020-03-06 PROCEDURE — 85025 COMPLETE CBC W/AUTO DIFF WBC: CPT

## 2020-03-07 NOTE — PROGRESS NOTES
Farazdebora TORO Yevgeniy, 68/1935, 81year old, female is being discharged from Facility: [address]    Date of Admission:  11.30.2019    Date of Discharge:  Anticipated on 3.8.2020                                 Admitting Diag bruits  BREAST: ---deferred  RESPIRATORY:---clear to auscultation anteriorly and posteriorly; no wheezing, on room air  CARDIOVASCULAR: S1, S2 normal, RRR; no S3, no S4; , no click; +systolic murmur  ABDOMEN:  normal active BS+, soft, nondistended; no orga 03/06/2020    ALB 2.6 (L) 03/06/2020    GLOBULIN 4.8 (H) 03/06/2020    AGRATIO 1.6 04/27/2016     03/06/2020    K 4.5 03/06/2020     03/06/2020    CO2 18.0 (L) 03/06/2020     Lab Results   Component Value Date    T4F 0.8 02/26/2020    TSH 47.10 Green/surgery prn  7. F/U w/ Dr Montano/urology prn     ABLA/Acute on chronic anemia  1. FeSO4 325 mg   2. S/P transfusion 2u PRBCs--second unit during most recent hospitalization     Generalized weakness/Impaired mobility and ADLs  1.  Home health CNA/RN/PT/OT

## 2020-03-10 ENCOUNTER — PATIENT OUTREACH (OUTPATIENT)
Dept: CASE MANAGEMENT | Age: 85
End: 2020-03-10

## 2020-03-10 NOTE — PROGRESS NOTES
Tried to call the pt for TCM, phone rang three times and then seemed as though it answered, no one responded. Tried to call pt back again and no answer. Will await a returned phone call.

## 2020-03-11 ENCOUNTER — NURSE ONLY (OUTPATIENT)
Dept: LAB | Age: 85
End: 2020-03-11
Attending: FAMILY MEDICINE
Payer: MEDICARE

## 2020-03-11 ENCOUNTER — PATIENT OUTREACH (OUTPATIENT)
Dept: CASE MANAGEMENT | Age: 85
End: 2020-03-11

## 2020-03-11 DIAGNOSIS — A49.9 ESBL (EXTENDED SPECTRUM BETA-LACTAMASE) PRODUCING BACTERIA INFECTION: Primary | ICD-10-CM

## 2020-03-11 DIAGNOSIS — Z16.12 ESBL (EXTENDED SPECTRUM BETA-LACTAMASE) PRODUCING BACTERIA INFECTION: Primary | ICD-10-CM

## 2020-03-11 LAB
BILIRUB UR QL STRIP.AUTO: NEGATIVE
COLOR UR AUTO: YELLOW
GLUCOSE UR STRIP.AUTO-MCNC: NEGATIVE MG/DL
KETONES UR STRIP.AUTO-MCNC: NEGATIVE MG/DL
NITRITE UR QL STRIP.AUTO: NEGATIVE
PH UR STRIP.AUTO: 6 [PH] (ref 4.5–8)
PROT UR STRIP.AUTO-MCNC: 100 MG/DL
RBC #/AREA URNS AUTO: >10 /HPF
SP GR UR STRIP.AUTO: 1.01 (ref 1–1.03)
UROBILINOGEN UR STRIP.AUTO-MCNC: <2 MG/DL
WBC #/AREA URNS AUTO: >50 /HPF
WBC CLUMPS UR QL AUTO: PRESENT
YEAST URINE: PRESENT

## 2020-03-11 PROCEDURE — 81001 URINALYSIS AUTO W/SCOPE: CPT

## 2020-03-11 PROCEDURE — 87086 URINE CULTURE/COLONY COUNT: CPT

## 2020-03-11 PROCEDURE — 87077 CULTURE AEROBIC IDENTIFY: CPT

## 2020-03-11 PROCEDURE — 87186 SC STD MICRODIL/AGAR DIL: CPT

## 2020-03-21 ENCOUNTER — NURSE ONLY (OUTPATIENT)
Dept: LAB | Age: 85
End: 2020-03-21
Attending: FAMILY MEDICINE
Payer: MEDICARE

## 2020-03-21 DIAGNOSIS — A49.9 ESBL (EXTENDED SPECTRUM BETA-LACTAMASE) PRODUCING BACTERIA INFECTION: Primary | ICD-10-CM

## 2020-03-21 DIAGNOSIS — Z16.12 ESBL (EXTENDED SPECTRUM BETA-LACTAMASE) PRODUCING BACTERIA INFECTION: Primary | ICD-10-CM

## 2020-03-21 PROCEDURE — 87077 CULTURE AEROBIC IDENTIFY: CPT

## 2020-03-21 PROCEDURE — 87186 SC STD MICRODIL/AGAR DIL: CPT

## 2020-03-21 PROCEDURE — 87106 FUNGI IDENTIFICATION YEAST: CPT

## 2020-03-21 PROCEDURE — 81001 URINALYSIS AUTO W/SCOPE: CPT

## 2020-03-21 PROCEDURE — 87086 URINE CULTURE/COLONY COUNT: CPT

## 2020-03-22 LAB
BILIRUB UR QL STRIP.AUTO: NEGATIVE
COLOR UR AUTO: YELLOW
GLUCOSE UR STRIP.AUTO-MCNC: NEGATIVE MG/DL
HYALINE CASTS #/AREA URNS AUTO: PRESENT /LPF
KETONES UR STRIP.AUTO-MCNC: NEGATIVE MG/DL
NITRITE UR QL STRIP.AUTO: NEGATIVE
PH UR STRIP.AUTO: 5 [PH] (ref 4.5–8)
PROT UR STRIP.AUTO-MCNC: 100 MG/DL
RBC #/AREA URNS AUTO: >10 /HPF
SP GR UR STRIP.AUTO: 1.02 (ref 1–1.03)
UROBILINOGEN UR STRIP.AUTO-MCNC: <2 MG/DL
WBC #/AREA URNS AUTO: >50 /HPF
WBC CLUMPS UR QL AUTO: PRESENT
YEAST URINE: PRESENT

## 2020-03-23 ENCOUNTER — NURSE ONLY (OUTPATIENT)
Dept: LAB | Age: 85
End: 2020-03-23
Attending: FAMILY MEDICINE
Payer: MEDICARE

## 2020-03-23 DIAGNOSIS — N18.9 CKD (CHRONIC KIDNEY DISEASE): ICD-10-CM

## 2020-03-23 DIAGNOSIS — R69 DIAGNOSIS UNKNOWN: Primary | ICD-10-CM

## 2020-03-23 LAB
ALBUMIN SERPL-MCNC: 2.6 G/DL (ref 3.4–5)
ALBUMIN/GLOB SERPL: 0.5 {RATIO} (ref 1–2)
ALP LIVER SERPL-CCNC: 80 U/L (ref 55–142)
ALT SERPL-CCNC: 19 U/L (ref 13–56)
ANION GAP SERPL CALC-SCNC: 6 MMOL/L (ref 0–18)
AST SERPL-CCNC: 18 U/L (ref 15–37)
BASOPHILS # BLD AUTO: 0.05 X10(3) UL (ref 0–0.2)
BASOPHILS NFR BLD AUTO: 0.8 %
BILIRUB SERPL-MCNC: 0.3 MG/DL (ref 0.1–2)
BUN BLD-MCNC: 34 MG/DL (ref 7–18)
BUN/CREAT SERPL: 30.9 (ref 10–20)
CALCIUM BLD-MCNC: 8.8 MG/DL (ref 8.5–10.1)
CHLORIDE SERPL-SCNC: 115 MMOL/L (ref 98–112)
CO2 SERPL-SCNC: 17 MMOL/L (ref 21–32)
CREAT BLD-MCNC: 1.1 MG/DL (ref 0.55–1.02)
DEPRECATED RDW RBC AUTO: 63 FL (ref 35.1–46.3)
EOSINOPHIL # BLD AUTO: 0.37 X10(3) UL (ref 0–0.7)
EOSINOPHIL NFR BLD AUTO: 5.9 %
ERYTHROCYTE [DISTWIDTH] IN BLOOD BY AUTOMATED COUNT: 21.7 % (ref 11–15)
GLOBULIN PLAS-MCNC: 4.8 G/DL (ref 2.8–4.4)
GLUCOSE BLD-MCNC: 120 MG/DL (ref 70–99)
HCT VFR BLD AUTO: 28 % (ref 35–48)
HGB BLD-MCNC: 8.4 G/DL (ref 12–16)
IMM GRANULOCYTES # BLD AUTO: 0.03 X10(3) UL (ref 0–1)
IMM GRANULOCYTES NFR BLD: 0.5 %
LYMPHOCYTES # BLD AUTO: 2.37 X10(3) UL (ref 1–4)
LYMPHOCYTES NFR BLD AUTO: 37.6 %
M PROTEIN MFR SERPL ELPH: 7.4 G/DL (ref 6.4–8.2)
MCH RBC QN AUTO: 24.3 PG (ref 26–34)
MCHC RBC AUTO-ENTMCNC: 30 G/DL (ref 31–37)
MCV RBC AUTO: 81.2 FL (ref 80–100)
MONOCYTES # BLD AUTO: 0.33 X10(3) UL (ref 0.1–1)
MONOCYTES NFR BLD AUTO: 5.2 %
NEUTROPHILS # BLD AUTO: 3.15 X10 (3) UL (ref 1.5–7.7)
NEUTROPHILS # BLD AUTO: 3.15 X10(3) UL (ref 1.5–7.7)
NEUTROPHILS NFR BLD AUTO: 50 %
OSMOLALITY SERPL CALC.SUM OF ELEC: 295 MOSM/KG (ref 275–295)
PATIENT FASTING Y/N/NP: YES
PLATELET # BLD AUTO: 324 10(3)UL (ref 150–450)
POTASSIUM SERPL-SCNC: 4.2 MMOL/L (ref 3.5–5.1)
RBC # BLD AUTO: 3.45 X10(6)UL (ref 3.8–5.3)
SODIUM SERPL-SCNC: 138 MMOL/L (ref 136–145)
WBC # BLD AUTO: 6.3 X10(3) UL (ref 4–11)

## 2020-03-23 PROCEDURE — 36415 COLL VENOUS BLD VENIPUNCTURE: CPT

## 2020-03-23 PROCEDURE — 80053 COMPREHEN METABOLIC PANEL: CPT

## 2020-03-23 PROCEDURE — 85025 COMPLETE CBC W/AUTO DIFF WBC: CPT

## 2020-03-25 ENCOUNTER — NURSE ONLY (OUTPATIENT)
Dept: LAB | Age: 85
End: 2020-03-25
Attending: FAMILY MEDICINE
Payer: MEDICARE

## 2020-03-25 DIAGNOSIS — N18.9 CKD (CHRONIC KIDNEY DISEASE): Primary | ICD-10-CM

## 2020-03-25 LAB
ALBUMIN SERPL-MCNC: 2.9 G/DL (ref 3.4–5)
ALBUMIN/GLOB SERPL: 0.6 {RATIO} (ref 1–2)
ALP LIVER SERPL-CCNC: 83 U/L (ref 55–142)
ALT SERPL-CCNC: 25 U/L (ref 13–56)
ANION GAP SERPL CALC-SCNC: 8 MMOL/L (ref 0–18)
AST SERPL-CCNC: 23 U/L (ref 15–37)
BASOPHILS # BLD AUTO: 0.05 X10(3) UL (ref 0–0.2)
BASOPHILS NFR BLD AUTO: 0.7 %
BILIRUB SERPL-MCNC: 0.4 MG/DL (ref 0.1–2)
BUN BLD-MCNC: 28 MG/DL (ref 7–18)
BUN/CREAT SERPL: 27.7 (ref 10–20)
CALCIUM BLD-MCNC: 9.5 MG/DL (ref 8.5–10.1)
CHLORIDE SERPL-SCNC: 110 MMOL/L (ref 98–112)
CO2 SERPL-SCNC: 17 MMOL/L (ref 21–32)
CREAT BLD-MCNC: 1.01 MG/DL (ref 0.55–1.02)
DEPRECATED RDW RBC AUTO: 64.5 FL (ref 35.1–46.3)
EOSINOPHIL # BLD AUTO: 0.27 X10(3) UL (ref 0–0.7)
EOSINOPHIL NFR BLD AUTO: 3.5 %
ERYTHROCYTE [DISTWIDTH] IN BLOOD BY AUTOMATED COUNT: 21.9 % (ref 11–15)
GLOBULIN PLAS-MCNC: 5.2 G/DL (ref 2.8–4.4)
GLUCOSE BLD-MCNC: 137 MG/DL (ref 70–99)
HCT VFR BLD AUTO: 30.2 % (ref 35–48)
HGB BLD-MCNC: 9.2 G/DL (ref 12–16)
IMM GRANULOCYTES # BLD AUTO: 0.03 X10(3) UL (ref 0–1)
IMM GRANULOCYTES NFR BLD: 0.4 %
LYMPHOCYTES # BLD AUTO: 2.93 X10(3) UL (ref 1–4)
LYMPHOCYTES NFR BLD AUTO: 38.3 %
M PROTEIN MFR SERPL ELPH: 8.1 G/DL (ref 6.4–8.2)
MCH RBC QN AUTO: 24.9 PG (ref 26–34)
MCHC RBC AUTO-ENTMCNC: 30.5 G/DL (ref 31–37)
MCV RBC AUTO: 81.6 FL (ref 80–100)
MONOCYTES # BLD AUTO: 0.41 X10(3) UL (ref 0.1–1)
MONOCYTES NFR BLD AUTO: 5.4 %
NEUTROPHILS # BLD AUTO: 3.96 X10 (3) UL (ref 1.5–7.7)
NEUTROPHILS # BLD AUTO: 3.96 X10(3) UL (ref 1.5–7.7)
NEUTROPHILS NFR BLD AUTO: 51.7 %
OSMOLALITY SERPL CALC.SUM OF ELEC: 288 MOSM/KG (ref 275–295)
PATIENT FASTING Y/N/NP: YES
PLATELET # BLD AUTO: 410 10(3)UL (ref 150–450)
POTASSIUM SERPL-SCNC: 4.3 MMOL/L (ref 3.5–5.1)
RBC # BLD AUTO: 3.7 X10(6)UL (ref 3.8–5.3)
SODIUM SERPL-SCNC: 135 MMOL/L (ref 136–145)
WBC # BLD AUTO: 7.7 X10(3) UL (ref 4–11)

## 2020-03-25 PROCEDURE — 85025 COMPLETE CBC W/AUTO DIFF WBC: CPT

## 2020-03-25 PROCEDURE — 36415 COLL VENOUS BLD VENIPUNCTURE: CPT

## 2020-03-25 PROCEDURE — 80053 COMPREHEN METABOLIC PANEL: CPT

## 2020-03-30 ENCOUNTER — APPOINTMENT (OUTPATIENT)
Dept: INTERVENTIONAL RADIOLOGY/VASCULAR | Facility: HOSPITAL | Age: 85
End: 2020-03-30
Attending: EMERGENCY MEDICINE
Payer: MEDICARE

## 2020-03-30 ENCOUNTER — HOSPITAL ENCOUNTER (EMERGENCY)
Facility: HOSPITAL | Age: 85
Discharge: NURSING, REHAB OR LONG-TERM CARE | End: 2020-03-30
Attending: EMERGENCY MEDICINE
Payer: MEDICARE

## 2020-03-30 VITALS
SYSTOLIC BLOOD PRESSURE: 126 MMHG | BODY MASS INDEX: 26.23 KG/M2 | RESPIRATION RATE: 16 BRPM | OXYGEN SATURATION: 98 % | HEIGHT: 65 IN | DIASTOLIC BLOOD PRESSURE: 70 MMHG | WEIGHT: 157.44 LBS | HEART RATE: 70 BPM | TEMPERATURE: 99 F

## 2020-03-30 DIAGNOSIS — T83.098A MALFUNCTION OF NEPHROSTOMY TUBE (HCC): Primary | ICD-10-CM

## 2020-03-30 LAB
INR BLD: 1.18 (ref 0.89–1.11)
PSA SERPL DL<=0.01 NG/ML-MCNC: 15.4 SECONDS (ref 12.4–14.6)

## 2020-03-30 PROCEDURE — 99284 EMERGENCY DEPT VISIT MOD MDM: CPT

## 2020-03-30 PROCEDURE — 85610 PROTHROMBIN TIME: CPT | Performed by: EMERGENCY MEDICINE

## 2020-03-30 PROCEDURE — 99283 EMERGENCY DEPT VISIT LOW MDM: CPT

## 2020-03-30 PROCEDURE — 36415 COLL VENOUS BLD VENIPUNCTURE: CPT

## 2020-03-30 PROCEDURE — 99152 MOD SED SAME PHYS/QHP 5/>YRS: CPT

## 2020-03-30 PROCEDURE — 50435 EXCHANGE NEPHROSTOMY CATH: CPT

## 2020-03-30 RX ORDER — LIDOCAINE HYDROCHLORIDE 10 MG/ML
INJECTION, SOLUTION INFILTRATION; PERINEURAL
Status: COMPLETED
Start: 2020-03-30 | End: 2020-03-30

## 2020-03-30 RX ORDER — LEVOFLOXACIN 5 MG/ML
INJECTION, SOLUTION INTRAVENOUS
Status: COMPLETED
Start: 2020-03-30 | End: 2020-03-30

## 2020-03-30 RX ORDER — MIDAZOLAM HYDROCHLORIDE 1 MG/ML
INJECTION INTRAMUSCULAR; INTRAVENOUS
Status: COMPLETED
Start: 2020-03-30 | End: 2020-03-30

## 2020-03-30 NOTE — PROCEDURES
[address] [place] Patient Status:  Emergency    1935 MRN [id number]   Location [address] [facility] Attending No att. providers found   Hosp Day # 0 PCP Michael Phillips MD         Brief Procedure Report    Pre-Operative

## 2020-03-30 NOTE — PROCEDURES
[facility]  Pre-Procedure Note    Name: Елена Márquez  MRN#: [id number]  : 1935    Procedure: Neph tube exchange    Indication: Neph tube was cut at [facility]    Allergies:    No Known Allergies    Pertinent Medications:    Is patient on any Aspirin,

## 2020-03-30 NOTE — ED NOTES
Pt back from IR, report called to [facility] nurse, nephrostomy bag intact and draining, dressing dry and intact

## 2020-03-30 NOTE — ED NOTES
Spoke to DON of [facility] she was told in report the nephrostomy tube was cut this am and the bag was in thr garbage, tube is drainng, dressing in place, pt also has a ileostomy and is draining

## 2020-03-30 NOTE — ED PROVIDER NOTES
Patient Seen in: [facility] Emergency Department      History   Patient presents with: Other    Stated Complaint: nephroscopy tube fell out    HPI    56-year-old female who is sent from nursing today because her nephrostomy tube was cut.   Patient h Smokeless tobacco: Never Used    Alcohol use: No    Drug use: No             Review of Systems    Positive for stated complaint: nephroscopy tube fell out  Other systems are as noted in HPI. Constitutional and vital signs reviewed.       All other systems nephrostomy tube replaced. Patient tolerated the procedure well. She will be discharged home to follow-up with urology as an outpatient.               Disposition and Plan     Clinical Impression:  Malfunction of nephrostomy tube ([address]  (primary encounter

## 2020-04-01 ENCOUNTER — NURSE ONLY (OUTPATIENT)
Dept: LAB | Age: 85
End: 2020-04-01
Attending: FAMILY MEDICINE
Payer: MEDICARE

## 2020-04-01 DIAGNOSIS — N39.0 UTI (URINARY TRACT INFECTION): ICD-10-CM

## 2020-04-01 DIAGNOSIS — E11.9 DM (DIABETES MELLITUS) (HCC): Primary | ICD-10-CM

## 2020-04-06 ENCOUNTER — NURSE ONLY (OUTPATIENT)
Dept: LAB | Age: 85
End: 2020-04-06
Attending: FAMILY MEDICINE
Payer: MEDICARE

## 2020-04-06 DIAGNOSIS — N39.0 UTI (URINARY TRACT INFECTION): ICD-10-CM

## 2020-04-06 DIAGNOSIS — Z16.21 VRE (VANCOMYCIN-RESISTANT ENTEROCOCCI) INFECTION: Primary | ICD-10-CM

## 2020-04-06 DIAGNOSIS — A49.1 VRE (VANCOMYCIN-RESISTANT ENTEROCOCCI) INFECTION: Primary | ICD-10-CM

## 2020-04-06 PROCEDURE — 87086 URINE CULTURE/COLONY COUNT: CPT

## 2020-04-06 PROCEDURE — 87186 SC STD MICRODIL/AGAR DIL: CPT

## 2020-04-06 PROCEDURE — 81001 URINALYSIS AUTO W/SCOPE: CPT

## 2020-04-06 PROCEDURE — 87088 URINE BACTERIA CULTURE: CPT

## 2020-04-07 RX ORDER — GABAPENTIN 100 MG/1
CAPSULE ORAL
Qty: 30 CAPSULE | Refills: 0 | OUTPATIENT
Start: 2020-04-07

## 2020-04-09 ENCOUNTER — NURSE ONLY (OUTPATIENT)
Dept: LAB | Age: 85
End: 2020-04-09
Attending: FAMILY MEDICINE
Payer: MEDICARE

## 2020-04-09 DIAGNOSIS — R53.1 WEAKNESS: Primary | ICD-10-CM

## 2020-04-09 PROCEDURE — 36415 COLL VENOUS BLD VENIPUNCTURE: CPT

## 2020-04-09 PROCEDURE — 85025 COMPLETE CBC W/AUTO DIFF WBC: CPT

## 2020-04-09 PROCEDURE — 80053 COMPREHEN METABOLIC PANEL: CPT

## 2020-04-13 ENCOUNTER — MED REC SCAN ONLY (OUTPATIENT)
Dept: FAMILY MEDICINE CLINIC | Facility: CLINIC | Age: 85
End: 2020-04-13

## 2020-04-15 ENCOUNTER — NURSE ONLY (OUTPATIENT)
Dept: LAB | Age: 85
End: 2020-04-15
Attending: FAMILY MEDICINE
Payer: MEDICARE

## 2020-04-15 DIAGNOSIS — E11.9 DM (DIABETES MELLITUS) (HCC): Primary | ICD-10-CM

## 2020-04-15 DIAGNOSIS — B99.9 INFECTION: ICD-10-CM

## 2020-04-16 ENCOUNTER — NURSE ONLY (OUTPATIENT)
Dept: LAB | Age: 85
End: 2020-04-16
Attending: FAMILY MEDICINE
Payer: MEDICARE

## 2020-04-16 DIAGNOSIS — R53.1 WEAKNESS: Primary | ICD-10-CM

## 2020-04-16 PROCEDURE — 85025 COMPLETE CBC W/AUTO DIFF WBC: CPT

## 2020-04-16 PROCEDURE — 80053 COMPREHEN METABOLIC PANEL: CPT

## 2020-04-16 PROCEDURE — 36415 COLL VENOUS BLD VENIPUNCTURE: CPT

## 2020-04-20 ENCOUNTER — NURSE ONLY (OUTPATIENT)
Dept: LAB | Age: 85
End: 2020-04-20
Attending: FAMILY MEDICINE
Payer: MEDICARE

## 2020-04-20 DIAGNOSIS — N18.9 CKD (CHRONIC KIDNEY DISEASE): ICD-10-CM

## 2020-04-20 DIAGNOSIS — E87.5 HYPERKALEMIA: Primary | ICD-10-CM

## 2020-04-20 PROCEDURE — 80053 COMPREHEN METABOLIC PANEL: CPT

## 2020-04-20 PROCEDURE — 85025 COMPLETE CBC W/AUTO DIFF WBC: CPT

## 2020-04-20 PROCEDURE — 36415 COLL VENOUS BLD VENIPUNCTURE: CPT

## 2020-04-29 ENCOUNTER — NURSE ONLY (OUTPATIENT)
Dept: LAB | Age: 85
End: 2020-04-29
Attending: FAMILY MEDICINE
Payer: MEDICARE

## 2020-04-29 DIAGNOSIS — Z00.00 ROUTINE GENERAL MEDICAL EXAMINATION AT A HEALTH CARE FACILITY: Primary | ICD-10-CM

## 2020-04-29 PROCEDURE — 36415 COLL VENOUS BLD VENIPUNCTURE: CPT

## 2020-04-29 PROCEDURE — 80053 COMPREHEN METABOLIC PANEL: CPT

## 2020-04-29 PROCEDURE — 85025 COMPLETE CBC W/AUTO DIFF WBC: CPT

## 2020-05-05 ENCOUNTER — TELEPHONE (OUTPATIENT)
Dept: FAMILY MEDICINE CLINIC | Facility: CLINIC | Age: 85
End: 2020-05-05

## 2020-05-05 NOTE — TELEPHONE ENCOUNTER
Daughter stated patient is in bad shape. Daughter stated patient has been back and forth from hospital and rehab center. Kidney function is very bad. Urine is backing up into kidney.     Daughter stated that the nurses at rehab are so busy that her m

## 2020-05-06 ENCOUNTER — APPOINTMENT (OUTPATIENT)
Dept: ULTRASOUND IMAGING | Facility: HOSPITAL | Age: 85
DRG: 682 | End: 2020-05-06
Attending: EMERGENCY MEDICINE
Payer: MEDICARE

## 2020-05-06 ENCOUNTER — NURSE ONLY (OUTPATIENT)
Dept: LAB | Age: 85
End: 2020-05-06
Attending: FAMILY MEDICINE
Payer: MEDICARE

## 2020-05-06 ENCOUNTER — HOSPITAL ENCOUNTER (INPATIENT)
Facility: HOSPITAL | Age: 85
LOS: 7 days | Discharge: HOME HEALTH | DRG: 682 | End: 2020-05-13
Attending: EMERGENCY MEDICINE | Admitting: HOSPITALIST
Payer: MEDICARE

## 2020-05-06 DIAGNOSIS — E11.65 TYPE 2 DIABETES MELLITUS WITH HYPERGLYCEMIA, WITH LONG-TERM CURRENT USE OF INSULIN (HCC): ICD-10-CM

## 2020-05-06 DIAGNOSIS — Z79.4 TYPE 2 DIABETES MELLITUS WITH HYPERGLYCEMIA, WITH LONG-TERM CURRENT USE OF INSULIN (HCC): ICD-10-CM

## 2020-05-06 DIAGNOSIS — E86.0 DEHYDRATION: ICD-10-CM

## 2020-05-06 DIAGNOSIS — N17.9 ACUTE KIDNEY INJURY (HCC): ICD-10-CM

## 2020-05-06 DIAGNOSIS — E11.9 DM (DIABETES MELLITUS) (HCC): Primary | ICD-10-CM

## 2020-05-06 DIAGNOSIS — N17.9 ACUTE RENAL FAILURE, UNSPECIFIED ACUTE RENAL FAILURE TYPE (HCC): Primary | ICD-10-CM

## 2020-05-06 PROBLEM — N19 RENAL FAILURE: Status: ACTIVE | Noted: 2020-05-06

## 2020-05-06 PROBLEM — R79.89 AZOTEMIA: Status: ACTIVE | Noted: 2020-05-06

## 2020-05-06 PROCEDURE — 85025 COMPLETE CBC W/AUTO DIFF WBC: CPT

## 2020-05-06 PROCEDURE — 99223 1ST HOSP IP/OBS HIGH 75: CPT | Performed by: HOSPITALIST

## 2020-05-06 PROCEDURE — 36415 COLL VENOUS BLD VENIPUNCTURE: CPT

## 2020-05-06 PROCEDURE — 80053 COMPREHEN METABOLIC PANEL: CPT

## 2020-05-06 PROCEDURE — 76770 US EXAM ABDO BACK WALL COMP: CPT | Performed by: EMERGENCY MEDICINE

## 2020-05-06 RX ORDER — DEXTROSE MONOHYDRATE 25 G/50ML
50 INJECTION, SOLUTION INTRAVENOUS
Status: DISCONTINUED | OUTPATIENT
Start: 2020-05-06 | End: 2020-05-13

## 2020-05-06 RX ORDER — SODIUM CHLORIDE 9 MG/ML
INJECTION, SOLUTION INTRAVENOUS CONTINUOUS
Status: ACTIVE | OUTPATIENT
Start: 2020-05-06 | End: 2020-05-07

## 2020-05-06 RX ORDER — SIMETHICONE 80 MG
80 TABLET,CHEWABLE ORAL EVERY 6 HOURS PRN
Status: DISCONTINUED | OUTPATIENT
Start: 2020-05-06 | End: 2020-05-13

## 2020-05-06 RX ORDER — HEPARIN SODIUM 5000 [USP'U]/ML
5000 INJECTION, SOLUTION INTRAVENOUS; SUBCUTANEOUS EVERY 12 HOURS SCHEDULED
Status: DISCONTINUED | OUTPATIENT
Start: 2020-05-06 | End: 2020-05-13

## 2020-05-06 RX ORDER — MULTIVIT-MIN/IRON FUM/FOLIC AC 7.5 MG-4
1 TABLET ORAL
Status: ON HOLD | COMMUNITY
End: 2020-05-11

## 2020-05-06 RX ORDER — SODIUM CHLORIDE 9 MG/ML
INJECTION, SOLUTION INTRAVENOUS CONTINUOUS
Status: DISCONTINUED | OUTPATIENT
Start: 2020-05-06 | End: 2020-05-07

## 2020-05-06 RX ORDER — LEVOTHYROXINE SODIUM 0.15 MG/1
150 TABLET ORAL
Status: DISCONTINUED | OUTPATIENT
Start: 2020-05-07 | End: 2020-05-13

## 2020-05-06 RX ORDER — MEGESTROL ACETATE 40 MG/ML
400 SUSPENSION ORAL DAILY
Status: DISCONTINUED | OUTPATIENT
Start: 2020-05-06 | End: 2020-05-13

## 2020-05-06 RX ORDER — SERTRALINE HYDROCHLORIDE 100 MG/1
100 TABLET, FILM COATED ORAL DAILY
Status: DISCONTINUED | OUTPATIENT
Start: 2020-05-06 | End: 2020-05-13

## 2020-05-06 RX ORDER — MIRTAZAPINE 15 MG/1
30 TABLET, FILM COATED ORAL NIGHTLY
Status: DISCONTINUED | OUTPATIENT
Start: 2020-05-06 | End: 2020-05-13

## 2020-05-06 RX ORDER — SUCRALFATE 1 G/1
1 TABLET ORAL
Status: DISCONTINUED | OUTPATIENT
Start: 2020-05-06 | End: 2020-05-13

## 2020-05-06 RX ORDER — ONDANSETRON 2 MG/ML
4 INJECTION INTRAMUSCULAR; INTRAVENOUS EVERY 6 HOURS PRN
Status: DISCONTINUED | OUTPATIENT
Start: 2020-05-06 | End: 2020-05-13

## 2020-05-06 RX ORDER — MAGNESIUM OXIDE 400 MG (241.3 MG MAGNESIUM) TABLET
3 TABLET NIGHTLY
Status: DISCONTINUED | OUTPATIENT
Start: 2020-05-06 | End: 2020-05-13

## 2020-05-06 RX ORDER — ACETAMINOPHEN 325 MG/1
650 TABLET ORAL EVERY 6 HOURS PRN
Status: DISCONTINUED | OUTPATIENT
Start: 2020-05-06 | End: 2020-05-13

## 2020-05-06 RX ORDER — PANTOPRAZOLE SODIUM 40 MG/1
40 TABLET, DELAYED RELEASE ORAL
Status: DISCONTINUED | OUTPATIENT
Start: 2020-05-07 | End: 2020-05-13

## 2020-05-06 RX ORDER — GABAPENTIN 100 MG/1
100 CAPSULE ORAL NIGHTLY
Status: DISCONTINUED | OUTPATIENT
Start: 2020-05-06 | End: 2020-05-10

## 2020-05-06 RX ORDER — MINERAL OIL/PETROLATUM,WHITE
1 CREAM (GRAM) TOPICAL AS NEEDED
Status: ON HOLD | COMMUNITY
End: 2020-05-11

## 2020-05-07 PROCEDURE — 99232 SBSQ HOSP IP/OBS MODERATE 35: CPT | Performed by: HOSPITALIST

## 2020-05-07 NOTE — H&P
[facility]  History and Physical     Tuan Vuong Patient Status:  Emergency    1935 MRN [id number]   Location [address] [facility] Attending Ernestine Conteh MD   Hosp Day # 0 PCP Marium Martinez MD     Chief Complaint: Blair Gonzalez PART REMOVAL COLON W END COLOSTOMY  11/12/2019   • REMOVAL GALLBLADDER     • RIGHT PHACOEMULSIFICATION OF CATARACT WITH INTRAOCULAR LENS IMPLANT 07134 Right 4/26/2017    Performed by Brynn Meyer MD at [address] [place] [address] % External Patch, Place 1 patch onto the skin daily. Apply to low back and right shoulder in am and remove at pm , Disp: , Rfl:   simethicone 80 MG Oral Chew Tab, Chew 80 mg by mouth every 6 (six) hours as needed (for gas).   , Disp: , Rfl:   Megestrol Acet 328.0       Recent Labs   Lab 05/06/20  0537   *   BUN 83*   CREATSERUM 3.18*   GFRAA 15*   GFRNAA 13*   CA 10.3*   ALB 2.7*   *   K 4.6      CO2 11.0*   ALKPHO 130   AST 24   ALT 24   BILT 0.2   TP 8.6*       Estimated Creatinine Cleara

## 2020-05-07 NOTE — PROGRESS NOTES
[facility] Pharmacy Note:  Renal Adjustment for meropenem (MERREM)    Asher Rodriguez is a 81year old female who has been prescribed meropenem (MERREM) 500 mg every 8 hrs.   CrCl is estimated creatinine clearance is 12.1 mL/min (A) (based on SCr of 3.18 mg/dL (H))

## 2020-05-07 NOTE — DIETARY MALNUTRITION NOTE
[facility]    NUTRITION INITIAL ASSESSMENT    Pt meets severe malnutrition criteria.     CRITERIA FOR MALNUTRITION DIAGNOSIS:  Criteria for severe malnutrition diagnosis: chronic illness related to energy intake less than 75% for greater than 1 month kg (157 lb 6.5 oz)  02/16/20 : 71.4 kg (157 lb 8 oz)  01/21/20 : 73.7 kg (162 lb 6.4 oz)  01/09/20 : 73.1 kg (161 lb 1.6 oz)  12/31/19 : 70.9 kg (156 lb 6.4 oz)  12/24/19 : 70.6 kg (155 lb 9.6 oz)  12/09/19 : 67 kg (147 lb 11.3 oz)  12/03/19 : 70 kg (154 l

## 2020-05-07 NOTE — ED INITIAL ASSESSMENT (HPI)
Pt arrives from [facility] with elevated BUN/Creatinine and low sodium, EMS started IVF's, 150ML 0.9NS given PTA. Language barrier, Nursing Home states that neuro status is pt's baseline.

## 2020-05-07 NOTE — PLAN OF CARE
NURSING ADMISSION NOTE      Patient admitted via Cart  Oriented to room. Safety precautions initiated. Bed in low position. Call light in reach. Patient is alert to self. Language barrier.  Moaning when she is turning in bed but quiet down after r

## 2020-05-07 NOTE — ED NOTES
PT's daughter was informed of plan of care and translated to PT. PT's daughter is not planning on coming to the hospital at this time, however would like to be called with updates.

## 2020-05-07 NOTE — CM/SW NOTE
Pt is known to NILES from previous admissions. Son confirmed he would like the pt to return to [facility] at [facility]. NILES sent updated info via [facility].

## 2020-05-07 NOTE — ED NOTES
Son, [place], was updated on plan of care.  He is aware of the no visitor policy at this time and was given the phone number of the charge nurse of the floor

## 2020-05-07 NOTE — ED PROVIDER NOTES
Patient Seen in: [facility] Emergency Department      History   Patient presents with:  Abnormal Result    Stated Complaint: abnormal BUN/Creatinine     HPI    72-year-old woman who is sent from [facility].   She has had worsening renal functio status: Never Smoker      Smokeless tobacco: Never Used    Alcohol use: No    Drug use: No             Review of Systems    Positive for stated complaint: abnormal BUN/Creatinine   Other systems are as noted in HPI.   Constitutional and vital signs reviewed KIDNEY/BLADDER (CPT=76770)  COMPARISON:  None. INDICATIONS:  abnormal BUN/Creatinine replacement right nephrostomy tube 3/30/2020. Possible virus.   TECHNIQUE:  Transabdominal gray scale ultrasound imaging of the bilateral kidneys and bladder was performe

## 2020-05-07 NOTE — PROGRESS NOTES
[facility]  Progress Note     Tuan Vuong Patient Status:  Inpatient    1935 MRN [id number]   [facility] 4NW-A Attending Laurel Sainz MD   Hosp Day # 1 PCP Leny Molina MD     Chief Complaint: frail    S: Patient appears weak gabapentin  100 mg Oral Nightly   • Levothyroxine Sodium  150 mcg Oral Before breakfast   • lidocaine-menthol  1 patch Transdermal Q24H   • Megestrol Acetate  400 mg Oral Daily   • melatonin  3 mg Oral Nightly   • mirtazapine  30 mg Oral Nightly   • Pantop him further after PT eval tomorrow.     DENNISE Gaffney  1:04 PM

## 2020-05-08 ENCOUNTER — TELEPHONE (OUTPATIENT)
Dept: FAMILY MEDICINE CLINIC | Facility: CLINIC | Age: 85
End: 2020-05-08

## 2020-05-08 PROCEDURE — 99232 SBSQ HOSP IP/OBS MODERATE 35: CPT | Performed by: HOSPITALIST

## 2020-05-08 PROCEDURE — 99223 1ST HOSP IP/OBS HIGH 75: CPT | Performed by: INTERNAL MEDICINE

## 2020-05-08 RX ORDER — SODIUM BICARBONATE 325 MG/1
650 TABLET ORAL 2 TIMES DAILY
Status: DISCONTINUED | OUTPATIENT
Start: 2020-05-08 | End: 2020-05-13

## 2020-05-08 RX ORDER — CHOLESTYRAMINE LIGHT 4 G/5.7G
4 POWDER, FOR SUSPENSION ORAL DAILY
Status: DISCONTINUED | OUTPATIENT
Start: 2020-05-08 | End: 2020-05-13

## 2020-05-08 RX ORDER — POTASSIUM CHLORIDE 1.5 G/1.77G
20 POWDER, FOR SOLUTION ORAL ONCE
Status: COMPLETED | OUTPATIENT
Start: 2020-05-08 | End: 2020-05-08

## 2020-05-08 RX ORDER — POTASSIUM CHLORIDE 14.9 MG/ML
20 INJECTION INTRAVENOUS ONCE
Status: DISCONTINUED | OUTPATIENT
Start: 2020-05-08 | End: 2020-05-08

## 2020-05-08 NOTE — CONSULTS
[facility]  Report of Consultation    Tuan Vuong Patient Status:  Inpatient    1935 MRN [id number]   [facility] 4NW-A Attending Mike Gandhi MD   Hosp Day # 2 PCP Ermelinda Rice MD     Reason for Consultation:  ANTONIO    History of END COLOSTOMY  11/12/2019   • REMOVAL GALLBLADDER     • RIGHT PHACOEMULSIFICATION OF CATARACT WITH INTRAOCULAR LENS IMPLANT [id number] Right 4/26/2017    Performed by Cezar Lagos MD at [place] [address] Left 11/12/2019 Subcutaneous, TID AC and HS  •  Heparin Sodium (Porcine) 5000 UNIT/ML injection 5,000 Units, 5,000 Units, Subcutaneous, 2 times per day  •  acetaminophen (TYLENOL) tab 650 mg, 650 mg, Oral, Q6H PRN  •  ondansetron HCl (ZOFRAN) injection 4 mg, 4 mg, Dalton Melvin obstructive uropathy; s/p R PNT  - agree w/ fluids- continue  - not on any diuretics/arb/acei  - monitor labs closely - cr somewhat better today  2. Hyponatrmia- continue isotonic fluids; monitor  3.  Acidosis- related to bicarb losses from the ostomy prima

## 2020-05-08 NOTE — PLAN OF CARE
Pt cont to be agitated, screaming since 0700. Only speaks Armenian, but able to say few [place] words (I.e. eggs, eat, thank you). Pt's son called for status update, spoke to pt to ask if she is in pain. Pt denies pain.  Dr. Erin Greene made aware of pt's state, or

## 2020-05-08 NOTE — HOME CARE LIAISON
Received referral from Froilan Thorne. Spoke with patient son to discuss home health services and offer choice. Patient/family is agreeable to [facility] services at discharge. 24hr contact information provided. Any questions addressed. Will follow.

## 2020-05-08 NOTE — PROGRESS NOTES
[facility]  Progress Note     Tuan Vuong Patient Status:  Inpatient    1935 MRN [id number]   [facility] 4NW-A Attending Catalino Valerio MD   Hosp Day # 2 PCP Mac Sylvester MD     Chief Complaint: frail    S: Patient appears more TROP, CK in the last 168 hours. Imaging: Imaging data reviewed in Epic.     Medications:   • gabapentin  100 mg Oral Nightly   • Levothyroxine Sodium  150 mcg Oral Before breakfast   • lidocaine-menthol  1 patch Transdermal Q24H   • Megestrol Acetat negative bacilli; none predominant. No further workup performed.      Reviewed cx w/ MD, possibly d/c maia BOWDEN  1:30 PM

## 2020-05-08 NOTE — TELEPHONE ENCOUNTER
[facility] called to ask if Dr. Surendra Cochran would sign off on Home health care for patient. She is in [facility] now and eventually be returning home. No discharge date yet.

## 2020-05-08 NOTE — PLAN OF CARE
Assumed pt care at 0700, awake, alert. Speaks mainly Setswana, pt screaming intermittently. Bladder scan done at 0800, only 13cc noted on scan. Has nephrostomy tube draining clear yellow urine and has purewick on.  Ileostomy bag changed d/t leaking from adhes

## 2020-05-08 NOTE — PROGRESS NOTES
[facility]  Progress Note     Tuan Vuong Patient Status:  Inpatient    1935 MRN [id number]   [facility] 4NW-A Attending Elsa Velasco MD   Hosp Day # 2 PCP Gilberto Montoya MD     Chief Complaint: frail    S: cannot give much his lidocaine-menthol  1 patch Transdermal Q24H   • Megestrol Acetate  400 mg Oral Daily   • melatonin  3 mg Oral Nightly   • mirtazapine  30 mg Oral Nightly   • Pantoprazole Sodium  40 mg Oral BID AC   • Sertraline HCl  100 mg Oral Daily   • sucralfate  1 g O

## 2020-05-08 NOTE — OCCUPATIONAL THERAPY NOTE
OCCUPATIONAL THERAPY EVALUATION - INPATIENT     Room Number: 643/787-U  Evaluation Date: 5/8/2020  Type of Evaluation: Initial  Presenting Problem: acute renal failure    Physician Order: IP Consult to Occupational Therapy  Reason for Therapy: ADL/IADL Dys N/A 11/12/2019    Performed by Deneen Molina MD at [facility] MAIN OR   • COLON SURGERY     • CYSTOSCOPY URETEROSCOPY Bilateral 1/16/2020    Performed by Alesha Judd MD at [facility] MAIN OR   • LEFT PHACOEMULSIFICATION OF CATARACT WITH INTRAOCULAR LENS IMPLANT [address] L[address] RESTRICTION  Weight Bearing Restriction: None                PAIN ASSESSMENT  Ratin  Location: denied       COGNITION  Overall Cognitive Status:  ROSE - unable to assess and follows commands with increased time and visual/demonstration/body language CL    FUNCTIONAL TRANSFER ASSESSMENT  Supine to Sit : Moderate assistance  Sit to Stand: Maximum assistance    Skilled Therapy Provided: Pt received semi-supine in bed.  Writer wore PPE, gown, surgical mask and gloves throughout tx session 2/2 droplet preca patient should achieve her PLOF level in BADLs and functional transfers.     The patient is functioning below her previous functional level and would benefit from a trial on skilled inpatient OT to address the above deficits, maximizing patient’s ability to

## 2020-05-08 NOTE — PHYSICAL THERAPY NOTE
PHYSICAL THERAPY EVALUATION - INPATIENT     Room Number: 744/027-E  Evaluation Date: 5/8/2020  Type of Evaluation: Initial  Physician Order: PT Eval and Treat    Presenting Problem: ANTONIO, dehydration, ARF, DM2  Reason for Therapy: Mobility Dysfunction type diabetes mellitus without mention of complication, not stated as uncontrolled    • Vision loss     need cataract surgery       Past Surgical History  Past Surgical History:   Procedure Laterality Date   •      • COLECTOMY N/A 2019    Pe weekends. Pt reports that she must use a bidet to initiate a bowel movement\"        SUBJECTIVE  \"Eat eat! \"-pt noting she was hungry    Patient self-stated goal is n/a-other than eating    OBJECTIVE  Precautions: Bed/chair alarm; Other (Comment)(speaks di room?: Total   -   Climbing 3-5 steps with a railing?: Total       AM-PAC Score:  Raw Score: 10   Approx Degree of Impairment: 76.75%   Standardized Score (AM-PAC Scale): 32.29   CMS Modifier (G-Code): CL    FUNCTIONAL ABILITY STATUS  Gait Assessment   Keli Wan overall the evaluation complexity is considered moderate. These impairments and comorbidities manifest themselves as functional limitations in independent bed mobility, transfers, and gait.   The patient is below baseline and would benefit from skilled inp

## 2020-05-08 NOTE — CM/SW NOTE
SW had a long conversation w/the pt's son who stated eh would now like to take the pt home. He stated the pt is becoming depressed at [facility]. He stated they have a wheelchair and a commode at home. He asked about additional services.  He is agreea

## 2020-05-08 NOTE — PLAN OF CARE
Alert to self. Speaking in Indonesian. Moaning when she is turning. Prn tylenol given at bedtime. Afebrile. Water offered frequently. Meds per mar. Vss. Slept very well.    Problem: GASTROINTESTINAL - ADULT  Goal: Achieves appropriate nutritional intake (davidat

## 2020-05-09 PROCEDURE — 99232 SBSQ HOSP IP/OBS MODERATE 35: CPT | Performed by: HOSPITALIST

## 2020-05-09 PROCEDURE — 99233 SBSQ HOSP IP/OBS HIGH 50: CPT | Performed by: INTERNAL MEDICINE

## 2020-05-09 RX ORDER — POTASSIUM CHLORIDE 14.9 MG/ML
20 INJECTION INTRAVENOUS ONCE
Status: COMPLETED | OUTPATIENT
Start: 2020-05-09 | End: 2020-05-09

## 2020-05-09 NOTE — PLAN OF CARE
Problem: METABOLIC/FLUID AND ELECTROLYTES - ADULT  Goal: Glucose maintained within prescribed range  Description  INTERVENTIONS:  - Monitor Blood Glucose as ordered  - Assess for signs and symptoms of hyperglycemia and hypoglycemia  - Administer ordered recliner chair till this evening, transferred to bed with 2 max assist, calling out on preferred  language, able to maintain eye contact   when staff speaking to her in [address], Right nephrostomy tube intact, dressing is dry, ileostomy  with moderate liqui

## 2020-05-09 NOTE — PROGRESS NOTES
[facility]  Progress Note    Tuan Vuong Patient Status:  Inpatient    1935 MRN [id number]   [facility] 4NW-A Attending Minnie Salazar MD   Hosp Day # 3 PCP Marian Story MD     Chart reviewed ; care discussed w/ R      Current Med UNIT/ML injection 5,000 Units, 5,000 Units, Subcutaneous, 2 times per day  •  acetaminophen (TYLENOL) tab 650 mg, 650 mg, Oral, Q6H PRN  •  ondansetron HCl (ZOFRAN) injection 4 mg, 4 mg, Intravenous, Q6H PRN  •  sodium chloride 0.9% IV bolus 500 mL, 500 mL related to bicarb losses from the ostomy primarily; on bicarb infusion + start on oral bicarb that can be used as maintenance tx  4. Hx of UTI; chronic PNT; on emperic abx; monitor clx  5. DM  6. Deconditioning - rehab services to eval/tx  7.  Anemia due to

## 2020-05-09 NOTE — PLAN OF CARE
Assumed care of patient at 0730. VSS. Afebrile. Pt non-english speaking however does maintain eye contact when speaking to pt and does understand most things. Pt refusing to use  and does speak a specific Slovak dialect.  Pt son (POA) willing/able Progressing  5/9/2020 1517 by Ulysses Larios RN  Outcome: Progressing  Goal: Electrolytes maintained within normal limits  Description  INTERVENTIONS:  - Monitor labs and rhythm and assess patient for signs and symptoms of electrolyte imbalances  - Adm

## 2020-05-10 ENCOUNTER — APPOINTMENT (OUTPATIENT)
Dept: ULTRASOUND IMAGING | Facility: HOSPITAL | Age: 85
DRG: 682 | End: 2020-05-10
Attending: HOSPITALIST
Payer: MEDICARE

## 2020-05-10 PROCEDURE — 99233 SBSQ HOSP IP/OBS HIGH 50: CPT | Performed by: INTERNAL MEDICINE

## 2020-05-10 PROCEDURE — 99232 SBSQ HOSP IP/OBS MODERATE 35: CPT | Performed by: HOSPITALIST

## 2020-05-10 PROCEDURE — 93971 EXTREMITY STUDY: CPT | Performed by: HOSPITALIST

## 2020-05-10 RX ORDER — MAGNESIUM OXIDE 400 MG (241.3 MG MAGNESIUM) TABLET
800 TABLET ONCE
Status: COMPLETED | OUTPATIENT
Start: 2020-05-10 | End: 2020-05-10

## 2020-05-10 RX ORDER — GABAPENTIN 100 MG/1
200 CAPSULE ORAL NIGHTLY
Status: DISCONTINUED | OUTPATIENT
Start: 2020-05-10 | End: 2020-05-13

## 2020-05-10 NOTE — PROGRESS NOTES
[facility]  Progress Note    Tuan Vuong Patient Status:  Inpatient    1935 MRN [id number]   [facility] 4NW-A Attending Olivia Draper MD   Hosp Day # 4 PCP Thu Camejo MD     No acute events;    Chart reviewed      Current Medica injection 4 mg, 4 mg, Intravenous, Q6H PRN  •  sodium chloride 0.9% IV bolus 500 mL, 500 mL, Intravenous, Once  Home Medications:  No current outpatient medications on file.       Review of Systems:  See HPI; A total of 12 systems reviewed and otherwise unr 4. Acidosis- related to bicarb losses from the ostomy primarily; on bicarb infusion + started on oral bicarb that can be used as maintenance tx  5. Hx of UTI; chronic PNT; on emperic abx; monitor clx  6. DM  7.  Deconditioning - rehab services to eval/tx

## 2020-05-10 NOTE — PROGRESS NOTES
[facility]  Progress Note     Tuan Vuong Patient Status:  Inpatient    1935 MRN [id number]   [facility] 4NW-A Attending Yoly Hawkins MD   Hosp Day # 4 PCP Jacquie Limon MD     Chief Complaint: increased confusion    S: EWECIK 0.2  --   --   --   --   --    TP 8.6*  --   --   --   --   --     < > = values in this interval not displayed. Estimated Creatinine Clearance: 28.8 mL/min (A) (based on SCr of 1.33 mg/dL (H)).     No results for input(s): PTP, INR in the last 168 cassie oral intake.  Has been refusing  as well.     Quality:  · DVT Prophylaxis: heparin  · CODE status: full      Estimated date of discharge: tbd     Plan of care discussed with rn  Keshia Meier MD          **Certification      PHYSICIAN Certification

## 2020-05-10 NOTE — SLP NOTE
ADULT SWALLOWING EVALUATION    ASSESSMENT    ASSESSMENT/OVERALL IMPRESSION:  Pt seen this AM for bedside swallow evaluation. RN approved session and present at bedside during part of evaluation. Pt admitted to hospital due to abnormal labs.  Pt diagnosed wi One pill at a time  Treatment Plan/Recommendations: No further inpatient SLP service warranted;Aspiration precautions  Discharge Recommendations/Plan: Undetermined    HISTORY   MEDICAL HISTORY  Reason for Referral: R/O aspiration    Problem List  Principal Within Functional Limits           (Please note: Silent aspiration cannot be evaluated clinically.  Videofluoroscopic Swallow Study is required to rule-out silent aspiration.)    Esophageal Phase of Swallow: No complaints consistent with possible esophageal

## 2020-05-10 NOTE — OCCUPATIONAL THERAPY NOTE
OCCUPATIONAL THERAPY TREATMENT NOTE - INPATIENT     Room Number: 873/210-J  Session: 1   Number of Visits to Meet Established Goals: 5    Presenting Problem: acute renal failure    History related to current admission: Pt is 81year old female admitted on PHACOEMULSIFICATION OF CATARACT WITH INTRAOCULAR LENS IMPLANT 03106 Left 2017    Performed by Keisha Turner MD at [facility]   •      • OTHER      SHOULDER, KNEES   • OTHER SURGICAL HISTORY      KNEE SURGERY   • OTHER SURGICAL HIST Modifier (G-Code): CL    FUNCTIONAL TRANSFER ASSESSMENT  Supine to Sit : Moderate assistance  Sit to Stand:  Moderate assistance    Skilled Therapy Provided: Pt only Frisian Speaking, Son on phone to translate as our  service does not have correct : Guarded  Frequency (Obs): 3-5x/week      OT Goals:   ADL Goals   Patient will perform eating: with supervision  Patient will perform grooming: with supervision  Patient will perform upper body dressing:  with supervision     Functional Transfer Goals  Pa

## 2020-05-10 NOTE — PLAN OF CARE
Problem: Impaired Swallowing  Goal: Minimize aspiration risk  Description  Interventions:  - Patient should be alert and upright for all feedings (90 degrees preferred)  - Offer food and liquids at a slow rate  -No Straws  - Encourage small bites of food

## 2020-05-10 NOTE — PLAN OF CARE
Assumed care of pt at [address]. Vitals stable. Afebrile. Pt denied pain. Speaks Croatian, spoke to Elizabeth Friedman, on phone to translate. Took nightly meds in applesauce without difficulty. Bicarb gtt infusing, dosing changed per nephrology.  Potassium replaced throu volume excess or deficit  - Monitor intake, output and patient weight  - Monitor urine specific gravity, serum osmolarity and serum sodium as indicated or ordered  - Monitor response to interventions for patient's volume status, including labs, urine outpu

## 2020-05-11 PROCEDURE — 99239 HOSP IP/OBS DSCHRG MGMT >30: CPT | Performed by: HOSPITALIST

## 2020-05-11 PROCEDURE — 99232 SBSQ HOSP IP/OBS MODERATE 35: CPT | Performed by: INTERNAL MEDICINE

## 2020-05-11 RX ORDER — MAGNESIUM OXIDE 400 MG (241.3 MG MAGNESIUM) TABLET
800 TABLET ONCE
Status: COMPLETED | OUTPATIENT
Start: 2020-05-11 | End: 2020-05-11

## 2020-05-11 RX ORDER — POTASSIUM CHLORIDE 20 MEQ/1
40 TABLET, EXTENDED RELEASE ORAL ONCE
Status: DISCONTINUED | OUTPATIENT
Start: 2020-05-11 | End: 2020-05-11

## 2020-05-11 RX ORDER — CHOLESTYRAMINE LIGHT 4 G/5.7G
4 POWDER, FOR SUSPENSION ORAL DAILY
Qty: 30 PACKET | Refills: 1 | Status: SHIPPED | OUTPATIENT
Start: 2020-05-12 | End: 2020-05-12

## 2020-05-11 RX ORDER — POTASSIUM CHLORIDE 20 MEQ/1
40 TABLET, EXTENDED RELEASE ORAL ONCE
Status: COMPLETED | OUTPATIENT
Start: 2020-05-11 | End: 2020-05-11

## 2020-05-11 NOTE — PROGRESS NOTES
[facility]  Progress Note     Tuan Vuong Patient Status:  Inpatient    1935 MRN [id number]   [facility] 4NW-A Attending Sandra Rivers MD   Hosp Day # 5 PCP Emre Roca MD     Chief Complaint: confused    S: Patient less agit Estimated Creatinine Clearance: 35.8 mL/min (A) (based on SCr of 1.07 mg/dL (H)). No results for input(s): PTP, INR in the last 168 hours. No results for input(s): TROP, CK in the last 168 hours. Imaging: Imaging data reviewed in Epic. MD Yas Haro MD

## 2020-05-11 NOTE — PROGRESS NOTES
[facility]  Nephrology Progress Note    Tuan Vuong Attending:  Naz Hdz MD       Assessment and Plan:    1) ANTONIO- due to dehydration / prerenal azotemia- resolved with fluid resuscitation- HL IV    2) Metabolic acidosis- due to ostomy losses + Once  sodium bicarbonate 75 mEq in Dextrose-NaCl 5-0.45 % 1,000 mL infusion, 75 mEq, Intravenous, Continuous  gabapentin (NEURONTIN) cap 200 mg, 200 mg, Oral, Nightly  QUEtiapine Fumarate (SEROQUEL) partial tab 12.5 mg, 12.5 mg, Oral, BID  cholestyramine l

## 2020-05-11 NOTE — DISCHARGE SUMMARY
[facility]  DISCHARGE SUMMARY     Tuan Vuong Patient Status:  Inpatient    1935 MRN [id number]   [facility] 4NW-A Attending Molly Nguyen MD   Hosp Day # 5 PCP Angella Mohr MD     Date of Admission: 2020  Date of [address] [facility] cholestyramine. We liberalized diet and took her off diabetic restriction. Dietitian evaluated and patient drinking Ensure. Discussed CODE STATUS with family x2 during the stay he plans to discuss further with his family.   At this time he would like her packet  Refills:  1      sodium bicarbonate 650 MG Tabs       Take 1 tablet (650 mg total) by mouth 2 (two) times daily.    Quantity:  60 tablet  Refills:  11                    CHANGE how you take these medications      Instructions Prescription details known as:  ZOLOFT       Take 1 tablet (100 mg total) by mouth daily.    Quantity:  30 tablet  Refills:  0      simethicone 80 MG Chew  Commonly known as:  MYLICON       Chew 80 mg by mouth every 6 (six) hours as needed (for gas).     Refills:  0    minutes                     Date/Time From To Method   5/13/2020  5:03 PM  Bon Balbuena MD In [facility]

## 2020-05-11 NOTE — PLAN OF CARE
Patient AOx1, ROSE otherwise. Refusing to use . Vitals stable. Up to the chair with Physical therapy. Recommending Sit to Stand for nursing staff. Purewick in place. Ileostomy intact with yellow/light brown liquid stool.  R nephrostomy draining yel

## 2020-05-11 NOTE — CM/SW NOTE
05/11/20 1500   Discharge disposition   Expected discharge disposition Home or Self   Name of Facillity/Home Care/Hospice Residential   Discharge transportation Private car   Paged [facility] and informed them the pt is going to dc home today.  Informed them t

## 2020-05-11 NOTE — DIETARY NOTE
[facility]    NUTRITION FOLLOW UP ASSESSMENT    Pt meets severe malnutrition criteria.     CRITERIA FOR MALNUTRITION DIAGNOSIS:  Criteria for severe malnutrition diagnosis: chronic illness related to energy intake less than 75% for greater than 1 mon FTT and DM2. ANTHROPOMETRICS:  Ht:  5' 5\"  Wt: 67 kg (147 lb 12.8 oz). This is 109 % of IBW  BMI: Body mass index is 24.6 kg/m². IBW: 56.8 kg    WEIGHT HISTORY: Pt with severe wt loss per standards (~21 lb, 13.4% in about 1 month).      Patient Weight

## 2020-05-11 NOTE — PHYSICAL THERAPY NOTE
PHYSICAL THERAPY TREATMENT NOTE - INPATIENT    Room Number: 762/182-L     Session: 1   Number of Visits to Meet Established Goals: 3;Trial    Presenting Problem: ANTONIO, dehydration, ARF, DM2    History related to current admission: Pt is 81year old female PHACOEMULSIFICATION OF CATARACT WITH INTRAOCULAR LENS IMPLANT 53554 Left 2017    Performed by Luis E Orr MD at [address] [place] [address]   •      • OTHER      SHOULDER, KNEES   • OTHER SURGICAL HISTORY      KNEE SURGERY   • OTHER SURGICAL HIST another person does the patient currently need. ..   -   Moving to and from a bed to a chair (including a wheelchair)?: A Lot   -   Need to walk in hospital room?: Total   -   Climbing 3-5 steps with a railing?: Total       AM-PAC Score:  Raw Score: 12   Ap from [facility] 12. DISCHARGE RECOMMENDATIONS  PT Discharge Recommendations: Sub-acute rehabilitation; Other (Comment)(c ELOS 12-14 days)     PLAN  PT Treatment Plan: Bed mobility; Body mechanics; Endurance; Energy conservation;Patient education;Gait training;Stren

## 2020-05-11 NOTE — PLAN OF CARE
Assumed care of pt at [address] [place] [address]. Pt's primary language Mongolian. Able to point and cue to want she wants. Right sided ileostomy draining yellow moderate thick stool. Right nephrostomy draining clear, yellow urine. Nightly meds in applesauce without difficulty.  Bi

## 2020-05-12 PROCEDURE — 99232 SBSQ HOSP IP/OBS MODERATE 35: CPT | Performed by: INTERNAL MEDICINE

## 2020-05-12 PROCEDURE — 99232 SBSQ HOSP IP/OBS MODERATE 35: CPT | Performed by: HOSPITALIST

## 2020-05-12 RX ORDER — ACETAMINOPHEN 325 MG/1
650 TABLET ORAL EVERY 6 HOURS PRN
Refills: 0 | Status: SHIPPED | COMMUNITY
Start: 2020-05-12

## 2020-05-12 RX ORDER — CHOLESTYRAMINE LIGHT 4 G/5.7G
4 POWDER, FOR SUSPENSION ORAL DAILY
Qty: 30 PACKET | Refills: 1 | Status: SHIPPED | OUTPATIENT
Start: 2020-05-12 | End: 2020-06-18

## 2020-05-12 RX ORDER — LEVOTHYROXINE SODIUM 0.15 MG/1
150 TABLET ORAL
Qty: 30 TABLET | Refills: 0 | Status: SHIPPED | OUTPATIENT
Start: 2020-05-12 | End: 2020-06-18

## 2020-05-12 RX ORDER — MEGESTROL ACETATE 40 MG/ML
400 SUSPENSION ORAL DAILY
Qty: 240 ML | Refills: 0 | Status: SHIPPED | OUTPATIENT
Start: 2020-05-12 | End: 2020-06-18

## 2020-05-12 RX ORDER — MIRTAZAPINE 15 MG/1
30 TABLET, FILM COATED ORAL NIGHTLY
Qty: 30 TABLET | Refills: 0 | Status: SHIPPED | OUTPATIENT
Start: 2020-05-12 | End: 2020-06-18

## 2020-05-12 RX ORDER — SODIUM BICARBONATE 650 MG/1
650 TABLET ORAL 2 TIMES DAILY
Qty: 60 TABLET | Refills: 11 | Status: SHIPPED | OUTPATIENT
Start: 2020-05-12 | End: 2020-07-13

## 2020-05-12 RX ORDER — GABAPENTIN 100 MG/1
200 CAPSULE ORAL NIGHTLY
Qty: 60 CAPSULE | Refills: 0 | Status: SHIPPED | OUTPATIENT
Start: 2020-05-12 | End: 2020-06-18

## 2020-05-12 RX ORDER — MELATONIN
3 NIGHTLY
Qty: 30 TABLET | Refills: 0 | Status: SHIPPED | OUTPATIENT
Start: 2020-05-12 | End: 2020-06-18

## 2020-05-12 RX ORDER — SERTRALINE HYDROCHLORIDE 100 MG/1
100 TABLET, FILM COATED ORAL DAILY
Qty: 30 TABLET | Refills: 0 | Status: SHIPPED | OUTPATIENT
Start: 2020-05-12 | End: 2020-06-18

## 2020-05-12 RX ORDER — LIDOCAINE 4 G/G
1 PATCH TOPICAL EVERY 24 HOURS
Qty: 10 PATCH | Refills: 0 | Status: SHIPPED | OUTPATIENT
Start: 2020-05-12

## 2020-05-12 NOTE — PROGRESS NOTES
[facility]  Nephrology Progress Note    Tuan Vuong Attending:  Laurel Sainz MD       Assessment and Plan:    1) ANTONIO- due to dehydration / prerenal azotemia- resolved with fluid resuscitation- off IVF x 24 hrs    2) Metabolic acidosis- due to ostom Imaging: All imaging studies reviewed.     Meds:   gabapentin (NEURONTIN) cap 200 mg, 200 mg, Oral, Nightly  QUEtiapine Fumarate (SEROQUEL) partial tab 12.5 mg, 12.5 mg, Oral, BID  cholestyramine light (PREVALITE) powder packet 4 g, 4 g, Oral, Daily

## 2020-05-12 NOTE — PLAN OF CARE
Pt alert. Language barrier. Frequently frustrated with staff. Resistant to getting up. Max two assist to chair. Poor appetite. Needs a lot of encouragement, needs help with feeding. No electrolyte replacement today per nephrology.  Waiting for lift equipmen

## 2020-05-12 NOTE — PROGRESS NOTES
[facility]  Progress Note     Tuan Vuong Patient Status:  Inpatient    1935 MRN [id number]   [facility] 4NW-A Attending Ivon Viera MD   Hosp Day # 6 PCP Michael Phillips MD     Chief Complaint: frail    S: Patient upright in b --   --    TP 8.6*  --   --   --   --     < > = values in this interval not displayed. Estimated Creatinine Clearance: 37.2 mL/min (A) (based on SCr of 1.03 mg/dL (H)). No results for input(s): PTP, INR in the last 168 hours.     No results for inp today.    Estimated date of discharge: in next 24hrs ---pt is from [facility], going home w/ son now. Pt max assist and family will need lift at home as d/w NILES, RN, MD this am.  Son declined transport offered by NILES and plans to take pt home himself.    D/c plan

## 2020-05-12 NOTE — PROGRESS NOTES
Pt alert and oriented to self, unable to assess place/time/situation due to language barrier. Pt irritable and yelling out. Able to meet pt needs. Pt wanted the [facility] lowered, to drink water, and to go to sleep. Pt given meds per [facility] and tolerated well.  Pt w

## 2020-05-12 NOTE — OCCUPATIONAL THERAPY NOTE
Attempted to see pt this PM. Per RN, pt was just transferred to chair and is very agitated and not appropriate for therapy at this time. OT will continue to follow and re-attempt at later time as pt becomes appropriate and schedule allows.

## 2020-05-12 NOTE — CM/SW NOTE
Son is still refusing BLS transport even though it is recommended. He wants to  the pt. Frank Belarusian not wanting pt to dc until alyssa lift arrives.  It will be delivered tomorrow per [phone]

## 2020-05-12 NOTE — CM/SW NOTE
NILES spoke w/son to confirm dc plan. Informed him the pt is a max assist and needed a lift to get up. NILES sent an order for a alyssa lift to [place]. Awaiting confirmation. Expressed concerns about pt going home.  Son feels the pt is \"acting up\" because she is i

## 2020-05-13 VITALS
SYSTOLIC BLOOD PRESSURE: 109 MMHG | WEIGHT: 147.81 LBS | DIASTOLIC BLOOD PRESSURE: 68 MMHG | OXYGEN SATURATION: 97 % | TEMPERATURE: 98 F | RESPIRATION RATE: 16 BRPM | HEART RATE: 84 BPM | BODY MASS INDEX: 25 KG/M2

## 2020-05-13 NOTE — PROGRESS NOTES
[facility]  Progress Note     Tuan Vuong Patient Status:  Inpatient    1935 MRN [id number]   [facility] 4NW-A Attending Minnie Salazar MD   Hosp Day # 7 PCP Marian Story MD     Chief Complaint: frail    S: Patient upright in b • QUEtiapine Fumarate  12.5 mg Oral BID   • cholestyramine light  4 g Oral Daily   • sodium bicarbonate  650 mg Oral BID   • Levothyroxine Sodium  150 mcg Oral Before breakfast   • lidocaine-menthol  1 patch Transdermal Q24H   • Megestrol Acetate  400 mg asked me to check her ears before d/c - b/l cerumen - debrox given. D/c home today. Son is aware of risk of readmission and we reviewed code status x2 this stay.      DENNISE Pineda  1:08 PM

## 2020-05-13 NOTE — PLAN OF CARE
Problem: GASTROINTESTINAL - ADULT  Goal: Achieves appropriate nutritional intake (bariatric)  Description  INTERVENTIONS:  - Monitor for over-consumption  - Identify factors contributing to increased intake, treat as appropriate  - Monitor I&O, WT and la but calm most of the night. Vital signs stable. No fever. No cough. No complaints of pain at this time. Ileostomy bag replaced, clean,and intact. Nephrostomy intact with good amount of urine.  Able to take due PO medications one at a time without difficulty

## 2020-05-13 NOTE — PLAN OF CARE
Assumed pt care at 0700, pt awake, alert and oriented to name. Possible discharge today, awaiting for alyssa lift to be delivered at son's house. Pt denies pain, no distress noted. Nephrostomy tube draining well of yellow, clear urine.  Ileostomy with greeni

## 2020-05-13 NOTE — DISCHARGE SUMMARY
[facility]  DISCHARGE SUMMARY     Tuan Vuong Patient Status:  Inpatient    1935 MRN [id number]   [facility] 4NW-A Attending Penny Mcfadden MD   [address] Day # 7 PCP Rey Martino MD     Date of Admission: 2020  Date of Discharge cholestyramine. We liberalized diet and took her off diabetic restriction. Dietitian evaluated and patient drinking Ensure. Discussed CODE STATUS with family x2 during the stay he plans to discuss further with his family.   At this time he would like her bicarbonate 650 MG Tabs      Take 1 tablet (650 mg total) by mouth 2 (two) times daily.    Quantity:  60 tablet  Refills:  11        CHANGE how you take these medications      Instructions Prescription details   gabapentin 100 MG Caps  Commonly known as:  N 30 tablet  Refills:  0     simethicone 80 MG Chew  Commonly known as:  MYLICON      Chew 80 mg by mouth every 6 (six) hours as needed (for gas).    Refills:  0        STOP taking these medications    Acetaminophen  MG Tbcr  Commonly known as:  TYLENOL

## 2020-05-14 ENCOUNTER — TELEPHONE (OUTPATIENT)
Dept: FAMILY MEDICINE CLINIC | Facility: CLINIC | Age: 85
End: 2020-05-14

## 2020-05-14 ENCOUNTER — HOSPITAL ENCOUNTER (EMERGENCY)
Facility: HOSPITAL | Age: 85
Discharge: HOME | End: 2020-05-14
Attending: EMERGENCY MEDICINE
Payer: MEDICARE

## 2020-05-14 ENCOUNTER — APPOINTMENT (OUTPATIENT)
Dept: INTERVENTIONAL RADIOLOGY/VASCULAR | Facility: HOSPITAL | Age: 85
End: 2020-05-14
Attending: EMERGENCY MEDICINE
Payer: MEDICARE

## 2020-05-14 ENCOUNTER — TELEMEDICINE (OUTPATIENT)
Dept: FAMILY MEDICINE CLINIC | Facility: CLINIC | Age: 85
End: 2020-05-14

## 2020-05-14 ENCOUNTER — PATIENT OUTREACH (OUTPATIENT)
Dept: CASE MANAGEMENT | Age: 85
End: 2020-05-14

## 2020-05-14 VITALS
SYSTOLIC BLOOD PRESSURE: 119 MMHG | HEART RATE: 73 BPM | HEIGHT: 65 IN | OXYGEN SATURATION: 94 % | BODY MASS INDEX: 24.61 KG/M2 | TEMPERATURE: 98 F | RESPIRATION RATE: 18 BRPM | WEIGHT: 147.69 LBS | DIASTOLIC BLOOD PRESSURE: 68 MMHG

## 2020-05-14 DIAGNOSIS — Z02.9 ENCOUNTERS FOR UNSPECIFIED ADMINISTRATIVE PURPOSE: ICD-10-CM

## 2020-05-14 DIAGNOSIS — N17.9 ACUTE RENAL FAILURE, UNSPECIFIED ACUTE RENAL FAILURE TYPE (HCC): ICD-10-CM

## 2020-05-14 DIAGNOSIS — D64.9 ANEMIA, UNSPECIFIED TYPE: ICD-10-CM

## 2020-05-14 DIAGNOSIS — H01.119 CONTACT DERMATITIS OF EYELID, UNSPECIFIED LATERALITY: ICD-10-CM

## 2020-05-14 DIAGNOSIS — E86.0 DEHYDRATION: ICD-10-CM

## 2020-05-14 DIAGNOSIS — N17.9 ACUTE KIDNEY INJURY (HCC): ICD-10-CM

## 2020-05-14 DIAGNOSIS — Z79.4 TYPE 2 DIABETES MELLITUS WITH HYPERGLYCEMIA, WITH LONG-TERM CURRENT USE OF INSULIN (HCC): ICD-10-CM

## 2020-05-14 DIAGNOSIS — I10 ESSENTIAL HYPERTENSION: ICD-10-CM

## 2020-05-14 DIAGNOSIS — N17.9 AKI (ACUTE KIDNEY INJURY) (HCC): Primary | ICD-10-CM

## 2020-05-14 DIAGNOSIS — E46 MALNUTRITION, UNSPECIFIED TYPE (HCC): ICD-10-CM

## 2020-05-14 DIAGNOSIS — T83.022A NEPHROSTOMY TUBE DISPLACED (HCC): Primary | ICD-10-CM

## 2020-05-14 DIAGNOSIS — E03.9 ACQUIRED HYPOTHYROIDISM: ICD-10-CM

## 2020-05-14 DIAGNOSIS — Z86.711 HISTORY OF PULMONARY EMBOLISM: ICD-10-CM

## 2020-05-14 DIAGNOSIS — Z91.81 AT RISK FOR FALLING: ICD-10-CM

## 2020-05-14 DIAGNOSIS — E11.65 TYPE 2 DIABETES MELLITUS WITH HYPERGLYCEMIA, WITH LONG-TERM CURRENT USE OF INSULIN (HCC): ICD-10-CM

## 2020-05-14 DIAGNOSIS — E11.42 TYPE 2 DIABETES MELLITUS WITH DIABETIC POLYNEUROPATHY, UNSPECIFIED WHETHER LONG TERM INSULIN USE (HCC): ICD-10-CM

## 2020-05-14 PROCEDURE — 99496 TRANSJ CARE MGMT HIGH F2F 7D: CPT | Performed by: FAMILY MEDICINE

## 2020-05-14 PROCEDURE — 99283 EMERGENCY DEPT VISIT LOW MDM: CPT

## 2020-05-14 PROCEDURE — 99152 MOD SED SAME PHYS/QHP 5/>YRS: CPT

## 2020-05-14 PROCEDURE — 50432 PLMT NEPHROSTOMY CATHETER: CPT

## 2020-05-14 PROCEDURE — 1111F DSCHRG MED/CURRENT MED MERGE: CPT

## 2020-05-14 PROCEDURE — 0T9330Z DRAINAGE OF RIGHT KIDNEY PELVIS WITH DRAINAGE DEVICE, PERCUTANEOUS APPROACH: ICD-10-PCS | Performed by: RADIOLOGY

## 2020-05-14 RX ORDER — MIDAZOLAM HYDROCHLORIDE 1 MG/ML
INJECTION INTRAMUSCULAR; INTRAVENOUS
Status: COMPLETED
Start: 2020-05-14 | End: 2020-05-14

## 2020-05-14 RX ORDER — OLOPATADINE HYDROCHLORIDE 1 MG/ML
1 SOLUTION/ DROPS OPHTHALMIC 2 TIMES DAILY
Qty: 1 BOTTLE | Refills: 0 | Status: SHIPPED | OUTPATIENT
Start: 2020-05-14 | End: 2020-06-18

## 2020-05-14 RX ORDER — LIDOCAINE HYDROCHLORIDE 10 MG/ML
INJECTION, SOLUTION INFILTRATION; PERINEURAL
Status: COMPLETED
Start: 2020-05-14 | End: 2020-05-14

## 2020-05-14 RX ORDER — LEVOFLOXACIN 5 MG/ML
INJECTION, SOLUTION INTRAVENOUS
Status: COMPLETED
Start: 2020-05-14 | End: 2020-05-14

## 2020-05-14 NOTE — TELEPHONE ENCOUNTER
Pt. Needs TCM'  Nurse care manager Ananda Rowan stated patient is being discharged from hospital and the following medications need to be ordered as they are on her discharge papers.     Insulin     Megestrol  40 mg per ml  100 ml daily     Simethicone      Son's

## 2020-05-14 NOTE — ED NOTES
Numerous unsuccessful IV attempts per this RN and advanced tech. Total of six times. U/S attempt x 2 per BENNIE advanced techBaldemar To IR via cart with radiology.

## 2020-05-14 NOTE — ED PROVIDER NOTES
Patient Seen in: [facility] Emergency Department      History   Patient presents with:  Cath Tube Problem    Stated Complaint: nephrostomy tube fell out    HPI    Patient is an 29-year-old female who has a chronic right nephrostomy tube due to a ure Noam Gardner MD at [facility] MAIN OR                    Social History    Tobacco Use      Smoking status: Never Smoker      Smokeless tobacco: Never Used    Alcohol use: No    Drug use:  No             Review of Systems    Positive for stated complaint: nephrostomy prior to arrival.  No other complaints or issues. Will contact interventional radiology to see if they would be able to replace it.       Update at 8:15 PM.  Dr. Saturnino Alfaro of interventional radiology was very gracious enough to get the nephrostomy tube replac

## 2020-05-14 NOTE — PROGRESS NOTES
Initial Post Discharge Follow Up   Discharge Date: 5/13/20  Contact Date: 5/14/2020    Consent Verification:  Assessment Completed With: Other: Cruz patient's son Permission received per patient?  written  HIPAA Verified? Yes    Discharge Dx:     1. since discharge?  yes   If Yes:  o Where: leg pain   Rating on pain scale 1-10, 10 being the worst pain you have ever experienced: Patient's son states she is unable to rate the pain, possibly a 7-8/10  o Alleviating factors: Tylenol  o Aggravating factors External Patch Place 1 patch onto the skin daily. You can get this over the counter. Apply to low back and right shoulder in am and remove at pm 10 patch 0   • melatonin 3 MG Oral Tab Take 1 tablet (3 mg total) by mouth nightly.  30 tablet 0   • Levothyrox suspension take 10 ml (400 mg total) daily, or Simethicone 80 mg 1 every 6 hours as needed for gas . BERNARDINO sent message to MD's office. Referrals/orders at D/C:  Home Health/Services ordered at D/C?   Yes   What services:   RN/PT  (BERNARDINO) (If HH was ordered appointments:     NCM offered sooner TCC appointment if schedule allowed: NA    []  Advised patient to bring all medications and blood glucose meter/supplies if applicable.

## 2020-05-14 NOTE — TELEPHONE ENCOUNTER
Pt. Needs TCM'  Nurse care manager Ananda Rowan stated patient is being discharged from hospital and the following medications need to be ordered as they are on her discharge papers.     Insulin    Megestrol  40 mg per ml  100 ml daily    Simethicone     Son's numb

## 2020-05-14 NOTE — ED INITIAL ASSESSMENT (HPI)
Arrives with family, who states nephrostomy tube came out from right flank area approximately 30 minutes PTA. Was released from here yesterday.

## 2020-05-14 NOTE — TELEPHONE ENCOUNTER
1. Patient was discharged home from [facility] on 5/13/2020 prior to that patient was in [facility] for ~ 6 months.  Patient is at High risk for readmission to hospital and recommended to have a TCM HFU by 5/20/2020 preferred or no later t

## 2020-05-14 NOTE — TELEPHONE ENCOUNTER
Dennis Moffett from [facility] called stating the following\"    Pt son canceled home health visit due to not being home today. Will be done tomorrow.

## 2020-05-15 NOTE — ED NOTES
Received report from [facility] per report IR had called back and patient is being brought back to ER was given 0.5mg of Versed and 100mcg of Fentanyl per report. Pt has not returned to room yet.

## 2020-05-15 NOTE — PROGRESS NOTES
Please note that the following visit was completed using two-way, real-time interactive audio and/or video communication.   This has been done in good saad to provide continuity of care in the best interest of the provider-patient relationship, due to the independent living, and activities of daily living. ? Assisted in scheduling required follow-up with community providers and services. Medication Reconciliation:  I am aware of an inpatient discharge within the last 30 days.   The discharge medication l Current Meds:  carbamide peroxide 6.5 % Otic Solution, Place 5 drops into both ears 2 (two) times daily for 3 days. acetaminophen 325 MG Oral Tab, Take 2 tablets (650 mg total) by mouth every 6 (six) hours as needed.   gabapentin 100 MG Oral Cap, Take 2 organism unspecified(486), Pulmonary embolism ([facility] [address] Type II or unspecified type diabetes mellitus without mention of complication, not stated as uncontrolled, and Vision loss.     She  has a past surgical history that includes other; removal gallbladder; n time/date  Colostomy and nephrostomy in place  NEURO: Oriented times three, cranial nerves are intact, motor and sensory are grossly intact    ASSESSMENT/ PLAN:   Diagnoses and all orders for this visit:    ANTONIO (acute kidney injury) ([facility] [address]  Push fluids  Typ Medical Decision Making- Based on service period of discharge to 30 days:   · Number of Possible Diagnoses and/or Management Options: severe  · Amount and/or Complexity of Data to Be Reviewed: severe  · Risk of Significant Complications, Morbidity, and/o

## 2020-05-15 NOTE — ED NOTES
Colostomy bag changed as it was leaking. Son at the bedside and instructed on colostomy care. Per son they will have a home health nurse coming to provide care.

## 2020-05-15 NOTE — PROCEDURES
[facility]  Procedure Note    Tuan Vuong Patient Status:  Emergency    1935 MRN [id number]   Location [address] [facility] Attending Louisa Dan MD   Hosp Day # 0 PCP Gerald Andrade MD     Procedure: right nephrostomy tube

## 2020-05-20 ENCOUNTER — TELEPHONE (OUTPATIENT)
Dept: FAMILY MEDICINE CLINIC | Facility: CLINIC | Age: 85
End: 2020-05-20

## 2020-05-20 NOTE — TELEPHONE ENCOUNTER
Zohreh Saucedo from [place] [address] Occupational Therapist called to give dr Kwon's Plan of Care. One time a week for 5 weeks. Will be working on Activity, Tolerance, Strength, Balance, Self Care & Transfers.

## 2020-05-20 NOTE — TELEPHONE ENCOUNTER
I called Tigist Leo back  [facility] is with [facility]. I had to leave message to have her call back. What type of orders is she looking for? Labs, Imagining? Need a little more info to help with this.

## 2020-05-20 NOTE — TELEPHONE ENCOUNTER
I called and left message that Escalante Dancer will need to talk with patient and find out who urology provider is that is managing this. I asked that please call urology provider to get the orders for what is needed.

## 2020-05-20 NOTE — TELEPHONE ENCOUNTER
Home health calling for orders for nephrostomy tube, need supplies and orders. Would you be able to do this or should this be coming from Nephrology provider? I looked but I am not sure who is managing this for her. Please advise.

## 2020-05-20 NOTE — TELEPHONE ENCOUNTER
The nephrostomy tube- placed by the radiologist  I am sure she has a urology appointment  Let [facility] know to contact the urology for the supply

## 2020-05-27 ENCOUNTER — TELEPHONE (OUTPATIENT)
Dept: FAMILY MEDICINE CLINIC | Facility: CLINIC | Age: 85
End: 2020-05-27

## 2020-06-01 ENCOUNTER — MED REC SCAN ONLY (OUTPATIENT)
Dept: FAMILY MEDICINE CLINIC | Facility: CLINIC | Age: 85
End: 2020-06-01

## 2020-06-02 ENCOUNTER — PATIENT OUTREACH (OUTPATIENT)
Dept: CASE MANAGEMENT | Age: 85
End: 2020-06-02

## 2020-06-08 ENCOUNTER — TELEPHONE (OUTPATIENT)
Dept: FAMILY MEDICINE CLINIC | Facility: CLINIC | Age: 85
End: 2020-06-08

## 2020-06-08 NOTE — TELEPHONE ENCOUNTER
Son call nurse stating he thought patient had a UTI. Nurse is asking for an order for UA with culture. Nurse stated this can be a verbal over the phone.

## 2020-06-09 ENCOUNTER — TELEPHONE (OUTPATIENT)
Dept: FAMILY MEDICINE CLINIC | Facility: CLINIC | Age: 85
End: 2020-06-09

## 2020-06-09 NOTE — TELEPHONE ENCOUNTER
Patient is not responding to OT. The son of the patient is now declining OT. Home Health stated they are going to dc OT. Penelope Bee needs a call back since this is a change in her care plan.

## 2020-06-09 NOTE — TELEPHONE ENCOUNTER
Call returned  Patient is not responding to physical therapy or occupational therapy  Okay to DC PT OT

## 2020-06-12 DIAGNOSIS — E11.42 TYPE 2 DIABETES MELLITUS WITH DIABETIC POLYNEUROPATHY, UNSPECIFIED WHETHER LONG TERM INSULIN USE (HCC): ICD-10-CM

## 2020-06-12 RX ORDER — NITROFURANTOIN 25; 75 MG/1; MG/1
100 CAPSULE ORAL 2 TIMES DAILY
Qty: 14 CAPSULE | Refills: 0 | Status: SHIPPED | OUTPATIENT
Start: 2020-06-12 | End: 2020-06-19

## 2020-06-12 NOTE — TELEPHONE ENCOUNTER
A prescription for Macrobid 100 mg p.o. twice a day sent to the pharmacy on file  Please notify the the patient

## 2020-06-12 NOTE — TELEPHONE ENCOUNTER
6/11/20    Urine culture    Preliminary   Ecoli > 100,000    Sensitive    Amikacin  Meropenem  Nitrofurantoin      Please advise.  Thank You

## 2020-06-12 NOTE — TELEPHONE ENCOUNTER
Spoke to [employer] and gave information. Says he has been unable to get any refills he called the pharmacy.

## 2020-06-15 NOTE — TELEPHONE ENCOUNTER
Could not reach patient by phone. Sent a My Chart message asking her to call office and schedule an appt.

## 2020-06-15 NOTE — TELEPHONE ENCOUNTER
Patient needs appt and labs before any refills can be made    LOV 5-14-19    LAST LAB    LAST RX    Next OV No future appointments. PROTOCOL    SITagliptin Phosphate 25 MG Oral Tab               Sig: Take 1 tablet (25 mg total) by mouth daily.     Disp:

## 2020-06-16 RX ORDER — SERTRALINE HYDROCHLORIDE 100 MG/1
100 TABLET, FILM COATED ORAL DAILY
Qty: 30 TABLET | Refills: 0 | OUTPATIENT
Start: 2020-06-16

## 2020-06-16 RX ORDER — GABAPENTIN 100 MG/1
200 CAPSULE ORAL NIGHTLY
Qty: 60 CAPSULE | Refills: 0 | OUTPATIENT
Start: 2020-06-16

## 2020-06-16 RX ORDER — LEVOTHYROXINE SODIUM 0.15 MG/1
150 TABLET ORAL
Qty: 30 TABLET | Refills: 0 | OUTPATIENT
Start: 2020-06-16

## 2020-06-16 RX ORDER — MIRTAZAPINE 15 MG/1
30 TABLET, FILM COATED ORAL NIGHTLY
Qty: 30 TABLET | Refills: 0 | OUTPATIENT
Start: 2020-06-16

## 2020-06-17 ENCOUNTER — TELEPHONE (OUTPATIENT)
Dept: FAMILY MEDICINE CLINIC | Facility: CLINIC | Age: 85
End: 2020-06-17

## 2020-06-18 ENCOUNTER — TELEMEDICINE (OUTPATIENT)
Dept: FAMILY MEDICINE CLINIC | Facility: CLINIC | Age: 85
End: 2020-06-18

## 2020-06-18 ENCOUNTER — TELEPHONE (OUTPATIENT)
Dept: FAMILY MEDICINE CLINIC | Facility: CLINIC | Age: 85
End: 2020-06-18

## 2020-06-18 DIAGNOSIS — T83.512A URINARY TRACT INFECTION ASSOCIATED WITH NEPHROSTOMY CATHETER, INITIAL ENCOUNTER (HCC): ICD-10-CM

## 2020-06-18 DIAGNOSIS — I10 ESSENTIAL HYPERTENSION: ICD-10-CM

## 2020-06-18 DIAGNOSIS — E11.42 TYPE 2 DIABETES MELLITUS WITH DIABETIC POLYNEUROPATHY, UNSPECIFIED WHETHER LONG TERM INSULIN USE (HCC): Primary | ICD-10-CM

## 2020-06-18 DIAGNOSIS — E03.9 ACQUIRED HYPOTHYROIDISM: ICD-10-CM

## 2020-06-18 DIAGNOSIS — G47.00 INSOMNIA, UNSPECIFIED TYPE: ICD-10-CM

## 2020-06-18 DIAGNOSIS — H01.119 CONTACT DERMATITIS OF EYELID, UNSPECIFIED LATERALITY: ICD-10-CM

## 2020-06-18 DIAGNOSIS — F32.A DEPRESSION, UNSPECIFIED DEPRESSION TYPE: ICD-10-CM

## 2020-06-18 DIAGNOSIS — N39.0 URINARY TRACT INFECTION ASSOCIATED WITH NEPHROSTOMY CATHETER, INITIAL ENCOUNTER (HCC): ICD-10-CM

## 2020-06-18 PROCEDURE — 99214 OFFICE O/P EST MOD 30 MIN: CPT | Performed by: FAMILY MEDICINE

## 2020-06-18 RX ORDER — MIRTAZAPINE 15 MG/1
30 TABLET, FILM COATED ORAL NIGHTLY
Qty: 90 TABLET | Refills: 1 | Status: SHIPPED | OUTPATIENT
Start: 2020-06-18 | End: 2020-09-14

## 2020-06-18 RX ORDER — SERTRALINE HYDROCHLORIDE 100 MG/1
100 TABLET, FILM COATED ORAL DAILY
Qty: 90 TABLET | Refills: 1 | Status: SHIPPED | OUTPATIENT
Start: 2020-06-18 | End: 2021-03-12

## 2020-06-18 RX ORDER — MELATONIN
3 NIGHTLY
Qty: 90 TABLET | Refills: 1 | Status: SHIPPED | OUTPATIENT
Start: 2020-06-18 | End: 2021-01-28

## 2020-06-18 RX ORDER — GABAPENTIN 100 MG/1
200 CAPSULE ORAL NIGHTLY
Qty: 180 CAPSULE | Refills: 1 | Status: ON HOLD | OUTPATIENT
Start: 2020-06-18 | End: 2020-12-18

## 2020-06-18 RX ORDER — LORATADINE 10 MG/1
10 TABLET ORAL DAILY
Qty: 30 TABLET | Refills: 0 | Status: SHIPPED | OUTPATIENT
Start: 2020-06-18 | End: 2020-07-22

## 2020-06-18 RX ORDER — LEVOTHYROXINE SODIUM 0.15 MG/1
150 TABLET ORAL
Qty: 90 TABLET | Refills: 1 | Status: ON HOLD | OUTPATIENT
Start: 2020-06-18 | End: 2020-12-18

## 2020-06-18 NOTE — PROGRESS NOTES
Please note that the following visit was completed using two-way, real-time interactive audio and video communication.   This has been done in good saad to provide continuity of care in the best interest of the provider-patient relationship, due to the o tablet (3 mg total) by mouth nightly. 90 tablet 1   • Levothyroxine Sodium 150 MCG Oral Tab Take 1 tablet (150 mcg total) by mouth before breakfast. 90 tablet 1   • loratadine (CLARITIN) 10 MG Oral Tab Take 1 tablet (10 mg total) by mouth daily.  30 tablet medications  Given the history of anemia, CKD, hydronephrosis I would like to check her labs  A prescription for CBC CMP and a UA was sent to [facility]       SITagliptin Phosphate 25 MG Oral Tab; Take 1 tablet (25 mg total) by mouth daily.   -

## 2020-06-18 NOTE — TELEPHONE ENCOUNTER
Please call [address]  Patient will need a blood draw, CBC CMP and urinalysis with culture and sensitivity on Monday

## 2020-06-23 ENCOUNTER — LAB REQUISITION (OUTPATIENT)
Dept: LAB | Facility: HOSPITAL | Age: 85
End: 2020-06-23
Payer: MEDICARE

## 2020-06-23 ENCOUNTER — TELEPHONE (OUTPATIENT)
Dept: FAMILY MEDICINE CLINIC | Facility: CLINIC | Age: 85
End: 2020-06-23

## 2020-06-23 DIAGNOSIS — N30.00 ACUTE CYSTITIS WITHOUT HEMATURIA: ICD-10-CM

## 2020-06-23 DIAGNOSIS — D63.1 ANEMIA IN CHRONIC KIDNEY DISEASE (CODE): ICD-10-CM

## 2020-06-23 PROCEDURE — 81001 URINALYSIS AUTO W/SCOPE: CPT | Performed by: FAMILY MEDICINE

## 2020-06-23 PROCEDURE — 87086 URINE CULTURE/COLONY COUNT: CPT | Performed by: FAMILY MEDICINE

## 2020-06-23 NOTE — TELEPHONE ENCOUNTER
Called Home health blood work was not done. Spoke to [facility] health gave orders to be done ASAP. Yaima Dockery will call back when lab can be done.

## 2020-06-23 NOTE — TELEPHONE ENCOUNTER
Fidel Yuen from [facility] home health called regarding lab tests ordered . She said she spoke to tessie about this and would like a call back .

## 2020-06-24 ENCOUNTER — TELEPHONE (OUTPATIENT)
Dept: FAMILY MEDICINE CLINIC | Facility: CLINIC | Age: 85
End: 2020-06-24

## 2020-06-24 NOTE — TELEPHONE ENCOUNTER
Patient has been having low blood pressure, urine analysis is positive for infection, CMP and CBC could not be done  Clinically sounds like dehydration  Patient has recent history of acute renal failure due to dehydration  Discussed with Terese NERI am nida

## 2020-06-24 NOTE — TELEPHONE ENCOUNTER
Nurse Kong [place]) stated the phlebotomist attempted 4 x to draw patient's blood. Blood was too thick. They were able to draw UA & C&S. Nurse would like to know what they should have pt do. .. Go to IC for hydration? Please advise.

## 2020-06-25 ENCOUNTER — HOSPITAL ENCOUNTER (INPATIENT)
Facility: HOSPITAL | Age: 85
LOS: 5 days | Discharge: HOME | DRG: 683 | End: 2020-06-30
Attending: EMERGENCY MEDICINE | Admitting: HOSPITALIST
Payer: MEDICARE

## 2020-06-25 DIAGNOSIS — N17.9 ACUTE RENAL FAILURE, UNSPECIFIED ACUTE RENAL FAILURE TYPE (HCC): Primary | ICD-10-CM

## 2020-06-25 DIAGNOSIS — E86.0 DEHYDRATION: ICD-10-CM

## 2020-06-25 DIAGNOSIS — N39.0 URINARY TRACT INFECTION WITHOUT HEMATURIA, SITE UNSPECIFIED: ICD-10-CM

## 2020-06-25 DIAGNOSIS — I35.0 NONRHEUMATIC AORTIC VALVE STENOSIS: ICD-10-CM

## 2020-06-25 LAB
ALBUMIN SERPL-MCNC: 3 G/DL (ref 3.4–5)
ALBUMIN/GLOB SERPL: 0.5 {RATIO} (ref 1–2)
ALP LIVER SERPL-CCNC: 114 U/L (ref 55–142)
ALT SERPL-CCNC: 19 U/L (ref 13–56)
ANION GAP SERPL CALC-SCNC: 5 MMOL/L (ref 0–18)
AST SERPL-CCNC: 30 U/L (ref 15–37)
BASOPHILS # BLD AUTO: 0.05 X10(3) UL (ref 0–0.2)
BASOPHILS NFR BLD AUTO: 0.7 %
BILIRUB SERPL-MCNC: 0.3 MG/DL (ref 0.1–2)
BILIRUB UR QL STRIP.AUTO: NEGATIVE
BUN BLD-MCNC: 42 MG/DL (ref 7–18)
BUN/CREAT SERPL: 19.3 (ref 10–20)
CALCIUM BLD-MCNC: 10.3 MG/DL (ref 8.5–10.1)
CHLORIDE SERPL-SCNC: 116 MMOL/L (ref 98–112)
CO2 SERPL-SCNC: 15 MMOL/L (ref 21–32)
COLOR UR AUTO: YELLOW
CREAT BLD-MCNC: 2.18 MG/DL (ref 0.55–1.02)
DEPRECATED RDW RBC AUTO: 50.3 FL (ref 35.1–46.3)
EOSINOPHIL # BLD AUTO: 0.51 X10(3) UL (ref 0–0.7)
EOSINOPHIL NFR BLD AUTO: 7.2 %
ERYTHROCYTE [DISTWIDTH] IN BLOOD BY AUTOMATED COUNT: 16.2 % (ref 11–15)
GLOBULIN PLAS-MCNC: 5.8 G/DL (ref 2.8–4.4)
GLUCOSE BLD-MCNC: 73 MG/DL (ref 70–99)
GLUCOSE BLD-MCNC: 75 MG/DL (ref 70–99)
GLUCOSE UR STRIP.AUTO-MCNC: NEGATIVE MG/DL
HCT VFR BLD AUTO: 32.3 % (ref 35–48)
HGB BLD-MCNC: 9.5 G/DL (ref 12–16)
IMM GRANULOCYTES # BLD AUTO: 0.03 X10(3) UL (ref 0–1)
IMM GRANULOCYTES NFR BLD: 0.4 %
KETONES UR STRIP.AUTO-MCNC: NEGATIVE MG/DL
LACTATE SERPL-SCNC: 0.6 MMOL/L (ref 0.4–2)
LYMPHOCYTES # BLD AUTO: 2.16 X10(3) UL (ref 1–4)
LYMPHOCYTES NFR BLD AUTO: 30.5 %
M PROTEIN MFR SERPL ELPH: 8.8 G/DL (ref 6.4–8.2)
MCH RBC QN AUTO: 24.9 PG (ref 26–34)
MCHC RBC AUTO-ENTMCNC: 29.4 G/DL (ref 31–37)
MCV RBC AUTO: 84.8 FL (ref 80–100)
MONOCYTES # BLD AUTO: 0.5 X10(3) UL (ref 0.1–1)
MONOCYTES NFR BLD AUTO: 7.1 %
NEUTROPHILS # BLD AUTO: 3.83 X10 (3) UL (ref 1.5–7.7)
NEUTROPHILS # BLD AUTO: 3.83 X10(3) UL (ref 1.5–7.7)
NEUTROPHILS NFR BLD AUTO: 54.1 %
NITRITE UR QL STRIP.AUTO: POSITIVE
OSMOLALITY SERPL CALC.SUM OF ELEC: 291 MOSM/KG (ref 275–295)
PH UR STRIP.AUTO: 5.5 [PH] (ref 4.5–8)
PLATELET # BLD AUTO: 344 10(3)UL (ref 150–450)
POTASSIUM SERPL-SCNC: 5.5 MMOL/L (ref 3.5–5.1)
RBC # BLD AUTO: 3.81 X10(6)UL (ref 3.8–5.3)
SARS-COV-2 RNA RESP QL NAA+PROBE: NOT DETECTED
SODIUM SERPL-SCNC: 136 MMOL/L (ref 136–145)
SP GR UR STRIP.AUTO: 1.02 (ref 1–1.03)
UROBILINOGEN UR STRIP.AUTO-MCNC: 0.2 MG/DL
WBC # BLD AUTO: 7.1 X10(3) UL (ref 4–11)
WBC #/AREA URNS AUTO: >50 /HPF

## 2020-06-25 PROCEDURE — 99223 1ST HOSP IP/OBS HIGH 75: CPT | Performed by: HOSPITALIST

## 2020-06-25 RX ORDER — SODIUM BICARBONATE 325 MG/1
650 TABLET ORAL 2 TIMES DAILY
Status: DISCONTINUED | OUTPATIENT
Start: 2020-06-25 | End: 2020-07-01

## 2020-06-25 RX ORDER — MAGNESIUM OXIDE 400 MG (241.3 MG MAGNESIUM) TABLET
3 TABLET NIGHTLY
Status: DISCONTINUED | OUTPATIENT
Start: 2020-06-25 | End: 2020-07-01

## 2020-06-25 RX ORDER — ACETAMINOPHEN 325 MG/1
650 TABLET ORAL EVERY 6 HOURS PRN
Status: DISCONTINUED | OUTPATIENT
Start: 2020-06-25 | End: 2020-07-01

## 2020-06-25 RX ORDER — METOCLOPRAMIDE HYDROCHLORIDE 5 MG/ML
5 INJECTION INTRAMUSCULAR; INTRAVENOUS EVERY 8 HOURS PRN
Status: DISCONTINUED | OUTPATIENT
Start: 2020-06-25 | End: 2020-07-01

## 2020-06-25 RX ORDER — HEPARIN SODIUM 5000 [USP'U]/ML
5000 INJECTION, SOLUTION INTRAVENOUS; SUBCUTANEOUS EVERY 8 HOURS SCHEDULED
Status: DISCONTINUED | OUTPATIENT
Start: 2020-06-25 | End: 2020-07-01

## 2020-06-25 RX ORDER — SODIUM CHLORIDE 9 MG/ML
INJECTION, SOLUTION INTRAVENOUS CONTINUOUS
Status: ACTIVE | OUTPATIENT
Start: 2020-06-25 | End: 2020-06-25

## 2020-06-25 RX ORDER — ONDANSETRON 2 MG/ML
4 INJECTION INTRAMUSCULAR; INTRAVENOUS EVERY 6 HOURS PRN
Status: DISCONTINUED | OUTPATIENT
Start: 2020-06-25 | End: 2020-07-01

## 2020-06-25 RX ORDER — DEXTROSE MONOHYDRATE 25 G/50ML
50 INJECTION, SOLUTION INTRAVENOUS
Status: DISCONTINUED | OUTPATIENT
Start: 2020-06-25 | End: 2020-07-01

## 2020-06-25 RX ORDER — SODIUM CHLORIDE 9 MG/ML
INJECTION, SOLUTION INTRAVENOUS CONTINUOUS
Status: DISCONTINUED | OUTPATIENT
Start: 2020-06-25 | End: 2020-06-28

## 2020-06-25 RX ORDER — GABAPENTIN 100 MG/1
200 CAPSULE ORAL NIGHTLY
Status: DISCONTINUED | OUTPATIENT
Start: 2020-06-25 | End: 2020-07-01

## 2020-06-25 RX ORDER — MIRTAZAPINE 15 MG/1
30 TABLET, FILM COATED ORAL NIGHTLY
Status: DISCONTINUED | OUTPATIENT
Start: 2020-06-25 | End: 2020-07-01

## 2020-06-25 RX ORDER — SERTRALINE HYDROCHLORIDE 100 MG/1
100 TABLET, FILM COATED ORAL DAILY
Status: DISCONTINUED | OUTPATIENT
Start: 2020-06-26 | End: 2020-07-01

## 2020-06-25 RX ORDER — LEVOTHYROXINE SODIUM 0.15 MG/1
150 TABLET ORAL
Status: DISCONTINUED | OUTPATIENT
Start: 2020-06-26 | End: 2020-07-01

## 2020-06-25 NOTE — ED INITIAL ASSESSMENT (HPI)
Patient presents for evaluation of dehydration. Per son she was unable to have her blood drawn due to dehydration/not drinking enough water. They were able to get a urine specimen.  He reports more liquid stool from her colostomy than normal. Denies fever,

## 2020-06-25 NOTE — PROGRESS NOTES
Patient has not been drinking fluids, blood could not be drawn  Urinalysis is positive  Discussed with home health nurse [employer] [place]  Patient needs hydration,  Advised to take her to the emergency room

## 2020-06-26 ENCOUNTER — APPOINTMENT (OUTPATIENT)
Dept: CT IMAGING | Facility: HOSPITAL | Age: 85
DRG: 683 | End: 2020-06-26
Attending: HOSPITALIST
Payer: MEDICARE

## 2020-06-26 LAB
ANION GAP SERPL CALC-SCNC: 5 MMOL/L (ref 0–18)
BASOPHILS # BLD AUTO: 0.04 X10(3) UL (ref 0–0.2)
BASOPHILS NFR BLD AUTO: 0.9 %
BUN BLD-MCNC: 36 MG/DL (ref 7–18)
BUN/CREAT SERPL: 19.5 (ref 10–20)
C DIFF TOX B STL QL: NEGATIVE
CALCIUM BLD-MCNC: 8.8 MG/DL (ref 8.5–10.1)
CHLORIDE SERPL-SCNC: 124 MMOL/L (ref 98–112)
CO2 SERPL-SCNC: 12 MMOL/L (ref 21–32)
CREAT BLD-MCNC: 1.85 MG/DL (ref 0.55–1.02)
DEPRECATED RDW RBC AUTO: 53 FL (ref 35.1–46.3)
EOSINOPHIL # BLD AUTO: 0.37 X10(3) UL (ref 0–0.7)
EOSINOPHIL NFR BLD AUTO: 8.2 %
ERYTHROCYTE [DISTWIDTH] IN BLOOD BY AUTOMATED COUNT: 16.2 % (ref 11–15)
GLUCOSE BLD-MCNC: 129 MG/DL (ref 70–99)
GLUCOSE BLD-MCNC: 138 MG/DL (ref 70–99)
GLUCOSE BLD-MCNC: 73 MG/DL (ref 70–99)
GLUCOSE BLD-MCNC: 77 MG/DL (ref 70–99)
GLUCOSE BLD-MCNC: 88 MG/DL (ref 70–99)
HCT VFR BLD AUTO: 30.9 % (ref 35–48)
HGB BLD-MCNC: 8.9 G/DL (ref 12–16)
IMM GRANULOCYTES # BLD AUTO: 0.07 X10(3) UL (ref 0–1)
IMM GRANULOCYTES NFR BLD: 1.6 %
LYMPHOCYTES # BLD AUTO: 1.82 X10(3) UL (ref 1–4)
LYMPHOCYTES NFR BLD AUTO: 40.4 %
MCH RBC QN AUTO: 25.6 PG (ref 26–34)
MCHC RBC AUTO-ENTMCNC: 28.8 G/DL (ref 31–37)
MCV RBC AUTO: 89 FL (ref 80–100)
MONOCYTES # BLD AUTO: 0.34 X10(3) UL (ref 0.1–1)
MONOCYTES NFR BLD AUTO: 7.6 %
NEUTROPHILS # BLD AUTO: 1.86 X10 (3) UL (ref 1.5–7.7)
NEUTROPHILS # BLD AUTO: 1.86 X10(3) UL (ref 1.5–7.7)
NEUTROPHILS NFR BLD AUTO: 41.3 %
OSMOLALITY SERPL CALC.SUM OF ELEC: 299 MOSM/KG (ref 275–295)
PLATELET # BLD AUTO: 272 10(3)UL (ref 150–450)
POTASSIUM SERPL-SCNC: 4.8 MMOL/L (ref 3.5–5.1)
RBC # BLD AUTO: 3.47 X10(6)UL (ref 3.8–5.3)
SODIUM SERPL-SCNC: 141 MMOL/L (ref 136–145)
WBC # BLD AUTO: 4.5 X10(3) UL (ref 4–11)

## 2020-06-26 PROCEDURE — 74176 CT ABD & PELVIS W/O CONTRAST: CPT | Performed by: HOSPITALIST

## 2020-06-26 PROCEDURE — 99232 SBSQ HOSP IP/OBS MODERATE 35: CPT | Performed by: HOSPITALIST

## 2020-06-26 NOTE — CONSULTS
[facility]    Report of Consultation    Tuan CORTEZ Yevgeniy Patient Status:  Inpatient    1935 MRN [id number]   [facility] 4NW-A Attending Agustina Urias MD   Hosp Day # 1 PCP Thu Camejo MD     Date of Admission:  2020  Date of SURGICAL HISTORY      KNEE SURGERY   • OTHER SURGICAL HISTORY      GALLBLADDER   • OTHER SURGICAL HISTORY      STOMACH SURGERY   • OTHER SURGICAL HISTORY      RECONSTUCTION RT UPPER ARM   • PART REMOVAL COLON W END COLOSTOMY  11/12/2019   • REMOVAL GALLBLA cap 200 mg, 200 mg, Oral, Nightly  Levothyroxine Sodium (SYNTHROID) tab 150 mcg, 150 mcg, Oral, Before breakfast  lidocaine-menthol 4-1 % 1 patch, 1 patch, Transdermal, Q24H  sodium bicarbonate tab 650 mg, 650 mg, Oral, BID  Sertraline HCl (ZOLOFT) tab 100 no CVA tenderness, R neph tube in place draining well with yellow, cloudy urine       Results:     Laboratory Data:  Lab Results   Component Value Date    WBC 4.5 06/26/2020    HGB 8.9 (L) 06/26/2020    HCT 30.9 (L) 06/26/2020    .0 06/26/2020    CR

## 2020-06-26 NOTE — PLAN OF CARE
NURSING ADMISSION NOTE      Patient admitted via Cart  Oriented to room. Safety precautions initiated. Bed in low position. Call light in reach. Pt here with dehydration and UTI. Started on IV fluids and IV abx. Pt is Persian speaking.  Cruz PO

## 2020-06-26 NOTE — HOME CARE LIAISON
Pt is current with [facility] (RN/PT/OT/MSW/ComPal) services. If returning with [facility], will need TAMMY orders.

## 2020-06-26 NOTE — ED PROVIDER NOTES
Patient Seen in: [facility] 4nw-a      History   Patient presents with:  Abnormal Result    Stated Complaint: abnormal labs/dehydration    HPI    Patient is an 81-year-old female who presents the emergency room with son.   Concerns from her primary c Family history reviewed with patient/caregiver and is not pertinent to presenting problem. Social History    Tobacco Use      Smoking status: Never Smoker      Smokeless tobacco: Never Used    Alcohol use: No    Drug use:  No             R Notable for the following components:    Clarity Urine Slightly Cloudy (*)     Blood Urine Moderate (*)     Protein Urine 100 mg/dL (*)     Nitrite Urine Positive (*)     Leukocyte Esterase Urine Large (*)     All other components within normal limits   UR creatinine. Nephrostomy tube with evidence of infection. IV antibiotics.   Admission disposition: 6/25/2020  6:46 PM                   Disposition and Plan     Clinical Impression:  Acute renal failure, unspecified acute renal failure type ([facility] [address]  (primary

## 2020-06-26 NOTE — PROGRESS NOTES
[facility] Pharmacy Note:  Renal Adjustment for meropenem (MERREM)    Garlon Galeazzi is a 81year old female who has been prescribed meropenem (MERREM) 500 mg every 8 hrs.   CrCl is estimated creatinine clearance is 15.5 mL/min (A) (based on SCr of 2.18 mg/dL (H))

## 2020-06-26 NOTE — PLAN OF CARE
A&Ox3- language barrier, unable to use iPad  services due to specific dialect. Patient able to communicate certain needs, such as food or needing ostomy emptied. Patient's son available for translation needs over the phone.   IVF and IV antibiotic Educate pt/family on patient safety including physical limitations  - Instruct pt to call for assistance with activity based on assessment  - Modify environment to reduce risk of injury  - Provide assistive devices as appropriate  - Consider OT/PT consult hypoglycemia  - Administer ordered medications to maintain glucose within target range  - Assess barriers to adequate nutritional intake and initiate nutrition consult as needed  - Instruct patient on self management of diabetes  Outcome: Progressing     P

## 2020-06-26 NOTE — PROGRESS NOTES
[facility]  Progress note     Tuan Vuong Patient Status:  Emergency    1935 MRN [id number]   [address] Attending Hedy Flores MD   Hosp Day # 1 PCP Zina Hernandez MD     Chief Complaint: VENKAT BOYD [facility] negative  4. Metabolic acidosis due to above  1. Monitor on bicarb  5. Hypercalcemia  1. resolved  6. Normocytic anemia  1. At baseline-monitor  7. DMII  1. SSI  8. Hypothyroidism  1. Synthroid  9. GERD  1. PPI  10. Anxiety/depression  1.  Continue home med

## 2020-06-26 NOTE — PROGRESS NOTES
06/26/20 9525   Clinical Encounter Type   Visited With Health care provider  ( spoke with nurse regarding consult request.  Per nurse, the fmaily and the patient need to have a conversation with the doctor regarding the meaning of the various co

## 2020-06-26 NOTE — H&P
[facility]  History and Physical     Tuan Vuong Patient Status:  Emergency    1935 MRN [id number]   Location [address] [facility] Attending Rakesh Mccann MD   Hosp Day # 0 PCP Bernard Germain MD     Chief Complaint: Gala Martínez OTHER SURGICAL HISTORY      STOMACH SURGERY   • OTHER SURGICAL HISTORY      RECONSTUCTION RT UPPER ARM   • PART REMOVAL COLON W END COLOSTOMY  11/12/2019   • REMOVAL GALLBLADDER     • RIGHT PHACOEMULSIFICATION OF CATARACT WITH INTRAOCULAR LENS IMPLANT 9074 bicarbonate 650 MG Oral Tab, Take 1 tablet (650 mg total) by mouth 2 (two) times daily. , Disp: 60 tablet, Rfl: 11  Multiple Minerals-Vitamins (CALCIUM CITRATE PLUS/MAGNESIUM OR), Take 2 tablets by mouth daily. , Disp: , Rfl:         Review of Systems:   [facility] pending  4. Metabolic acidosis due to above  1. IVF  2. Monitor   5. Hypercalcemia  1. Monitor response to fluids  6. Normocytic anemia  1. At baseline  7. DMII  1. SSI  8. Hypothyroidism  1. Synthroid  9. GERD  1. PPI  10. Anxiety/depression  1.  Continue

## 2020-06-27 LAB
ANION GAP SERPL CALC-SCNC: 11 MMOL/L (ref 0–18)
BUN BLD-MCNC: 25 MG/DL (ref 7–18)
BUN/CREAT SERPL: 15.9 (ref 10–20)
CALCIUM BLD-MCNC: 8.9 MG/DL (ref 8.5–10.1)
CHLORIDE SERPL-SCNC: 115 MMOL/L (ref 98–112)
CO2 SERPL-SCNC: 13 MMOL/L (ref 21–32)
CREAT BLD-MCNC: 1.57 MG/DL (ref 0.55–1.02)
GLUCOSE BLD-MCNC: 112 MG/DL (ref 70–99)
GLUCOSE BLD-MCNC: 117 MG/DL (ref 70–99)
GLUCOSE BLD-MCNC: 127 MG/DL (ref 70–99)
GLUCOSE BLD-MCNC: 87 MG/DL (ref 70–99)
GLUCOSE BLD-MCNC: 90 MG/DL (ref 70–99)
OSMOLALITY SERPL CALC.SUM OF ELEC: 292 MOSM/KG (ref 275–295)
POTASSIUM SERPL-SCNC: 4.8 MMOL/L (ref 3.5–5.1)
SODIUM SERPL-SCNC: 139 MMOL/L (ref 136–145)

## 2020-06-27 PROCEDURE — 99232 SBSQ HOSP IP/OBS MODERATE 35: CPT | Performed by: HOSPITALIST

## 2020-06-27 RX ORDER — SODIUM BICARBONATE 150 MEQ/1,000 ML IN DEXTROSE 5 % INTRAVENOUS
75 SOLUTION CONTINUOUS
Status: DISPENSED | OUTPATIENT
Start: 2020-06-27 | End: 2020-06-28

## 2020-06-27 NOTE — PROGRESS NOTES
[facility]  Progress note     Tuan Vuong Patient Status:  Emergency    1935 MRN [id number]   Location [address] [facility] Attending Tevin Kim MD   Hosp Day # 2 PCP Demetrice Stafford MD     Chief Complaint: VENKAT BOYD [facility] negative  4. Metabolic acidosis due to above  1. Monitor on bicarb  5. Hypercalcemia  1. resolved  6. Normocytic anemia  1. At baseline-monitor  7. DMII  1. SSI  8. Hypothyroidism  1. Synthroid  9. GERD  1. PPI  10. Anxiety/depression  1.  Continue home med

## 2020-06-27 NOTE — PROGRESS NOTES
Pt.alert and oriented; rounded with new orders. MD updated pPOC with son over the phone;per son pt.  Has no complaints  Nephrostomy tube and ilesotomy intact;kept clean and dry

## 2020-06-27 NOTE — PLAN OF CARE
Problem: PAIN - ADULT  Goal: Verbalizes/displays adequate comfort level or patient's stated pain goal  Description  INTERVENTIONS:  - Encourage pt to monitor pain and request assistance  - Assess pain using appropriate pain scale  - Administer analgesics nutritional consult as needed  - Establish a toileting routine/schedule  - Consider collaborating with pharmacy to review patient's medication profile  Outcome: Progressing  Goal: Maintains adequate nutritional intake (undernourished)  Description  Terrance Keller

## 2020-06-28 LAB
ANION GAP SERPL CALC-SCNC: 4 MMOL/L (ref 0–18)
BUN BLD-MCNC: 20 MG/DL (ref 7–18)
BUN/CREAT SERPL: 15.4 (ref 10–20)
CALCIUM BLD-MCNC: 8.5 MG/DL (ref 8.5–10.1)
CHLORIDE SERPL-SCNC: 110 MMOL/L (ref 98–112)
CO2 SERPL-SCNC: 25 MMOL/L (ref 21–32)
CREAT BLD-MCNC: 1.3 MG/DL (ref 0.55–1.02)
GLUCOSE BLD-MCNC: 105 MG/DL (ref 70–99)
GLUCOSE BLD-MCNC: 110 MG/DL (ref 70–99)
GLUCOSE BLD-MCNC: 122 MG/DL (ref 70–99)
GLUCOSE BLD-MCNC: 99 MG/DL (ref 70–99)
HAV IGM SER QL: 1.5 MG/DL (ref 1.6–2.6)
OSMOLALITY SERPL CALC.SUM OF ELEC: 291 MOSM/KG (ref 275–295)
POTASSIUM SERPL-SCNC: 3.9 MMOL/L (ref 3.5–5.1)
SODIUM SERPL-SCNC: 139 MMOL/L (ref 136–145)

## 2020-06-28 PROCEDURE — 99232 SBSQ HOSP IP/OBS MODERATE 35: CPT | Performed by: HOSPITALIST

## 2020-06-28 RX ORDER — MAGNESIUM OXIDE 400 MG (241.3 MG MAGNESIUM) TABLET
800 TABLET ONCE
Status: COMPLETED | OUTPATIENT
Start: 2020-06-28 | End: 2020-06-28

## 2020-06-28 RX ORDER — MAGNESIUM SULFATE HEPTAHYDRATE 40 MG/ML
2 INJECTION, SOLUTION INTRAVENOUS ONCE
Status: COMPLETED | OUTPATIENT
Start: 2020-06-28 | End: 2020-06-28

## 2020-06-28 NOTE — PHYSICAL THERAPY NOTE
PHYSICAL THERAPY EVALUATION - INPATIENT     Room Number: 402/402-A  Evaluation Date: 6/28/2020  Type of Evaluation: Initial  Physician Order: PT Eval and Treat    Presenting Problem: ARF, dehydration  Reason for Therapy: Mobility Dysfunction and Disc GALLBLADDER   • OTHER SURGICAL HISTORY      STOMACH SURGERY   • OTHER SURGICAL HISTORY      RECONSTUCTION RT UPPER ARM   • PART REMOVAL COLON W END COLOSTOMY  11/12/2019   • REMOVAL GALLBLADDER     • RIGHT PHACOEMULSIFICATION OF CATARACT WITH INTRAOCU AM-PAC '6-Clicks' INPATIENT SHORT FORM - BASIC MOBILITY  How much difficulty does the patient currently have. ..  -   Turning over in bed (including adjusting bedclothes, sheets and blankets)?: A Little   -   Sitting down on and standing up from a castro is considered low. These impairments and comorbidities manifest themselves as functional limitations in independent bed mobility, transfers, and gait.   The patient is below baseline and would benefit from skilled inpatient PT to address the above deficits

## 2020-06-28 NOTE — PLAN OF CARE
Problem: PAIN - ADULT  Goal: Verbalizes/displays adequate comfort level or patient's stated pain goal  Description  INTERVENTIONS:  - Encourage pt to monitor pain and request assistance  - Assess pain using appropriate pain scale  - Administer analgesics nutritional consult as needed  - Establish a toileting routine/schedule  - Consider collaborating with pharmacy to review patient's medication profile  Outcome: Progressing    Remain on IV ABT, alert, afebrile, taking po meds without difficulty, ileostomy

## 2020-06-28 NOTE — PROGRESS NOTES
[facility]  Progress note     Tuan Vuong Patient Status:  Emergency    1935 MRN [id number]   Location [address] [facility] Attending Eber Charlton MD   Hosp Day # 3 PCP Michael Phillips MD     Chief Complaint: VENKAT BOYD [facility] infection seems low  3. Diarrhea, C diff negative  4. Metabolic acidosis due to above  1. improving  5. Hypercalcemia  1. resolved  6. Normocytic anemia  1. At baseline-monitor  7. DMII  1. SSI  8. Hypothyroidism  1.  Synthroid  9. hypomag-replete with iv m

## 2020-06-28 NOTE — PLAN OF CARE
A&Ox4. Language barrier- patient speaks specific Bengali dialect. Patient able to communicate needs like pain, food/water, and getting ostomy drained. Patient's son available for translation services. No complaints of pain this shift.  Tolerating diet, no N to call for assistance with activity based on assessment  - Modify environment to reduce risk of injury  - Provide assistive devices as appropriate  - Consider OT/PT consult to assist with strengthening/mobility  - Encourage toileting schedule  Outcome: Pr Assess barriers to adequate nutritional intake and initiate nutrition consult as needed  - Instruct patient on self management of diabetes  Outcome: Progressing     Problem: SKIN/TISSUE INTEGRITY - ADULT  Goal: Incision(s), wounds(s) or drain site(s) heali and gait  - Educate and engage patient/family in tolerated activity level and precautions  - Recommend use of  RW for transfers and ambulation   Outcome: Progressing

## 2020-06-29 ENCOUNTER — PATIENT OUTREACH (OUTPATIENT)
Dept: CASE MANAGEMENT | Age: 85
End: 2020-06-29

## 2020-06-29 ENCOUNTER — APPOINTMENT (OUTPATIENT)
Dept: CV DIAGNOSTICS | Facility: HOSPITAL | Age: 85
DRG: 683 | End: 2020-06-29
Attending: INTERNAL MEDICINE
Payer: MEDICARE

## 2020-06-29 ENCOUNTER — APPOINTMENT (OUTPATIENT)
Dept: GENERAL RADIOLOGY | Facility: HOSPITAL | Age: 85
DRG: 683 | End: 2020-06-29
Attending: HOSPITALIST
Payer: MEDICARE

## 2020-06-29 LAB
ATRIAL RATE: 68 BPM
GLUCOSE BLD-MCNC: 105 MG/DL (ref 70–99)
GLUCOSE BLD-MCNC: 140 MG/DL (ref 70–99)
GLUCOSE BLD-MCNC: 96 MG/DL (ref 70–99)
GLUCOSE BLD-MCNC: 98 MG/DL (ref 70–99)
P AXIS: 50 DEGREES
P-R INTERVAL: 206 MS
Q-T INTERVAL: 408 MS
QRS DURATION: 72 MS
QTC CALCULATION (BEZET): 433 MS
R AXIS: -33 DEGREES
T AXIS: 35 DEGREES
VENTRICULAR RATE: 68 BPM

## 2020-06-29 PROCEDURE — 93306 TTE W/DOPPLER COMPLETE: CPT | Performed by: INTERNAL MEDICINE

## 2020-06-29 PROCEDURE — 99232 SBSQ HOSP IP/OBS MODERATE 35: CPT | Performed by: HOSPITALIST

## 2020-06-29 PROCEDURE — 99223 1ST HOSP IP/OBS HIGH 75: CPT | Performed by: INTERNAL MEDICINE

## 2020-06-29 PROCEDURE — 71045 X-RAY EXAM CHEST 1 VIEW: CPT | Performed by: HOSPITALIST

## 2020-06-29 NOTE — PROGRESS NOTES
[facility]  Urology Progress Note    Tuan Vuong Patient Status:  Inpatient    1935 MRN [id number]   [facility] 4NW-A Attending Eze Jay MD   Hosp Day # 4 PCP Marium Martinez MD     Subjective:  Tuan Roberson is a(n) 81 yea

## 2020-06-29 NOTE — CONSULTS
[facility]  Report of Consultation    Tuan CORTEZ Yevgeniy Patient Status:  Inpatient    1935 MRN [id number]   [facility] 4NW-A Attending Ramona Iqbal MD   Hosp Day # 4 PCP Angella Mohr MD     Reason for Consultation:  Aortic steno MAIN OR   • COLON SURGERY     • CYSTOSCOPY URETEROSCOPY Bilateral 1/16/2020    Performed by Kathy Cotton MD at [facility] MAIN OR   • LEFT PHACOEMULSIFICATION OF CATARACT WITH INTRAOCULAR LENS IMPLANT 27621 Left 5/17/2017    Performed by Luis E Orr MD at [facility] injection 4 mg, 4 mg, Intravenous, Q6H PRN  •  Metoclopramide HCl (REGLAN) injection 5 mg, 5 mg, Intravenous, Q8H PRN  •  gabapentin (NEURONTIN) cap 200 mg, 200 mg, Oral, Nightly  •  Levothyroxine Sodium (SYNTHROID) tab 150 mcg, 150 mcg, Oral, Before break TP 8.8*  --   --   --            Assessment/Plan:    1. Dyspnea -noting these are nonspecific complaints. Patient has bilateral atelectasis. She is not volume overloaded by exam.  I not convinced that her dyspnea was secondary to her aortic stenosis.

## 2020-06-29 NOTE — PROGRESS NOTES
1st attempt TCC apt request    [facility]   [phone]. [address], [address]   ( parking available, M – F, 8 am – 6 pm)    LVM for CB

## 2020-06-29 NOTE — DISCHARGE SUMMARY
[facility]  DISCHARGE SUMMARY     Tuna Vuong Patient Status:  Inpatient    1935 MRN [id number]   [facility] 4NW-A Attending Akbar Navarro MD   Hosp Day # 4 PCP Benigno Abrams MD     Date of Admission: 2020  Date of Disc High Risk  29-58 Medium Risk  0-28   Low Risk  Patient was referred to the [facility]. TCM Follow-Up Recommendation:  LACE > 58:  High Risk of readmission after discharge from the hospital.    Procedures during hospitalization:   • known as:  BUZZUVIA      Take 1 tablet (25 mg total) by mouth daily. Quantity:  90 tablet  Refills:  1     sodium bicarbonate 650 MG Tabs      Take 1 tablet (650 mg total) by mouth 2 (two) times daily.    Quantity:  60 tablet  Refills:  11            [place]

## 2020-06-29 NOTE — CM/SW NOTE
06/29/20 1000   CM/SW Referral Data   Referral Source Social Work (self-referral)   Reason for Referral Discharge planning   Informant Children   Patient Info   Patient's Mental Status Alert   Discharge Needs   Anticipated D/C needs To be determined   S

## 2020-06-29 NOTE — PROGRESS NOTES
[facility]  Progress note     Tuan Vuong Patient Status:  Emergency    1935 MRN [id number]   Location [address] [facility] [address] Attending Mark Og MD   Hosp Day # 4 PCP Kelly Sweeney MD     Chief Complaint: VENKAT BOYD [facility] but will ask [facility] to evaluate to see if any further w/u needed, such as echo or stress test.  Will also need outpatient surveillance probably for her AS. 2. ANTONIO on CKD  1. Improved w/fluids  3. Possible UTI  1.  Stopped abx as suspicion for infection seems

## 2020-06-29 NOTE — PLAN OF CARE
Pt is aox3 on room air no tele and pt has denied pain, pt does state that she has some sob and chest pain md notified and order ekg.  Pt then referred to Cardiology, pt vitals has been stable and pt has been afebrile during this shift no questions at this t toileting schedule  Outcome: Progressing     Problem: GASTROINTESTINAL - ADULT  Goal: Maintains or returns to baseline bowel function  Description  INTERVENTIONS:  - Assess bowel function  - Maintain adequate hydration with IV or PO as ordered and tolerate wounds(s) or drain site(s) healing without S/S of infection  Description  INTERVENTIONS:  - Assess and document risk factors for pressure ulcer development  - Assess and document skin integrity  - Assess and document dressing/incision, wound bed, drain sit

## 2020-06-30 VITALS
TEMPERATURE: 99 F | RESPIRATION RATE: 16 BRPM | SYSTOLIC BLOOD PRESSURE: 121 MMHG | BODY MASS INDEX: 25.58 KG/M2 | WEIGHT: 139 LBS | HEIGHT: 62 IN | DIASTOLIC BLOOD PRESSURE: 83 MMHG | HEART RATE: 64 BPM | OXYGEN SATURATION: 97 %

## 2020-06-30 LAB
GLUCOSE BLD-MCNC: 115 MG/DL (ref 70–99)
GLUCOSE BLD-MCNC: 191 MG/DL (ref 70–99)
GLUCOSE BLD-MCNC: 92 MG/DL (ref 70–99)

## 2020-06-30 PROCEDURE — 99232 SBSQ HOSP IP/OBS MODERATE 35: CPT | Performed by: INTERNAL MEDICINE

## 2020-06-30 PROCEDURE — 99232 SBSQ HOSP IP/OBS MODERATE 35: CPT | Performed by: HOSPITALIST

## 2020-06-30 NOTE — CONSULTS
[facility]  Consultation Note      Tuan Vuong Patient Status:  Inpatient    1935 MRN [id number]   [facility] 4NW-A Attending Shilpa Andrea MD   Hosp Day # 5 PCP Michael Phillips MD     Reason for consultation:  AS treatment options including TAVR. Her granddaughter (who is an HF transplant cardiologist) requested for a second opinion as she is a personal friend of mine. The patient at this time denies any chest pain or dyspnea.      Cardiology consultation was History:   Procedure Laterality Date   •      • COLECTOMY N/A 2019    Performed by Onofre Delgado MD at [facility] MAIN OR   • COLON SURGERY     • CYSTOSCOPY URETEROSCOPY Bilateral 2020    Performed by Nazario Castillo MD at [facility] MAIN OR   • LEFT PHAC breastfeeding.   Temp (24hrs), Av.7 °F (37.1 °C), Min:98.7 °F (37.1 °C), Max:98.8 °F (37.1 °C)    Wt Readings from Last 3 Encounters:  20 : 139 lb (63 kg)  20 : 147 lb 11.3 oz (67 kg)  20 : 147 lb 12.8 oz (67 kg)      General: Awake an

## 2020-06-30 NOTE — PROGRESS NOTES
[facility]  Progress note     Tuan Vuong Patient Status:  Emergency    1935 MRN [id number]   Location [address] [facility] Attending Chela Ware MD   Hosp Day # 5 PCP Clearance MD Gio     Chief Complaint: VENKAT BOYD [facility] suspicion for infection seems low  5. Diarrhea, C diff negative  6. Metabolic acidosis due to above  1. Improved with bicarb fluids  7. Hypercalcemia  1. resolved  8. Normocytic anemia  1. At baseline-monitor  9. DMII  1. SSI  10. Hypothyroidism  1.  Synthr

## 2020-06-30 NOTE — PLAN OF CARE
Patient up sitting in the chair,appears comfortable,calling out occasionally but not in any distress. NO SOB,no chest pain & no discomforts voiced. Eating w/ good appetite. Fall precautions observed. Accuchecks within normal,not getting insulin.  Kept comfo

## 2020-06-30 NOTE — PLAN OF CARE
Asked by son, Sruthi Marx, for relaying my assessment to family member, Kinnie Blizzard an advance heart failure cardiologist working at [facility].     She has trained and knows Dr. Messi Ayala well and was appreciative of my assessment but family wou

## 2020-06-30 NOTE — PROGRESS NOTES
[facility]  Cardiology Progress Note    Farazdebora TORO Isidrodel Patient Status:  Inpatient    1935 MRN [id number]   [facility] 4NW-A Attending Jamarucs Garcia MD   Hosp Day # 5 PCP Emre Roca MD       Subjective:  No events overn hours.     Allergies:   No Known Allergies    Medications:  sodium chloride 0.9% IV bolus 1,000 mL, 1,000 mL, Intravenous, Once  glucose (DEX4) oral liquid 15 g, 15 g, Oral, Q15 Min PRN    Or  Glucose-Vitamin C (DEX-4) chewable tab 4 tablet, 4 tablet, Oral, 4.27m/sec. Mean gradient (S): 43mm Hg. Peak gradient (S): 73mm Hg. Valve area (VTI): 0.91cm^2. Indexed valve area (VTI): 0.55cm^2/m^2. 3. Aorta: Ascending aorta diameter was 3.9cm. Impressions:   This study is compared with previous dated 01/14/2020: Comp myself the week of July 13th.     Ashley Adbul MD  6/30/2020  11:45 AM

## 2020-06-30 NOTE — PLAN OF CARE
Patient will be seen by Dr Re Michel this evening after clinic for 2nd opinion per family request. Informed Dr Field Kras Dr Diana Guillen who is on call. He will relay to Dr Catrachito Contreras. If not seen by palliative this evening & clear for discharge by ca

## 2020-07-01 ENCOUNTER — PATIENT OUTREACH (OUTPATIENT)
Dept: CASE MANAGEMENT | Age: 85
End: 2020-07-01

## 2020-07-01 DIAGNOSIS — N17.9 ACUTE KIDNEY INJURY (HCC): ICD-10-CM

## 2020-07-01 DIAGNOSIS — Z02.9 ENCOUNTERS FOR UNSPECIFIED ADMINISTRATIVE PURPOSE: ICD-10-CM

## 2020-07-01 PROCEDURE — 1111F DSCHRG MED/CURRENT MED MERGE: CPT

## 2020-07-01 NOTE — PLAN OF CARE
Discharge summary completed & discharge papers printed attached to folder. Patient's son will come  patient around 7:30pm to 8pm.He told RN earlier. Patient was dressed w/ own clothes & ileostomy nephrostomy bags emptied.  POA spoke to cardilogist &

## 2020-07-01 NOTE — PLAN OF CARE
NURSING DISCHARGE NOTE    Discharged patient to home, accompanied by Son. Discharge summary papers given to Son, explained and encouraged to ask questions if any, son verbalized understanding. All personal belongings went home with patient.  Patient vit

## 2020-07-01 NOTE — PROGRESS NOTES
Initial Post Discharge Follow Up   Discharge Date: 6/30/20  Contact Date: 7/1/2020    Consent Verification:  Assessment Completed With: Other: Ethan, [facility] RITA Permission received per patient?  written  HIPAA Verified?   Yes    Discharge Dx:  Dehydration  AK nightly. Taking 30 mg nightly 90 tablet 1   • Sertraline HCl 100 MG Oral Tab Take 1 tablet (100 mg total) by mouth daily. 90 tablet 1   • melatonin 3 MG Oral Tab Take 1 tablet (3 mg total) by mouth nightly.  90 tablet 1   • Levothyroxine Sodium 150 MCG Oral ensure your camera and microphone are working properly. Once the video visit has started you will be placed in a waiting room until the provider begins the visit. You will receive an email confirmation with instructions.   If you have questions, call yo updates to medications and or orders sent to PCP. For patients with TCC appointments:     NCM offered sooner TCC appointment if schedule allowed:  no    [x]  Advised patient to bring all medications and blood glucose meter/supplies if applicable.

## 2020-07-02 ENCOUNTER — TELEPHONE (OUTPATIENT)
Dept: FAMILY MEDICINE CLINIC | Facility: CLINIC | Age: 85
End: 2020-07-02

## 2020-07-02 NOTE — TELEPHONE ENCOUNTER
David Anderson from [facility] health called to let dr Richards Laughter know that they could not reach the patient and are going to try again tomorrow .

## 2020-07-06 ENCOUNTER — TELEMEDICINE (OUTPATIENT)
Dept: INTERNAL MEDICINE CLINIC | Facility: CLINIC | Age: 85
End: 2020-07-06

## 2020-07-06 DIAGNOSIS — D64.9 ANEMIA, UNSPECIFIED TYPE: ICD-10-CM

## 2020-07-06 DIAGNOSIS — E03.9 HYPOTHYROIDISM, UNSPECIFIED TYPE: ICD-10-CM

## 2020-07-06 DIAGNOSIS — K21.9 GASTROESOPHAGEAL REFLUX DISEASE WITHOUT ESOPHAGITIS: ICD-10-CM

## 2020-07-06 DIAGNOSIS — F41.9 ANXIETY DISORDER, UNSPECIFIED TYPE: ICD-10-CM

## 2020-07-06 DIAGNOSIS — N13.9 OBSTRUCTIVE UROPATHY: ICD-10-CM

## 2020-07-06 DIAGNOSIS — E11.65 TYPE 2 DIABETES MELLITUS WITH HYPERGLYCEMIA, WITHOUT LONG-TERM CURRENT USE OF INSULIN (HCC): ICD-10-CM

## 2020-07-06 DIAGNOSIS — I35.0 NONRHEUMATIC AORTIC VALVE STENOSIS: ICD-10-CM

## 2020-07-06 DIAGNOSIS — E86.0 DEHYDRATION: Primary | ICD-10-CM

## 2020-07-06 DIAGNOSIS — Z87.19 H/O ISCHEMIC BOWEL DISEASE: ICD-10-CM

## 2020-07-06 PROCEDURE — 99495 TRANSJ CARE MGMT MOD F2F 14D: CPT | Performed by: CLINICAL NURSE SPECIALIST

## 2020-07-06 NOTE — PROGRESS NOTES
[address] [facility] PHARMACIST MEDICATION RECONCILIATION        [place] MRN [id number]    1935 PCP Ilana Rodriguez MD       Comments: Medication history completed by the [address] [place] [address] Pharmacist with the son [facility]) on the phon medications in detail with patient including dose, indication, timing of administration, monitoring parameters, and potential side effects of medications. Patient confirmed understanding.      Thank you,    Cristina Rodriguez, PharmD, 7/6/2020, 2:23 PM  Kendra Perez

## 2020-07-06 NOTE — PROGRESS NOTES
Video Visit  [address]      Please note that the following visit was completed using two-way, real-time interactive audio and/or video communication.   This has been done in good saad to provide continuity of care in the be Oral Tab, Take 1 tablet (25 mg total) by mouth daily. , Disp: 90 tablet, Rfl: 1  gabapentin 100 MG Oral Cap, Take 2 capsules (200 mg total) by mouth nightly., Disp: 180 capsule, Rfl: 1  mirtazapine 15 MG Oral Tab, Take 2 tablets (30 mg total) by mouth night SURGERY     • CYSTOSCOPY URETEROSCOPY Bilateral 1/16/2020    Performed by Radha Gumzan MD at [facility] MAIN OR   • LEFT PHACOEMULSIFICATION OF CATARACT WITH INTRAOCULAR LENS IMPLANT 44328 Left 5/17/2017    Performed by Randolph Guillen MD at [facility], High Dose 65 yr and older (98244) 09/07/2019   • Fluvirin, 3 Years & >, Im 10/30/2013   • Fluzone Vaccine Medicare () 10/28/2014, 11/16/2016, 09/07/2017, 09/19/2018   • Influenza 10/29/2015, 10/15/2017, 09/07/2019   • Pneumococcal (Prevnar 13) 12/27/2 prescriptions requested or ordered in this encounter         Health Maintenance:  DEXA Scan due on 08/08/2001  Pneumococcal PPSV23/PCV13 65+ Years / Low and Medium Risk(2 of 2 - PPSV23) due on 12/27/2018  Diabetes Care Dilated Eye Exam due on 12/18/2019  A Disposition/Plan:   Your appointments     Date & Time Appointment Department [facility])    Jul 13, 2020  1:20 PM CDT Hospital Follow Up with Mann Teixeira MD [facility]            [facility]

## 2020-07-13 ENCOUNTER — TELEMEDICINE (OUTPATIENT)
Dept: FAMILY MEDICINE CLINIC | Facility: CLINIC | Age: 85
End: 2020-07-13

## 2020-07-13 DIAGNOSIS — T83.512A: Primary | ICD-10-CM

## 2020-07-13 PROCEDURE — 99214 OFFICE O/P EST MOD 30 MIN: CPT | Performed by: FAMILY MEDICINE

## 2020-07-13 RX ORDER — SODIUM BICARBONATE 650 MG/1
650 TABLET ORAL 2 TIMES DAILY
Qty: 60 TABLET | Refills: 11 | Status: SHIPPED | OUTPATIENT
Start: 2020-07-13 | End: 2021-01-20

## 2020-07-13 RX ORDER — CEFADROXIL 500 MG/1
500 CAPSULE ORAL 2 TIMES DAILY
Qty: 14 CAPSULE | Refills: 0 | Status: SHIPPED | OUTPATIENT
Start: 2020-07-13 | End: 2020-08-06 | Stop reason: ALTCHOICE

## 2020-07-13 RX ORDER — SACCHAROMYCES BOULARDII 250 MG
250 CAPSULE ORAL 2 TIMES DAILY
Qty: 60 CAPSULE | Refills: 0 | Status: SHIPPED | OUTPATIENT
Start: 2020-07-13

## 2020-07-13 NOTE — PROGRESS NOTES
Please note that the following visit was completed using two-way, real-time interactive audio and video communication.   This has been done in good saad to provide continuity of care in the best interest of the provider-patient relationship, due to the o Oral Tab Take 1 tablet (25 mg total) by mouth daily. 90 tablet 1   • gabapentin 100 MG Oral Cap Take 2 capsules (200 mg total) by mouth nightly. 180 capsule 1   • mirtazapine 15 MG Oral Tab Take 2 tablets (30 mg total) by mouth nightly.  Taking 30 mg nightl nephrostomy catheter, initial encounter ([facility] [address]  I had a long discussion with the patient's son regarding the differential diagnosis  It looks like there is some infection in the area  I will treat her with cefadroxil 500 mg twice a day  Probiotics have also

## 2020-07-16 ENCOUNTER — PATIENT OUTREACH (OUTPATIENT)
Dept: CASE MANAGEMENT | Age: 85
End: 2020-07-16

## 2020-07-16 NOTE — PROGRESS NOTES
Spoke with patient son Rayne Mckeon regarding CCM services requested I send additional information so he may look it over. Information sent to [employer].

## 2020-07-20 ENCOUNTER — TELEPHONE (OUTPATIENT)
Dept: FAMILY MEDICINE CLINIC | Facility: CLINIC | Age: 85
End: 2020-07-20

## 2020-07-20 DIAGNOSIS — H01.119 CONTACT DERMATITIS OF EYELID, UNSPECIFIED LATERALITY: ICD-10-CM

## 2020-07-20 NOTE — TELEPHONE ENCOUNTER
Therapist from OT stated the plan of care is:  1x /wk for 4 weeks  Working on:  Strength and endourance and balance for self cares.

## 2020-07-22 RX ORDER — LORATADINE 10 MG/1
10 TABLET ORAL DAILY
Qty: 30 TABLET | Refills: 0 | Status: SHIPPED | OUTPATIENT
Start: 2020-07-22 | End: 2020-09-24

## 2020-07-22 NOTE — TELEPHONE ENCOUNTER
LOV 7/13/20    LAST LAB      LAST RX   loratadine (CLARITIN) 10 MG Oral Tab 30 tablet 0 6/18/2020    Sig:   Take 1 tablet (10 mg total) by mouth daily.            Next OV   Future Appointments   Date Time Provider Kiel Alarcon   8/6/2020 10:00 AM Manjinder Herman

## 2020-08-04 ENCOUNTER — TELEPHONE (OUTPATIENT)
Dept: FAMILY MEDICINE CLINIC | Facility: CLINIC | Age: 85
End: 2020-08-04

## 2020-08-04 NOTE — TELEPHONE ENCOUNTER
Matthew stated she needs Dr. Nikko Washington to order UA w/ culture. Can leave a verbal on [facility]'s secure line: listed. [phone]

## 2020-08-04 NOTE — TELEPHONE ENCOUNTER
Called and spoke with [facility] requesting UA w/ culture as pt c/o burning upon urination for \"several days\". No blood. No fever or chills. Hx of UTI (early June). Nephrostomy on right side of abd- pt's son noted decreased output.   Informed will relay t

## 2020-08-05 NOTE — TELEPHONE ENCOUNTER
Called and spoke to Lady Adkins, [facility] RN. Advised OK per Dr. Pierre Thompson to obtain UA/Culture as requested. She will fax results to this office.

## 2020-08-06 ENCOUNTER — LAB REQUISITION (OUTPATIENT)
Dept: LAB | Facility: HOSPITAL | Age: 85
End: 2020-08-06
Payer: MEDICARE

## 2020-08-06 DIAGNOSIS — I12.9 HYPERTENSIVE CHRONIC KIDNEY DISEASE WITH STAGE 1 THROUGH STAGE 4 CHRONIC KIDNEY DISEASE, OR UNSPECIFIED CHRONIC KIDNEY DISEASE: ICD-10-CM

## 2020-08-06 DIAGNOSIS — D63.1 ANEMIA IN CHRONIC KIDNEY DISEASE (CODE): ICD-10-CM

## 2020-08-06 LAB
BILIRUB UR QL STRIP.AUTO: NEGATIVE
COLOR UR AUTO: YELLOW
GLUCOSE UR STRIP.AUTO-MCNC: NEGATIVE MG/DL
GRAN CASTS #/AREA URNS LPF: PRESENT /LPF
KETONES UR STRIP.AUTO-MCNC: NEGATIVE MG/DL
NITRITE UR QL STRIP.AUTO: POSITIVE
PH UR STRIP.AUTO: 5 [PH] (ref 4.5–8)
PROT UR STRIP.AUTO-MCNC: 30 MG/DL
SP GR UR STRIP.AUTO: 1.01 (ref 1–1.03)
UROBILINOGEN UR STRIP.AUTO-MCNC: <2 MG/DL
WBC #/AREA URNS AUTO: >50 /HPF
WBC CLUMPS UR QL AUTO: PRESENT

## 2020-08-06 PROCEDURE — 87086 URINE CULTURE/COLONY COUNT: CPT | Performed by: FAMILY MEDICINE

## 2020-08-06 PROCEDURE — 81001 URINALYSIS AUTO W/SCOPE: CPT | Performed by: FAMILY MEDICINE

## 2020-08-06 PROCEDURE — 87186 SC STD MICRODIL/AGAR DIL: CPT | Performed by: FAMILY MEDICINE

## 2020-08-06 PROCEDURE — 87077 CULTURE AEROBIC IDENTIFY: CPT | Performed by: FAMILY MEDICINE

## 2020-08-07 ENCOUNTER — TELEPHONE (OUTPATIENT)
Dept: FAMILY MEDICINE CLINIC | Facility: CLINIC | Age: 85
End: 2020-08-07

## 2020-08-07 NOTE — TELEPHONE ENCOUNTER
Received VM from YEIMY Kulkarni with [facility] stating went to pt's house today and no answer. Requesting to move last visit to next week. Lm for YEIMY Kulkarni inform ok to reschedule pt's last visit to next week. To call back at the office if any further questions.

## 2020-08-11 ENCOUNTER — LAB REQUISITION (OUTPATIENT)
Dept: LAB | Facility: HOSPITAL | Age: 85
End: 2020-08-11
Payer: MEDICARE

## 2020-08-11 ENCOUNTER — TELEPHONE (OUTPATIENT)
Dept: FAMILY MEDICINE CLINIC | Facility: CLINIC | Age: 85
End: 2020-08-11

## 2020-08-11 DIAGNOSIS — N18.9 CHRONIC KIDNEY DISEASE, UNSPECIFIED: ICD-10-CM

## 2020-08-11 LAB
ANION GAP SERPL CALC-SCNC: 7 MMOL/L (ref 0–18)
BUN BLD-MCNC: 38 MG/DL (ref 7–18)
BUN/CREAT SERPL: 23.6 (ref 10–20)
CALCIUM BLD-MCNC: 9.7 MG/DL (ref 8.5–10.1)
CHLORIDE SERPL-SCNC: 110 MMOL/L (ref 98–112)
CO2 SERPL-SCNC: 18 MMOL/L (ref 21–32)
CREAT BLD-MCNC: 1.61 MG/DL (ref 0.55–1.02)
GLUCOSE BLD-MCNC: 111 MG/DL (ref 70–99)
OSMOLALITY SERPL CALC.SUM OF ELEC: 290 MOSM/KG (ref 275–295)
POTASSIUM SERPL-SCNC: 4.7 MMOL/L (ref 3.5–5.1)
SODIUM SERPL-SCNC: 135 MMOL/L (ref 136–145)

## 2020-08-11 PROCEDURE — 80048 BASIC METABOLIC PNL TOTAL CA: CPT | Performed by: INTERNAL MEDICINE

## 2020-08-11 RX ORDER — NITROFURANTOIN 25; 75 MG/1; MG/1
100 CAPSULE ORAL 2 TIMES DAILY
Qty: 14 CAPSULE | Refills: 0 | Status: CANCELLED | OUTPATIENT
Start: 2020-08-11 | End: 2020-08-18

## 2020-08-11 NOTE — TELEPHONE ENCOUNTER
Patient is high risk for ESBL sepsis, she was admitted to the hospital for sepsis  It seems that the patient is symptomatic with dysuria urgency and frequency of urination at this time  She may need to be sent out to the hospital for IV antibiotics due to

## 2020-08-11 NOTE — TELEPHONE ENCOUNTER
Per Dr. Jessica Izquierdo, he reviewed patient's chart and feels she is at risk for sepsis and may need IV antibiotics. Patient has hx ESBL. Requests we notify son to take patient to ED for evaluation of UTI and possible IV antibiotics.     Detailed VMML for patient's

## 2020-08-11 NOTE — TELEPHONE ENCOUNTER
Nurse from [facility] stated she faxed over UA & culture. She is asking if Dr. Jessica Izquierdo will be sending script for antibiotic? Pharmacy is the [facility] listed in patient's chart. Please return her call.

## 2020-08-11 NOTE — TELEPHONE ENCOUNTER
ML for Aristides Yates from [facility] asking if patient is having UTI symptoms. Requested call back. Will try to call family member. Spoke to patient's son, Kat Estevez, who states patient is having frequency and urgency, but does not void much.   Has burning

## 2020-08-12 ENCOUNTER — HOSPITAL ENCOUNTER (INPATIENT)
Facility: HOSPITAL | Age: 85
LOS: 3 days | Discharge: HOME HEALTH | DRG: 690 | End: 2020-08-17
Attending: EMERGENCY MEDICINE | Admitting: HOSPITALIST
Payer: MEDICARE

## 2020-08-12 DIAGNOSIS — R53.1 WEAKNESS GENERALIZED: ICD-10-CM

## 2020-08-12 DIAGNOSIS — N39.0 URINARY TRACT INFECTION WITHOUT HEMATURIA, SITE UNSPECIFIED: Primary | ICD-10-CM

## 2020-08-12 LAB
ALBUMIN SERPL-MCNC: 3.1 G/DL (ref 3.4–5)
ALBUMIN/GLOB SERPL: 0.6 {RATIO} (ref 1–2)
ALP LIVER SERPL-CCNC: 142 U/L (ref 55–142)
ALT SERPL-CCNC: 17 U/L (ref 13–56)
ANION GAP SERPL CALC-SCNC: 5 MMOL/L (ref 0–18)
APTT PPP: 32.2 SECONDS (ref 25.4–36.1)
AST SERPL-CCNC: 13 U/L (ref 15–37)
BASOPHILS # BLD AUTO: 0.03 X10(3) UL (ref 0–0.2)
BASOPHILS NFR BLD AUTO: 0.5 %
BILIRUB SERPL-MCNC: 0.2 MG/DL (ref 0.1–2)
BILIRUB UR QL STRIP.AUTO: NEGATIVE
BUN BLD-MCNC: 39 MG/DL (ref 7–18)
BUN/CREAT SERPL: 21.9 (ref 10–20)
CALCIUM BLD-MCNC: 10 MG/DL (ref 8.5–10.1)
CHLORIDE SERPL-SCNC: 112 MMOL/L (ref 98–112)
CO2 SERPL-SCNC: 20 MMOL/L (ref 21–32)
COLOR UR AUTO: YELLOW
CREAT BLD-MCNC: 1.78 MG/DL (ref 0.55–1.02)
DEPRECATED RDW RBC AUTO: 52 FL (ref 35.1–46.3)
EOSINOPHIL # BLD AUTO: 0.35 X10(3) UL (ref 0–0.7)
EOSINOPHIL NFR BLD AUTO: 6.3 %
ERYTHROCYTE [DISTWIDTH] IN BLOOD BY AUTOMATED COUNT: 16.9 % (ref 11–15)
GLOBULIN PLAS-MCNC: 5.3 G/DL (ref 2.8–4.4)
GLUCOSE BLD-MCNC: 99 MG/DL (ref 70–99)
GLUCOSE UR STRIP.AUTO-MCNC: NEGATIVE MG/DL
GRAN CASTS #/AREA URNS LPF: PRESENT /LPF
HCT VFR BLD AUTO: 32 % (ref 35–48)
HGB BLD-MCNC: 9.7 G/DL (ref 12–16)
IMM GRANULOCYTES # BLD AUTO: 0.02 X10(3) UL (ref 0–1)
IMM GRANULOCYTES NFR BLD: 0.4 %
INR BLD: 1.1 (ref 0.89–1.11)
KETONES UR STRIP.AUTO-MCNC: NEGATIVE MG/DL
LACTATE SERPL-SCNC: 1.1 MMOL/L (ref 0.4–2)
LYMPHOCYTES # BLD AUTO: 2.39 X10(3) UL (ref 1–4)
LYMPHOCYTES NFR BLD AUTO: 42.9 %
M PROTEIN MFR SERPL ELPH: 8.4 G/DL (ref 6.4–8.2)
MCH RBC QN AUTO: 25.4 PG (ref 26–34)
MCHC RBC AUTO-ENTMCNC: 30.3 G/DL (ref 31–37)
MCV RBC AUTO: 83.8 FL (ref 80–100)
MONOCYTES # BLD AUTO: 0.36 X10(3) UL (ref 0.1–1)
MONOCYTES NFR BLD AUTO: 6.5 %
NEUTROPHILS # BLD AUTO: 2.42 X10 (3) UL (ref 1.5–7.7)
NEUTROPHILS # BLD AUTO: 2.42 X10(3) UL (ref 1.5–7.7)
NEUTROPHILS NFR BLD AUTO: 43.4 %
NITRITE UR QL STRIP.AUTO: POSITIVE
OSMOLALITY SERPL CALC.SUM OF ELEC: 293 MOSM/KG (ref 275–295)
PH UR STRIP.AUTO: 5 [PH] (ref 4.5–8)
PLATELET # BLD AUTO: 289 10(3)UL (ref 150–450)
POTASSIUM SERPL-SCNC: 4.8 MMOL/L (ref 3.5–5.1)
PROT UR STRIP.AUTO-MCNC: 30 MG/DL
PSA SERPL DL<=0.01 NG/ML-MCNC: 14.5 SECONDS (ref 12.4–14.6)
RBC # BLD AUTO: 3.82 X10(6)UL (ref 3.8–5.3)
RBC UR QL AUTO: NEGATIVE
SARS-COV-2 RNA RESP QL NAA+PROBE: NOT DETECTED
SODIUM SERPL-SCNC: 137 MMOL/L (ref 136–145)
SP GR UR STRIP.AUTO: 1.01 (ref 1–1.03)
UROBILINOGEN UR STRIP.AUTO-MCNC: <2 MG/DL
WBC # BLD AUTO: 5.6 X10(3) UL (ref 4–11)
WBC #/AREA URNS AUTO: >50 /HPF
WBC CLUMPS UR QL AUTO: PRESENT

## 2020-08-12 PROCEDURE — 99223 1ST HOSP IP/OBS HIGH 75: CPT | Performed by: INTERNAL MEDICINE

## 2020-08-12 RX ORDER — ONDANSETRON 2 MG/ML
4 INJECTION INTRAMUSCULAR; INTRAVENOUS EVERY 4 HOURS PRN
Status: CANCELLED | OUTPATIENT
Start: 2020-08-12

## 2020-08-12 NOTE — ED INITIAL ASSESSMENT (HPI)
PER son pt had urine test on 8/6 and got a call from  to bring pt to er for possible sepsi, +for uti, but he didn't prescribe anything yet, pt c/o urgency and frequency, pt has a colostomy bag and urostomy bag to rgt side

## 2020-08-12 NOTE — TELEPHONE ENCOUNTER
[facility] for [facility] , Manjinder Caceres, advising of updated BMP result and and to see if she was able to talk with patient's son regarding advise to take patient to hospital.  Noted Cardiology has OK'd patient for cath on Thursday.   Unsure if they are aware of

## 2020-08-12 NOTE — TELEPHONE ENCOUNTER
returned call and states she left a VM for patient's son last night but has not heart back from him. She will be going to see patient this afternoon and will give us an update.

## 2020-08-13 PROBLEM — R53.1 WEAKNESS GENERALIZED: Status: ACTIVE | Noted: 2020-08-13

## 2020-08-13 LAB
ANION GAP SERPL CALC-SCNC: 6 MMOL/L (ref 0–18)
BUN BLD-MCNC: 36 MG/DL (ref 7–18)
BUN/CREAT SERPL: 20.8 (ref 10–20)
CALCIUM BLD-MCNC: 9.7 MG/DL (ref 8.5–10.1)
CHLORIDE SERPL-SCNC: 117 MMOL/L (ref 98–112)
CO2 SERPL-SCNC: 16 MMOL/L (ref 21–32)
CREAT BLD-MCNC: 1.73 MG/DL (ref 0.55–1.02)
DEPRECATED RDW RBC AUTO: 52.7 FL (ref 35.1–46.3)
ERYTHROCYTE [DISTWIDTH] IN BLOOD BY AUTOMATED COUNT: 16.8 % (ref 11–15)
EST. AVERAGE GLUCOSE BLD GHB EST-MCNC: 108 MG/DL (ref 68–126)
GLUCOSE BLD-MCNC: 101 MG/DL (ref 70–99)
GLUCOSE BLD-MCNC: 103 MG/DL (ref 70–99)
GLUCOSE BLD-MCNC: 118 MG/DL (ref 70–99)
GLUCOSE BLD-MCNC: 141 MG/DL (ref 70–99)
GLUCOSE BLD-MCNC: 90 MG/DL (ref 70–99)
GLUCOSE BLD-MCNC: 97 MG/DL (ref 70–99)
HBA1C MFR BLD HPLC: 5.4 % (ref ?–5.7)
HCT VFR BLD AUTO: 30.9 % (ref 35–48)
HGB BLD-MCNC: 9 G/DL (ref 12–16)
MCH RBC QN AUTO: 24.8 PG (ref 26–34)
MCHC RBC AUTO-ENTMCNC: 29.1 G/DL (ref 31–37)
MCV RBC AUTO: 85.1 FL (ref 80–100)
OSMOLALITY SERPL CALC.SUM OF ELEC: 296 MOSM/KG (ref 275–295)
PLATELET # BLD AUTO: 271 10(3)UL (ref 150–450)
POTASSIUM SERPL-SCNC: 4.7 MMOL/L (ref 3.5–5.1)
RBC # BLD AUTO: 3.63 X10(6)UL (ref 3.8–5.3)
SODIUM SERPL-SCNC: 139 MMOL/L (ref 136–145)
WBC # BLD AUTO: 4.6 X10(3) UL (ref 4–11)

## 2020-08-13 PROCEDURE — 99232 SBSQ HOSP IP/OBS MODERATE 35: CPT | Performed by: HOSPITALIST

## 2020-08-13 RX ORDER — ACETAMINOPHEN 500 MG
500 TABLET ORAL EVERY 6 HOURS PRN
Status: DISCONTINUED | OUTPATIENT
Start: 2020-08-13 | End: 2020-08-17

## 2020-08-13 RX ORDER — GABAPENTIN 100 MG/1
200 CAPSULE ORAL NIGHTLY
Status: DISCONTINUED | OUTPATIENT
Start: 2020-08-13 | End: 2020-08-17

## 2020-08-13 RX ORDER — SERTRALINE HYDROCHLORIDE 100 MG/1
100 TABLET, FILM COATED ORAL DAILY
Status: DISCONTINUED | OUTPATIENT
Start: 2020-08-13 | End: 2020-08-17

## 2020-08-13 RX ORDER — DEXTROSE MONOHYDRATE 25 G/50ML
50 INJECTION, SOLUTION INTRAVENOUS
Status: DISCONTINUED | OUTPATIENT
Start: 2020-08-13 | End: 2020-08-17

## 2020-08-13 RX ORDER — HEPARIN SODIUM 5000 [USP'U]/ML
5000 INJECTION, SOLUTION INTRAVENOUS; SUBCUTANEOUS EVERY 8 HOURS SCHEDULED
Status: DISCONTINUED | OUTPATIENT
Start: 2020-08-13 | End: 2020-08-17

## 2020-08-13 RX ORDER — SODIUM CHLORIDE 9 MG/ML
INJECTION, SOLUTION INTRAVENOUS CONTINUOUS
Status: DISCONTINUED | OUTPATIENT
Start: 2020-08-13 | End: 2020-08-13

## 2020-08-13 RX ORDER — GARLIC EXTRACT 500 MG
1 CAPSULE ORAL 2 TIMES DAILY
Status: DISCONTINUED | OUTPATIENT
Start: 2020-08-13 | End: 2020-08-17

## 2020-08-13 RX ORDER — SODIUM BICARBONATE 325 MG/1
650 TABLET ORAL 2 TIMES DAILY
Status: DISCONTINUED | OUTPATIENT
Start: 2020-08-13 | End: 2020-08-17

## 2020-08-13 RX ORDER — MELATONIN
3 NIGHTLY
Status: DISCONTINUED | OUTPATIENT
Start: 2020-08-13 | End: 2020-08-17

## 2020-08-13 RX ORDER — MIRTAZAPINE 15 MG/1
30 TABLET, FILM COATED ORAL NIGHTLY
Status: DISCONTINUED | OUTPATIENT
Start: 2020-08-13 | End: 2020-08-17

## 2020-08-13 RX ORDER — LEVOTHYROXINE SODIUM 0.15 MG/1
150 TABLET ORAL
Status: DISCONTINUED | OUTPATIENT
Start: 2020-08-13 | End: 2020-08-17

## 2020-08-13 RX ORDER — ONDANSETRON 2 MG/ML
4 INJECTION INTRAMUSCULAR; INTRAVENOUS EVERY 6 HOURS PRN
Status: DISCONTINUED | OUTPATIENT
Start: 2020-08-13 | End: 2020-08-17

## 2020-08-13 NOTE — ED PROVIDER NOTES
Patient Seen in: [facility] 5nw-a      History   Patient presents with:  Urinary Symptoms    Stated Complaint: Possible UTI, Nephrostomy tube site painful    HPI    Patient here because of pain to the right nephrostomy site for the past 7 to 10 days REMOVAL GALLBLADDER     • RIGHT PHACOEMULSIFICATION OF CATARACT WITH INTRAOCULAR LENS IMPLANT [id number] Right 4/26/2017    Performed by Ami Triana MD at [place] [address] Left 11/12/2019    Performed by Judy Bennett MD (14) - Abnormal; Notable for the following components:       Result Value    CO2 20.0 (*)     BUN 39 (*)     Creatinine 1.78 (*)     BUN/CREA Ratio 21.9 (*)     GFR, Non- 26 (*)     GFR, -American 30 (*)     AST 13 (*)     Total Prot colostomy, now with 7 to 10 days of right nephrostomy area pain, UTI, malodorous discharge from the nephrostomy site. Patient initially given Zosyn to cover for possible sepsis. However urine culture from August 6 shows ESBL.   Her antibiotic will be swit

## 2020-08-13 NOTE — PROGRESS NOTES
[facility]  Progress Note     Tuan Vuong Patient Status:  Observation    1935 MRN [id number]   [facility] 5NW-A Attending Remi Madrigal MD   Hosp Day # 0 PCP Clearance MD Gio     Chief Complaint: Weakness    S: Patient witho input(s): TROP, CK in the last 168 hours. Imaging: Imaging data reviewed in Epic.     Medications:   • gabapentin  200 mg Oral Nightly   • Levothyroxine Sodium  150 mcg Oral Before breakfast   • melatonin  3 mg Oral Nightly   • mirtazapine  30 mg Or

## 2020-08-13 NOTE — PLAN OF CARE
Pt A/O x2-3. Lithuanian speaking, IPAD  used. Forgetful. VSS. RA. . Pt refused heparin d/t pork in medication. Scds are on. Brief and incontinent at times. Rt nephrostomy tube, draining yellow. RLQ ileostomy. IV Abx.  Up with  assist x2 stand pivo Long Term Goal  Description  Patient's Long Term Goal: Go home    Interventions:  - iv abx  - PT and OT eval  - See additional Care Plan goals for specific interventions   8/13/2020 1815 by Rosa Isela Norman RN  Outcome: Progressing  8/13/2020 1738 by Georgiana Hernandez

## 2020-08-13 NOTE — PROGRESS NOTES
[facility] Pharmacy Note:  Renal Adjustment for meropenem (MERREM)    Pacheco Frank is a 81year old patient who has been prescribed meropenem (MERREM) 500 mg every 8 hrs.   CrCl is estimated creatinine clearance is 18.6 mL/min (A) (based on SCr of 1.78 mg/dL (H)

## 2020-08-13 NOTE — H&P
[facility]  History and Physical     Tuan Vuong Patient Status:  Observation    1935 MRN [id number]   [facility] 5NW-A Attending Sury Wolff MD   Hosp Day # 0 PCP Marixa Multani MD     Chief Complaint: weakness     Histor INTRAOCULAR LENS IMPLANT 56290 Right 4/26/2017    Performed by Ronny Atkinson MD at [address] [place] [address]   • URETERAL REIMPLANTATION Left 11/12/2019    Performed by Tanisha Lama MD at [facility] MAIN OR       Social History:  reports that she has never smok shoulder in am and remove at pm (Patient not taking: Reported on 8/6/2020 ), Disp: 10 patch, Rfl: 0        Review of Systems:   A comprehensive 14 point review of systems was completed. Pertinent positives and negatives noted in the HPI.     Physical Exa 1. 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 Follow up with Ucx  3. Urology consult for nephrostomy tube exchange   2. Mild ANTONIO on CKD 4- repeat BMP in am   3. Anemia of chronic disease  1. Stable   4.  Mild metabolic acidosis - resume home bica

## 2020-08-13 NOTE — CONSULTS
[facility]    Report of Consultation    Tuan CORTEZ Yevgeniy Patient Status:  Observation    1935 MRN [id number]   [facility] 5NW-A Attending Riley Yeung MD   Hosp Day # 0 PCP Chiquis Swenson MD     Date of Admission:  2020  Date of Left 2017    Performed by Jarred Moctezuma MD at [facility]   •      • OTHER      SHOULDER, KNEES   • OTHER SURGICAL HISTORY      KNEE SURGERY   • OTHER SURGICAL HISTORY      GALLBLADDER   • OTHER SURGICAL HISTORY      STOMACH SURGERY 100 UNIT/ML flexpen 1-5 Units, 1-5 Units, Subcutaneous, TID CC and HS  acetaminophen (TYLENOL EXTRA STRENGTH) tab 500 mg, 500 mg, Oral, Q6H PRN  Meropenem (MERREM) 500 mg in sodium chloride 0.9% 100 mL MBP, 500 mg, Intravenous, Q12H      loratadine (CLARIT non-tender; bowel sounds normal; no masses,  no organomegaly  Pulses: 2+ and symmetric  Neurologic: Grossly normal    Right nephrostomy tube in place with concentrated urine.    Results:     Laboratory Data:  Lab Results   Component Value Date    WBC 4.6 08

## 2020-08-13 NOTE — CM/SW NOTE
08/13/20 1000   CM/SW Referral Data   Referral Source Social Work (self-referral)   Informant Children  (son/POA)   Social History   Recreational Drug/Alcohol Use n   Major Changes Last 6 Months n   Domestic/Partner Violence n   Suicidal Ideation/Depres

## 2020-08-13 NOTE — PHYSICAL THERAPY NOTE
PHYSICAL THERAPY QUICK EVALUATION - INPATIENT    Room Number: 355/138-I  Evaluation Date: 8/13/2020  Presenting Problem: weakness  Physician Order: PT Eval and Treat    Problem List  Principal Problem:    Urinary tract infection without hematuria, site u Owned Equipment: Rolling walker; Other (Comment)(w/c, alyssa lift)       Prior Level of Jessamine: Pt unable to give PLOF. Pt previously was at [facility] from prolonged time (months) until family took pt home.   Pt has had HHC at home, has assist with all mobil to repeatedly sit back down. Stand pivot completed with pt actually taking a couple of steps with min A from writer. Stand to sit at chair with CGA.       Writer wore droplet mask, gloves throughout entire session      Patient End of Session: Up in chair;

## 2020-08-13 NOTE — PROGRESS NOTES
Health system Pharmacy Note:  Renal Adjustment for piperacillin/tazobactam (Sherice Torres)    Alfonso Oliva is a 81year old patient who has been prescribed piperacillin/tazobactam (ZOSYN) 3.375 gm every 8 hrs.   CrCl is estimated creatinine clearance is 18.6 mL/min (A) (ba

## 2020-08-13 NOTE — PLAN OF CARE
Problem: GENITOURINARY - ADULT  Goal: Absence of urinary retention  Description  INTERVENTIONS:  - Assess patient’s ability to void and empty bladder  - Monitor intake/output and perform bladder scan as needed  - Follow urinary retention protocol/standar side nephrostomy in place, draining yellow. Colostomy in place. No c/o pain. Redness to heels and sacrum noted but blanchable. PT and OT to eval.  Iv abx to be administered. Glucose monitored as ordered. Will continue to monitor.

## 2020-08-13 NOTE — OCCUPATIONAL THERAPY NOTE
OCCUPATIONAL THERAPY QUICK EVALUATION - INPATIENT    Room Number: 778/266-G  Evaluation Date: 8/13/2020     Type of Evaluation: Initial  Presenting Problem: possible UTI    Physician Order: IP Consult to Occupational Therapy  Reason for Therapy:  ADL/IADL IMPLANT [id number] Right 4/26/2017    Performed by Magnolia Bermudez MD at [facility]   • URETERAL REIMPLANTATION Left 11/12/2019    Performed by Mendez Mckeon MD at [facility]  Type of Home: [facility] assistance    Skilled Therapy Provided:   PPE: Surgical mask, disposable isolation gown, and gloves worn. Pt seen semi supine. Mod (A) to EOB. Completed UE testing    Total (A) to don socks, able to follow commands to facilitate.      Sit to stand m demonstrate safety with ADLS: at previous functional level

## 2020-08-13 NOTE — CONSULTS
INFECTIOUS DISEASE CONSULTATION    Tuan Vuong Patient Status:  Observation    1935 MRN [id number]   [facility] 5NW-A Attending Tala Wiley MD   Hosp Day # 0 PCP Ermelinda Rice MD REMOVAL COLON W END COLOSTOMY  11/12/2019   • REMOVAL GALLBLADDER     • RIGHT PHACOEMULSIFICATION OF CATARACT WITH INTRAOCULAR LENS IMPLANT [id number] Right 4/26/2017    Performed by Natacha Robison MD at [address] [place] [address] facility-administered medications on file prior to encounter. loratadine (CLARITIN) 10 MG Oral Tab, Take 1 tablet (10 mg total) by mouth daily. , Disp: 30 tablet, Rfl: 0  sodium bicarbonate 650 MG Oral Tab, Take 1 tablet (650 mg total) by mouth 2 (two) ti distended  Musculoskeletal: Full range of motion of all extremities. No swelling noted. Joints: no effusions  Skin: No lesions.  No erythema, no open wounds      Laboratory Data:  Laboratory data reviewed      Recent Labs   Lab 08/12/20 1953 08/13/20 08 Abdominal distension     Fecal impaction of colon (HCC)     Intestinal obstruction, unspecified cause, unspecified whether partial or complete (HCC)     Lactic acidosis     Essential hypertension     Hypo-osmolar hyponatremia     Counseling regarding advan

## 2020-08-14 ENCOUNTER — APPOINTMENT (OUTPATIENT)
Dept: CT IMAGING | Facility: HOSPITAL | Age: 85
DRG: 690 | End: 2020-08-14
Attending: UROLOGY
Payer: MEDICARE

## 2020-08-14 ENCOUNTER — APPOINTMENT (OUTPATIENT)
Dept: INTERVENTIONAL RADIOLOGY/VASCULAR | Facility: HOSPITAL | Age: 85
DRG: 690 | End: 2020-08-14
Attending: UROLOGY
Payer: MEDICARE

## 2020-08-14 LAB
ANION GAP SERPL CALC-SCNC: 6 MMOL/L (ref 0–18)
BASOPHILS # BLD AUTO: 0.03 X10(3) UL (ref 0–0.2)
BASOPHILS NFR BLD AUTO: 0.8 %
BUN BLD-MCNC: 36 MG/DL (ref 7–18)
BUN/CREAT SERPL: 23.2 (ref 10–20)
CALCIUM BLD-MCNC: 9.8 MG/DL (ref 8.5–10.1)
CHLORIDE SERPL-SCNC: 114 MMOL/L (ref 98–112)
CO2 SERPL-SCNC: 19 MMOL/L (ref 21–32)
CREAT BLD-MCNC: 1.55 MG/DL (ref 0.55–1.02)
DEPRECATED RDW RBC AUTO: 54.2 FL (ref 35.1–46.3)
EOSINOPHIL # BLD AUTO: 0.24 X10(3) UL (ref 0–0.7)
EOSINOPHIL NFR BLD AUTO: 6.6 %
ERYTHROCYTE [DISTWIDTH] IN BLOOD BY AUTOMATED COUNT: 16.7 % (ref 11–15)
GLUCOSE BLD-MCNC: 102 MG/DL (ref 70–99)
GLUCOSE BLD-MCNC: 104 MG/DL (ref 70–99)
GLUCOSE BLD-MCNC: 113 MG/DL (ref 70–99)
GLUCOSE BLD-MCNC: 121 MG/DL (ref 70–99)
GLUCOSE BLD-MCNC: 166 MG/DL (ref 70–99)
HCT VFR BLD AUTO: 32.1 % (ref 35–48)
HGB BLD-MCNC: 9.4 G/DL (ref 12–16)
IMM GRANULOCYTES # BLD AUTO: 0.01 X10(3) UL (ref 0–1)
IMM GRANULOCYTES NFR BLD: 0.3 %
LYMPHOCYTES # BLD AUTO: 1.79 X10(3) UL (ref 1–4)
LYMPHOCYTES NFR BLD AUTO: 49.2 %
MCH RBC QN AUTO: 25.5 PG (ref 26–34)
MCHC RBC AUTO-ENTMCNC: 29.3 G/DL (ref 31–37)
MCV RBC AUTO: 87 FL (ref 80–100)
MONOCYTES # BLD AUTO: 0.3 X10(3) UL (ref 0.1–1)
MONOCYTES NFR BLD AUTO: 8.2 %
NEUTROPHILS # BLD AUTO: 1.27 X10 (3) UL (ref 1.5–7.7)
NEUTROPHILS # BLD AUTO: 1.27 X10(3) UL (ref 1.5–7.7)
NEUTROPHILS NFR BLD AUTO: 34.9 %
OSMOLALITY SERPL CALC.SUM OF ELEC: 297 MOSM/KG (ref 275–295)
PLATELET # BLD AUTO: 244 10(3)UL (ref 150–450)
POTASSIUM SERPL-SCNC: 4.3 MMOL/L (ref 3.5–5.1)
RBC # BLD AUTO: 3.69 X10(6)UL (ref 3.8–5.3)
SODIUM SERPL-SCNC: 139 MMOL/L (ref 136–145)
TROPONIN I SERPL-MCNC: <0.045 NG/ML (ref ?–0.04)
WBC # BLD AUTO: 3.6 X10(3) UL (ref 4–11)

## 2020-08-14 PROCEDURE — 74176 CT ABD & PELVIS W/O CONTRAST: CPT | Performed by: UROLOGY

## 2020-08-14 PROCEDURE — 99232 SBSQ HOSP IP/OBS MODERATE 35: CPT | Performed by: HOSPITALIST

## 2020-08-14 PROCEDURE — 0T25X0Z CHANGE DRAINAGE DEVICE IN KIDNEY, EXTERNAL APPROACH: ICD-10-PCS | Performed by: RADIOLOGY

## 2020-08-14 RX ORDER — MIDAZOLAM HYDROCHLORIDE 1 MG/ML
INJECTION INTRAMUSCULAR; INTRAVENOUS
Status: COMPLETED
Start: 2020-08-14 | End: 2020-08-14

## 2020-08-14 RX ORDER — DIPHENHYDRAMINE HYDROCHLORIDE 50 MG/ML
12.5 INJECTION INTRAMUSCULAR; INTRAVENOUS EVERY 4 HOURS PRN
Status: DISCONTINUED | OUTPATIENT
Start: 2020-08-14 | End: 2020-08-17

## 2020-08-14 RX ORDER — LEVOFLOXACIN 5 MG/ML
INJECTION, SOLUTION INTRAVENOUS
Status: COMPLETED
Start: 2020-08-14 | End: 2020-08-14

## 2020-08-14 RX ORDER — DIPHENHYDRAMINE HCL 25 MG
25 CAPSULE ORAL EVERY 4 HOURS PRN
Status: DISCONTINUED | OUTPATIENT
Start: 2020-08-14 | End: 2020-08-17

## 2020-08-14 RX ORDER — LIDOCAINE HYDROCHLORIDE 10 MG/ML
INJECTION, SOLUTION INFILTRATION; PERINEURAL
Status: DISCONTINUED
Start: 2020-08-14 | End: 2020-08-14 | Stop reason: WASHOUT

## 2020-08-14 NOTE — CM/SW NOTE
Patient [place] speaking  Spoke with Shirley Willis and daughter Anais Gipson re IM  All questions answered  COPY TO CHART

## 2020-08-14 NOTE — PLAN OF CARE
Pt A/O x2-3. Yakut speaking, IPAD  used. Forgetful. VSS. RA. . Pt refused heparin d/t pork in medication. Scds are on. Brief and incontinent at times. Rt nephrostomy tube exchanged today in IR, C/D/I.  Possible obstruction noted by radiologis Plan goals for specific interventions   Outcome: Progressing  Goal: Patient/Family Short Term Goal  Description  Patient's Short Term Goal:   8/12 pm: maintain safety and get a little sleep  8/13 AM: maintain safety and get stronger  8/13 pm: maintain safe

## 2020-08-14 NOTE — PROGRESS NOTES
Progress Note    Tuan Vuong Patient Status:  Inpatient    1935 MRN [id number]   [facility] 5NW-A Attending Shannan Vincent MD   Hosp Day # 0 PCP Leny Molina MD     Subjective:  Leatha Morin is a(n) 81year old female.   Follow up

## 2020-08-14 NOTE — PROGRESS NOTES
08/14/20 0754   Clinical Encounter Type   Visited With Health care provider  (Pt. currently not decisional.  EMR says son is POA but no paperwork. RN to ask son when visits if has HCPOA and to bring it in.   If patient's decisionality returns, RN will s

## 2020-08-14 NOTE — PLAN OF CARE
Problem: GENITOURINARY - ADULT  Goal: Absence of urinary retention  Description  INTERVENTIONS:  - Assess patient’s ability to void and empty bladder  - Monitor intake/output and perform bladder scan as needed  - Follow urinary retention protocol/standar level of independence in self care  Description  Interventions:  - Assess ability and encourage patient to participate in ADLs to maximize function  - Promote sitting position while performing ADLs such as feeding, grooming, and bathing  - Educate and enco

## 2020-08-14 NOTE — PROGRESS NOTES
[facility]                INFECTIOUS DISEASE PROGRESS NOTE    Tuan Vuong Patient Status:  Inpatient    1935 MRN [id number]   [facility] 5NW-A Attending Trung Starks MD   Hosp Day # 0 PCP Nathanael Mccormick MD     Antibiotics: aureus (A) N/A    Aerobic Culture Result 4+ growth Corynebacterium striatum (A) N/A    Aerobic Smear 1+ WBCs seen N/A    Aerobic Smear 3+ Gram Positive Cocci N/A    Aerobic Smear 1+ Gram Positive Rods N/A   2.  BLOOD CULTURE     Status: None (Preliminary re ([facility] [address]     Hyperkalemia     Elevated troponin     Displacement of nephrostomy tube (HCC)     Metabolic acidosis     Urinary tract infection associated with nephrostomy catheter, initial encounter ([facility] [address]     Anemia, unspecified type     Acute renal failure (A

## 2020-08-14 NOTE — PROGRESS NOTES
[facility]  Progress Note     Tuan Vuong Patient Status:  Observation    1935 MRN [id number]   [facility] 5NW-A Attending Dion Rossi MD   Hosp Day # 0 PCP Ambreen Vincent MD     Chief Complaint: Weakness    S: Patient witho TROP, CK in the last 168 hours. Imaging: Imaging data reviewed in Epic.     Medications:   • gabapentin  200 mg Oral Nightly   • Levothyroxine Sodium  150 mcg Oral Before breakfast   • melatonin  3 mg Oral Nightly   • mirtazapine  30 mg Oral Nightly

## 2020-08-15 LAB
ATRIAL RATE: 70 BPM
GLUCOSE BLD-MCNC: 103 MG/DL (ref 70–99)
GLUCOSE BLD-MCNC: 169 MG/DL (ref 70–99)
GLUCOSE BLD-MCNC: 170 MG/DL (ref 70–99)
GLUCOSE BLD-MCNC: 90 MG/DL (ref 70–99)
P AXIS: 5 DEGREES
P-R INTERVAL: 204 MS
Q-T INTERVAL: 384 MS
QRS DURATION: 76 MS
QTC CALCULATION (BEZET): 414 MS
R AXIS: -29 DEGREES
T AXIS: 55 DEGREES
VENTRICULAR RATE: 70 BPM

## 2020-08-15 PROCEDURE — 99232 SBSQ HOSP IP/OBS MODERATE 35: CPT | Performed by: HOSPITALIST

## 2020-08-15 NOTE — PROGRESS NOTES
[facility]  Progress Note     Tuan Vuong Patient Status:  Observation    1935 MRN [id number]   [facility] 5NW-A Attending Bebe Tam MD   Hosp Day # 1 PCP Jorje Quintanilla MD     Chief Complaint: Weakness    S: Patient with Recent Labs   Lab 08/14/20 2052   TROP <0.045            Imaging: Imaging data reviewed in Epic.     Medications:   • vancomycin  15 mg/kg Intravenous Q24H   • gabapentin  200 mg Oral Nightly   • Levothyroxine Sodium  150 mcg Oral Before breakfast

## 2020-08-15 NOTE — PROGRESS NOTES
[address] CORTEZ Vuong Patient Status:  Inpatient    1935 MRN [id number]   [facility] 5NW-A Attending Dion Rossi MD   Hosp Day # 1 PCP Ambreen Vincent MD     Subjective: Interval History: s/p IR exchange of right NT.  Pt was kidney disease) stage 4, GFR 15-29 ml/min (HCC)    UTI  Chronic Nephrostomy tube   S/p IR exchange NT 8/14/20   ID following, on Vanco and Tobi Curry PA-C   LOS: 1 day

## 2020-08-15 NOTE — PROGRESS NOTES
PATIENT WITH C/O LEFT SIDED CHEST PAIN AT CHANGE OF SHIFT.  LINE UTILIZED. HER SON WAS ALSO IN THE ROOM. SHE DESCRIBED THE PAIN AS PRESSURE - LIKE SOMEONE IS PUSHING ON HER CHEST.   SHE ALSO REPORTED A RIGHT SIDED HEADACHE AND PAIN FROM

## 2020-08-15 NOTE — PROGRESS NOTES
[facility]                INFECTIOUS DISEASE PROGRESS NOTE    Tuan Vuong Patient Status:  Inpatient    1935 MRN [id number]   [facility] 5NW-A Attending Velia Miranda MD   Hosp Day # 1 PCP Angella Mohr MD     Antibiotics: for: [facility] Encounter on 08/12/20   1.  AEROBIC BACTERIAL CULTURE     Status: Abnormal    Collection Time: 08/12/20  8:43 PM   Result Value Ref Range    Aerobic Culture Result 2+ growth Staphylococcus aureus, MRSA (A) N/A    Aerobic obstruction, unspecified cause, unspecified whether partial or complete (HCC)     Lactic acidosis     Essential hypertension     Hypo-osmolar hyponatremia     Counseling regarding advance care planning and goals of care     Palliative care encounter     Ma

## 2020-08-15 NOTE — PLAN OF CARE
Problem: GENITOURINARY - ADULT  Goal: Absence of urinary retention  Description  INTERVENTIONS:  - Assess patient’s ability to void and empty bladder  - Monitor intake/output and perform bladder scan as needed  - Follow urinary retention protocol/standar Problem: Impaired Activities of Daily Living  Goal: Achieve highest/safest level of independence in self care  Description  Interventions:  - Assess ability and encourage patient to participate in ADLs to maximize function  - Promote sitting position i

## 2020-08-16 LAB
ANION GAP SERPL CALC-SCNC: 4 MMOL/L (ref 0–18)
BASOPHILS # BLD AUTO: 0.03 X10(3) UL (ref 0–0.2)
BASOPHILS NFR BLD AUTO: 0.8 %
BUN BLD-MCNC: 33 MG/DL (ref 7–18)
BUN/CREAT SERPL: 21.4 (ref 10–20)
CALCIUM BLD-MCNC: 9.1 MG/DL (ref 8.5–10.1)
CHLORIDE SERPL-SCNC: 113 MMOL/L (ref 98–112)
CO2 SERPL-SCNC: 21 MMOL/L (ref 21–32)
CREAT BLD-MCNC: 1.54 MG/DL (ref 0.55–1.02)
DEPRECATED RDW RBC AUTO: 51.3 FL (ref 35.1–46.3)
EOSINOPHIL # BLD AUTO: 0.28 X10(3) UL (ref 0–0.7)
EOSINOPHIL NFR BLD AUTO: 7 %
ERYTHROCYTE [DISTWIDTH] IN BLOOD BY AUTOMATED COUNT: 16.6 % (ref 11–15)
GLUCOSE BLD-MCNC: 109 MG/DL (ref 70–99)
GLUCOSE BLD-MCNC: 109 MG/DL (ref 70–99)
GLUCOSE BLD-MCNC: 140 MG/DL (ref 70–99)
GLUCOSE BLD-MCNC: 148 MG/DL (ref 70–99)
GLUCOSE BLD-MCNC: 168 MG/DL (ref 70–99)
HCT VFR BLD AUTO: 28.4 % (ref 35–48)
HGB BLD-MCNC: 8.5 G/DL (ref 12–16)
IMM GRANULOCYTES # BLD AUTO: 0.01 X10(3) UL (ref 0–1)
IMM GRANULOCYTES NFR BLD: 0.3 %
LYMPHOCYTES # BLD AUTO: 2.1 X10(3) UL (ref 1–4)
LYMPHOCYTES NFR BLD AUTO: 52.6 %
MCH RBC QN AUTO: 25.3 PG (ref 26–34)
MCHC RBC AUTO-ENTMCNC: 29.9 G/DL (ref 31–37)
MCV RBC AUTO: 84.5 FL (ref 80–100)
MONOCYTES # BLD AUTO: 0.29 X10(3) UL (ref 0.1–1)
MONOCYTES NFR BLD AUTO: 7.3 %
NEUTROPHILS # BLD AUTO: 1.28 X10 (3) UL (ref 1.5–7.7)
NEUTROPHILS # BLD AUTO: 1.28 X10(3) UL (ref 1.5–7.7)
NEUTROPHILS NFR BLD AUTO: 32 %
OSMOLALITY SERPL CALC.SUM OF ELEC: 296 MOSM/KG (ref 275–295)
PLATELET # BLD AUTO: 234 10(3)UL (ref 150–450)
POTASSIUM SERPL-SCNC: 4.1 MMOL/L (ref 3.5–5.1)
RBC # BLD AUTO: 3.36 X10(6)UL (ref 3.8–5.3)
SODIUM SERPL-SCNC: 138 MMOL/L (ref 136–145)
WBC # BLD AUTO: 4 X10(3) UL (ref 4–11)

## 2020-08-16 PROCEDURE — 99232 SBSQ HOSP IP/OBS MODERATE 35: CPT | Performed by: HOSPITALIST

## 2020-08-16 NOTE — PLAN OF CARE
Problem: GENITOURINARY - ADULT  Goal: Absence of urinary retention  Description  INTERVENTIONS:  - Assess patient’s ability to void and empty bladder  - Monitor intake/output and perform bladder scan as needed  - Follow urinary retention protocol/standar eval  - IR consult  - See additional Care Plan goals for specific interventions          Outcome: Progressing     Problem: Impaired Activities of Daily Living  Goal: Achieve highest/safest level of independence in self care  Description  Interventions:  -

## 2020-08-16 NOTE — PROGRESS NOTES
[facility]  Progress Note     Tuan Vuong Patient Status:  Observation    1935 MRN [id number]   [facility] 5NW-A Attending Shannan Vincent MD   Hosp Day # 2 PCP Leny Molina MD     Chief Complaint: Weakness    S: Patient with 08/14/20 2052   TROP <0.045            Imaging: Imaging data reviewed in Epic.     Medications:   • vancomycin  15 mg/kg Intravenous Q24H   • gabapentin  200 mg Oral Nightly   • Levothyroxine Sodium  150 mcg Oral Before breakfast   • melatonin  3 mg Oral N

## 2020-08-16 NOTE — PLAN OF CARE
Received patient A&O x 4. VSS. O2 sats >90% on RA. Patient states she has moderate pain in her right flank at her nephrostomy site, but denied pain medication. Scant drainage noted on dressing. Ileostomy site clean and pink, emptied trapped gas in bag.  Pt. sitting position while performing ADLs such as feeding, grooming, and bathing  - Educate and encourage patient/family in tolerated functional activity level and precautions during self-care     Outcome: Progressing     Problem: Impaired Functional Mobility

## 2020-08-17 VITALS
DIASTOLIC BLOOD PRESSURE: 51 MMHG | BODY MASS INDEX: 25.92 KG/M2 | HEART RATE: 59 BPM | OXYGEN SATURATION: 94 % | WEIGHT: 140.88 LBS | SYSTOLIC BLOOD PRESSURE: 107 MMHG | HEIGHT: 62 IN | TEMPERATURE: 98 F | RESPIRATION RATE: 18 BRPM

## 2020-08-17 LAB
ANION GAP SERPL CALC-SCNC: 5 MMOL/L (ref 0–18)
ATRIAL RATE: 66 BPM
BASOPHILS # BLD AUTO: 0.03 X10(3) UL (ref 0–0.2)
BASOPHILS NFR BLD AUTO: 0.7 %
BUN BLD-MCNC: 30 MG/DL (ref 7–18)
BUN/CREAT SERPL: 21.1 (ref 10–20)
CALCIUM BLD-MCNC: 9.3 MG/DL (ref 8.5–10.1)
CHLORIDE SERPL-SCNC: 110 MMOL/L (ref 98–112)
CO2 SERPL-SCNC: 22 MMOL/L (ref 21–32)
CREAT BLD-MCNC: 1.42 MG/DL (ref 0.55–1.02)
DEPRECATED RDW RBC AUTO: 51.4 FL (ref 35.1–46.3)
EOSINOPHIL # BLD AUTO: 0.25 X10(3) UL (ref 0–0.7)
EOSINOPHIL NFR BLD AUTO: 5.6 %
ERYTHROCYTE [DISTWIDTH] IN BLOOD BY AUTOMATED COUNT: 16.4 % (ref 11–15)
GLUCOSE BLD-MCNC: 101 MG/DL (ref 70–99)
GLUCOSE BLD-MCNC: 114 MG/DL (ref 70–99)
GLUCOSE BLD-MCNC: 123 MG/DL (ref 70–99)
GLUCOSE BLD-MCNC: 131 MG/DL (ref 70–99)
HCT VFR BLD AUTO: 28.9 % (ref 35–48)
HGB BLD-MCNC: 8.7 G/DL (ref 12–16)
IMM GRANULOCYTES # BLD AUTO: 0.01 X10(3) UL (ref 0–1)
IMM GRANULOCYTES NFR BLD: 0.2 %
LYMPHOCYTES # BLD AUTO: 2.36 X10(3) UL (ref 1–4)
LYMPHOCYTES NFR BLD AUTO: 52.8 %
MCH RBC QN AUTO: 25.7 PG (ref 26–34)
MCHC RBC AUTO-ENTMCNC: 30.1 G/DL (ref 31–37)
MCV RBC AUTO: 85.3 FL (ref 80–100)
MONOCYTES # BLD AUTO: 0.28 X10(3) UL (ref 0.1–1)
MONOCYTES NFR BLD AUTO: 6.3 %
NEUTROPHILS # BLD AUTO: 1.54 X10 (3) UL (ref 1.5–7.7)
NEUTROPHILS # BLD AUTO: 1.54 X10(3) UL (ref 1.5–7.7)
NEUTROPHILS NFR BLD AUTO: 34.4 %
OSMOLALITY SERPL CALC.SUM OF ELEC: 291 MOSM/KG (ref 275–295)
P AXIS: 59 DEGREES
P-R INTERVAL: 218 MS
PLATELET # BLD AUTO: 235 10(3)UL (ref 150–450)
POTASSIUM SERPL-SCNC: 4.3 MMOL/L (ref 3.5–5.1)
Q-T INTERVAL: 384 MS
QRS DURATION: 74 MS
QTC CALCULATION (BEZET): 402 MS
R AXIS: -29 DEGREES
RBC # BLD AUTO: 3.39 X10(6)UL (ref 3.8–5.3)
SODIUM SERPL-SCNC: 137 MMOL/L (ref 136–145)
T AXIS: 46 DEGREES
VANCOMYCIN SERPL-MCNC: 19.5 UG/ML
VENTRICULAR RATE: 66 BPM
WBC # BLD AUTO: 4.5 X10(3) UL (ref 4–11)

## 2020-08-17 PROCEDURE — 99232 SBSQ HOSP IP/OBS MODERATE 35: CPT | Performed by: HOSPITALIST

## 2020-08-17 NOTE — PLAN OF CARE
Problem: GENITOURINARY - ADULT  Goal: Absence of urinary retention  Description  INTERVENTIONS:  - Assess patient’s ability to void and empty bladder  - Monitor intake/output and perform bladder scan as needed  - Follow urinary retention protocol/standar patient/family in tolerated functional activity level and precautions during self-care     8/17/2020 1452 by Wade Schwab RN  Outcome: Completed  8/17/2020 1129 by Wade Schwab RN  Outcome: Progressing     Problem: Impaired Function

## 2020-08-17 NOTE — PROGRESS NOTES
[facility]  Progress Note    Tuan Vuong Patient Status:  Inpatient    1935 MRN [id number]   [facility] 5NW-A Attending Renee Naylor MD   Hosp Day # 2 PCP Violeta Gonzalez MD     Subjective:  Matthew Segal is a(n) 81year old fem Counseling regarding advance care planning and goals of care     Palliative care encounter     Malnutrition [facility])     Acute renal insufficiency     ANTONIO (acute kidney injury) ([facility] [address]     Urinary tract infection without hematuria     Urinary tract infection w

## 2020-08-17 NOTE — DISCHARGE SUMMARY
[facility]  DISCHARGE SUMMARY     Tuan Vuong Patient Status:  Inpatient    1935 MRN [id number]   [facility] 5NW-A Attending Sanjay Miranda MD   Hosp Day # 3 PCP Jacquie Limon MD     Date of Admission: 2020  Date of Disch NEURONTIN      Take 2 capsules (200 mg total) by mouth nightly.    Quantity:  180 capsule  Refills:  1     Levothyroxine Sodium 150 MCG Tabs  Commonly known as:  SYNTHROID      Take 1 tablet (150 mcg total) by mouth before breakfast.   Quantity:  90 tablet Vital signs:  Temp:  [98 °F (36.7 °C)-98.7 °F (37.1 °C)] 98 °F (36.7 °C)  Pulse:  [59-61] 59  Resp:  [16-18] 18  BP: (102-125)/(51-68) 107/51    Physical Exam:    General: No acute distress. Respiratory: Clear to auscultation bilaterally.  No wheeze

## 2020-08-17 NOTE — PROGRESS NOTES
[facility]  Progress Note     Tuan Vuong Patient Status:  Observation    1935 MRN [id number]   [facility] 5NW-A Attending Velia Miranda MD   Hosp Day # 3 PCP Angella Mohr MD     Chief Complaint: Weakness    S: Patient witho 21.5 mL/min (A) (based on SCr of 1.54 mg/dL (H)). Recent Labs   Lab 08/12/20 1953   PTP 14.5   INR 1.10       Recent Labs   Lab 08/14/20 2052   TROP <0.045            Imaging: Imaging data reviewed in Epic.     Medications:   • gabapentin  200 mg Oral

## 2020-08-17 NOTE — PROGRESS NOTES
[facility]                INFECTIOUS DISEASE PROGRESS NOTE    Tuan Vuong Patient Status:  Inpatient    1935 MRN [id number]   [facility] 5NW-A Attending Teri Lyon MD   Hosp Day # 3 PCP Renea Rodríguez MD     Antibiotics: ALT 17  --   --   --    BILT 0.2  --   --   --    TP 8.4*  --   --   --        No results found for: [facility] Encounter on 08/12/20   1.  AEROBIC BACTERIAL CULTURE     Status: Abnormal    Collection Time: 08/12/20  8:43 PM   Result Pulmonary embolus (HCC)     Chest pain     Type 2 diabetes mellitus with hyperglycemia ([address]     History of pulmonary embolism     At risk for falling     Type 2 diabetes mellitus with diabetic polyneuropathy (HCC)     Recurrent major depressive disorder, Weakness generalized     CKD (chronic kidney disease) stage 4, GFR 15-29 ml/min ([facility])        ASSESSMENT/PLAN:  1. Right nephrostomy tube site pain, site culture MRSA  Urine cx E.  Coli ESBL  -complete abx x 7d      MD Lorraine Prince Infectious [facility]

## 2020-08-17 NOTE — PLAN OF CARE
Pt. planned for discharge today, medically cleared per urology, ID and hospitalist. No abx for discharge per ID. Pt will be picked up by son at [facility]. Will give last dose of abx prior to d/c.

## 2020-08-17 NOTE — PLAN OF CARE
Problem: GENITOURINARY - ADULT  Goal: Absence of urinary retention  Description  INTERVENTIONS:  - Assess patient’s ability to void and empty bladder  - Monitor intake/output and perform bladder scan as needed  - Follow urinary retention protocol/standar cluster care  - PT/OT eval  - IR consult  - See additional Care Plan goals for specific interventions           Outcome: Progressing     Problem: Impaired Activities of Daily Living  Goal: Achieve highest/safest level of independence in self care  Descript

## 2020-08-17 NOTE — PLAN OF CARE
PATIENT AWAKE ALERT, SPEAK Faroese. IPAD   UTILIZED. SHE DENIES DISCOMFORT. WANTS TO GO TO SLEEP. NEPHROSTOMY TUBE AND ILEOSTOMY INTACT. BEDTIME ACCUCHECK = 109. SAFETY AND CONTACT ISOLATION PRECAUTIONS IN PLACE. WILL FOLLOW.

## 2020-08-17 NOTE — PLAN OF CARE
Pt. AOx3, disoriented to situation. Nepali speaking, video  used. VSS, afebrile. Maintaining O2 sats >90% on RA. Tele-SR. Pt c/o mild headache, PRN tylenol given for relief. Tolerating diet, denies n/v. Colostomy RLQ, yellow-brown output.  Stoma c

## 2020-08-17 NOTE — PROGRESS NOTES
NURSING DISCHARGE NOTE    Patient stable for discharge to home. No antibiotics for discharge needed. IV line removed, c/d/i. Patient's belongings sent home with patient. Discharge navigator completed.  Discharge instructions provided to patient's son, PELON

## 2020-08-18 ENCOUNTER — PATIENT OUTREACH (OUTPATIENT)
Dept: CASE MANAGEMENT | Age: 85
End: 2020-08-18

## 2020-08-18 ENCOUNTER — TELEPHONE (OUTPATIENT)
Dept: FAMILY MEDICINE CLINIC | Facility: CLINIC | Age: 85
End: 2020-08-18

## 2020-08-18 DIAGNOSIS — Z02.9 ENCOUNTERS FOR UNSPECIFIED ADMINISTRATIVE PURPOSE: ICD-10-CM

## 2020-08-18 PROCEDURE — 1111F DSCHRG MED/CURRENT MED MERGE: CPT

## 2020-08-18 NOTE — PROGRESS NOTES
Initial Post Discharge Follow Up   Discharge Date: 8/17/20  Contact Date: 8/18/2020    Consent Verification:  Assessment Completed With: Caregiver: Anton Egan received per patient?  written  HIPAA Verified?   Yes    Discharge Dx:     Acute pyelonep 10 MG Oral Tab Take 1 tablet (10 mg total) by mouth daily. 30 tablet 0   • saccharomyces boulardii 250 MG Oral Cap Take 1 capsule (250 mg total) by mouth 2 (two) times daily.  (Patient not taking: Reported on 8/6/2020 ) 60 capsule 0   • sodium bicarbonate 6 contact the family earlier today. If Yes: With Whom: [facility]   Except for [address] mentioned above, have you scheduled these other services?    no        DME ordered at D/C? Yes   What?  Wheel Chair, [employer], Nephrostomy T year already. BOOK BY DATE: 8/31/2020    [x]  Discharge Summary, Discharge medications reviewed/discussed/and reconciled against outpatient medications with patient,  and orders reviewed and discussed.  Any changes or updates to medications and or order

## 2020-08-18 NOTE — TELEPHONE ENCOUNTER
Spoke to pt for TCM today to follow up on recent hospitalization and care for pyleonephritis and nephrostomy tube replacement. Pt does not have HFU appt scheduled at this time. TCM/HFU appt recommended by 8/24/2020 as pt is a high risk for readmission.  Esteban Broderick

## 2020-09-03 ENCOUNTER — TELEPHONE (OUTPATIENT)
Dept: FAMILY MEDICINE CLINIC | Facility: CLINIC | Age: 85
End: 2020-09-03

## 2020-09-03 NOTE — TELEPHONE ENCOUNTER
Called and spoke with Brendan Cuadra with [facility] to inform ok for re-certification for nursing, managing ileostomy and nephrostomy, x8 weeks. No further questions or concerns. Brendan Cuadra verbalized understanding and agreed with POC.   DEQUAN

## 2020-09-03 NOTE — TELEPHONE ENCOUNTER
Bev Hearn from [facility] called asking for orders for re-certification for nursing for 8 weeks. She will also be managing \" ILeoftomy\" & Nephroftomy.

## 2020-09-14 DIAGNOSIS — F32.A DEPRESSION, UNSPECIFIED DEPRESSION TYPE: ICD-10-CM

## 2020-09-14 RX ORDER — MIRTAZAPINE 15 MG/1
30 TABLET, FILM COATED ORAL NIGHTLY
Qty: 90 TABLET | Refills: 1 | Status: SHIPPED | OUTPATIENT
Start: 2020-09-14 | End: 2020-12-23

## 2020-09-14 NOTE — TELEPHONE ENCOUNTER
LOV 6/18/2020    LAST LAB    LAST RX 6/18/2020 90 tablet 1 refill    Next OV No future appointments. PROTOCOL none   mirtazapine 15 MG Oral Tab              Sig: Take 2 tablets (30 mg total) by mouth nightly.  Taking 30 mg nightly    Disp:  90 tablet

## 2020-09-24 DIAGNOSIS — H01.119 CONTACT DERMATITIS OF EYELID, UNSPECIFIED LATERALITY: ICD-10-CM

## 2020-09-24 RX ORDER — LORATADINE 10 MG/1
10 TABLET ORAL DAILY
Qty: 30 TABLET | Refills: 0 | Status: ON HOLD | OUTPATIENT
Start: 2020-09-24 | End: 2020-10-13

## 2020-09-24 NOTE — TELEPHONE ENCOUNTER
LOV 6/18/2020    LAST LAB    LAST RX 7/22/2020 30 tablet 0 refills    Next OV No future appointments.       PROTOCOL   Allergy Medication Protocol [id number]/24/2020 11:48 AM   Appointment in the past 12 or next 3 months

## 2020-10-08 ENCOUNTER — HOSPITAL ENCOUNTER (INPATIENT)
Facility: HOSPITAL | Age: 85
LOS: 3 days | Discharge: HOME HEALTH | DRG: 690 | End: 2020-10-13
Attending: EMERGENCY MEDICINE | Admitting: HOSPITALIST
Payer: MEDICARE

## 2020-10-08 ENCOUNTER — APPOINTMENT (OUTPATIENT)
Dept: CT IMAGING | Age: 85
DRG: 690 | End: 2020-10-08
Attending: EMERGENCY MEDICINE
Payer: MEDICARE

## 2020-10-08 ENCOUNTER — APPOINTMENT (OUTPATIENT)
Dept: CT IMAGING | Facility: HOSPITAL | Age: 85
DRG: 690 | End: 2020-10-08
Attending: EMERGENCY MEDICINE
Payer: MEDICARE

## 2020-10-08 DIAGNOSIS — N39.0 URINARY TRACT INFECTION WITHOUT HEMATURIA, SITE UNSPECIFIED: ICD-10-CM

## 2020-10-08 DIAGNOSIS — N17.9 AKI (ACUTE KIDNEY INJURY) (HCC): Primary | ICD-10-CM

## 2020-10-08 DIAGNOSIS — H01.119 CONTACT DERMATITIS OF EYELID, UNSPECIFIED LATERALITY: ICD-10-CM

## 2020-10-08 PROCEDURE — 74176 CT ABD & PELVIS W/O CONTRAST: CPT | Performed by: EMERGENCY MEDICINE

## 2020-10-08 PROCEDURE — 99223 1ST HOSP IP/OBS HIGH 75: CPT | Performed by: HOSPITALIST

## 2020-10-08 RX ORDER — ONDANSETRON 2 MG/ML
4 INJECTION INTRAMUSCULAR; INTRAVENOUS EVERY 6 HOURS PRN
Status: DISCONTINUED | OUTPATIENT
Start: 2020-10-08 | End: 2020-10-13

## 2020-10-08 RX ORDER — MELATONIN
3 NIGHTLY
Status: DISCONTINUED | OUTPATIENT
Start: 2020-10-08 | End: 2020-10-13

## 2020-10-08 RX ORDER — SODIUM BICARBONATE 325 MG/1
650 TABLET ORAL 2 TIMES DAILY
Status: DISCONTINUED | OUTPATIENT
Start: 2020-10-08 | End: 2020-10-09

## 2020-10-08 RX ORDER — ACETAMINOPHEN 325 MG/1
650 TABLET ORAL EVERY 6 HOURS PRN
Status: DISCONTINUED | OUTPATIENT
Start: 2020-10-08 | End: 2020-10-13

## 2020-10-08 RX ORDER — GABAPENTIN 100 MG/1
200 CAPSULE ORAL NIGHTLY
Status: DISCONTINUED | OUTPATIENT
Start: 2020-10-08 | End: 2020-10-13

## 2020-10-08 RX ORDER — HEPARIN SODIUM 5000 [USP'U]/ML
5000 INJECTION, SOLUTION INTRAVENOUS; SUBCUTANEOUS EVERY 12 HOURS SCHEDULED
Status: DISCONTINUED | OUTPATIENT
Start: 2020-10-08 | End: 2020-10-13

## 2020-10-08 RX ORDER — LEVOTHYROXINE SODIUM 0.15 MG/1
150 TABLET ORAL
Status: DISCONTINUED | OUTPATIENT
Start: 2020-10-09 | End: 2020-10-13

## 2020-10-08 RX ORDER — DEXTROSE MONOHYDRATE 25 G/50ML
50 INJECTION, SOLUTION INTRAVENOUS
Status: DISCONTINUED | OUTPATIENT
Start: 2020-10-08 | End: 2020-10-13

## 2020-10-08 RX ORDER — SODIUM BICARBONATE 150 MEQ/1,000 ML IN DEXTROSE 5 % INTRAVENOUS
100 SOLUTION CONTINUOUS
Status: DISCONTINUED | OUTPATIENT
Start: 2020-10-08 | End: 2020-10-09

## 2020-10-08 NOTE — ED INITIAL ASSESSMENT (HPI)
Patient wheelchaired into triage. Speaks English. Patient lives with son at home. Son helps translate. Hx of diabetes.  Has a colostomy bag, Son states that patient has been weak for 2-3 days with low appetite and pain to nephrostomy tube, right side, \"nurs

## 2020-10-08 NOTE — ED PROVIDER NOTES
Patient Seen in: [facility] Emergency Department      History   Patient presents with:  Fatigue    Stated Complaint: weakness, pain to nephrostomy tubes, family concerned for infection    HPI     This is an 81-year-old woman from home, here with com [id number] Right 4/26/2017    Performed by Wade Cullen MD at [place] [id number] Left 11/12/2019    Performed by Kashif Ramirez MD at [facility] MAIN OR                    Social History    Tobacco Use      Smoking status: Never Smo Esterase Urine Large (*)     All other components within normal limits   COMP METABOLIC PANEL (14) - Abnormal; Notable for the following components:    Potassium 5.3 (*)     Chloride 116 (*)     CO2 14.0 (*)     BUN 59 (*)     Creatinine 2.90 (*)     BUN/C nephrostomy tube  TECHNIQUE:  Unenhanced multislice CT scanning from above the kidneys to below the urinary bladder. 2D rendering are generated on the CT scanner workstation to localize potential stones in the cranio-caudal plane.   Dose reduction techniqu Cholecystectomy.    Dictated by (CST): Autumn Garcia MD on 10/08/2020 at 7:54 PM     Finalized by (CST): Autumn Garcia MD on 10/08/2020 at 8:02 PM             MDM      81year-old woman here with right flank pain, CT scan unremarkable however does appear

## 2020-10-09 ENCOUNTER — APPOINTMENT (OUTPATIENT)
Dept: INTERVENTIONAL RADIOLOGY/VASCULAR | Facility: HOSPITAL | Age: 85
DRG: 690 | End: 2020-10-09
Attending: NURSE PRACTITIONER
Payer: MEDICARE

## 2020-10-09 PROCEDURE — 0T9030Z DRAINAGE OF RIGHT KIDNEY WITH DRAINAGE DEVICE, PERCUTANEOUS APPROACH: ICD-10-PCS | Performed by: RADIOLOGY

## 2020-10-09 PROCEDURE — 0T25X0Z CHANGE DRAINAGE DEVICE IN KIDNEY, EXTERNAL APPROACH: ICD-10-PCS | Performed by: RADIOLOGY

## 2020-10-09 PROCEDURE — 99233 SBSQ HOSP IP/OBS HIGH 50: CPT | Performed by: HOSPITALIST

## 2020-10-09 RX ORDER — LIDOCAINE HYDROCHLORIDE 10 MG/ML
INJECTION, SOLUTION INFILTRATION; PERINEURAL
Status: COMPLETED
Start: 2020-10-09 | End: 2020-10-09

## 2020-10-09 RX ORDER — SODIUM BICARBONATE 325 MG/1
650 TABLET ORAL 2 TIMES DAILY
Status: DISCONTINUED | OUTPATIENT
Start: 2020-10-09 | End: 2020-10-13

## 2020-10-09 RX ORDER — SODIUM CHLORIDE 9 MG/ML
INJECTION, SOLUTION INTRAVENOUS CONTINUOUS
Status: DISCONTINUED | OUTPATIENT
Start: 2020-10-09 | End: 2020-10-13

## 2020-10-09 RX ORDER — MIDAZOLAM HYDROCHLORIDE 1 MG/ML
INJECTION INTRAMUSCULAR; INTRAVENOUS
Status: COMPLETED
Start: 2020-10-09 | End: 2020-10-09

## 2020-10-09 RX ORDER — MIDAZOLAM HYDROCHLORIDE 1 MG/ML
INJECTION INTRAMUSCULAR; INTRAVENOUS
Status: DISCONTINUED
Start: 2020-10-09 | End: 2020-10-09 | Stop reason: WASHOUT

## 2020-10-09 NOTE — PROGRESS NOTES
[facility] Pharmacy Note:  Renal Adjustment for meropenem (MERREM)    Monika Cueto is a 81year old patient who has been prescribed meropenem (MERREM) 500 mg every 8 hrs.   CrCl is estimated creatinine clearance is 10.9 mL/min (A) (based on SCr of 2.9 mg/dL (H))

## 2020-10-09 NOTE — HOME CARE LIAISON
Ptnt current with [facility] for sn  Will need a TAMMY order on or before dc    Thanks  Parth Kidd

## 2020-10-09 NOTE — CM/SW NOTE
10/09/20 1300   Discharge disposition   Expected discharge disposition Home or Self   Name of [address] [place] [address]   Discharge transportation Private car     MSW spoke to Pt's son.  He wants [facility] Rn, states he home with his mother 24/7, h

## 2020-10-09 NOTE — PLAN OF CARE
Pt a&o x self. Able to respond to simple questions  Irish speaking. Bed/chair alarm  Maintaining sats on ra.   Lungs clear, diminished @ bases  Tele = nsr.  scd's & heparin  Ileostomy producing soft brown/green stool  Right nephrostomy tube producing sli patient reports new pain  - Anticipate increased pain with activity and pre-medicate as appropriate  Outcome: Progressing     Problem: SAFETY ADULT - FALL  Goal: Free from fall injury  Description: INTERVENTIONS:  - Assess pt frequently for physical needs

## 2020-10-09 NOTE — H&P
[facility]  History and Physical     Tuan Vuong Patient Status:  Emergency    1935 MRN [id number]   Location [address] Attending Venessa Aase, MD   Hosp Day # 0 PCP Jacquie Limon MD     Chief Complaint: Right Jaja Velasco MD at [address] [place] [address]   • URETERAL REIMPLANTATION Left 11/12/2019    Performed by Aníbal Daugherty MD at [facility] MAIN OR       Social History:  reports that she has never smoked.  She has never used smokeless tobacco. She reports that she does 0        Review of Systems:   A comprehensive 14 point review of systems was completed. Pertinent positives and negatives noted in the HPI.     Physical Exam:    /46   Pulse 70   Temp 98.6 °F (37 °C) (Temporal)   Resp 20   Ht 5' 1\" (1.549 m)   Wt type 2    Quality:  · DVT Prophylaxis: Heparin  · CODE status: Full  · Niño: None  · If COVID testing is negative, may discontinue isolation: Yes     Plan of care discussed with Patient.     Levy Hsieh MD  10/8/2020

## 2020-10-09 NOTE — PROGRESS NOTES
[facility]  Progress Note     Tuan Vuong Patient Status:  Observation    1935 MRN [id number]   [facility] 3NE-A Attending Shonda Britton MD   Hosp Day # 0 PCP Marixa Multani MD     Chief Complaint: right flank pain   S:      R afternoon- Dr Kirit Renee to perform- POA consent.  Spoke w/ Dr Kirit Renee, daughter, Ashlyn Danielson PA  - liu not able to be internalized - IR doesn't not rec trying that at present  CBC,  BMP am   Dr Ben Brian updated     Quality:  · DVT Prophylaxis: Heparin  · CODE status: F

## 2020-10-09 NOTE — CONSULTS
INFECTIOUS DISEASE CONSULT NOTE    Tuan Vuong Patient Status:  Observation    1935 MRN [id number]   [facility] 3NE-A Attending Ivon Viera MD   Hosp Day # 0 PCP Carrolyn Regulus, Landrum Merlin SURGICAL HISTORY      GALLBLADDER   • OTHER SURGICAL HISTORY      STOMACH SURGERY   • OTHER SURGICAL HISTORY      RECONSTUCTION RT UPPER ARM   • PART REMOVAL COLON W END COLOSTOMY  11/12/2019   • REMOVAL GALLBLADDER     • RIGHT PHACOEMULSIFICATION OF CATAR melatonin tab 3 mg, 3 mg, Oral, Nightly    Review of Systems:    Unable to obtain    Physical Exam:    General: No acute distress.  Alert, comfortable  Vital signs: Blood pressure 123/63, pulse 80, temperature 97.9 °F (36.6 °C), temperature source Oral, res None.  INDICATIONS:  weakness, R flank, RLQ pain, nephrostomy tube  TECHNIQUE:  Unenhanced multislice CT scanning from above the kidneys to below the urinary bladder.   2D rendering are generated on the CT scanner workstation to localize potential stones in intra-abdominal process demonstrated. Cholecystectomy. Dictated by (CST): Juan Carlos Reyes MD on 10/08/2020 at 7:54 PM     Finalized by (CST): Juan Carlos Reyes MD on 10/08/2020 at 8:02 PM            ASSESSMENT:    1.  Fevers and R flank pain in pt with chroni

## 2020-10-09 NOTE — PROGRESS NOTES
UROLOGY NOTE    Paged by IM services regarding NT exchange. IR willing to complete today as patient has received 3 doses IV antibiotics. Family is questioning to have internalization completed at this time. Attempted to call son x 2, left messages.   Marcial Engleos

## 2020-10-09 NOTE — OCCUPATIONAL THERAPY NOTE
OCCUPATIONAL THERAPY QUICK EVALUATION - INPATIENT    Room Number: 5458/8875-T  Evaluation Date: 10/9/2020     Type of Evaluation: Quick Eval  Presenting Problem:  ANTONIO    Physician Order: IP Consult to Occupational Therapy  Reason for Therapy:  ADL/IADL Dysf URETERAL REIMPLANTATION Left 11/12/2019    Performed by Tonny Hagan MD at [address] [facility] SITUATION  Type of Home: House  Home Layout: Able to live on main level  Lives With: Family                     Drives: No       Prio chair;Needs met;Call light within reach;RN aware of session/findings; All patient questions and concerns addressed; Alarm set    ASSESSMENT     Patient is a 81year old female admitted on 10/8/2020 for Presenting Problem: ANTONIO.  Complete medical history and oc

## 2020-10-09 NOTE — PLAN OF CARE
Pt. A&O x3, Portuguese speaking only, confused slightly at times according to the . On RA, tele shows NSR. Ileostomy and nephrostomy both draining appropriately, c/di/I. Patient can only weakly  turn from side to side with assistance.  SCDs in place, influences on pain and pain management  - Manage/alleviate anxiety  - Utilize distraction and/or relaxation techniques  - Monitor for opioid side effects  - Notify MD/LIP if interventions unsuccessful or patient reports new pain  - Anticipate increased zurdo

## 2020-10-09 NOTE — PHYSICAL THERAPY NOTE
PHYSICAL THERAPY QUICK EVALUATION - INPATIENT    Room Number: 7713/3738-K  Evaluation Date: 10/9/2020  Presenting Problem: ANTONIO, UTI  Physician Order: PT Eval and Treat    Problem List  Principal Problem:    ANTONIO (acute kidney [facility]) ([facility]  Active Problem of [place]: Pt  Unable to give history. Per previous charts, pt's family assists with all mobility. Pt has a alyssa lift at home but is capable of getting up with RW. Pt was still current for [facility] RN, not therapy. SUBJECTIVE  \"itchy! \"    [place] completed with mod A of 2  Continued knee buckling present. Writer wore droplet mask, gloves throughout entire session      Patient End of Session: Up in chair;Needs met;Call light within reach;RN aware of session/findings; All patient questions and co

## 2020-10-09 NOTE — PLAN OF CARE
NURSING ADMISSION NOTE      Patient admitted via Cart  Oriented to room. Safety precautions initiated. Bed in low position. Call light in reach. Navigator completed with  line and son who is POA.

## 2020-10-09 NOTE — CONSULTS
[facility]    Report of Consultation    Tuan TORO Isidrodel Patient Status:  Observation    1935 MRN [id number]   [facility] 3NE-A Attending Debby Blakely MD   Hosp Day # 0 PCP Rosi Roque MD     Date of Admission:  10/8/2020  Date of • OTHER SURGICAL HISTORY      GALLBLADDER   • OTHER SURGICAL HISTORY      STOMACH SURGERY   • OTHER SURGICAL HISTORY      RECONSTUCTION RT UPPER ARM   • PART REMOVAL COLON W END COLOSTOMY  11/12/2019   • REMOVAL GALLBLADDER     • RIGHT PHACOEMULSIFICATIO Daily  •  melatonin tab 3 mg, 3 mg, Oral, Nightly    Review of Systems:  Pertinent items are noted in HPI.     Physical Exam:  No distress  Non labored respirations  Abdomen soft, ostomy RLQ, mild tenderness bilateral lower abdomen  Right NT draining slight AORTA/VASCULAR:  No aneurysm. RETROPERITONEUM:  No mass or adenopathy. BOWEL/MESENTERY:  Subtotal colectomy. There is an ostomy in on the right. There is a sigmoid stump. There is a small amount of diverticulosis.   There is no obstruction, fluid ([facility]     Lactic acidosis     Essential hypertension     Hypo-osmolar hyponatremia     Counseling regarding advance care planning and goals of care     Palliative care encounter     Malnutrition [facility])     Acute renal insufficiency     ANTONIO (acute kidney inj patient.       Joshua Olmedo PA-C  [facility] Urology  10/9/2020  11:16 AM

## 2020-10-09 NOTE — PROCEDURES
[facility]  Procedure Note    Tuan Vuong Patient Status:  Observation    1935 MRN [id number]   [facility] 3NE-A Attending Narcisa Khan MD   Hosp Day # 0 PCP Adonay Rea MD     Procedure: R PCN exchange    Pre-Procedure Diagnos

## 2020-10-09 NOTE — H&P
[facility]   History & Physical    Tuan Vuong Patient Status:  Observation    1935 MRN [id number]   [facility] 3NE-A Attending Catalino Valerio MD   Hosp Day # 0 PCP Mac Sylvester MD     Admitting Diagnosis:   UTI    History of Pres Age of Onset   • Diabetes Daughter    • Hypertension Daughter    • Diabetes Son    • Hypertension Son        Allergies/Medications: Allergies:  No Known Allergies    Medications:  No current outpatient medications on file.     Physical Exam & Review of Sy

## 2020-10-09 NOTE — PLAN OF CARE
Problem: Impaired Activities of Daily Living  Goal: Achieve highest/safest level of independence in self care  Description: Interventions:  - Assess ability and encourage patient to participate in ADLs to maximize function  - Promote sitting position [facility]

## 2020-10-09 NOTE — DIETARY NOTE
[facility]    NUTRITION ASSESSMENT    Pt does not meet malnutrition criteria.     NUTRITION DIAGNOSIS/PROBLEM:    Predicted suboptimal energy intake related to medical therapy predicted to decrease ability to consume sufficient intake as evidenced by N PRESCRIPTION:  Calories: 7794-5720 calories/day (25-30 calories per kg)  Protein:  grams protein/day (1.2-1.5 grams protein per kg)  Fluid: ~1 ml/kcal or per MD discretion    MONITOR AND EVALUATE/NUTRITION GOALS:    3. No signs of skin breakdown  4.

## 2020-10-10 PROCEDURE — 99232 SBSQ HOSP IP/OBS MODERATE 35: CPT | Performed by: HOSPITALIST

## 2020-10-10 RX ORDER — MAGNESIUM HYDROXIDE/ALUMINUM HYDROXICE/SIMETHICONE 120; 1200; 1200 MG/30ML; MG/30ML; MG/30ML
30 SUSPENSION ORAL 4 TIMES DAILY PRN
Status: DISCONTINUED | OUTPATIENT
Start: 2020-10-10 | End: 2020-10-13

## 2020-10-10 RX ORDER — CALCIUM CARBONATE 200(500)MG
500 TABLET,CHEWABLE ORAL 3 TIMES DAILY PRN
Status: DISCONTINUED | OUTPATIENT
Start: 2020-10-10 | End: 2020-10-13

## 2020-10-10 NOTE — PROGRESS NOTES
[facility]    Progress Note    Tuan Vuong Patient Status:  Observation    1935 MRN [id number]   [facility] 3NE-A Attending Minnie Salazar MD   Hosp Day # 0 PCP Marian Story MD         Subjective:   Tyra Rodriguez is a(n) 81 year TSH 47.100 (H) 02/26/2020     11/11/2019    MG 1.5 (L) 06/28/2020    PHOS 3.0 02/20/2020    TROP <0.045 08/14/2020     09/25/2015    B12 685 11/20/2019       Ct Abdomen+pelvis Kidneystone 2d Rndr(no Iv,no Oral)(cpt=74176)    Result Date: 10

## 2020-10-10 NOTE — PLAN OF CARE
Assumed care at 0730. Assessment completed with  line. Pt a/ox3, forgetful. VSS, on RA, NSR with PVCs on telemetry. Pt with c/o flank pain 6/10, declining intervention at this time. No c/o n/v/d, SOB, dizziness/lightheadedness.  RLQ ileostomy is cultural and social influences on pain and pain management  - Manage/alleviate anxiety  - Utilize distraction and/or relaxation techniques  - Monitor for opioid side effects  - Notify MD/LIP if interventions unsuccessful or patient reports new pain  - Anti daily with assist of 2 vs sit to stand/ginette lift  Outcome: Progressing

## 2020-10-10 NOTE — PROGRESS NOTES
[facility]  Progress Note     Tuan Vuong Patient Status:  Observation    1935 MRN [id number]   [facility] 3NE-A Attending Yanna Arita MD   Hosp Day # 0 PCP Gerald Andrade MD     Chief Complaint: rt flank pain    S: Patient is input(s): TROP, CK in the last 168 hours. Imaging: Imaging data reviewed in Epic.     Medications:   • sodium bicarbonate  650 mg Oral BID   • lidocaine-menthol  1 patch Transdermal Q24H   • Heparin Sodium (Porcine)  5,000 Units Subcutaneous Q12H SC

## 2020-10-10 NOTE — PLAN OF CARE
Assumed care of patient at [address]. Monitor tele-NSR,  on RA. IVF infusing. IV antibiotics. Nephrostomy tube R back.  used for assessment/meds. PRN tylenol provided for pain. MD notified of pain. Patch placed back. Fall precautions in place.  Wi

## 2020-10-10 NOTE — PROGRESS NOTES
INFECTIOUS DISEASE PROGRESS NOTE    Tuan Vuong Patient Status:  Observation    1935 MRN [id number]   [facility] 3NE-A Attending Tara Sanchez MD   Hosp Day # 0 PCP Nathanael Mccormick, 124/56, pulse (P) 65, temperature 98.1 °F (36.7 °C), temperature source Axillary, resp. rate 18, height 5' 1\" (1.549 m), weight 149 lb (67.6 kg), SpO2 96 %, not currently breastfeeding. HEENT: no scleral icterus or conjunctival injection.  Moist mucous me RNDR(NO IV,NO ORAL)(CPT=74176)  COMPARISON:  None. INDICATIONS:  weakness, R flank, RLQ pain, nephrostomy tube  TECHNIQUE:  Unenhanced multislice CT scanning from above the kidneys to below the urinary bladder.   2D rendering are generated on the CT scanne Sigmoid stump with diverticulosis. No acute intra-abdominal process demonstrated. Cholecystectomy. Dictated by (CST): Alejandro Galeana MD on 10/08/2020 at 7:54 PM     Finalized by (CST): Alejandro Galeana MD on 10/08/2020 at 8:02 PM       ASSESSMENT:    1.  Fe

## 2020-10-11 ENCOUNTER — APPOINTMENT (OUTPATIENT)
Dept: GENERAL RADIOLOGY | Facility: HOSPITAL | Age: 85
DRG: 690 | End: 2020-10-11
Attending: HOSPITALIST
Payer: MEDICARE

## 2020-10-11 PROCEDURE — 99232 SBSQ HOSP IP/OBS MODERATE 35: CPT | Performed by: HOSPITALIST

## 2020-10-11 PROCEDURE — 71045 X-RAY EXAM CHEST 1 VIEW: CPT | Performed by: HOSPITALIST

## 2020-10-11 RX ORDER — POTASSIUM CHLORIDE 20 MEQ/1
40 TABLET, EXTENDED RELEASE ORAL ONCE
Status: COMPLETED | OUTPATIENT
Start: 2020-10-11 | End: 2020-10-11

## 2020-10-11 RX ORDER — PANTOPRAZOLE SODIUM 40 MG/1
40 TABLET, DELAYED RELEASE ORAL
Status: DISCONTINUED | OUTPATIENT
Start: 2020-10-11 | End: 2020-10-13

## 2020-10-11 RX ORDER — ASPIRIN 325 MG
325 TABLET ORAL DAILY
Status: DISCONTINUED | OUTPATIENT
Start: 2020-10-11 | End: 2020-10-13

## 2020-10-11 RX ORDER — MAGNESIUM OXIDE 400 MG (241.3 MG MAGNESIUM) TABLET
400 TABLET ONCE
Status: COMPLETED | OUTPATIENT
Start: 2020-10-11 | End: 2020-10-11

## 2020-10-11 NOTE — PLAN OF CARE
Assumed care of patient at [address]. Monitor tele. IVF infusing. IV antibiotics. C/o L flank pain-patch applied. R nephrostomy tube draining. Fall precautions in place.

## 2020-10-11 NOTE — PROGRESS NOTES
[facility]  Progress Note     Tuan Vuong Patient Status:  Inpatient    1935 MRN [id number]   [facility] 3NE-A Attending Andrea Swartz MD   Hosp Day # 1 PCP Jordy Martins MD     Chief Complaint: confusion fatigue fever    S: Pa input(s): PTP, INR in the last 168 hours. No results for input(s): TROP, CK in the last 168 hours. Imaging: Imaging data reviewed in Epic.     Medications:   • sodium bicarbonate  650 mg Oral BID   • lidocaine-menthol  1 patch Transdermal Q24H

## 2020-10-11 NOTE — PLAN OF CARE
Assumed care at 0730. Assessment completed with  line. Pt a/ox3, forgetful. VSS, on RA, NSR with PVCs on telemetry. No c/o pain this AM. Some flank pain after assessment completed. PRN medications given.  No c/o n/v/d, SOB, dizziness/lightheadedn ADULT  Goal: Verbalizes/displays adequate comfort level or patient's stated pain goal  Description: INTERVENTIONS:  - Encourage pt to monitor pain and request assistance  - Assess pain using appropriate pain scale  - Administer analgesics based on type and who interact with patient  Outcome: Progressing     Problem: Impaired Functional Mobility  Goal: Achieve highest/safest level of mobility/gait  Description: Interventions:  - Assess patient's functional ability and stability  - Promote increasing activity/

## 2020-10-12 ENCOUNTER — APPOINTMENT (OUTPATIENT)
Dept: CV DIAGNOSTICS | Facility: HOSPITAL | Age: 85
DRG: 690 | End: 2020-10-12
Attending: INTERNAL MEDICINE
Payer: MEDICARE

## 2020-10-12 PROCEDURE — 99233 SBSQ HOSP IP/OBS HIGH 50: CPT | Performed by: HOSPITALIST

## 2020-10-12 PROCEDURE — 93306 TTE W/DOPPLER COMPLETE: CPT | Performed by: INTERNAL MEDICINE

## 2020-10-12 RX ORDER — MAGNESIUM OXIDE 400 MG (241.3 MG MAGNESIUM) TABLET
400 TABLET ONCE
Status: DISCONTINUED | OUTPATIENT
Start: 2020-10-12 | End: 2020-10-12

## 2020-10-12 NOTE — PLAN OF CARE
Pt. Is a&o x4 this am, Pashto speaking - utilizing translating service to communicate, forgetful at times. NSR w/ 1st degree AVB, RA. Nephrostomy tube draining appropriately w/ yellow clear urine. Ileostomy draining appropriately w/ seedy drainage.  Diet to

## 2020-10-12 NOTE — CONSULTS
[facility] Cardiology Consultation NotePruddeep Javed MD    The patient was interviewed, examined, the chart was reviewed and the consult was dictated. This is a 81year old female with a chief complaint of chest pain. Impression:  1.   Chest pain–atypical.  Mini

## 2020-10-12 NOTE — CM/SW NOTE
Care Progression Note:  Active Acute Medical Issue:   ANTNOIO (acute kidney injury) ([facility]     Other Contributing Medical Factors/Dx. : metabolic acidosis, right sided pyelonephritis, diabetes mellitus type 2, dementia, aortic stenosis~ conservative cardiology t

## 2020-10-12 NOTE — PROGRESS NOTES
INFECTIOUS DISEASE PROGRESS NOTE    Tuan Vuong Patient Status:  Observation    1935 MRN [id number]   [facility] 3NE-A Attending Yanna Arita MD   [facility] Day # 2 PCP Gerald Andrade, mg, 100 mg, Oral, Daily  •  melatonin tab 3 mg, 3 mg, Oral, Nightly    Review of Systems:  Completed. See pertinent positives and negative above. Physical Exam:    General: No acute distress.  Alert, comfortable  Vital signs: Blood pressure 147/71, pulse results found for: [facility] Labs   Lab 10/08/20  2110   COLORUR Yellow   CLARITY Hazy*   SPECGRAVITY 1.012   GLUUR Negative   BILUR Negative   KETUR Negative   BLOODURINE Negative   PHURINE 5.0   PROUR 30 *   UROBILINOGEN <2.0   NITRITE Positive*   LE formation. BONES:  No bony lesion or fracture. Osteitis condensans ilei. PELVIC ORGANS:  Normal for age. LUNG BASES:  No visible pulmonary or pleural disease.   OTHER:  Image number 61 demonstrates a 1.6 cm amorphous densely calcified nodular density betw

## 2020-10-12 NOTE — CONSULTS
[facility]    PATIENT'S NAME: Jason TORO   ATTENDING PHYSICIAN: RONDA Dalyache: Yulia Johnson M.D.    PATIENT ACCOUNT#:   [de-identified]    LOCATION:  [address] [place] [address]  MEDICAL RECORD #:   [id number]       DATE OF BIRTH:  [birth year].    PHYSICAL EXAMINATION:    GENERAL:  A healthy-appearing female in no acute distress. VITAL SIGNS:  Blood pressure is 140/60. The patient is afebrile. Pulse rate is in the 60s. HEENT:  Normocephalic. Anicteric sclerae. LUNGS:  Clear.     HEAR

## 2020-10-12 NOTE — PLAN OF CARE
Assumed care at [address] [place] [address] Road. Communicated via  line. Pt is A&O x3, oriented to self, location, and year. Patient is very playful and jolly, at times jokes with  that confuses the assessment of orientation. On Ra, .  Patient states [place]

## 2020-10-13 ENCOUNTER — PATIENT OUTREACH (OUTPATIENT)
Dept: CASE MANAGEMENT | Age: 85
End: 2020-10-13

## 2020-10-13 VITALS
OXYGEN SATURATION: 96 % | SYSTOLIC BLOOD PRESSURE: 123 MMHG | BODY MASS INDEX: 28.13 KG/M2 | WEIGHT: 149 LBS | DIASTOLIC BLOOD PRESSURE: 74 MMHG | TEMPERATURE: 98 F | HEART RATE: 63 BPM | RESPIRATION RATE: 19 BRPM | HEIGHT: 61 IN

## 2020-10-13 PROCEDURE — 99239 HOSP IP/OBS DSCHRG MGMT >30: CPT | Performed by: HOSPITALIST

## 2020-10-13 PROCEDURE — 05HY33Z INSERTION OF INFUSION DEVICE INTO UPPER VEIN, PERCUTANEOUS APPROACH: ICD-10-PCS | Performed by: PHYSICIAN ASSISTANT

## 2020-10-13 RX ORDER — ASPIRIN 81 MG/1
81 TABLET ORAL DAILY
Qty: 30 TABLET | Refills: 0 | Status: SHIPPED | OUTPATIENT
Start: 2020-10-13 | End: 2020-10-13

## 2020-10-13 RX ORDER — PANTOPRAZOLE SODIUM 40 MG/1
40 TABLET, DELAYED RELEASE ORAL
Qty: 30 TABLET | Refills: 0 | Status: SHIPPED | OUTPATIENT
Start: 2020-10-14 | End: 2020-11-12

## 2020-10-13 NOTE — PROGRESS NOTES
INFECTIOUS DISEASE PROGRESS NOTE    Tuan Vuong Patient Status:  Observation    1935 MRN [id number]   [facility] 3NE-A Attending Sohail Cadet MD   [address] Day # 3 PCP Payal Ochoa, HCl (ZOLOFT) tab 100 mg, 100 mg, Oral, Daily  •  melatonin tab 3 mg, 3 mg, Oral, Nightly    Review of Systems:  Completed. See pertinent positives and negative above. Physical Exam:    General: No acute distress.  Alert, appears comfortable  Vital signs: results found for: CRP, HSCRP   No results found for: VANCT  Recent Labs   Lab 10/08/20  2110   COLORUR Yellow   CLARITY Hazy*   SPECGRAVITY 1.012   GLUUR Negative   BILUR Negative   KETUR Negative   BLOODURINE Negative   PHURINE 5.0   PROUR 30 *   UROBILI WALL:  Midline incision with scar formation. BONES:  No bony lesion or fracture. Osteitis condensans ilei. PELVIC ORGANS:  Normal for age. LUNG BASES:  No visible pulmonary or pleural disease.   OTHER:  Image number 61 demonstrates a 1.6 cm amorphous dens

## 2020-10-13 NOTE — PLAN OF CARE
A/O x 4  Peripheral IV causing pain- pt asked to remove and after 1 failed attempt refused to have another IV placed.  Plan for Midline first thing in the AM and DC w [address] home with caregiver and son  Spanish speaking but does understand basic [place] and Utilize distraction and/or relaxation techniques  - Monitor for opioid side effects  - Notify MD/LIP if interventions unsuccessful or patient reports new pain  - Anticipate increased pain with activity and pre-medicate as appropriate  Outcome: Progressing

## 2020-10-13 NOTE — PLAN OF CARE
Patient A&O x 4. Room air. NSR on tele. Midline placed this AM. IV Invanz ordered and dose to be given prior to discharge. Ambulance ordered by social work for [employer]. All needs met at this time currently, will continue to monitor.

## 2020-10-13 NOTE — PROGRESS NOTES
[facility] Cardiology  Progress Note    Tuan Vuong Patient Status:  Inpatient    1935 MRN [id number]   [facility] 3NE-A Attending Yoly Hawkins MD   Hosp Day # 3 PCP Jacquie Limon MD     Impression:  1.   Chest pain–atypical. all 4 extremities normally      •  ertapenem (INVANZ) 500 mg in sodium chloride 0.9% 100 mL IVPB, 500 mg, Intravenous, Once    •  Pantoprazole Sodium (PROTONIX) EC tab 40 mg, 40 mg, Oral, QAM AC    •  aspirin tab 325 mg, 325 mg, Oral, Daily    •  Alum & Ma contact me if you have any questions.     Nitesh Vo MD, [facility]  10/13/2020  1:06 PM

## 2020-10-13 NOTE — CM/SW NOTE
Patient to discharge home with [facility] for home IV ABT to son's home via [facility] at 1430 today. Son notified of the above.      Rima Griggs, RN, BSN   [phone]

## 2020-10-13 NOTE — PROGRESS NOTES
NURSING DISCHARGE NOTE    Discharged Home via Ambulance. Accompanied by Support staff  Belongings Taken by patient/family. Discharge paperwork with prescriptions taken by support staff for family.  Spoke to son Chi Coe about discharge follow up appoin

## 2020-10-13 NOTE — PROGRESS NOTES
1st attempt TCC apt request    [facility]  [address] [facility] [address] [phone]  Apt made for Mon.  Oct. 19th @1:30pm

## 2020-10-13 NOTE — PROGRESS NOTES
[facility]PITAL[facility]  Progress Note     Tuan Vuong Patient Status:  Inpatient    1935 MRN [id number]   [facility] 3NE-A Attending Trevor Louis MD   Hosp Day # 3 PCP Vaughn Barber MD     Chief Complaint: confusion fatigue fever    S: Pa 117* 115* 116*   CO2 14.0*   < > 14.0* 18.0* 19.0*   ALKPHO 136  --   --   --   --    AST 13*  --   --   --   --    ALT 16  --   --   --   --    BILT 0.2  --   --   --   --    TP 8.7*  --   --   --   --     < > = values in this interval not displayed. status: Full  · Niño: None        Will the patient be referred to TCC on discharge?: no   Estimated date of discharge: tues  Discharge is dependent on:  Clinical cxs  At this point Ms. Vanessa Tellez is expected to be discharge to:  Home   Sarah Nowak NP

## 2020-10-14 ENCOUNTER — PATIENT OUTREACH (OUTPATIENT)
Dept: CASE MANAGEMENT | Age: 85
End: 2020-10-14

## 2020-10-14 DIAGNOSIS — R53.1 WEAKNESS GENERALIZED: ICD-10-CM

## 2020-10-14 DIAGNOSIS — E86.0 DEHYDRATION: ICD-10-CM

## 2020-10-14 DIAGNOSIS — E11.65 TYPE 2 DIABETES MELLITUS WITH HYPERGLYCEMIA, WITHOUT LONG-TERM CURRENT USE OF INSULIN (HCC): ICD-10-CM

## 2020-10-14 DIAGNOSIS — Z02.9 ENCOUNTERS FOR UNSPECIFIED ADMINISTRATIVE PURPOSE: ICD-10-CM

## 2020-10-14 DIAGNOSIS — N17.9 ACUTE KIDNEY INJURY (HCC): ICD-10-CM

## 2020-10-14 DIAGNOSIS — N17.9 ACUTE RENAL FAILURE, UNSPECIFIED ACUTE RENAL FAILURE TYPE (HCC): ICD-10-CM

## 2020-10-14 DIAGNOSIS — N39.0 URINARY TRACT INFECTION WITHOUT HEMATURIA, SITE UNSPECIFIED: ICD-10-CM

## 2020-10-14 DIAGNOSIS — R53.81 PHYSICAL DECONDITIONING: ICD-10-CM

## 2020-10-14 DIAGNOSIS — N18.4 CKD (CHRONIC KIDNEY DISEASE) STAGE 4, GFR 15-29 ML/MIN (HCC): ICD-10-CM

## 2020-10-14 PROCEDURE — 1111F DSCHRG MED/CURRENT MED MERGE: CPT

## 2020-10-14 NOTE — PROGRESS NOTES
Initial Post Discharge Follow Up   Discharge Date: 10/13/20  Contact Date: 10/14/2020    Consent Verification:  Assessment Completed With: Other: Cruz patient's son Permission received per patient?  written  HIPAA Verified?   Yes    Discharge Dx: well were your discharge instructions explained to you?   o On a scale of 1-5   1- Very Poor and 5- Very well   o Very well  • Do you have any questions about your discharge instructions?   No  • Before leaving the hospital was your diagnoses explained to y Oral Tab Take 2 tablets (650 mg total) by mouth every 6 (six) hours as needed. 0   • lidocaine 4 % External Patch Place 1 patch onto the skin daily. You can get this over the counter.   Apply to low back and right shoulder in am and remove at pm 10 patch 0 Wandy Lilly ([facility] [address], [address])            [address] [facility]  [address]  [facility]  [address] [phone] Urology - Osmany Lillyre [facility] [address],

## 2020-10-14 NOTE — DISCHARGE SUMMARY
[facility] HOSPITAL[facility]  DISCHARGE SUMMARY     Tuan Vuong Patient Status:  Inpatient    1935 MRN [id number]   [facility] 3NE-A Attending No att. providers found   Hosp Day # 3 PCP Gilberto Montoya MD     Date of Admission: 10/8/2020  Date of feels that patient would not benefit any further from consideration for TAVR due to her severe deconditioning frequent infections and admissions and likely life expectancy less than 1 year she is had a change in [address] to DNR/DNI.   Palliative care fro chloride 0.9% 100 mL  Start taking on: October 14, 2020      Inject 500 mg into the vein daily for 10 days. Will need weekly CBC and BMP while on IV abx.    Stop taking on: October 24, 2020  Quantity: 10 Bag  Refills: 0     Pantoprazole Sodium 40 MG Tbec  C Take 1 tablet (650 mg total) by mouth 2 (two) times daily.    Quantity: 60 tablet  Refills: 11        STOP taking these medications    loratadine 10 MG Tabs  Commonly known as: Claritin              Where to Get Your Medications      Please  your 19  BP: (123-149)/(66-79) 123/74    Physical Exam:    General: No acute distress. Alert   Respiratory: Clear to auscultation bilaterally. No wheezes. No rhonchi. Cardiovascular: S1, S2. Regular rate and rhythm. + TAI  No  rubs or gallops.    Abdomen: Soft

## 2020-10-15 ENCOUNTER — TELEPHONE (OUTPATIENT)
Dept: FAMILY MEDICINE CLINIC | Facility: CLINIC | Age: 85
End: 2020-10-15

## 2020-10-15 NOTE — TELEPHONE ENCOUNTER
Physical Therapist stated patient is declining PT. Therapist asked for order to discharge patient from Physical Therapy.

## 2020-10-16 NOTE — TELEPHONE ENCOUNTER
Dwayne sebastianPT to inform per PCP ok to D/C pt from PT as pt is declining PT. To call back at the office if any further questions.

## 2020-10-19 ENCOUNTER — LAB REQUISITION (OUTPATIENT)
Dept: LAB | Facility: HOSPITAL | Age: 85
End: 2020-10-19
Payer: MEDICARE

## 2020-10-19 DIAGNOSIS — I12.9 HYPERTENSIVE CHRONIC KIDNEY DISEASE WITH STAGE 1 THROUGH STAGE 4 CHRONIC KIDNEY DISEASE, OR UNSPECIFIED CHRONIC KIDNEY DISEASE: ICD-10-CM

## 2020-10-19 DIAGNOSIS — D63.1 ANEMIA IN CHRONIC KIDNEY DISEASE (CODE): ICD-10-CM

## 2020-10-19 PROCEDURE — 85025 COMPLETE CBC W/AUTO DIFF WBC: CPT | Performed by: FAMILY MEDICINE

## 2020-10-19 PROCEDURE — 80048 BASIC METABOLIC PNL TOTAL CA: CPT | Performed by: FAMILY MEDICINE

## 2020-10-22 ENCOUNTER — PATIENT OUTREACH (OUTPATIENT)
Dept: CASE MANAGEMENT | Age: 85
End: 2020-10-22

## 2020-10-22 NOTE — PROGRESS NOTES
Attempted contacting pt to intro CCM services with no answer. Will continue contacting to discuss. LM for patient/family to call back at their convenience.  [phone]

## 2020-10-27 ENCOUNTER — LAB REQUISITION (OUTPATIENT)
Dept: LAB | Facility: HOSPITAL | Age: 85
End: 2020-10-27
Payer: MEDICARE

## 2020-10-27 DIAGNOSIS — D63.1 ANEMIA IN CHRONIC KIDNEY DISEASE (CODE): ICD-10-CM

## 2020-10-27 DIAGNOSIS — I12.9 HYPERTENSIVE CHRONIC KIDNEY DISEASE WITH STAGE 1 THROUGH STAGE 4 CHRONIC KIDNEY DISEASE, OR UNSPECIFIED CHRONIC KIDNEY DISEASE: ICD-10-CM

## 2020-10-27 PROCEDURE — 85025 COMPLETE CBC W/AUTO DIFF WBC: CPT | Performed by: FAMILY MEDICINE

## 2020-10-27 PROCEDURE — 80048 BASIC METABOLIC PNL TOTAL CA: CPT | Performed by: FAMILY MEDICINE

## 2020-10-28 ENCOUNTER — TELEPHONE (OUTPATIENT)
Dept: FAMILY MEDICINE CLINIC | Facility: CLINIC | Age: 85
End: 2020-10-28

## 2020-10-28 DIAGNOSIS — D63.1 ANEMIA IN CHRONIC KIDNEY DISEASE (CODE): Primary | ICD-10-CM

## 2020-10-28 DIAGNOSIS — I12.9 HYPERTENSIVE CHRONIC KIDNEY DISEASE WITH STAGE 1 THROUGH STAGE 4 CHRONIC KIDNEY DISEASE, OR UNSPECIFIED CHRONIC KIDNEY DISEASE: ICD-10-CM

## 2020-10-28 NOTE — TELEPHONE ENCOUNTER
Patient's son Federica Benitez returned Dr Monica Miller call. Sent chat message to Dr. Concepcion Bernardo.

## 2020-10-28 NOTE — PROGRESS NOTES
Message left to call back  Elevated BUN and creatinine  Elevated potassium  Looks like dehydration  Increase fluid intake

## 2020-10-29 NOTE — TELEPHONE ENCOUNTER
Discussed the results of the blood work with the patient's son Mr. Lemus Asp  Elevated BUN and creatinine as well as potassium  According to the son patient is very quiet she is not talking like  she used to  No fever but she feels tired    In my opinion pat

## 2020-11-02 ENCOUNTER — LAB REQUISITION (OUTPATIENT)
Dept: LAB | Facility: HOSPITAL | Age: 85
End: 2020-11-02
Payer: MEDICARE

## 2020-11-02 DIAGNOSIS — D63.1 ANEMIA IN CHRONIC KIDNEY DISEASE (CODE): ICD-10-CM

## 2020-11-02 DIAGNOSIS — Z16.12 EXTENDED SPECTRUM BETA LACTAMASE (ESBL) RESISTANCE: ICD-10-CM

## 2020-11-02 PROCEDURE — 80048 BASIC METABOLIC PNL TOTAL CA: CPT | Performed by: FAMILY MEDICINE

## 2020-11-02 PROCEDURE — 85025 COMPLETE CBC W/AUTO DIFF WBC: CPT | Performed by: FAMILY MEDICINE

## 2020-11-03 ENCOUNTER — HOSPITAL ENCOUNTER (INPATIENT)
Facility: HOSPITAL | Age: 85
LOS: 3 days | Discharge: HOME HEALTH | DRG: 682 | End: 2020-11-07
Attending: EMERGENCY MEDICINE | Admitting: HOSPITALIST
Payer: MEDICARE

## 2020-11-03 ENCOUNTER — TELEPHONE (OUTPATIENT)
Dept: FAMILY MEDICINE CLINIC | Facility: CLINIC | Age: 85
End: 2020-11-03

## 2020-11-03 DIAGNOSIS — E87.5 HYPERKALEMIA: Primary | ICD-10-CM

## 2020-11-03 DIAGNOSIS — U07.1 COVID-19 VIRUS INFECTION: ICD-10-CM

## 2020-11-03 DIAGNOSIS — N18.4 CKD (CHRONIC KIDNEY DISEASE) STAGE 4, GFR 15-29 ML/MIN (HCC): ICD-10-CM

## 2020-11-03 RX ORDER — DEXTROSE MONOHYDRATE 25 G/50ML
50 INJECTION, SOLUTION INTRAVENOUS ONCE
Status: COMPLETED | OUTPATIENT
Start: 2020-11-03 | End: 2020-11-03

## 2020-11-03 RX ORDER — SODIUM POLYSTYRENE SULFONATE 4.1 MEQ/G
30 POWDER, FOR SUSPENSION ORAL; RECTAL ONCE
Status: DISCONTINUED | OUTPATIENT
Start: 2020-11-03 | End: 2020-11-03

## 2020-11-03 NOTE — TELEPHONE ENCOUNTER
BMP results from last night showing elevated potassium at 6.4  Message left for the son Mr. Lilton Simmonds to call back on his cell phone  If her clinical condition did not change -and she remains lethargic, patient should be taken to the emergency room as this p

## 2020-11-03 NOTE — TELEPHONE ENCOUNTER
Called  Arun Mann again  Discussed the results of her BMP showing elevated potassium at 6.4  We reviewed the labs from before  Patient's potassium has been fluctuating for a while but has never been this high before  I advised him to take her to the emerg

## 2020-11-04 ENCOUNTER — APPOINTMENT (OUTPATIENT)
Dept: ULTRASOUND IMAGING | Facility: HOSPITAL | Age: 85
DRG: 682 | End: 2020-11-04
Attending: INTERNAL MEDICINE
Payer: MEDICARE

## 2020-11-04 PROBLEM — U07.1 COVID-19 VIRUS INFECTION: Status: ACTIVE | Noted: 2020-11-04

## 2020-11-04 PROBLEM — N18.4 CKD (CHRONIC KIDNEY DISEASE) STAGE 4, GFR 15-29 ML/MIN (HCC): Status: ACTIVE | Noted: 2020-11-04

## 2020-11-04 PROCEDURE — 76770 US EXAM ABDO BACK WALL COMP: CPT | Performed by: INTERNAL MEDICINE

## 2020-11-04 RX ORDER — LEVOTHYROXINE SODIUM 0.15 MG/1
150 TABLET ORAL
Status: DISCONTINUED | OUTPATIENT
Start: 2020-11-04 | End: 2020-11-07

## 2020-11-04 RX ORDER — BISACODYL 10 MG
10 SUPPOSITORY, RECTAL RECTAL
Status: DISCONTINUED | OUTPATIENT
Start: 2020-11-04 | End: 2020-11-07

## 2020-11-04 RX ORDER — MIRTAZAPINE 15 MG/1
30 TABLET, FILM COATED ORAL NIGHTLY
Status: DISCONTINUED | OUTPATIENT
Start: 2020-11-04 | End: 2020-11-07

## 2020-11-04 RX ORDER — DOCUSATE SODIUM 100 MG/1
100 CAPSULE, LIQUID FILLED ORAL 2 TIMES DAILY
Status: DISCONTINUED | OUTPATIENT
Start: 2020-11-04 | End: 2020-11-07

## 2020-11-04 RX ORDER — GABAPENTIN 100 MG/1
200 CAPSULE ORAL NIGHTLY
Status: DISCONTINUED | OUTPATIENT
Start: 2020-11-04 | End: 2020-11-07

## 2020-11-04 RX ORDER — HEPARIN SODIUM 5000 [USP'U]/ML
5000 INJECTION, SOLUTION INTRAVENOUS; SUBCUTANEOUS EVERY 8 HOURS SCHEDULED
Status: DISCONTINUED | OUTPATIENT
Start: 2020-11-04 | End: 2020-11-07

## 2020-11-04 RX ORDER — DEXTROSE MONOHYDRATE 25 G/50ML
50 INJECTION, SOLUTION INTRAVENOUS
Status: DISCONTINUED | OUTPATIENT
Start: 2020-11-04 | End: 2020-11-07

## 2020-11-04 RX ORDER — PANTOPRAZOLE SODIUM 40 MG/1
40 TABLET, DELAYED RELEASE ORAL
Status: DISCONTINUED | OUTPATIENT
Start: 2020-11-04 | End: 2020-11-07

## 2020-11-04 RX ORDER — ONDANSETRON 2 MG/ML
4 INJECTION INTRAMUSCULAR; INTRAVENOUS EVERY 6 HOURS PRN
Status: DISCONTINUED | OUTPATIENT
Start: 2020-11-04 | End: 2020-11-07

## 2020-11-04 RX ORDER — SODIUM BICARBONATE 325 MG/1
650 TABLET ORAL 2 TIMES DAILY
Status: DISCONTINUED | OUTPATIENT
Start: 2020-11-04 | End: 2020-11-07

## 2020-11-04 RX ORDER — SERTRALINE HYDROCHLORIDE 100 MG/1
100 TABLET, FILM COATED ORAL DAILY
Status: DISCONTINUED | OUTPATIENT
Start: 2020-11-04 | End: 2020-11-07

## 2020-11-04 RX ORDER — ACETAMINOPHEN 325 MG/1
650 TABLET ORAL EVERY 6 HOURS PRN
Status: DISCONTINUED | OUTPATIENT
Start: 2020-11-04 | End: 2020-11-07

## 2020-11-04 RX ORDER — DEXTROSE MONOHYDRATE 25 G/50ML
50 INJECTION, SOLUTION INTRAVENOUS ONCE
Status: COMPLETED | OUTPATIENT
Start: 2020-11-04 | End: 2020-11-04

## 2020-11-04 RX ORDER — METOCLOPRAMIDE HYDROCHLORIDE 5 MG/ML
5 INJECTION INTRAMUSCULAR; INTRAVENOUS EVERY 8 HOURS PRN
Status: DISCONTINUED | OUTPATIENT
Start: 2020-11-04 | End: 2020-11-07

## 2020-11-04 RX ORDER — MELATONIN
3 NIGHTLY
Status: DISCONTINUED | OUTPATIENT
Start: 2020-11-04 | End: 2020-11-07

## 2020-11-04 RX ORDER — POLYETHYLENE GLYCOL 3350 17 G/17G
17 POWDER, FOR SOLUTION ORAL DAILY PRN
Status: DISCONTINUED | OUTPATIENT
Start: 2020-11-04 | End: 2020-11-07

## 2020-11-04 RX ORDER — SODIUM BICARBONATE 150 MEQ/1,000 ML IN DEXTROSE 5 % INTRAVENOUS
125 SOLUTION CONTINUOUS
Status: DISCONTINUED | OUTPATIENT
Start: 2020-11-04 | End: 2020-11-05

## 2020-11-04 NOTE — ED INITIAL ASSESSMENT (HPI)
Pt had recent lab work done. Pts potassium was 6.1. Pts PCP called to go to the ER. Pt has a nephrostomy that is leaking.

## 2020-11-04 NOTE — PLAN OF CARE
Problem: Impaired Activities of Daily Living  Goal: Achieve highest/safest level of independence in self care  Description: Interventions:  - Assess ability and encourage patient to participate in ADLs to maximize function  - Promote sitting position [facility]

## 2020-11-04 NOTE — OCCUPATIONAL THERAPY NOTE
OCCUPATIONAL THERAPY QUICK EVALUATION - INPATIENT    Room Number: 529/529-A  Evaluation Date: 11/4/2020     Type of Evaluation: Initial  Presenting Problem: COVID-19    Physician Order: IP Consult to Occupational Therapy  Reason for Therapy:  ADL/IADL Dysf PROFILE    HOME SITUATION  Type of Home: House  Home Layout: Able to live on main level  Lives With: Son    Toilet and Equipment: Toilet riser with arms;Grab bar  Shower/Tub and Equipment: Walk-in shower; Shower chair;Grab bar  Other Equipment: None    [employer] supine in bed for session, pt t/f to EOB with max assist, pt then sat with CGA, therapist completed MMT and ROM on pt, pt was able to complete sit to stand with mod assist x2 or max assist x1 and take steps up to [facility] with RW and mod assist, therapist evelia demonstrate safety with ADLS: at previous functional level

## 2020-11-04 NOTE — CONSULTS
[facility]    Report of Consultation    Tuan Vuong Patient Status:  Inpatient    1935 MRN [id number]   [facility] 5NW-A Attending Venice Stern,    Hosp Day # 0 PCP Zina Hernandez MD       REASON FOR CONSULT:     ANTONIO, hype mellitus without mention of complication, not stated as uncontrolled    • Vision loss     need cataract surgery     Past Surgical History:   Procedure Laterality Date   •      • COLECTOMY N/A 2019    Performed by Mirna Esquivel MD at [place] [address]. Levothyroxine Sodium (SYNTHROID) tab 150 mcg, 150 mcg, Oral, Before breakfast  •  lidocaine-menthol 4-1 % 1 patch, 1 patch, Transdermal, Q24H  •  melatonin tab 3 mg, 3 mg, Oral, Nightly  •  mirtazapine (REMERON) tab 30 mg, 30 mg, Oral, Nightly  •  Pantopra DATA:       Lab Results   Component Value Date    GLU 96 11/04/2020    BUN 49 (H) 11/04/2020    BUNCREA 26.1 (H) 11/04/2020    CREATSERUM 1.88 (H) 11/04/2020    ANIONGAP 4 11/04/2020    GFR 69 11/29/2016    GFRNAA 24 (L) 11/04/2020    GFRAA 28 (L) 11/04/20 (A) 10/08/2020    RBCUR 3-5 (A) 10/08/2020    EPIUR Large (A) 10/08/2020    BACUR None Seen 10/08/2020    CAOXUR Occasional (A) 03/11/2020    HYLUR Present (A) 04/06/2020         IMAGING:       ASSESSMENT/PLAN:     82 yo F with history of ischemic bowel s/

## 2020-11-04 NOTE — CONSULTS
[facility] Urology   Consultation Note    Tuan Vuong Patient Status:  Inpatient    1935 MRN [id number]   [facility] 5NW-A Attending Hodan Waldrop, DO   Hosp Day # 0 PCP Violeta Gonzalez MD     Reason for Consultation:  Mariely Chase Aliza   •      • OTHER      SHOULDER, KNEES   • OTHER SURGICAL HISTORY      KNEE SURGERY   • OTHER SURGICAL HISTORY      GALLBLADDER   • OTHER SURGICAL HISTORY      STOMACH SURGERY   • OTHER SURGICAL HISTORY      RECONSTUCTION RT UPPER ARM   • PA chewable tab 4 tablet, 4 tablet, Oral, Q15 Min PRN **OR** dextrose 50 % injection 50 mL, 50 mL, Intravenous, Q15 Min PRN **OR** glucose (DEX4) oral liquid 30 g, 30 g, Oral, Q15 Min PRN **OR** Glucose-Vitamin C (DEX-4) chewable tab 8 tablet, 8 tablet, Oral, aneurysm. RETROPERITONEUM:  No mass or adenopathy. BOWEL/MESENTERY:  Subtotal colectomy. There is an ostomy in on the right. There is a sigmoid stump. There is a small amount of diverticulosis.   There is no obstruction, fluid, free air or lymphade Malnutrition ([facility] [address]     Acute renal insufficiency     ANTONIO (acute kidney injury) ([facility] [address]     Urinary tract infection without hematuria     Urinary tract infection without hematuria, site unspecified     Hydronephrosis, unspecified hydronephrosis type     Acu to flush NT well. No further leakage noted. Urine draining into NT bag. Will continue to observe. Await renal US results. Above discussed with Dr. Rachna Pruitt and patient's nurse.       ETIENNE Esquivel  [facility] Urology

## 2020-11-04 NOTE — H&P
[facility] Hospitalist History and Physical      Patient presents with:  Abnormal Labs       PCP: Jacquie Limon MD      History of Present Illness: Patient is a 81year old female with PMH sig for DM II, CKD, chronic R sided nephrostomy, AS, and GERD who presented W END COLOSTOMY  11/12/2019   • REMOVAL GALLBLADDER     • RIGHT PHACOEMULSIFICATION OF CATARACT WITH INTRAOCULAR LENS IMPLANT 50502 Right 4/26/2017    Performed by Eden Colbert MD at [place] [address] Left 11/12/2019 Hx  Family History   Problem Relation Age of Onset   • Diabetes Daughter    • Hypertension Daughter    • Diabetes Son    • Hypertension Son        Review of Systems  Comprehensive ROS reviewed and negative except for what is stated in HPI.       OBJECTIVE: ---------------------------------------------------------------------------- Transthoracic Echocardiogram Name:Tuan Vuong Date: 10/12/2020 :  1935 Ht:  (61in)  BP: 120 / 61 MRN:  [id number]    Age:  85years    Wt:  (149lb) HR: 44bpm Loc:  EDW ---------------------------------------------------------------------------- * Left ventricle: The cavity size was normal. There was mild concentric hypertrophy. Systolic function was normal. The estimated ejection fraction was 65-70%.  There was no diagno ---------------------------------------------------------------------------- Measurements  Left ventricle                                 Value          Reference  LV ID, ED, PLAX                                4.7   cm       3.9 - 5.3  LV ID, ES, PLAX 0.75  cm^2     ---------  Aortic valve area/bsa, peak velocity           0.45  cm^2/m^2 ---------   Aorta                                          Value          Reference  Aortic root ID, ED, MM                         2.9   cm       ---------   [facility] Data Registry) which includes the Dose Index Registry. PATIENT STATED HISTORY: (As transcribed by Technologist)     FINDINGS:  KIDNEYS:  There is a nephrostomy tube on the right. There is no hydronephrosis. No stones.   Left kidney is normal. (As transcribed by Technologist)  Patient offered no additional history at this time. FINDINGS:  Stable cardiac size. Small left-sided pleural effusion. Bibasilar opacities. Interstitial opacities bilaterally.   Atheromatous calcification of the aorta patient tolerated the procedure well and there were no immediate complications. The patient was assessed and IV was established or checked and maintained by a radiology nurse. Moderate sedation was given.   1 mg Versed and 100 mcg of Fentanyl was administ the physical exam.     Outpatient records or previous hospital records reviewed. [facility] hospitalist to continue to follow patient while in house  A total of 70 minutes taken with patient and coordinating care. Greater than 50% face to face encounter.

## 2020-11-04 NOTE — PLAN OF CARE
11/4/2020    COVID (+)    A&Ox4  RA  Greenlandic speaking only- video  in room  NSR on tele  RLQ colostomy bag full and emptied  R flank nephrosotomy bag emptied- Moderate amount of leakage- cleaned and notified MD  Tender at site to touch  Flushed n

## 2020-11-04 NOTE — PLAN OF CARE
Pt. Admitted to the floor from ED at approximately 0330. Pt. pulled from cart to bed as she is too weak to walk. Pt. Is Bulgarian speaking- video interpretor used for admission database.  Attempted to call her son Prieto Elias to go over PTA meds and admittin

## 2020-11-04 NOTE — ED NOTES
Patient calling out for help, arrived patient said \"no\" when pointed to the commode. Patient pointed towards the wipes, I wiped her and then patient urinated in the bed. Sheets were changed completely by this PCT and patient's RN.  Nephrostomy bag was emp

## 2020-11-04 NOTE — HOME CARE LIAISON
Pt current with [facility] (RN, PT, OT, HHA, [facility]). Will need TAMMY upon d/c if returning with [facility].

## 2020-11-04 NOTE — PHYSICAL THERAPY NOTE
PHYSICAL THERAPY QUICK EVALUATION - INPATIENT    Room Number: 529/529-A  Evaluation Date: 11/4/2020  Presenting Problem: (+) Covid  Physician Order: PT Eval and Treat    Problem List  Principal Problem:    Hyperkalemia  Active Problems:    CKD (chronic k Prior Level of [place]: Pt lives with son, who works from home. Pt requires assist for functional mobility, has home health aide and Lazaro Hemp lift at home. SUBJECTIVE  Pt stating she is itchy    OBJECTIVE  Precautions: Bed/chair alarm; Other (Comm CL      FUNCTIONAL ABILITY STATUS  Gait Assessment  Gait Assistance: Maximum assistance  Distance (ft): 4  Assistive Device: Rolling walker  Pattern: Shuffle;L Decreased stance time;R Decreased stance time;R Flexed knee;L Flexed knee          Skilled Thera

## 2020-11-05 PROCEDURE — 99232 SBSQ HOSP IP/OBS MODERATE 35: CPT | Performed by: HOSPITALIST

## 2020-11-05 PROCEDURE — 05HY33Z INSERTION OF INFUSION DEVICE INTO UPPER VEIN, PERCUTANEOUS APPROACH: ICD-10-PCS | Performed by: HOSPITALIST

## 2020-11-05 RX ORDER — SODIUM CHLORIDE 9 MG/ML
INJECTION, SOLUTION INTRAVENOUS CONTINUOUS
Status: DISCONTINUED | OUTPATIENT
Start: 2020-11-05 | End: 2020-11-06

## 2020-11-05 NOTE — CONSULTS
[address] [facility]  TEL: [phone]  FAX: [phone]    Farazdebora TORO Yevgeniy Patient Status:  Inpatient    1935 MRN [id number]   [facility] 5NW-A Attending Zully Burch MD   Hosp Day # 1 PCP Demetrice Stafford MD HISTORY      RECONSTUCTION RT UPPER ARM   • PART REMOVAL COLON W END COLOSTOMY  11/12/2019   • REMOVAL GALLBLADDER     • RIGHT PHACOEMULSIFICATION OF CATARACT WITH INTRAOCULAR LENS IMPLANT 79970 Right 4/26/2017    Performed by Yi Riley MD at [facility] **OR** glucose (DEX4) oral liquid 30 g, 30 g, Oral, Q15 Min PRN **OR** Glucose-Vitamin C (DEX-4) chewable tab 8 tablet, 8 tablet, Oral, Q15 Min PRN  •  Insulin Aspart Pen (NOVOLOG) 100 UNIT/ML flexpen 1-5 Units, 1-5 Units, Subcutaneous, TID CC and HS  •  i 11/04/2020       Microbiologic Data:     Reviewed in EMR    Imaging:    Imaging results reviewed     Impression:  Patient Active Problem List:     Dyspnea     Pulmonary embolus (HCC)     Chest pain     Type 2 diabetes mellitus with hyperglycemia ([facility] [address] Hypomagnesemia     Physical deconditioning     Gastroesophageal reflux disease without esophagitis     Obstructive uropathy     H/O ischemic bowel disease     Weakness generalized     CKD (chronic kidney disease) stage 4, GFR 15-29 ml/min ([address]     COVID-1

## 2020-11-05 NOTE — CM/SW NOTE
11/05/20 1700   CM/SW Screening   Referral Source Social Work (self-referral)   Information Source Chart review;Nursing rounds   Patient's Mental Status Alert;Oriented   Patient's [address]   Patient lives with Children   Patient Status Fidelia

## 2020-11-05 NOTE — PROGRESS NOTES
[facility] H[facility]PITAL[facility]  Progress Note     Tuan Vuong Patient Status:  Inpatient    1935 MRN [id number]   [facility] 5NW-A Attending Jacquelyn José MD   Hosp Day # 1 PCP Milagro Jacobo MD     Chief Complaint: COVID    S: Patient seen Titus Ledezma 6. 0* 6.0* 5.5* 4.5   * 118*  --  115* 111   CO2 18.0* 18.0*  --  17.0* 27.0   ALKPHO 139  --   --   --   --    AST 15  --   --   --   --    ALT 15  --   --   --   --    BILT 0.2  --   --   --   --    TP 8.4*  --   --   --   --        Estimated Creati on: clinical improvement  At this point Ms. Gustavo Tijerina is expected to be discharge to: Home    Plan of care discussed with pt at bedside    New Xiong DO

## 2020-11-05 NOTE — PLAN OF CARE
A/o x4, NSR on tele. WNL on RA. Denies any pain or SOB at this time. R colostomy in place, draining well, CDI. R nephrostomy in place, draining well, CDI. Plan of care discussed with pt, verbalized understanding. Call light within reach.         Problem: Im

## 2020-11-05 NOTE — PROGRESS NOTES
[facility]    Nephrology Progress Note    Tuan Vuong Attending:  Monica Helton*       SUBJECTIVE:     Working with nurse, gestures that she wants to be fed, she is hungry    PHYSICAL EXAM:     Vital Signs: /65 (BP Location: Left a 1.39 11/20/2019    EOSABS 0.23 11/20/2019    BASABS 0.08 11/20/2019    NEUT 75 11/20/2019    LYMPH 18 11/20/2019    MON 2 11/20/2019    EOS 3 11/20/2019    BASO 1 11/20/2019    NEPERCENT 43.3 11/05/2020    LYPERCENT 44.1 11/05/2020    MOPERCENT 6.9 11/05/2 lidocaine-menthol 4-1 % 1 patch, 1 patch, Transdermal, Q24H    •  melatonin tab 3 mg, 3 mg, Oral, Nightly    •  mirtazapine (REMERON) tab 30 mg, 30 mg, Oral, Nightly    •  Pantoprazole Sodium (PROTONIX) EC tab 40 mg, 40 mg, Oral, QAM AC    •  Sertraline HC UTI:  -- UA abn with reflex culture pending, starting meropenem per ID    COVID19 infection:  -- per ID/primary    Thank you for allowing me to participate in the care of this patient. Please do not hesitate to call with questions or concerns.         Sid

## 2020-11-05 NOTE — PROGRESS NOTES
[facility]  Urology Progress Note    Tuan Vuong Patient Status:  Inpatient    1935 MRN [id number]   [facility] 5NW-A Attending Shay Magaña MD   [facility] Day # 1 PCP Angella Mohr MD     Subjective:  Garlon Galeazzi is a(n) 85 year creat improving  -serum creat 2.06-->2.1-->1.88-->1.8-->1.64  Nephrology following      Plan:   -Patient afebrile, no leukocytosis  -Serum creat improving and renal US is negative for hydronephrosis.   -Check final culture results  -Abx per ID  -Follow labs

## 2020-11-06 PROCEDURE — 99232 SBSQ HOSP IP/OBS MODERATE 35: CPT | Performed by: HOSPITALIST

## 2020-11-06 NOTE — DIETARY NOTE
[facility]  NUTRITION INITIAL ASSESSMENT    Pt does not meet malnutrition criteria. NUTRITION DIAGNOSIS/PROBLEM:  Predicted suboptimal energy intake related to physiological causes as evidenced by PO intake <75%.      Increased nutrient needs relate Preferences Addresses: Yes    NUTRITION RELATED PHYSICAL FINDINGS:  1. Body Fat/Muscle Mass: ROSE per COVID19 restrictions. 2. Fluid Accumulation: none per RN documentation.     NUTRITION PRESCRIPTION: 67.9 kg  Calories: 8319-5203 calories/day (25-30 calori

## 2020-11-06 NOTE — PLAN OF CARE
Assumed care at [address] [facility]. Pt A&Ox4. Chinese speaking. Using  for communication. Pt has no teeth and no dentures. Pt is on RA 96%. NSR on tele. VSS. Pt has R colostomy bag. R nephrostomy tube. Pt is incontinent, purewicked and briefed.  Denies pain at

## 2020-11-06 NOTE — PLAN OF CARE
Pt denies c/o pain. A/Ox4. Nepali speaking, this RN using Ipad  to communicate plan of care and to order meals. VSS. Afebrile. Lung sounds clear and diminished bilaterally, on room air. NSR on telemetry. Hep subq given.  NS infusing per Renal ability and encourage patient to participate in ADLs to maximize function  - Promote sitting position while performing ADLs such as feeding, grooming, and bathing  - Educate and encourage patient/family in tolerated functional activity level and precaution

## 2020-11-06 NOTE — PROGRESS NOTES
[facility]    Nephrology Progress Note    Tuan Vuong Attending:  Dhara Lange*       SUBJECTIVE:     Stool output decreased and urine output increased  Per nursing, she is not eating very much    PHYSICAL EXAM:     Vital Signs: / LYMPHABS 1.39 11/20/2019    EOSABS 0.23 11/20/2019    BASABS 0.08 11/20/2019    NEUT 75 11/20/2019    LYMPH 18 11/20/2019    MON 2 11/20/2019    EOS 3 11/20/2019    BASO 1 11/20/2019    NEPERCENT 43.3 11/05/2020    LYPERCENT 44.1 11/05/2020    MOPERCENT 6. Q24H    •  melatonin tab 3 mg, 3 mg, Oral, Nightly    •  mirtazapine (REMERON) tab 30 mg, 30 mg, Oral, Nightly    •  Pantoprazole Sodium (PROTONIX) EC tab 40 mg, 40 mg, Oral, QAM AC    •  Sertraline HCl (ZOLOFT) tab 100 mg, 100 mg, Oral, Daily    •  sodium with reflex culture showing candida 11/4, culture from 11/5 pending, on meropenem per ID     COVID19 infection:  -- per ID/primary    D/w nursing. Thank you for allowing me to participate in the care of this patient.  Please do not hesitate to call with

## 2020-11-06 NOTE — PROGRESS NOTES
[facility]PITAL[facility]  Progress Note     Tuan Vuong Patient Status:  Inpatient    1935 MRN [id number]   [facility] 5NW-A Attending Bhavin Matthews MD   Hosp Day # 2 PCP Gilberto Montoya MD     Chief Complaint: COVID    S: Patient seen Aline Donnelly K 6.4*   < > 5.5* 4.5 4.7   *   < > 115* 111 111   CO2 18.0*   < > 17.0* 27.0 26.0   ALKPHO 139  --   --   --   --    AST 15  --   --   --   --    ALT 15  --   --   --   --    BILT 0.2  --   --   --   --    TP 8.4*  --   --   --   --     < > = valu No  Estimated date of discharge: next 24 hours. Discharge is dependent on: clinical improvement  At this point Ms. Mi Olivier is expected to be discharge to: Home    Plan of care discussed with pt at bedside    Rosette Garces DO

## 2020-11-06 NOTE — PLAN OF CARE
Pt denies c/o pain. A/Ox4. French speaking. Ipad  at bedside. Spoke with family on phone today. Family updated on POC.  VSS. Afebrile. Lung sounds diminished, on room air. SpO2 in 90s. SB and NSR on telemetry. Hep subq given.   IV Fluconozole Diabetes/Glucose Control  Goal: Glucose maintained within prescribed range  Description: INTERVENTIONS:  - Monitor Blood Glucose as ordered  - Assess for signs and symptoms of hyperglycemia and hypoglycemia  - Administer ordered medications to maintain glu

## 2020-11-07 VITALS
TEMPERATURE: 98 F | SYSTOLIC BLOOD PRESSURE: 142 MMHG | OXYGEN SATURATION: 98 % | WEIGHT: 149.69 LBS | HEART RATE: 54 BPM | HEIGHT: 60 IN | BODY MASS INDEX: 29.39 KG/M2 | DIASTOLIC BLOOD PRESSURE: 62 MMHG | RESPIRATION RATE: 16 BRPM

## 2020-11-07 PROCEDURE — 99239 HOSP IP/OBS DSCHRG MGMT >30: CPT | Performed by: HOSPITALIST

## 2020-11-07 RX ORDER — FLUCONAZOLE 200 MG/1
400 TABLET ORAL DAILY
Qty: 7 TABLET | Refills: 0 | Status: SHIPPED | OUTPATIENT
Start: 2020-11-07 | End: 2020-11-14

## 2020-11-07 NOTE — PLAN OF CARE
Pt is A&Ox4, primarily speaks Danish, no teeth/dentures. VSS, afebrile. Maintaining O2 sats >92% RA. Tele, SB with 1st degree AV block with occasional PVCs. SCDs in place. Heparin. EP. LLQ ileostomy draining WDL. Tolerating regular chopped diet, accu QID. self-care    Outcome: Progressing     Problem: Diabetes/Glucose Control  Goal: Glucose maintained within prescribed range  Description: INTERVENTIONS:  - Monitor Blood Glucose as ordered  - Assess for signs and symptoms of hyperglycemia and hypoglycemia  -

## 2020-11-07 NOTE — PROGRESS NOTES
[facility]    Nephrology Progress Note    Tuan Vuong Attending:  Kristi Pearson*       SUBJECTIVE:     Resting comfortably    PHYSICAL EXAM:     Vital Signs: /76 (BP Location: Right arm)   Pulse 56   Temp 98.3 °F (36.8 °C) (Oral) 11/20/2019    NEUT 75 11/20/2019    LYMPH 18 11/20/2019    MON 2 11/20/2019    EOS 3 11/20/2019    BASO 1 11/20/2019    NEPERCENT 43.3 11/05/2020    LYPERCENT 44.1 11/05/2020    MOPERCENT 6.9 11/05/2020    EOPERCENT 5.1 11/05/2020    BAPERCENT 0.4 11/05/20 Oral, Nightly    •  Pantoprazole Sodium (PROTONIX) EC tab 40 mg, 40 mg, Oral, QAM AC    •  Sertraline HCl (ZOLOFT) tab 100 mg, 100 mg, Oral, Daily    •  sodium bicarbonate tab 650 mg, 650 mg, Oral, BID    •  glucose (DEX4) oral liquid 15 g, 15 g, Oral, Q15 discharge from nephrology standpoint when cleared by other services. Follow up with nephrology in 1 month as outpatient. Nephrology will sign off. Thank you for allowing me to participate in the care of this patient.  Please do not hesitate to call

## 2020-11-07 NOTE — CM/SW NOTE
RN states pt is ready for discharge. SW spoke with pt's son, and arranged time and ambulance. [facility] aware of pt's discharge.      Irene Silver [phone] has been requested to arrange ambulance for  time of 4:30

## 2020-11-07 NOTE — PLAN OF CARE
Super adorable lady. Alert x 4. Spoke with her per . Speaks Setswana. Has ileostomy & nephrostomy draining yellow fluid. C/O itching in her karli area & her buttocks. Cleaned & cream applied. Home today.  Spoke with son & they would like ambulance tra

## 2020-11-07 NOTE — PLAN OF CARE
Pt is A&Ox4, primarily speaks Sami, no teeth/dentures. VSS, afebrile. Maintaining O2 sats >92% RA. Tele, SB with 1st degree AV block with occasional PVCs. SCDs in place. Heparin. EP. LLQ ileostomy draining WDL. Tolerating regular chopped diet, accu QID. self-care    Outcome: Progressing     Problem: Diabetes/Glucose Control  Goal: Glucose maintained within prescribed range  Description: INTERVENTIONS:  - Monitor Blood Glucose as ordered  - Assess for signs and symptoms of hyperglycemia and hypoglycemia  -

## 2020-11-07 NOTE — PROGRESS NOTES
[facility]  Progress Note     Tuan Vuong Patient Status:  Inpatient    1935 MRN [id number]   [facility] 5NW-A Attending Nigel Jenkins MD   Hosp Day # 3 PCP Ruperto Xavier MD     Chief Complaint: COVID    S: Patient seen James Cheng --   --   --    ALT 15  --   --   --   --    BILT 0.2  --   --   --   --    TP 8.4*  --   --   --   --     < > = values in this interval not displayed. Estimated Creatinine Clearance: 20.2 mL/min (A) (based on SCr of 1.49 mg/dL (H)).     No results fo this point Ms. Janna Sanchez is expected to be discharge to: Home    Plan of care discussed with pt at bedside    Jordan Jaquez DO

## 2020-11-07 NOTE — PROGRESS NOTES
[facility]  TEL: [phone]  FAX: [phone]    Farazdebora TORO Yevgeniy Patient Status:  Inpatient    1935 MRN [id number]   [facility] 5NW-A Attending Shauna Morales MD   Hosp Day # 2 PCP Jacquie Limon MD (PF) (FLUZONE HD) high dose for 65 yrs & older inj 0.7ml, 0.7 mL, Intramuscular, Prior to discharge  •  Heparin Sodium (Porcine) 5000 UNIT/ML injection 5,000 Units, 5,000 Units, Subcutaneous, Q8H [address]        Physical Exam:    Temp (24hrs), Av.5 °F (36.9

## 2020-11-09 ENCOUNTER — MED REC SCAN ONLY (OUTPATIENT)
Dept: FAMILY MEDICINE CLINIC | Facility: CLINIC | Age: 85
End: 2020-11-09

## 2020-11-10 ENCOUNTER — TELEPHONE (OUTPATIENT)
Dept: FAMILY MEDICINE CLINIC | Facility: CLINIC | Age: 85
End: 2020-11-10

## 2020-11-10 ENCOUNTER — PATIENT OUTREACH (OUTPATIENT)
Dept: CASE MANAGEMENT | Age: 85
End: 2020-11-10

## 2020-11-10 DIAGNOSIS — N39.0 URINARY TRACT INFECTION WITHOUT HEMATURIA, SITE UNSPECIFIED: ICD-10-CM

## 2020-11-10 DIAGNOSIS — N17.9 ACUTE RENAL FAILURE, UNSPECIFIED ACUTE RENAL FAILURE TYPE (HCC): ICD-10-CM

## 2020-11-10 DIAGNOSIS — Z02.9 ENCOUNTERS FOR UNSPECIFIED ADMINISTRATIVE PURPOSE: ICD-10-CM

## 2020-11-10 DIAGNOSIS — N18.4 CKD (CHRONIC KIDNEY DISEASE) STAGE 4, GFR 15-29 ML/MIN (HCC): ICD-10-CM

## 2020-11-10 DIAGNOSIS — U07.1 COVID-19 VIRUS INFECTION: ICD-10-CM

## 2020-11-10 DIAGNOSIS — E87.5 HYPERKALEMIA: ICD-10-CM

## 2020-11-10 DIAGNOSIS — D63.8 ANEMIA, CHRONIC DISEASE: ICD-10-CM

## 2020-11-10 DIAGNOSIS — E11.65 TYPE 2 DIABETES MELLITUS WITH HYPERGLYCEMIA, WITHOUT LONG-TERM CURRENT USE OF INSULIN (HCC): ICD-10-CM

## 2020-11-10 DIAGNOSIS — R53.1 WEAKNESS GENERALIZED: ICD-10-CM

## 2020-11-10 PROCEDURE — 1111F DSCHRG MED/CURRENT MED MERGE: CPT

## 2020-11-10 NOTE — TELEPHONE ENCOUNTER
Dr. Rickey Miranda,  [id number]    Patient was Covid positive on 11/3/2020 in ED. Will contact family and schedule Video Visit for TCM/HFU.

## 2020-11-10 NOTE — PROGRESS NOTES
Initial Post Discharge Follow Up   Discharge Date: 11/7/20  Contact Date: 11/10/2020    Consent Verification:  Assessment Completed With: Other: Cruz patient's son Permission received per patient?  written  HIPAA Verified?   Yes    Discharge Dx:     Wayne Andrade activities of living as you have prior to your hospital stay (dressing, bathing, ambulating to the bathroom, etc)? no  o If No, What are some barriers or concerns? Patient has been bed bound since last January, needs assist with all ADL's.   • (BERNARDINO) Was pa side effects reviewed? yes  o Do you have any questions about your new medication? No  • Did you  your discharge medications when you left the hospital? Yes  • May I go over your medications with you to make sure we are not missing anything? yes  • A Cruz to call his mother’s PCP with any questions or needs, he states he will. The patient is to return to the ED if experiencing chest pain, trouble breathing, confusion, or a high fever that does not decrease with the use of tylenol.  COVID hotline: 468

## 2020-11-10 NOTE — TELEPHONE ENCOUNTER
Poonam Yi from [employer] called to inform  that at the request of the Pt's son the Physical Therapy Evaluation was cancelled.

## 2020-11-10 NOTE — TELEPHONE ENCOUNTER
Patient was discharged home from [facility] on 11/7/2020, (patient had been in prior 10/8-10/13) and is at High risk for readmission and recommended to have a TCM HFU by 11/14/2020 preferred or no later than 11/21/2020 or sooner.   NCM attempted to nieves

## 2020-11-11 NOTE — TELEPHONE ENCOUNTER
Future Appointments   Date Time Provider Kiel Alarcon   11/12/2020  1:00 PM Bebe Griffin MD [facility]

## 2020-11-12 ENCOUNTER — TELEMEDICINE (OUTPATIENT)
Dept: FAMILY MEDICINE CLINIC | Facility: CLINIC | Age: 85
End: 2020-11-12

## 2020-11-12 ENCOUNTER — TELEPHONE (OUTPATIENT)
Dept: FAMILY MEDICINE CLINIC | Facility: CLINIC | Age: 85
End: 2020-11-12

## 2020-11-12 VITALS — WEIGHT: 150 LBS | BODY MASS INDEX: 29.45 KG/M2 | HEIGHT: 60 IN

## 2020-11-12 DIAGNOSIS — H01.119 CONTACT DERMATITIS OF EYELID, UNSPECIFIED LATERALITY: ICD-10-CM

## 2020-11-12 DIAGNOSIS — R53.81 PHYSICAL DECONDITIONING: ICD-10-CM

## 2020-11-12 DIAGNOSIS — N39.0 URINARY TRACT INFECTION WITHOUT HEMATURIA, SITE UNSPECIFIED: ICD-10-CM

## 2020-11-12 DIAGNOSIS — B35.6 DERMATOPHYTOSIS OF GROIN AND PERIANAL AREA: ICD-10-CM

## 2020-11-12 DIAGNOSIS — U07.1 COVID-19 VIRUS INFECTION: ICD-10-CM

## 2020-11-12 DIAGNOSIS — D64.9 ANEMIA, UNSPECIFIED TYPE: ICD-10-CM

## 2020-11-12 DIAGNOSIS — T83.022A DISPLACEMENT OF NEPHROSTOMY TUBE (HCC): ICD-10-CM

## 2020-11-12 DIAGNOSIS — E87.5 HYPERKALEMIA: Primary | ICD-10-CM

## 2020-11-12 PROCEDURE — 99214 OFFICE O/P EST MOD 30 MIN: CPT | Performed by: FAMILY MEDICINE

## 2020-11-12 RX ORDER — PANTOPRAZOLE SODIUM 40 MG/1
40 TABLET, DELAYED RELEASE ORAL
Qty: 30 TABLET | Refills: 0 | Status: SHIPPED | OUTPATIENT
Start: 2020-11-12 | End: 2020-12-08

## 2020-11-12 RX ORDER — CLOTRIMAZOLE AND BETAMETHASONE DIPROPIONATE 10; .64 MG/G; MG/G
1 CREAM TOPICAL 2 TIMES DAILY PRN
Qty: 60 G | Refills: 0 | Status: SHIPPED | OUTPATIENT
Start: 2020-11-12

## 2020-11-12 NOTE — PROGRESS NOTES
Please note that the following visit was completed using two-way, real-time interactive audio and video communication.   This has been done in good saad to provide continuity of care in the best interest of the provider-patient relationship, due to the o Status Reviewed          CURRENT MEDICATIONS   Current Outpatient Medications   Medication Sig Dispense Refill   • clotrimazole-betamethasone 1-0.05 % External Cream Apply 1 Application topically 2 (two) times daily as needed.  60 g 0   • fluconazole 200 MG stiffness or swelling. GI: No nausea, vomiting or diarrhea. No blood in stools.   Neurologic: No seizures, tremors, weakness or numbness     Ht 60\"   Wt 150 lb (68 kg)   BMI 29.29 kg/m²   Physical Exam:  alert, appears stated age and cooperative, very ple [id number]    Electronically signed

## 2020-11-12 NOTE — TELEPHONE ENCOUNTER
LOV     LAST LAB     LAST RX   Pantoprazole Sodium 40 MG Oral Tab EC 30 tablet 0 10/14/2020    Sig:   Take 1 tablet (40 mg total) by mouth every morning before breakfast.           Next OV No future appointments. PROTOCOL NONE . Please advise?

## 2020-11-12 NOTE — TELEPHONE ENCOUNTER
Miguel from residential home [facility] called, stated pt resumed care 1x week for 8 weeks, now refuses therapy and home health aid.

## 2020-11-16 ENCOUNTER — PATIENT OUTREACH (OUTPATIENT)
Dept: CASE MANAGEMENT | Age: 85
End: 2020-11-16

## 2020-11-20 NOTE — DISCHARGE SUMMARY
[facility]  DISCHARGE SUMMARY     Tuan Vuong Patient Status:  Inpatient    1935 MRN [id number]   [facility] 5NW-A Attending No att. providers found   Hosp Day # 3 PCP Ambreen Vincent MD     Date of Admission: 11/3/2020  Date of Shell Navarro  • ID- Dr. Gladis Yost    Discharge Medication List:     Discharge Medications      CONTINUE taking these medications      Instructions Prescription details   acetaminophen 325 MG Tabs  Commonly known as: TYLENOL      Take 2 tablets (650 mg total) by mouth taking on: November 14, 2020  Quantity: 7 tablet  Refills: 0           Where to Get Your Medications      [facility] [address] [facility] [address] [place] [address] [address]  [address], [phone], [phone]

## 2020-11-30 ENCOUNTER — TELEPHONE (OUTPATIENT)
Dept: FAMILY MEDICINE CLINIC | Facility: CLINIC | Age: 85
End: 2020-11-30

## 2020-11-30 NOTE — TELEPHONE ENCOUNTER
[facility] left a vm asking if it is ok to remove patient from Covid 19 Isolation.  Patient is 14 days asymptomatic.  '

## 2020-12-01 NOTE — TELEPHONE ENCOUNTER
ML for Stu Thakur RN, and advised patient is no longer considered contagious. Her positive covid test was collected 11/3/2020, and per previous message, patient is asymptomatic. No covid specific precautions necerssary.   As always, Victor Precautions ar

## 2020-12-08 DIAGNOSIS — H01.119 CONTACT DERMATITIS OF EYELID, UNSPECIFIED LATERALITY: ICD-10-CM

## 2020-12-08 RX ORDER — PANTOPRAZOLE SODIUM 40 MG/1
40 TABLET, DELAYED RELEASE ORAL
Qty: 30 TABLET | Refills: 0 | Status: SHIPPED | OUTPATIENT
Start: 2020-12-08 | End: 2021-01-14

## 2020-12-08 NOTE — TELEPHONE ENCOUNTER
LOV 11-12-20    LAST LAB    LAST RX 11-12-20  30*0    Next OV   Future Appointments   Date Time Provider Kiel Herri   12/29/2020  1:45 PM Ricardo Senior MD [facility] [address]         PROTOCOL  Pantoprazole Sodium 40 MG Oral Tab EC          Sig: Ta

## 2020-12-10 ENCOUNTER — TELEPHONE (OUTPATIENT)
Dept: FAMILY MEDICINE CLINIC | Facility: CLINIC | Age: 85
End: 2020-12-10

## 2020-12-10 NOTE — TELEPHONE ENCOUNTER
[facility] nurse called and said pt has burning when urinating. She collected a sample to run. Wants to know for next week she needs to do a BMP and renal panel? Please advise and call back.

## 2020-12-11 NOTE — TELEPHONE ENCOUNTER
Tommy 3,  [facility] nurse and relayed orders for CMP and BMP. UA-C&S has already been sent. Requested all results be faxed to our office.

## 2020-12-13 ENCOUNTER — TELEPHONE (OUTPATIENT)
Dept: FAMILY MEDICINE CLINIC | Facility: CLINIC | Age: 85
End: 2020-12-13

## 2020-12-15 ENCOUNTER — MED REC SCAN ONLY (OUTPATIENT)
Dept: FAMILY MEDICINE CLINIC | Facility: CLINIC | Age: 85
End: 2020-12-15

## 2020-12-16 ENCOUNTER — TELEPHONE (OUTPATIENT)
Dept: FAMILY MEDICINE CLINIC | Facility: CLINIC | Age: 85
End: 2020-12-16

## 2020-12-17 ENCOUNTER — HOSPITAL ENCOUNTER (OUTPATIENT)
Facility: HOSPITAL | Age: 85
Setting detail: OBSERVATION
Discharge: HOME | End: 2020-12-20
Attending: EMERGENCY MEDICINE | Admitting: HOSPITALIST
Payer: MEDICARE

## 2020-12-17 ENCOUNTER — APPOINTMENT (OUTPATIENT)
Dept: CT IMAGING | Facility: HOSPITAL | Age: 85
End: 2020-12-17
Attending: EMERGENCY MEDICINE
Payer: MEDICARE

## 2020-12-17 DIAGNOSIS — E87.5 HYPERKALEMIA: ICD-10-CM

## 2020-12-17 DIAGNOSIS — N30.00 ACUTE CYSTITIS WITHOUT HEMATURIA: ICD-10-CM

## 2020-12-17 DIAGNOSIS — D63.1 ANEMIA IN CHRONIC KIDNEY DISEASE (CODE): Primary | ICD-10-CM

## 2020-12-17 DIAGNOSIS — N17.9 AKI (ACUTE KIDNEY INJURY) (HCC): ICD-10-CM

## 2020-12-17 DIAGNOSIS — R10.9 ABDOMINAL PAIN OF UNKNOWN ETIOLOGY: ICD-10-CM

## 2020-12-17 DIAGNOSIS — E87.2 ACIDOSIS: ICD-10-CM

## 2020-12-17 DIAGNOSIS — E86.0 DEHYDRATION: Primary | ICD-10-CM

## 2020-12-17 PROBLEM — E11.9 TYPE 2 DIABETES MELLITUS WITHOUT COMPLICATION, WITHOUT LONG-TERM CURRENT USE OF INSULIN (HCC): Status: ACTIVE | Noted: 2020-05-06

## 2020-12-17 PROCEDURE — 74176 CT ABD & PELVIS W/O CONTRAST: CPT | Performed by: EMERGENCY MEDICINE

## 2020-12-17 PROCEDURE — 99220 INITIAL OBSERVATION CARE,LEVL III: CPT | Performed by: HOSPITALIST

## 2020-12-17 RX ORDER — HEPARIN SODIUM 5000 [USP'U]/ML
5000 INJECTION, SOLUTION INTRAVENOUS; SUBCUTANEOUS EVERY 12 HOURS SCHEDULED
Status: DISCONTINUED | OUTPATIENT
Start: 2020-12-17 | End: 2020-12-20

## 2020-12-17 RX ORDER — MIRTAZAPINE 15 MG/1
30 TABLET, FILM COATED ORAL NIGHTLY
Status: DISCONTINUED | OUTPATIENT
Start: 2020-12-17 | End: 2020-12-20

## 2020-12-17 RX ORDER — PANTOPRAZOLE SODIUM 40 MG/1
40 TABLET, DELAYED RELEASE ORAL
Status: DISCONTINUED | OUTPATIENT
Start: 2020-12-18 | End: 2020-12-20

## 2020-12-17 RX ORDER — LEVOTHYROXINE SODIUM 0.15 MG/1
150 TABLET ORAL
Status: DISCONTINUED | OUTPATIENT
Start: 2020-12-18 | End: 2020-12-20

## 2020-12-17 RX ORDER — ACETAMINOPHEN 325 MG/1
650 TABLET ORAL EVERY 6 HOURS PRN
Status: DISCONTINUED | OUTPATIENT
Start: 2020-12-17 | End: 2020-12-20

## 2020-12-17 RX ORDER — SODIUM CHLORIDE 9 MG/ML
INJECTION, SOLUTION INTRAVENOUS CONTINUOUS
Status: DISCONTINUED | OUTPATIENT
Start: 2020-12-17 | End: 2020-12-20

## 2020-12-17 RX ORDER — SODIUM CHLORIDE 9 MG/ML
1000 INJECTION, SOLUTION INTRAVENOUS ONCE
Status: COMPLETED | OUTPATIENT
Start: 2020-12-17 | End: 2020-12-19

## 2020-12-17 RX ORDER — SODIUM CHLORIDE 9 MG/ML
INJECTION, SOLUTION INTRAVENOUS CONTINUOUS
Status: ACTIVE | OUTPATIENT
Start: 2020-12-17 | End: 2020-12-17

## 2020-12-17 RX ORDER — SODIUM BICARBONATE 325 MG/1
650 TABLET ORAL 2 TIMES DAILY
Status: DISCONTINUED | OUTPATIENT
Start: 2020-12-17 | End: 2020-12-20

## 2020-12-17 RX ORDER — ONDANSETRON 2 MG/ML
4 INJECTION INTRAMUSCULAR; INTRAVENOUS EVERY 6 HOURS PRN
Status: DISCONTINUED | OUTPATIENT
Start: 2020-12-17 | End: 2020-12-20

## 2020-12-17 RX ORDER — GABAPENTIN 100 MG/1
200 CAPSULE ORAL NIGHTLY
Status: DISCONTINUED | OUTPATIENT
Start: 2020-12-17 | End: 2020-12-20

## 2020-12-17 RX ORDER — DEXTROSE MONOHYDRATE 25 G/50ML
50 INJECTION, SOLUTION INTRAVENOUS
Status: DISCONTINUED | OUTPATIENT
Start: 2020-12-17 | End: 2020-12-20

## 2020-12-17 RX ORDER — MELATONIN
3 NIGHTLY
Status: DISCONTINUED | OUTPATIENT
Start: 2020-12-17 | End: 2020-12-20

## 2020-12-17 NOTE — ED PROVIDER NOTES
Patient Seen in: [facility] Emergency Department      History   Patient presents with:  Fatigue    Stated Complaint: dehydration    HPI    52-year-old female was directed to the emergency department by her primary care physician for evaluation of de status: Never Smoker      Smokeless tobacco: Never Used    Alcohol use: No    Drug use: No             Review of Systems    Positive for stated complaint: dehydration  Other systems are as noted in HPI. Constitutional and vital signs reviewed.       All ot Small (*)     Protein Urine 100  (*)     Leukocyte Esterase Urine Large (*)     WBC Urine >50 (*)     RBC URINE >10 (*)     Bacteria Urine 2+ (*)     Mucous Urine 1+ (*)     WBC Clump Present (*)     All other components within normal limits   CBC W/ DIFFE Generalized abdominal pain and back pain. Dehydration. Abnormal labs. Nephrostomy tube in place. FINDINGS:  Sensitivity decreased without IV contrast. LIVER:  Stable. Uniform parenchyma. BILIARY:  Stable. Surgically absent gallbladder.   No biliary There is evidence of urinary tract infection and Rocephin was administered. [facility] Adam [facility]. Treatment plan was discussed with the patient and her son prior to admission.     The patient had COVID-19 in early Nov

## 2020-12-17 NOTE — ED INITIAL ASSESSMENT (HPI)
Family reports receiving call from MD after routine lab work completed 2 days ago. MD told family that PT is dehydrated and has high potassium. Family reports PT has been c/o back and abdominal pain.

## 2020-12-17 NOTE — H&P
[facility]  History and Physical     Tuan Vuong Patient Status:  Emergency    1935 MRN [id number]   Location [facility] Attending Abdoul Davidson MD   Hosp Day # 0 PCP Gerald Andrade MD     Chief Complaint: INTRAOCULAR LENS IMPLANT 81944 Right 4/26/2017    Performed by Mike Becerra MD at [address] [place] [address]   • URETERAL REIMPLANTATION Left 11/12/2019    Performed by Tonny Hagan MD at [facility] MAIN OR       Social History:  reports that she has never smok (six) hours as needed. , Disp: , Rfl: 0    •  lidocaine 4 % External Patch, Place 1 patch onto the skin daily. You can get this over the counter.   Apply to low back and right shoulder in am and remove at pm, Disp: 10 patch, Rfl: 0        Review of Systems: with ESBL in urine  2. Merrem pending culture  2. ANTONIO on CKD  1. IVF  3. Metabolic acidosis secondary to above  4. DMII  1. SSI  5. Recent COVID19 pneumonia 11/3  6. Chronic anemia  1. At baseline   7. Peripheral neuropathy  1. Gabepentin   8.  Depression

## 2020-12-18 DIAGNOSIS — E03.9 ACQUIRED HYPOTHYROIDISM: ICD-10-CM

## 2020-12-18 DIAGNOSIS — E11.42 TYPE 2 DIABETES MELLITUS WITH DIABETIC POLYNEUROPATHY, UNSPECIFIED WHETHER LONG TERM INSULIN USE (HCC): ICD-10-CM

## 2020-12-18 PROCEDURE — 99225 SUBSEQUENT OBSERVATION CARE: CPT | Performed by: HOSPITALIST

## 2020-12-18 RX ORDER — LEVOTHYROXINE SODIUM 0.15 MG/1
150 TABLET ORAL
Qty: 90 TABLET | Refills: 1 | Status: SHIPPED | OUTPATIENT
Start: 2020-12-18 | End: 2021-06-21

## 2020-12-18 RX ORDER — GABAPENTIN 100 MG/1
200 CAPSULE ORAL NIGHTLY
Qty: 180 CAPSULE | Refills: 1 | Status: SHIPPED | OUTPATIENT
Start: 2020-12-18 | End: 2021-06-16

## 2020-12-18 NOTE — PROGRESS NOTES
[facility] Pharmacy Note:  Renal Adjustment for meropenem (MERREM)    Arlene Patino is a 81year old patient who has been prescribed meropenem (MERREM) 500 mg IV every 8 hrs.      CrCl is estimated creatinine clearance is 17 mL/min (A) (based on SCr of 1.95 mg/dL

## 2020-12-18 NOTE — PLAN OF CARE
Patient alert, RA, tele, Right nephrostomy tube, left kidney has output, perwick in place, iliostomy LUQ. Patient is Vietnamese speaking,  in the room. Patient is up with one assist to the [facility]. C/o pain and medication was given per the [facility].   Will

## 2020-12-18 NOTE — DIETARY NOTE
[facility]  NUTRITION ASSESSMENT    Pt does not meet malnutrition criteria. NUTRITION DIAGNOSIS/PROBLEM:  Increased nutrient needs related to increased demands of healing as evidenced by stage II PU to sacrum     NUTRITION INTERVENTION:  1.  Meal an at least 75% patient nutrition prescription  2. At least 75% intake of oral supplements  3.  Maintain lean body mass     MEDICATIONS:  Abx, Remeron, Protonix, Insulin, NaBicarb    LABS: BUN 42; Cr 1.8    Pt is at moderate nutrition risk    FOLLOW-UP DATE: 1

## 2020-12-18 NOTE — CM/SW NOTE
12/18/20 1300   CM/SW Screening   Referral Source Social Work (self-referral)   [address] staff; Chart review   Patient's [address]   Patient lives with Children   Patient Status Prior to Admission   Services in place prior to a

## 2020-12-18 NOTE — HOME CARE LIAISON
Ptnt current with [facility] for sn  Will need a TAMMY order on or before dc    Thanks  Alexandrea Gilman

## 2020-12-18 NOTE — PROGRESS NOTES
NURSING ADMISSION NOTE      Patient admitted via Cart  Oriented to room. Safety precautions initiated. Bed in low position. Call light in reach. Completed patient admission assessment with son/POCORTEZ Lizzette.   Pt is DNAR- needs POLST form filled out

## 2020-12-18 NOTE — TELEPHONE ENCOUNTER
LOV 11/12/2020    LAST LAB 2/26/2020    LAST RX   Levothyroxine Sodium 150 MCG Oral Tab 90 tablet 1 6/18/2020     gabapentin 100 MG Oral Cap 180 capsule 1 6/18/2020         Next OV   Future Appointments   Date Time Provider Kiel Alarcon   12/29/2020

## 2020-12-18 NOTE — ED NOTES
Informed son/GENETCORTEZ Crocker of PT admission to 3627 and gave him phone number to charge RN and PT's room.

## 2020-12-18 NOTE — PROGRESS NOTES
12/18/20 0810   Clinical Encounter Type   Visited With Patient not available  (RN Fernie Blanco: Pt sleeping; family not present at the time.)   Routine Visit   (Responded to POLST consult)    attempted to visit; however patient was sleeping and [facility]

## 2020-12-18 NOTE — PROGRESS NOTES
[facility]  Progress Note     Tuan Vuong Patient Status:  Observation    1935 MRN [id number]   [facility] 3NE-A Attending Omid Su [address] Day # 0 PCP Benigno Abrams MD     Chief Complaint: Abnormal labs    S: gabapentin  200 mg Oral Nightly   • Levothyroxine Sodium  150 mcg Oral Before breakfast   • melatonin  3 mg Oral Nightly   • mirtazapine  30 mg Oral Nightly   • Pantoprazole Sodium  40 mg Oral QAM AC   • Sertraline HCl  100 mg Oral Daily   • sodium bicarbo

## 2020-12-19 PROCEDURE — 99225 SUBSEQUENT OBSERVATION CARE: CPT | Performed by: HOSPITALIST

## 2020-12-19 NOTE — PLAN OF CARE
Patient alertx3, ra, tele, nephrostomy and ileostomy bag, Pt worked with patient today but she refused to get up in the chair. Patient c/o pain and medication was given per the [facility]. Speaks only Wolof and translation line was used with pt.   Spoke to son

## 2020-12-19 NOTE — PLAN OF CARE
Pt A&O  Problem: GENITOURINARY - ADULT  Goal: Absence of urinary retention  Description: INTERVENTIONS:  - Assess patient’s ability to void and empty bladder  - Monitor intake/output and perform bladder scan as needed  - Follow urinary retention protocol/s

## 2020-12-19 NOTE — PROGRESS NOTES
Pt A&Ox3 Room Air  Yakut speaking  at bedside  NSR on Tele  Accucheck QID  Purewick in place  Merrem  Right side Nephrostomy tube  Iliostomy   Isolation maintained  Safety precaution maintained  Will continue to monitor

## 2020-12-19 NOTE — PHYSICAL THERAPY NOTE
PHYSICAL THERAPY QUICK EVALUATION - INPATIENT    Room Number: 5712/2231-I  Evaluation Date: 12/19/2020  Presenting Problem: dehydration  Physician Order: PT Eval and Treat    Problem List  Principal Problem:    Dehydration  Active Problems:    Hyperkalem Lives With: Son  Drives: No  Patient Owned Equipment: Rolling walker; Other (Comment)(BSC, alyssa lift)       Prior Level of [place]: per pt son reports, pt requires assist for transfers and amb with RW HH distances.   Pt lives on main level wi Impairment: 54.16%   Standardized Score (AM-PAC Scale): 40.78   CMS Modifier (G-Code): CK      FUNCTIONAL ABILITY STATUS  Gait Assessment  Gait Assistance: Not tested           Stoop/Curb Assistance: Not tested       Skilled Therapy Provided: per RN pt ok services. Please re-order if a new functional limitation presents during this admission. GOALS  Patient was able to achieve the following goals . ..     Patient was able to transfer Unable to assess, pt refuses   Patient able to ambulate on level surface

## 2020-12-19 NOTE — CONSULTS
[facility] Pharmacy Note:  Renal Adjustment for piperacillin/tazobactam (Destiny Avelar)    Sandee Laguna is a 81year old patient who has been prescribed piperacillin/tazobactam (ZOSYN) 3.375 g every 8 hrs.   CrCl is estimated creatinine clearance is 18.4 mL/min (A) (bas

## 2020-12-20 VITALS
SYSTOLIC BLOOD PRESSURE: 156 MMHG | OXYGEN SATURATION: 97 % | HEIGHT: 62 IN | BODY MASS INDEX: 28.82 KG/M2 | TEMPERATURE: 98 F | WEIGHT: 156.63 LBS | HEART RATE: 69 BPM | DIASTOLIC BLOOD PRESSURE: 77 MMHG | RESPIRATION RATE: 18 BRPM

## 2020-12-20 PROCEDURE — 99225 SUBSEQUENT OBSERVATION CARE: CPT | Performed by: HOSPITALIST

## 2020-12-20 RX ORDER — AMOXICILLIN AND CLAVULANATE POTASSIUM 875; 125 MG/1; MG/1
1 TABLET, FILM COATED ORAL 2 TIMES DAILY
Qty: 20 TABLET | Refills: 0 | Status: SHIPPED | OUTPATIENT
Start: 2020-12-20 | End: 2020-12-30

## 2020-12-20 NOTE — PROGRESS NOTES
[facility]  Progress Note     Tuan Vuong Patient Status:  Observation    1935 MRN [id number]   [facility] 3NE-A Attending Jeffry Matthews [address] Day # 0 PCP Violeta Gonzalez MD     Chief Complaint: Abnormal labs    S: hours. No results for input(s): TROP, CK in the last 168 hours. Imaging: Imaging data reviewed in Epic.     Medications:   • piperacillin-tazobactam  3.375 g Intravenous Q12H   • gabapentin  200 mg Oral Nightly   • Levothyroxine Sodium  150 mcg O

## 2020-12-20 NOTE — PROGRESS NOTES
NURSING DISCHARGE NOTE    Discharge instructions/medications/follow-up reviewed with son, Livier Armstrong, over the phone. Paperwork/ paper script for Augmentin discussed with son and sent with discharge paperwork.   [facility]

## 2020-12-20 NOTE — PLAN OF CARE
Pt. A&Ox3-forgetful  Greek speaking, Ipad  used  RA;VSS  Tele- SB  Ileostomy   R Nephrostomy- site changed per patient request. Some old drainage noted. C/o some pain at site. Pain medications offered, refused.    0.9 @ 100  IV zosyn  Purewick in

## 2020-12-20 NOTE — CM/SW NOTE
[facility] [place]. PCS complete. [facility] aware of discharge. Ethan Gould aware and agreeable to discharge. RN to call [phone] when ready to discharge.     Timothy Ramirez, MSN RN, CTL

## 2020-12-20 NOTE — PLAN OF CARE
Assumed care at 7:30 am.  Cardiac monitoring. Per Hosp, contact uro reg nephrostomy exchange. Per Dr. Leti Moody with d/c today and will f/u in January for exchange. Ok to d/c if HGB 7.7 or higher. Hgb 7.8, patient discharging today.  Plan of care update

## 2020-12-22 ENCOUNTER — TELEPHONE (OUTPATIENT)
Dept: FAMILY MEDICINE CLINIC | Facility: CLINIC | Age: 85
End: 2020-12-22

## 2020-12-22 ENCOUNTER — PATIENT OUTREACH (OUTPATIENT)
Dept: CASE MANAGEMENT | Age: 85
End: 2020-12-22

## 2020-12-22 DIAGNOSIS — Z02.9 ENCOUNTERS FOR UNSPECIFIED ADMINISTRATIVE PURPOSE: ICD-10-CM

## 2020-12-22 DIAGNOSIS — F32.A DEPRESSION, UNSPECIFIED DEPRESSION TYPE: ICD-10-CM

## 2020-12-22 PROCEDURE — 1111F DSCHRG MED/CURRENT MED MERGE: CPT

## 2020-12-22 NOTE — TELEPHONE ENCOUNTER
Spoke to pt for TCM today. Pt does not have HFU appt scheduled at this time. TCM/HFU appt recommended by 12/27/2020 as pt is a high risk for readmission. Please advise.     BOOK BY DATE (last date for TCM):  1/3/21    TRIAGE:  Please f/u with pt and try

## 2020-12-22 NOTE — TELEPHONE ENCOUNTER
Called patient's son to try to schedule a TCM/HFU appt. States transporting patient can be difficult. Asking what the appointment is for.   Advised it is important to review patient's whole plan of care including review of medications or changes, testing t

## 2020-12-22 NOTE — PROGRESS NOTES
Initial Post Discharge Follow Up   Discharge Date: 12/20/20  Contact Date: 12/22/2020    Consent Verification:  Assessment Completed With: Caregiver: sonYasmine received per patient?  written  HIPAA Verified?   Yes    Discharge Dx:     1. UTI MCG Oral Tab Take 1 tablet (150 mcg total) by mouth before breakfast. 90 tablet 1   • Pantoprazole Sodium 40 MG Oral Tab EC Take 1 tablet (40 mg total) by mouth every morning before breakfast. 30 tablet 0   • clotrimazole-betamethasone 1-0.05 % External Cr Rehab, CHF, Stroke, PT, OT, Speech, Other:  [facility] RN  (NCM) (If HH was ordered) Has HH been set up? Yes    If Yes: With Whom:  [facility]    DME ordered at D/C? No    Needs post D/C:   Now that you are home, are there any needs or concerns you need addressed before any further questions or concerns. TE sent to PCP office regarding HFU appt. CCM referral placed:  No, referral recently placed in October 2020.     BOOK BY DATE: 1/3/2021    [x]  Discharge Summary, Discharge medications reviewed/discussed/and reconcile

## 2020-12-23 RX ORDER — MIRTAZAPINE 15 MG/1
30 TABLET, FILM COATED ORAL NIGHTLY
Qty: 90 TABLET | Refills: 1 | Status: SHIPPED | OUTPATIENT
Start: 2020-12-23 | End: 2021-03-16

## 2020-12-23 NOTE — TELEPHONE ENCOUNTER
LOV 11/12/2020    LAST LAB n/a    LAST RX    mirtazapine 15 MG Oral Tab 90 tablet 1 9/14/2020         Next OV   Future Appointments   Date Time Provider Kiel Alarcon   12/29/2020  1:45 PM Funmilayo Roberts MD [facility] [address]         PROTOCOL n/a

## 2020-12-28 NOTE — TELEPHONE ENCOUNTER
Called patient's son to again try to schedule TCM/HFU visit. States it's too difficult to bring patient to office. Wants to know if she can do a Video Visit.     Dr. Katia Morel,  Please advise

## 2020-12-30 ENCOUNTER — TELEMEDICINE (OUTPATIENT)
Dept: FAMILY MEDICINE CLINIC | Facility: CLINIC | Age: 85
End: 2020-12-30
Payer: MEDICARE

## 2020-12-30 ENCOUNTER — TELEPHONE (OUTPATIENT)
Dept: FAMILY MEDICINE CLINIC | Facility: CLINIC | Age: 85
End: 2020-12-30

## 2020-12-30 VITALS — WEIGHT: 156 LBS | HEIGHT: 62 IN | BODY MASS INDEX: 28.71 KG/M2

## 2020-12-30 DIAGNOSIS — E86.0 DEHYDRATION: ICD-10-CM

## 2020-12-30 DIAGNOSIS — D63.8 ANEMIA, CHRONIC DISEASE: ICD-10-CM

## 2020-12-30 DIAGNOSIS — R53.81 PHYSICAL DECONDITIONING: Primary | ICD-10-CM

## 2020-12-30 DIAGNOSIS — N30.00 ACUTE CYSTITIS WITHOUT HEMATURIA: Primary | ICD-10-CM

## 2020-12-30 DIAGNOSIS — E11.9 TYPE 2 DIABETES MELLITUS WITHOUT COMPLICATION, WITHOUT LONG-TERM CURRENT USE OF INSULIN (HCC): ICD-10-CM

## 2020-12-30 DIAGNOSIS — Z91.81 AT RISK FOR FALLING: ICD-10-CM

## 2020-12-30 DIAGNOSIS — R53.1 WEAKNESS GENERALIZED: ICD-10-CM

## 2020-12-30 DIAGNOSIS — D64.9 ANEMIA, UNSPECIFIED TYPE: ICD-10-CM

## 2020-12-30 DIAGNOSIS — E11.42 TYPE 2 DIABETES MELLITUS WITH DIABETIC POLYNEUROPATHY, UNSPECIFIED WHETHER LONG TERM INSULIN USE (HCC): ICD-10-CM

## 2020-12-30 PROCEDURE — 99214 OFFICE O/P EST MOD 30 MIN: CPT | Performed by: FAMILY MEDICINE

## 2020-12-30 NOTE — PROGRESS NOTES
Please note that the following visit was completed using two-way, real-time interactive audio and video communication.   This has been done in good saad to provide continuity of care in the best interest of the provider-patient relationship, due to the o by mouth nightly. Taking 30 mg nightly 90 tablet 1   • gabapentin 100 MG Oral Cap Take 2 capsules (200 mg total) by mouth nightly.  180 capsule 1   • Levothyroxine Sodium 150 MCG Oral Tab Take 1 tablet (150 mcg total) by mouth before breakfast. 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

## 2020-12-30 NOTE — TELEPHONE ENCOUNTER
[facility] nurse stated Dr. Rickey Miranda had wanted patient to get labs and uranalysis done. Would  ;please send orders? Also looking for orders for nursing. Nurse would like to be called with the orders.

## 2020-12-31 PROBLEM — R14.0 ABDOMINAL DISTENSION: Status: RESOLVED | Noted: 2018-04-17 | Resolved: 2020-12-31

## 2020-12-31 PROBLEM — R10.9 ABDOMINAL PAIN, ACUTE: Status: RESOLVED | Noted: 2018-04-17 | Resolved: 2020-12-31

## 2020-12-31 PROBLEM — N39.0 URINARY TRACT INFECTION WITHOUT HEMATURIA, SITE UNSPECIFIED: Status: RESOLVED | Noted: 2020-01-08 | Resolved: 2020-12-31

## 2020-12-31 PROBLEM — R73.9 HYPERGLYCEMIA: Status: RESOLVED | Noted: 2018-04-17 | Resolved: 2020-12-31

## 2020-12-31 PROBLEM — N39.0 URINARY TRACT INFECTION WITHOUT HEMATURIA: Status: RESOLVED | Noted: 2020-01-08 | Resolved: 2020-12-31

## 2020-12-31 PROBLEM — K56.41 FECAL IMPACTION OF COLON (HCC): Status: RESOLVED | Noted: 2018-04-17 | Resolved: 2020-12-31

## 2020-12-31 PROBLEM — R07.9 ACUTE CHEST PAIN: Status: RESOLVED | Noted: 2018-03-13 | Resolved: 2020-12-31

## 2020-12-31 PROBLEM — E87.1 HYPONATREMIA: Status: RESOLVED | Noted: 2018-04-17 | Resolved: 2020-12-31

## 2020-12-31 PROBLEM — N17.9 AKI (ACUTE KIDNEY INJURY) (HCC): Status: RESOLVED | Noted: 2019-12-09 | Resolved: 2020-12-31

## 2020-12-31 PROBLEM — R10.9 ABDOMINAL PAIN OF UNKNOWN ETIOLOGY: Status: RESOLVED | Noted: 2020-12-17 | Resolved: 2020-12-31

## 2020-12-31 PROBLEM — N28.9 ACUTE RENAL INSUFFICIENCY: Status: RESOLVED | Noted: 2019-12-09 | Resolved: 2020-12-31

## 2020-12-31 PROBLEM — K56.609 INTESTINAL OBSTRUCTION, UNSPECIFIED CAUSE, UNSPECIFIED WHETHER PARTIAL OR COMPLETE (HCC): Status: RESOLVED | Noted: 2019-11-11 | Resolved: 2020-12-31

## 2020-12-31 PROBLEM — K57.92 ACUTE DIVERTICULITIS: Status: RESOLVED | Noted: 2018-03-13 | Resolved: 2020-12-31

## 2020-12-31 PROBLEM — N30.00 ACUTE CYSTITIS WITHOUT HEMATURIA: Status: RESOLVED | Noted: 2020-12-17 | Resolved: 2020-12-31

## 2020-12-31 NOTE — TELEPHONE ENCOUNTER
Detailed VMML for Jyoti King from [facility] and gave verbal order for CBC, CMP, UA/C&S. Also gave order for [facility] to see patient for nurse eval for home health needs. Referral faxed to [phone].

## 2020-12-31 NOTE — TELEPHONE ENCOUNTER
Can you please call residential home [facility] nurse and give them a verbal  order CBC CMP urine  analysis  And culture

## 2020-12-31 NOTE — TELEPHONE ENCOUNTER
Dr. Sunday Garza,  Please advise    Patient had Televisit yesterday 12/30/2020.    -Referral for [facility]  -Lab orders

## 2021-01-01 DIAGNOSIS — H01.119 CONTACT DERMATITIS OF EYELID, UNSPECIFIED LATERALITY: ICD-10-CM

## 2021-01-01 RX ORDER — PANTOPRAZOLE SODIUM 40 MG/1
TABLET, DELAYED RELEASE ORAL
Qty: 30 TABLET | Refills: 0 | Status: SHIPPED | OUTPATIENT
Start: 2021-01-01 | End: 2021-01-01

## 2021-01-01 NOTE — PROGRESS NOTES
[facility] Hospitalist Progress Note     Tuan Vuong Patient Status:  Inpatient    1935 MRN [id number]   [facility] 4SW-A Attending Makayla Vasquez MD   Hosp Day # 13 PCP Denisa Dos Santos MD     CC: follow up    SUBJECTIVE:  Still with poor kailee 11/26/19  0801 11/26/19  1149   PGLU 135* 143* 185* 135* 182*         Meds:   Scheduled Medication:  • rivaroxaban  20 mg Oral Daily with food   • Levothyroxine Sodium  125 mcg Oral Before breakfast   • Pantoprazole Sodium  40 mg Oral QAM AC   • Normal Sal resolved    # h/o PE  - Per daughter, this was several years ago, she is unsure if was provoked or not, but has been on xarelto since that time  - Heme on consult re: ongoing AC recs- recommend restarting Xarelto when OK with surgery team. Now restarted- H done

## 2021-01-04 NOTE — DISCHARGE SUMMARY
[facility] HOSPITAL[facility]  DISCHARGE SUMMARY     Tuan Vuong Patient Status:  Observation    1935 MRN [id number]   [facility] 3NE-A Attending No att. providers found   Hosp Day # 0 PCP Payal Ochoa MD     Date of Admission: 2020  Date results pending at Discharge:   · None    Consultants:  • None    Discharge Medication List:     Discharge Medications      CONTINUE taking these medications      Instructions Prescription details   acetaminophen 325 MG Tabs  Commonly known as: TYLENOL Tabs  Commonly known as: AUGMENTIN  Ask about: Should I take this medication? Take 1 tablet by mouth 2 (two) times daily for 10 days.    Stop taking on: December 30, 2020  Quantity: 20 tablet  Refills: 0           Where to Get Your Medications      The edema.  -----------------------------------------------------------------------------------------------  PATIENT DISCHARGE INSTRUCTIONS: See electronic chart    Jackie Metzger DO    Time spent:  > 30 minutes

## 2021-01-07 ENCOUNTER — HOSPITAL ENCOUNTER (INPATIENT)
Facility: HOSPITAL | Age: 86
LOS: 4 days | Discharge: HOME HEALTH | DRG: 683 | End: 2021-01-12
Attending: EMERGENCY MEDICINE | Admitting: HOSPITALIST
Payer: MEDICARE

## 2021-01-07 ENCOUNTER — TELEPHONE (OUTPATIENT)
Dept: INTERNAL MEDICINE CLINIC | Facility: CLINIC | Age: 86
End: 2021-01-07

## 2021-01-07 DIAGNOSIS — N17.9 AKI (ACUTE KIDNEY INJURY) (HCC): Primary | ICD-10-CM

## 2021-01-07 DIAGNOSIS — E87.5 HYPERKALEMIA: ICD-10-CM

## 2021-01-07 DIAGNOSIS — N39.0 URINARY TRACT INFECTION WITHOUT HEMATURIA, SITE UNSPECIFIED: ICD-10-CM

## 2021-01-08 ENCOUNTER — APPOINTMENT (OUTPATIENT)
Dept: ULTRASOUND IMAGING | Facility: HOSPITAL | Age: 86
DRG: 683 | End: 2021-01-08
Attending: INTERNAL MEDICINE
Payer: MEDICARE

## 2021-01-08 PROBLEM — N39.0 URINARY TRACT INFECTION WITHOUT HEMATURIA, SITE UNSPECIFIED: Status: ACTIVE | Noted: 2021-01-08

## 2021-01-08 PROBLEM — N17.9 AKI (ACUTE KIDNEY INJURY) (HCC): Status: ACTIVE | Noted: 2021-01-08

## 2021-01-08 LAB
ALBUMIN SERPL-MCNC: 3.5 G/DL (ref 3.4–5)
ALBUMIN/GLOB SERPL: 0.6 {RATIO} (ref 1–2)
ALP LIVER SERPL-CCNC: 156 U/L
ALT SERPL-CCNC: 25 U/L
ANION GAP SERPL CALC-SCNC: 8 MMOL/L (ref 0–18)
ANION GAP SERPL CALC-SCNC: 9 MMOL/L (ref 0–18)
ANION GAP SERPL CALC-SCNC: 9 MMOL/L (ref 0–18)
AST SERPL-CCNC: 26 U/L (ref 15–37)
ATRIAL RATE: 66 BPM
BASOPHILS # BLD AUTO: 0.04 X10(3) UL (ref 0–0.2)
BASOPHILS # BLD AUTO: 0.04 X10(3) UL (ref 0–0.2)
BASOPHILS NFR BLD AUTO: 0.6 %
BASOPHILS NFR BLD AUTO: 0.7 %
BILIRUB SERPL-MCNC: 0.2 MG/DL (ref 0.1–2)
BILIRUB UR QL STRIP.AUTO: NEGATIVE
BUN BLD-MCNC: 57 MG/DL (ref 7–18)
BUN BLD-MCNC: 59 MG/DL (ref 7–18)
BUN BLD-MCNC: 61 MG/DL (ref 7–18)
BUN/CREAT SERPL: 23.4 (ref 10–20)
BUN/CREAT SERPL: 24.6 (ref 10–20)
BUN/CREAT SERPL: 24.7 (ref 10–20)
CALCIUM BLD-MCNC: 10 MG/DL (ref 8.5–10.1)
CALCIUM BLD-MCNC: 10 MG/DL (ref 8.5–10.1)
CALCIUM BLD-MCNC: 9.4 MG/DL (ref 8.5–10.1)
CHLORIDE SERPL-SCNC: 110 MMOL/L (ref 98–112)
CHLORIDE SERPL-SCNC: 113 MMOL/L (ref 98–112)
CHLORIDE SERPL-SCNC: 116 MMOL/L (ref 98–112)
CO2 SERPL-SCNC: 13 MMOL/L (ref 21–32)
CO2 SERPL-SCNC: 14 MMOL/L (ref 21–32)
CO2 SERPL-SCNC: 14 MMOL/L (ref 21–32)
COLOR UR AUTO: YELLOW
CREAT BLD-MCNC: 2.4 MG/DL
CREAT BLD-MCNC: 2.44 MG/DL
CREAT BLD-MCNC: 2.47 MG/DL
CREAT UR-SCNC: 63.3 MG/DL
DEPRECATED HBV CORE AB SER IA-ACNC: 46.8 NG/ML
DEPRECATED RDW RBC AUTO: 61.1 FL (ref 35.1–46.3)
DEPRECATED RDW RBC AUTO: 62.9 FL (ref 35.1–46.3)
EOSINOPHIL # BLD AUTO: 0.31 X10(3) UL (ref 0–0.7)
EOSINOPHIL # BLD AUTO: 0.37 X10(3) UL (ref 0–0.7)
EOSINOPHIL NFR BLD AUTO: 5.1 %
EOSINOPHIL NFR BLD AUTO: 5.4 %
ERYTHROCYTE [DISTWIDTH] IN BLOOD BY AUTOMATED COUNT: 19.4 % (ref 11–15)
ERYTHROCYTE [DISTWIDTH] IN BLOOD BY AUTOMATED COUNT: 19.5 % (ref 11–15)
GLOBULIN PLAS-MCNC: 5.7 G/DL (ref 2.8–4.4)
GLUCOSE BLD-MCNC: 110 MG/DL (ref 70–99)
GLUCOSE BLD-MCNC: 116 MG/DL (ref 70–99)
GLUCOSE BLD-MCNC: 120 MG/DL (ref 70–99)
GLUCOSE BLD-MCNC: 125 MG/DL (ref 70–99)
GLUCOSE BLD-MCNC: 128 MG/DL (ref 70–99)
GLUCOSE BLD-MCNC: 130 MG/DL (ref 70–99)
GLUCOSE BLD-MCNC: 153 MG/DL (ref 70–99)
GLUCOSE UR STRIP.AUTO-MCNC: NEGATIVE MG/DL
HAV IGM SER QL: 2.5 MG/DL (ref 1.6–2.6)
HAV IGM SER QL: 2.5 MG/DL (ref 1.6–2.6)
HCT VFR BLD AUTO: 30.4 %
HCT VFR BLD AUTO: 33.2 %
HGB BLD-MCNC: 9.2 G/DL
HGB BLD-MCNC: 9.9 G/DL
IMM GRANULOCYTES # BLD AUTO: 0.03 X10(3) UL (ref 0–1)
IMM GRANULOCYTES # BLD AUTO: 0.03 X10(3) UL (ref 0–1)
IMM GRANULOCYTES NFR BLD: 0.4 %
IMM GRANULOCYTES NFR BLD: 0.5 %
IRON SATURATION: 11 %
IRON SERPL-MCNC: 40 UG/DL
KETONES UR STRIP.AUTO-MCNC: NEGATIVE MG/DL
LACTATE SERPL-SCNC: 0.9 MMOL/L (ref 0.4–2)
LYMPHOCYTES # BLD AUTO: 2.38 X10(3) UL (ref 1–4)
LYMPHOCYTES # BLD AUTO: 2.74 X10(3) UL (ref 1–4)
LYMPHOCYTES NFR BLD AUTO: 39.3 %
LYMPHOCYTES NFR BLD AUTO: 39.7 %
M PROTEIN MFR SERPL ELPH: 9.2 G/DL (ref 6.4–8.2)
MCH RBC QN AUTO: 25.7 PG (ref 26–34)
MCH RBC QN AUTO: 26.7 PG (ref 26–34)
MCHC RBC AUTO-ENTMCNC: 29.8 G/DL (ref 31–37)
MCHC RBC AUTO-ENTMCNC: 30.3 G/DL (ref 31–37)
MCV RBC AUTO: 86.2 FL
MCV RBC AUTO: 88.4 FL
MONOCYTES # BLD AUTO: 0.41 X10(3) UL (ref 0.1–1)
MONOCYTES # BLD AUTO: 0.48 X10(3) UL (ref 0.1–1)
MONOCYTES NFR BLD AUTO: 6.8 %
MONOCYTES NFR BLD AUTO: 6.9 %
NEUTROPHILS # BLD AUTO: 2.88 X10 (3) UL (ref 1.5–7.7)
NEUTROPHILS # BLD AUTO: 2.88 X10(3) UL (ref 1.5–7.7)
NEUTROPHILS # BLD AUTO: 3.25 X10 (3) UL (ref 1.5–7.7)
NEUTROPHILS # BLD AUTO: 3.25 X10(3) UL (ref 1.5–7.7)
NEUTROPHILS NFR BLD AUTO: 47 %
NEUTROPHILS NFR BLD AUTO: 47.6 %
NITRITE UR QL STRIP.AUTO: NEGATIVE
OSMOLALITY SERPL CALC.SUM OF ELEC: 294 MOSM/KG (ref 275–295)
OSMOLALITY SERPL CALC.SUM OF ELEC: 300 MOSM/KG (ref 275–295)
OSMOLALITY SERPL CALC.SUM OF ELEC: 301 MOSM/KG (ref 275–295)
P AXIS: 35 DEGREES
P-R INTERVAL: 218 MS
PH UR STRIP.AUTO: 5 [PH] (ref 4.5–8)
PLATELET # BLD AUTO: 268 10(3)UL (ref 150–450)
PLATELET # BLD AUTO: 328 10(3)UL (ref 150–450)
POTASSIUM SERPL-SCNC: 5 MMOL/L (ref 3.5–5.1)
POTASSIUM SERPL-SCNC: 5.5 MMOL/L (ref 3.5–5.1)
POTASSIUM SERPL-SCNC: 5.9 MMOL/L (ref 3.5–5.1)
PROT UR STRIP.AUTO-MCNC: NEGATIVE MG/DL
Q-T INTERVAL: 398 MS
QRS DURATION: 80 MS
QTC CALCULATION (BEZET): 417 MS
R AXIS: -26 DEGREES
RBC # BLD AUTO: 3.44 X10(6)UL
RBC # BLD AUTO: 3.85 X10(6)UL
RBC UR QL AUTO: NEGATIVE
SARS-COV-2 RNA RESP QL NAA+PROBE: DETECTED
SODIUM SERPL-SCNC: 13 MMOL/L
SODIUM SERPL-SCNC: 133 MMOL/L (ref 136–145)
SODIUM SERPL-SCNC: 136 MMOL/L (ref 136–145)
SODIUM SERPL-SCNC: 137 MMOL/L (ref 136–145)
SP GR UR STRIP.AUTO: 1.01 (ref 1–1.03)
T AXIS: 78 DEGREES
TOTAL IRON BINDING CAPACITY: 353 UG/DL (ref 240–450)
TRANSFERRIN SERPL-MCNC: 237 MG/DL (ref 200–360)
UROBILINOGEN UR STRIP.AUTO-MCNC: <2 MG/DL
VENTRICULAR RATE: 66 BPM
WBC # BLD AUTO: 6.1 X10(3) UL (ref 4–11)
WBC # BLD AUTO: 6.9 X10(3) UL (ref 4–11)

## 2021-01-08 PROCEDURE — 99223 1ST HOSP IP/OBS HIGH 75: CPT | Performed by: INTERNAL MEDICINE

## 2021-01-08 PROCEDURE — 76770 US EXAM ABDO BACK WALL COMP: CPT | Performed by: INTERNAL MEDICINE

## 2021-01-08 RX ORDER — MORPHINE SULFATE 2 MG/ML
1 INJECTION, SOLUTION INTRAMUSCULAR; INTRAVENOUS EVERY 2 HOUR PRN
Status: DISCONTINUED | OUTPATIENT
Start: 2021-01-08 | End: 2021-01-12

## 2021-01-08 RX ORDER — LEVOTHYROXINE SODIUM 0.15 MG/1
150 TABLET ORAL
Status: DISCONTINUED | OUTPATIENT
Start: 2021-01-08 | End: 2021-01-12

## 2021-01-08 RX ORDER — SODIUM BICARBONATE 325 MG/1
650 TABLET ORAL 2 TIMES DAILY
Status: DISCONTINUED | OUTPATIENT
Start: 2021-01-08 | End: 2021-01-12

## 2021-01-08 RX ORDER — SODIUM CHLORIDE 9 MG/ML
INJECTION, SOLUTION INTRAVENOUS CONTINUOUS
Status: DISCONTINUED | OUTPATIENT
Start: 2021-01-08 | End: 2021-01-08

## 2021-01-08 RX ORDER — SODIUM BICARBONATE 150 MEQ/1,000 ML IN DEXTROSE 5 % INTRAVENOUS
100 SOLUTION CONTINUOUS
Status: DISCONTINUED | OUTPATIENT
Start: 2021-01-08 | End: 2021-01-09

## 2021-01-08 RX ORDER — DEXTROSE MONOHYDRATE 25 G/50ML
50 INJECTION, SOLUTION INTRAVENOUS
Status: DISCONTINUED | OUTPATIENT
Start: 2021-01-08 | End: 2021-01-12

## 2021-01-08 RX ORDER — MORPHINE SULFATE 4 MG/ML
4 INJECTION, SOLUTION INTRAMUSCULAR; INTRAVENOUS EVERY 2 HOUR PRN
Status: DISCONTINUED | OUTPATIENT
Start: 2021-01-08 | End: 2021-01-12

## 2021-01-08 RX ORDER — HYDROCODONE BITARTRATE AND ACETAMINOPHEN 5; 325 MG/1; MG/1
2 TABLET ORAL EVERY 4 HOURS PRN
Status: DISCONTINUED | OUTPATIENT
Start: 2021-01-08 | End: 2021-01-12

## 2021-01-08 RX ORDER — MORPHINE SULFATE 2 MG/ML
2 INJECTION, SOLUTION INTRAMUSCULAR; INTRAVENOUS EVERY 2 HOUR PRN
Status: DISCONTINUED | OUTPATIENT
Start: 2021-01-08 | End: 2021-01-12

## 2021-01-08 RX ORDER — ACETAMINOPHEN 325 MG/1
650 TABLET ORAL EVERY 4 HOURS PRN
Status: DISCONTINUED | OUTPATIENT
Start: 2021-01-08 | End: 2021-01-12

## 2021-01-08 RX ORDER — HYDROCODONE BITARTRATE AND ACETAMINOPHEN 5; 325 MG/1; MG/1
1 TABLET ORAL EVERY 4 HOURS PRN
Status: DISCONTINUED | OUTPATIENT
Start: 2021-01-08 | End: 2021-01-12

## 2021-01-08 RX ORDER — HEPARIN SODIUM 5000 [USP'U]/ML
5000 INJECTION, SOLUTION INTRAVENOUS; SUBCUTANEOUS EVERY 8 HOURS SCHEDULED
Status: DISCONTINUED | OUTPATIENT
Start: 2021-01-08 | End: 2021-01-12

## 2021-01-08 RX ORDER — MELATONIN
3 NIGHTLY PRN
Status: DISCONTINUED | OUTPATIENT
Start: 2021-01-08 | End: 2021-01-12

## 2021-01-08 RX ORDER — GABAPENTIN 100 MG/1
200 CAPSULE ORAL NIGHTLY
Status: DISCONTINUED | OUTPATIENT
Start: 2021-01-08 | End: 2021-01-12

## 2021-01-08 RX ORDER — PANTOPRAZOLE SODIUM 40 MG/1
40 TABLET, DELAYED RELEASE ORAL
Status: DISCONTINUED | OUTPATIENT
Start: 2021-01-08 | End: 2021-01-12

## 2021-01-08 RX ORDER — SERTRALINE HYDROCHLORIDE 100 MG/1
100 TABLET, FILM COATED ORAL DAILY
Status: DISCONTINUED | OUTPATIENT
Start: 2021-01-08 | End: 2021-01-12

## 2021-01-08 RX ORDER — ONDANSETRON 2 MG/ML
4 INJECTION INTRAMUSCULAR; INTRAVENOUS EVERY 6 HOURS PRN
Status: DISCONTINUED | OUTPATIENT
Start: 2021-01-08 | End: 2021-01-12

## 2021-01-08 RX ORDER — MIRTAZAPINE 15 MG/1
30 TABLET, FILM COATED ORAL NIGHTLY
Status: DISCONTINUED | OUTPATIENT
Start: 2021-01-08 | End: 2021-01-12

## 2021-01-08 RX ORDER — MELATONIN
3 NIGHTLY
Status: DISCONTINUED | OUTPATIENT
Start: 2021-01-08 | End: 2021-01-12

## 2021-01-08 RX ORDER — SODIUM POLYSTYRENE SULFONATE 15 G/60ML
30 SUSPENSION ORAL; RECTAL ONCE
Status: COMPLETED | OUTPATIENT
Start: 2021-01-08 | End: 2021-01-08

## 2021-01-08 NOTE — PLAN OF CARE
Pt is AOx4, Danish speaking.  used. On room air, . NSR on telemetry. Pt has right nephrostomy, draining clear yellow urine. No leaking noted at site. Colostomy in place. Pt complaining of sacral pain this morning, relieved with repositioning. AND ELECTROLYTES - ADULT  Goal: Electrolytes maintained within normal limits  Description: INTERVENTIONS:  - Monitor labs and rhythm and assess patient for signs and symptoms of electrolyte imbalances  - Administer electrolyte replacement as ordered  - Mon

## 2021-01-08 NOTE — ED PROVIDER NOTES
Patient Seen in: [facility] Emergency Department      History   Patient presents with:  Abnormal Result    Stated Complaint: K-6.3    HPI/Subjective:   HPI    Patient is a 23-year-old woman with history of diabetes, CKD, recurrent UTI, chronic hydro REIMPLANTATION Left 11/12/2019    Performed by Citlali Germain MD at [facility] MAIN OR                Social History    Tobacco Use      Smoking status: Never Smoker      Smokeless tobacco: Never Used    Alcohol use: No    Drug use:  No             Review of System Notable for the following components:       Result Value    Clarity Urine Hazy (*)     Leukocyte Esterase Urine Moderate (*)     WBC Urine 11-20 (*)     Bacteria Urine 1+ (*)     Mucous Urine 1+ (*)     All other components within normal limits   COMP META (*)     Sodium 133 (*)     Potassium 5.9 (*)     CO2 14.0 (*)     BUN 59 (*)     Creatinine 2.40 (*)     BUN/CREA Ratio 24.6 (*)     GFR, Non- 18 (*)     GFR, -American 21 (*)     Alkaline Phosphatase 156 (*)     Total Protein 9.2 Michelle Mancilla injury) ([facility] [address]  (primary encounter diagnosis)  Hyperkalemia  Urinary tract infection without hematuria, site unspecified    Disposition:  Admit  1/8/2021  1:24 am    Follow-up:  No follow-up provider specified.         Medications Prescribed:  Current Discha

## 2021-01-08 NOTE — TELEPHONE ENCOUNTER
[facility] called with critical value of 6.3. Patient needs to go to ER. [facility] states they will call patient to go to ER and agrees to call again if unable to reach patient or if patient refuses ER.

## 2021-01-08 NOTE — COVID NURSING ASSESSMENT
COVID-19 Daily Discharge Readiness-Nursing    O2 Sat at Rest: 100%     Temperature max from last 24 hrs: Temp (24hrs), Av °F (36.7 °C), Min:97.8 °F (36.6 °C), Max:98.3 °F (36.8 °C)    Inflammatory Markers: No results for input(s): CRP, JULIO CESAR, DDIMER in t

## 2021-01-08 NOTE — PROGRESS NOTES
NURSING ADMISSION NOTE      Patient admitted via Cart  Oriented to room. Safety precautions initiated. Bed in low position. Call light in reach. Pt admitted to floor & admission navigator complete.   Pt primarily German speaking, utilized translat

## 2021-01-08 NOTE — CM/SW NOTE
01/08/21 1408   CM/SW Screening   Referral [facility] staff; Chart review;Nursing rounds   Patient's [address]   Number of Levels in Home 2  (lives on main level)   Patient lives with Children  (son, who is

## 2021-01-08 NOTE — ED INITIAL ASSESSMENT (HPI)
Patient to ED with son, potassium 6.3.   Son reports her kidney's aren't working well.  + pain to her nephrostomy tube on the right side, reports it is due to be changed

## 2021-01-08 NOTE — H&P
[facility] HOSPITALIST  History and Physical     Tuan Chriss Felixdel Patient Status:  Inpatient    1935 MRN [id number]   [facility] 5NW-A Attending Matteo York MD   Hosp Day # 0 PCP Ruperto aXvier MD     Chief Complaint: Abnormal labs, fat • CYSTOSCOPY URETEROSCOPY Bilateral 1/16/2020    Performed by Vivi Oppenheim, MD at [facility] MAIN OR   • LEFT PHACOEMULSIFICATION OF CATARACT WITH INTRAOCULAR LENS IMPLANT [id number] Left 5/17/2017    Performed by Bina Sterling MD at [address] [place] [address]   • NS mouth 2 (two) times daily. , Disp: 60 capsule, Rfl: 0    •  sodium bicarbonate 650 MG Oral Tab, Take 1 tablet (650 mg total) by mouth 2 (two) times daily. , Disp: 60 tablet, Rfl: 11    •  Sertraline HCl 100 MG Oral Tab, Take 1 tablet (100 mg total) by mouth intact. Musculoskeletal: Moves all extremities. Extremities: No edema or cyanosis. Integument: No rashes or lesions. Psychiatric: Appropriate mood and affect. Diagnostic Data:      Labs:  Recent Labs   Lab 01/08/21  0007 01/08/21  0625   WBC 6.9 6. discontinue isolation: COVID +    Plan of care discussed with patient, ER physician.     Clement Stanford DO  1/2/5056        **Certification    PHYSICIAN Certification of Need for Inpatient Hospitalization - Initial Certification    Patient will require inpat

## 2021-01-08 NOTE — CONSULTS
[facility]    Report of Consultation    Farazdebora Patino Yevgeniy Patient Status:  Inpatient    1935 MRN [id number]   [facility] 5NW-A Attending Mtateo York MD   Hosp Day # 0 PCP Ruperto Xavier MD     Date of consult: 2021    REASON F Bilateral 2020    Performed by Babak Sanchez MD at [facility] MAIN OR   • LEFT PHACOEMULSIFICATION OF CATARACT WITH INTRAOCULAR LENS IMPLANT [id number] Left 2017    Performed by Jenny Norris MD at [address] [place] [address]   •      • OTHER      SHOULDER Min PRN  •  Insulin Aspart Pen (NOVOLOG) 100 UNIT/ML flexpen 1-5 Units, 1-5 Units, Subcutaneous, TID CC and HS  •  Piperacillin Sod-Tazobactam So (ZOSYN) 3.375 g in dextrose 5 % 100 mL ADD-vantage, 3.375 g, Intravenous, Q12H  •  Heparin Sodium (Porcine) 50 LABORATORY DATA:       Lab Results   Component Value Date     (H) 01/08/2021    BUN 57 (H) 01/08/2021    BUNCREA 23.4 (H) 01/08/2021    CREATSERUM 2.44 (H) 01/08/2021    ANIONGAP 8 01/08/2021    GFR 32 12/15/2020    GFRNAA 18 (L) 01/08/2021    G 11-20 (A) 01/08/2021    RBCUR None Seen 01/08/2021    EPIUR None Seen 01/08/2021    BACUR 1+ (A) 01/08/2021    CAOXUR Occasional (A) 03/11/2020    HYLUR Present (A) 04/06/2020         IMAGING:     All imaging studies personally reviewed.     Us Kidney/bladd

## 2021-01-09 LAB
ALBUMIN SERPL-MCNC: 2.7 G/DL (ref 3.4–5)
ANION GAP SERPL CALC-SCNC: 8 MMOL/L (ref 0–18)
BUN BLD-MCNC: 62 MG/DL (ref 7–18)
BUN/CREAT SERPL: 25.2 (ref 10–20)
CALCIUM BLD-MCNC: 8.9 MG/DL (ref 8.5–10.1)
CHLORIDE SERPL-SCNC: 110 MMOL/L (ref 98–112)
CO2 SERPL-SCNC: 20 MMOL/L (ref 21–32)
CREAT BLD-MCNC: 2.46 MG/DL
GLUCOSE BLD-MCNC: 137 MG/DL (ref 70–99)
GLUCOSE BLD-MCNC: 146 MG/DL (ref 70–99)
GLUCOSE BLD-MCNC: 159 MG/DL (ref 70–99)
GLUCOSE BLD-MCNC: 170 MG/DL (ref 70–99)
GLUCOSE BLD-MCNC: 172 MG/DL (ref 70–99)
HAV IGM SER QL: 2.3 MG/DL (ref 1.6–2.6)
OSMOLALITY SERPL CALC.SUM OF ELEC: 306 MOSM/KG (ref 275–295)
PHOSPHATE SERPL-MCNC: 5.3 MG/DL (ref 2.5–4.9)
POTASSIUM SERPL-SCNC: 4.1 MMOL/L (ref 3.5–5.1)
SODIUM SERPL-SCNC: 138 MMOL/L (ref 136–145)

## 2021-01-09 PROCEDURE — 99232 SBSQ HOSP IP/OBS MODERATE 35: CPT | Performed by: HOSPITALIST

## 2021-01-09 NOTE — PROGRESS NOTES
NEPHROLOGY DAILY PROGRESS NOTE     Follow up Reason: ANTONIO     SUBJECTIVE:  Resting in bed   States she is not doing well, states she is getting her blood drawn too often  RN reports pt complaining of pain near nephrostomy tube site     Language line used HYDROcodone-acetaminophen (NORCO) 5-325 MG per tab 1 tablet, 1 tablet, Oral, Q4H PRN    Or    •  HYDROcodone-acetaminophen (NORCO) 5-325 MG per tab 2 tablet, 2 tablet, Oral, Q4H PRN    •  morphINE sulfate (PF) 2 MG/ML injection 1 mg, 1 mg, Intravenous, Q2H 1.43mg/dL on discharge on 12/20/20  -- Cr now 2.4mg/dL on admit 1/8/20  -- ANTONIO likely prerenal   -- urine lytes c/w prerenal, urine na 13  -- renal US reviewed, no hydro  --- creatinine remains elevated at 2.46 this AM, patient did not want to repeat labs

## 2021-01-09 NOTE — PROGRESS NOTES
[facility] HOSPITALIST  Progress Note     Manny Vuong Patient Status:  Inpatient    1935 MRN [id number]   [facility] 5NW-A Attending Erica De Anda MD   Hosp Day # 1 PCP Armando Ellington MD     Chief Complaint: abnormal labs    S: Patient the last 168 hours. No results for input(s): TROP, CK in the last 168 hours. Imaging: Imaging data reviewed in Epic.     Medications:   • gabapentin  200 mg Oral Nightly   • Levothyroxine Sodium  150 mcg Oral Before breakfast   • melatonin  3 mg

## 2021-01-09 NOTE — PLAN OF CARE
Pt is A&Ox4, Persian speaking, no teeth. VSS, afebrile. Maintaining O2 sats >96% on RA. Tele, NSR. RLQ ileostomy draining WDL. Tolerating renal diet, accu QID. Voids per commode and right nephrostomy tube draining WDL, dressing changed overnight.  Up x2 assi restrictions as appropriate  Outcome: Progressing

## 2021-01-10 LAB
ALBUMIN SERPL-MCNC: 2.5 G/DL (ref 3.4–5)
ANION GAP SERPL CALC-SCNC: 7 MMOL/L (ref 0–18)
BUN BLD-MCNC: 56 MG/DL (ref 7–18)
BUN/CREAT SERPL: 25.3 (ref 10–20)
CALCIUM BLD-MCNC: 8.7 MG/DL (ref 8.5–10.1)
CHLORIDE SERPL-SCNC: 109 MMOL/L (ref 98–112)
CO2 SERPL-SCNC: 22 MMOL/L (ref 21–32)
CREAT BLD-MCNC: 2.21 MG/DL
GLUCOSE BLD-MCNC: 108 MG/DL (ref 70–99)
GLUCOSE BLD-MCNC: 129 MG/DL (ref 70–99)
GLUCOSE BLD-MCNC: 149 MG/DL (ref 70–99)
GLUCOSE BLD-MCNC: 175 MG/DL (ref 70–99)
GLUCOSE BLD-MCNC: 195 MG/DL (ref 70–99)
HAV IGM SER QL: 2 MG/DL (ref 1.6–2.6)
OSMOLALITY SERPL CALC.SUM OF ELEC: 302 MOSM/KG (ref 275–295)
PHOSPHATE SERPL-MCNC: 4.6 MG/DL (ref 2.5–4.9)
POTASSIUM SERPL-SCNC: 4 MMOL/L (ref 3.5–5.1)
SODIUM SERPL-SCNC: 138 MMOL/L (ref 136–145)

## 2021-01-10 PROCEDURE — 99232 SBSQ HOSP IP/OBS MODERATE 35: CPT | Performed by: HOSPITALIST

## 2021-01-10 RX ORDER — HYDROCODONE BITARTRATE AND ACETAMINOPHEN 5; 325 MG/1; MG/1
1 TABLET ORAL EVERY 4 HOURS PRN
Qty: 20 TABLET | Refills: 0 | Status: SHIPPED | OUTPATIENT
Start: 2021-01-10 | End: 2021-03-11

## 2021-01-10 NOTE — OCCUPATIONAL THERAPY NOTE
OCCUPATIONAL THERAPY QUICK EVALUATION - INPATIENT    Room Number: 524/524-A  Evaluation Date: 1/10/2021     Type of Evaluation: Initial  Presenting Problem:  ANTONIO    Physician Order: IP Consult to Occupational Therapy  Reason for Therapy:  ADL/IADL Dysfuncti OCCUPATIONAL PROFILE    HOME SITUATION  Type of Home: House  Home Layout: Able to live on main level  Lives With: Son    Toilet and Equipment: Comfort height toilet  Shower/Tub and Equipment: Walk-in shower                     Prior Level of Function CGA and then sat back down, pt unable to take steps, pt completed therex in chair to music. Patient End of Session: Up in chair;Needs met;Bracing education provided; All patient questions and concerns addressed;SCDs in place; Alarm set    ASSESSMENT     P

## 2021-01-10 NOTE — PLAN OF CARE
Pt is A&Ox4, Persian speaking, no teeth. VSS, afebrile. Maintaining O2 sats >94% on RA. Tele, NSR. RLQ ileostomy draining WDL. Tolerating renal diet, accu QID, order-set. Voids per commode and right nephrostomy tube draining WDL. Up x2 assists.  Reported zurod repeat lab results as appropriate  - Fluid restriction as ordered  - Instruct patient on fluid and nutrition restrictions as appropriate  Outcome: Progressing

## 2021-01-10 NOTE — PLAN OF CARE
Pt is AOx4, Telugu speaking. On room air, . NSR on telemetry. VSS, afebrile. Ileostomy intact, adequate output. Voids via bedside commode and nephrostomy to right back. Nephrostomy dressing C/D/I, draining adequate amount of clear yellow urine.  Pt compl ordered  - Monitor response to electrolyte replacements, including rhythm and repeat lab results as appropriate  - Fluid restriction as ordered  - Instruct patient on fluid and nutrition restrictions as appropriate  Outcome: Progressing

## 2021-01-10 NOTE — PLAN OF CARE
Assumed care of pt at 0730  A&Ox4, up with 1-2 assist and contact guard, complaining of some tenderness near R nephrostomy site but declines pain medication at this time  R/A, NSR, no chest pain, no shortness of breath  Continent of bowel and bladder, last patient on fluid and nutrition restrictions as appropriate  Outcome: Progressing

## 2021-01-10 NOTE — PROGRESS NOTES
[facility] HOSPITALIST  Progress Note     Evie Vuong Patient Status:  Inpatient    1935 MRN [id number]   [facility] 5NW-A Attending Freda Carlos MD   Hosp Day # 2 PCP Marian Story MD     Chief Complaint: abnormal labs    S: Patient --   --   --   --    TP 9.2*  --   --   --   --     < > = values in this interval not displayed. Estimated Creatinine Clearance: 15 mL/min (A) (based on SCr of 2.21 mg/dL (H)). No results for input(s): PTP, INR in the last 168 hours.     No results agnes Gregory MD

## 2021-01-10 NOTE — PROGRESS NOTES
NEPHROLOGY DAILY PROGRESS NOTE     Follow up Reason: ANTONIO     SUBJECTIVE:  Reports having back pain  Hungry wants to eat     Video interpret used     OBJECTIVE:    Total Intake/Output:    Intake/Output Summary (Last 24 hours) at 1/10/2021 1296  Last data fi PRN    Or    •  HYDROcodone-acetaminophen (NORCO) 5-325 MG per tab 2 tablet, 2 tablet, Oral, Q4H PRN    •  morphINE sulfate (PF) 2 MG/ML injection 1 mg, 1 mg, Intravenous, Q2H PRN    Or    •  morphINE sulfate (PF) 2 MG/ML injection 2 mg, 2 mg, Intravenous, meds       History of R hydronephrosis s/p nephrostomy tube placement:  -- had complained of pain near NT, but today appears to be more of back pain       Recurrent UTI:  -- w ho ESBL. Abx per primary     COVID19 infection:  -- per ID/primary.  Currently on

## 2021-01-10 NOTE — PHYSICAL THERAPY NOTE
PHYSICAL THERAPY QUICK EVALUATION - INPATIENT    Room Number: 524/524-A  Evaluation Date: 1/10/2021  Presenting Problem: abnormal labs  Physician Order: PT Eval and Treat    Problem List  Principal Problem:    ANTONIO (acute kidney injury) ([facility]  Active Prob (Comment)(alyssa lift, w/c)       Prior Level of [place]: Pt from home with son who provides 24hr care. Pt requires assist for all mobility and has a alyssa lift if needed. SUBJECTIVE  \"Yes, dancing! \"    OBJECTIVE  Precautions: Bed/chair alarm reach;RN aware of session/findings; All patient questions and concerns addressed    ASSESSMENT   Patient is a 81year old female admitted on 1/7/2021 for abnormal labs.   Pertinent comorbidities and personal factors impacting therapy h/o repeated hospitaliza

## 2021-01-11 LAB
ALBUMIN SERPL-MCNC: 2.7 G/DL (ref 3.4–5)
ANION GAP SERPL CALC-SCNC: 6 MMOL/L (ref 0–18)
BUN BLD-MCNC: 49 MG/DL (ref 7–18)
BUN/CREAT SERPL: 26.1 (ref 10–20)
CALCIUM BLD-MCNC: 9.6 MG/DL (ref 8.5–10.1)
CHLORIDE SERPL-SCNC: 108 MMOL/L (ref 98–112)
CO2 SERPL-SCNC: 24 MMOL/L (ref 21–32)
CREAT BLD-MCNC: 1.88 MG/DL
GLUCOSE BLD-MCNC: 106 MG/DL (ref 70–99)
GLUCOSE BLD-MCNC: 114 MG/DL (ref 70–99)
GLUCOSE BLD-MCNC: 128 MG/DL (ref 70–99)
GLUCOSE BLD-MCNC: 135 MG/DL (ref 70–99)
GLUCOSE BLD-MCNC: 161 MG/DL (ref 70–99)
HAV IGM SER QL: 2 MG/DL (ref 1.6–2.6)
OSMOLALITY SERPL CALC.SUM OF ELEC: 299 MOSM/KG (ref 275–295)
PHOSPHATE SERPL-MCNC: 5.3 MG/DL (ref 2.5–4.9)
POTASSIUM SERPL-SCNC: 4.2 MMOL/L (ref 3.5–5.1)
SODIUM SERPL-SCNC: 138 MMOL/L (ref 136–145)

## 2021-01-11 PROCEDURE — 99232 SBSQ HOSP IP/OBS MODERATE 35: CPT | Performed by: HOSPITALIST

## 2021-01-11 RX ORDER — SODIUM CHLORIDE 9 MG/ML
INJECTION, SOLUTION INTRAVENOUS CONTINUOUS
Status: ACTIVE | OUTPATIENT
Start: 2021-01-11 | End: 2021-01-11

## 2021-01-11 NOTE — PLAN OF CARE
COVID-19 Daily Discharge Readiness-Nursing    O2 Sat at Rest:     93%   O2 Sat with Exertion:    % on    liters   Temperature max from last 24 hrs: Temp (24hrs), Av.5 °F (36.9 °C), Min:97.9 °F (36.6 °C), Max:99 °F (37.2 °C)    Inflammatory Markers:   R 1/8 am: improve appetite  1/8 NOC: manage any pain  1/9 NOC: manage pain, sleep  1/10 NOC: control pain and sleep well   Interventions:   - PRN pain med , Melatonin  - offering food pt likes  - meds per [facility]  - frequent rounding  - nephrology consult  - S

## 2021-01-11 NOTE — COVID NURSING ASSESSMENT
COVID-19 Daily Discharge Readiness-Nursing    O2 Sat at Rest:   97  % on RA   O2 Sat with Exertion:   94  % on   0 liters   Temperature max from last 24 hrs: Temp (24hrs), Av.4 °F (36.9 °C), Min:97.9 °F (36.6 °C), Max:98.8 °F (37.1 °C)    Inflammatory

## 2021-01-11 NOTE — PROGRESS NOTES
[facility]    Nephrology Progress Note    Tuan Vuong Attending:  Lenin Meyer MD       SUBJECTIVE:     No cp, sob  No issues with nephrostomy tube    PHYSICAL EXAM:     Vital Signs: /56 (BP Location: Right arm)   Pulse 58   Temp 98.6 °F 11/20/2019    MON 2 11/20/2019    EOS 3 11/20/2019    BASO 1 11/20/2019    NEPERCENT 47.6 01/08/2021    LYPERCENT 39.3 01/08/2021    MOPERCENT 6.8 01/08/2021    EOPERCENT 5.1 01/08/2021    BAPERCENT 0.7 01/08/2021    NE 2.88 01/08/2021    LYMABS 2.38 01/08 chewable tab 8 tablet, 8 tablet, Oral, Q15 Min PRN    •  Insulin Aspart Pen (NOVOLOG) 100 UNIT/ML flexpen 1-5 Units, 1-5 Units, Subcutaneous, TID CC and HS    •  Heparin Sodium (Porcine) 5000 UNIT/ML injection 5,000 Units, 5,000 Units, Subcutaneous, Q8H SC Please do not hesitate to call with questions or concerns.         Radha Chris MD  [facility]  [facility] 93  [address] [facility]  [place], [address] [facility]    1/11/2021  4:01 PM

## 2021-01-11 NOTE — PROGRESS NOTES
[facility] HOSPITALIST  Progress Note     Glorious Riding Yevgeniy Patient Status:  Inpatient    1935 MRN [id number]   [facility] 5NW-A Attending Nora Chandler MD   Hosp Day # 3 PCP Ambreen Vincent MD     Chief Complaint: abnormal labs    S: no acut Estimated Creatinine Clearance: 17.6 mL/min (A) (based on SCr of 1.88 mg/dL (H)). No results for input(s): PTP, INR in the last 168 hours. No results for input(s): TROP, CK in the last 168 hours. Imaging: Imaging data reviewed in Epic.

## 2021-01-11 NOTE — PROGRESS NOTES
Assumed care at 1600. Pt alert and oriented x4. Nepali speaking. Afebrile. Denies pain. Denies n/v/d. Pt maintaining O2 sats at 95% on RA. Tele: NSR, SB. Heparin subcutaneous. Nephrostomy draining appropriately. Voids yellow/clear urine via the bedpan.  Col

## 2021-01-12 ENCOUNTER — PATIENT OUTREACH (OUTPATIENT)
Dept: CASE MANAGEMENT | Age: 86
End: 2021-01-12

## 2021-01-12 VITALS
RESPIRATION RATE: 16 BRPM | OXYGEN SATURATION: 95 % | HEART RATE: 70 BPM | SYSTOLIC BLOOD PRESSURE: 138 MMHG | DIASTOLIC BLOOD PRESSURE: 45 MMHG | TEMPERATURE: 99 F | BODY MASS INDEX: 30 KG/M2 | WEIGHT: 165.19 LBS

## 2021-01-12 LAB
ALBUMIN SERPL-MCNC: 2.4 G/DL (ref 3.4–5)
ANION GAP SERPL CALC-SCNC: 5 MMOL/L (ref 0–18)
BUN BLD-MCNC: 43 MG/DL (ref 7–18)
BUN/CREAT SERPL: 24.2 (ref 10–20)
CALCIUM BLD-MCNC: 8.8 MG/DL (ref 8.5–10.1)
CHLORIDE SERPL-SCNC: 113 MMOL/L (ref 98–112)
CO2 SERPL-SCNC: 20 MMOL/L (ref 21–32)
CREAT BLD-MCNC: 1.78 MG/DL
GLUCOSE BLD-MCNC: 103 MG/DL (ref 70–99)
GLUCOSE BLD-MCNC: 170 MG/DL (ref 70–99)
GLUCOSE BLD-MCNC: 99 MG/DL (ref 70–99)
HAV IGM SER QL: 1.9 MG/DL (ref 1.6–2.6)
OSMOLALITY SERPL CALC.SUM OF ELEC: 297 MOSM/KG (ref 275–295)
PHOSPHATE SERPL-MCNC: 4 MG/DL (ref 2.5–4.9)
POTASSIUM SERPL-SCNC: 4.5 MMOL/L (ref 3.5–5.1)
SODIUM SERPL-SCNC: 138 MMOL/L (ref 136–145)

## 2021-01-12 PROCEDURE — 99239 HOSP IP/OBS DSCHRG MGMT >30: CPT | Performed by: HOSPITALIST

## 2021-01-12 NOTE — DISCHARGE SUMMARY
[facility] HOSPITAL[facility]  DISCHARGE SUMMARY     Farazdebora Vuong Patient Status:  Inpatient    1935 MRN [id number]   [facility] 5NW-A Attending Maci Chaidez MD   Hosp Day # 4 PCP Milagro Jacobo MD     Date of Admission: 2021  Date of Dis condition    Lace+ Score: 74  59-90 High Risk  29-58 Medium Risk  0-28   Low Risk  Patient was referred to the [facility]. TCM Follow-Up Recommendation:  LACE > 58:  High Risk of readmission after discharge from the hospital.    Pr tablet  Refills: 1     Pantoprazole Sodium 40 MG Tbec  Commonly known as: PROTONIX      Take 1 tablet (40 mg total) by mouth every morning before breakfast.   Quantity: 30 tablet  Refills: 0     saccharomyces boulardii 250 MG Caps  Commonly known as: ENRIQUE ()/(37-73) 138/45    Physical Exam:    General: No acute distress. Respiratory: Clear to auscultation bilaterally. No wheezes. No rhonchi. Cardiovascular: S1, S2. Regular rate and rhythm. No murmurs, rubs or gallops.    Abdomen: Soft, nontender, no

## 2021-01-12 NOTE — PROGRESS NOTES
1st attempt TCC apt request    [address]  [phone]    Spoke to patients son Lina Doty and he is requesting a call once patient is home to schedule apt.

## 2021-01-12 NOTE — PLAN OF CARE
COVID-19 Daily Discharge Readiness-Nursing    O2 Sat at Rest:     96%  RA   O2 Sat with Exertion:    % on    liters   Temperature max from last 24 hrs: Temp (24hrs), Av.6 °F (37 °C), Min:97.9 °F (36.6 °C), Max:99.3 °F (37.4 °C)    Inflammatory Markers: NOC: manage any pain  1/9 NOC: manage pain, sleep  1/10 NOC: control pain and sleep well   1/11 AM: go home  1/11 [address] Red[place]: control pain and sleep well   Interventions:   - PRN pain med , Melatonin  - offering food pt likes  - meds per [facility]  - frequent rounding

## 2021-01-12 NOTE — PROGRESS NOTES
[facility]    Nephrology Progress Note    Tuan Vuong Attending:  Ahsan Arce MD       SUBJECTIVE:     No complaints    PHYSICAL EXAM:     Vital Signs: /52 (BP Location: Right arm)   Pulse 56   Temp 98.8 °F (37.1 °C) (Oral)   Resp 16 MON 2 11/20/2019    EOS 3 11/20/2019    BASO 1 11/20/2019    NEPERCENT 47.6 01/08/2021    LYPERCENT 39.3 01/08/2021    MOPERCENT 6.8 01/08/2021    EOPERCENT 5.1 01/08/2021    BAPERCENT 0.7 01/08/2021    NE 2.88 01/08/2021    LYMABS 2.38 01/08/2021    MO Aspart Pen (NOVOLOG) 100 UNIT/ML flexpen 1-5 Units, 1-5 Units, Subcutaneous, TID CC and HS    •  Heparin Sodium (Porcine) 5000 UNIT/ML injection 5,000 Units, 5,000 Units, Subcutaneous, Q8H [facility]    •  acetaminophen (TYLENOL) tab 650 mg, 650 mg, Oral, Q4H PRN nursing. Thank you for allowing me to participate in the care of this patient. Please do not hesitate to call with questions or concerns.         Kamaljit Bentley MD  [place] [address] [facility] Nephrology  [facility]  [place], [address] [place] [address]

## 2021-01-12 NOTE — CM/SW NOTE
Case discussed in rounds. Pt to discharge today. SW called son to confirm plans. He requested that pt be sent home by ambulance. SW arranged ambulance for 4:30pm and notified [facility] liaison of discharge today.       Manny, [address]

## 2021-01-13 ENCOUNTER — TELEPHONE (OUTPATIENT)
Dept: FAMILY MEDICINE CLINIC | Facility: CLINIC | Age: 86
End: 2021-01-13

## 2021-01-13 ENCOUNTER — PATIENT OUTREACH (OUTPATIENT)
Dept: CASE MANAGEMENT | Age: 86
End: 2021-01-13

## 2021-01-13 ENCOUNTER — MED REC SCAN ONLY (OUTPATIENT)
Dept: FAMILY MEDICINE CLINIC | Facility: CLINIC | Age: 86
End: 2021-01-13

## 2021-01-13 DIAGNOSIS — Z86.711 HISTORY OF PULMONARY EMBOLISM: ICD-10-CM

## 2021-01-13 DIAGNOSIS — E87.5 HYPERKALEMIA: ICD-10-CM

## 2021-01-13 DIAGNOSIS — N39.0 URINARY TRACT INFECTION WITHOUT HEMATURIA, SITE UNSPECIFIED: ICD-10-CM

## 2021-01-13 DIAGNOSIS — N17.9 ACUTE RENAL FAILURE, UNSPECIFIED ACUTE RENAL FAILURE TYPE (HCC): ICD-10-CM

## 2021-01-13 DIAGNOSIS — E11.9 TYPE 2 DIABETES MELLITUS WITHOUT COMPLICATION, WITHOUT LONG-TERM CURRENT USE OF INSULIN (HCC): ICD-10-CM

## 2021-01-13 DIAGNOSIS — N17.9 ACUTE KIDNEY INJURY (HCC): ICD-10-CM

## 2021-01-13 DIAGNOSIS — U07.1 COVID-19 VIRUS INFECTION: ICD-10-CM

## 2021-01-13 DIAGNOSIS — Z02.9 ENCOUNTERS FOR UNSPECIFIED ADMINISTRATIVE PURPOSE: ICD-10-CM

## 2021-01-13 PROCEDURE — 1111F DSCHRG MED/CURRENT MED MERGE: CPT

## 2021-01-13 NOTE — PROGRESS NOTES
Initial Post Discharge Follow Up   Discharge Date: 1/12/21  Contact Date: 1/13/2021    Consent Verification:  Assessment Completed With: Other: Lucious Lanes, patient's son Permission received per patient?  written  HIPAA Verified?   Yes    NC initially place verbalized understanding of these. Malgorzata Rodriguez denies any further questions or needs at this time.    • Do you have any pain since discharge?  yes   If Yes:  o Where: Bilateral foot pain   Rating on pain scale 1-10, 10 being the worst pain you have ever experie clotrimazole-betamethasone 1-0.05 % External Cream Apply 1 Application topically 2 (two) times daily as needed. 60 g 0   • saccharomyces boulardii 250 MG Oral Cap Take 1 capsule (250 mg total) by mouth 2 (two) times daily.  60 capsule 0   • sodium bicarbona Follow up appointments:      Your appointments     Date & Time Appointment Department [facility])    Jan 25, 2021  2:00 PM CST In-Office Procedure Visit with Steven Michaud MD Ophthalmology - [address], [address] [place] ([address], [address] [facility])    F appointments:     NCM offered sooner TCC appointment if schedule allowed:  NA    []  Advised patient to bring all medications and blood glucose meter/supplies if applicable.

## 2021-01-13 NOTE — TELEPHONE ENCOUNTER
Patient discharged home from [facility] on 1/12/21 and is at High risk for readmission per Lace score and recommended to have a TCM HFU by 1/19/2021 preferred or no later than 1/26/2021 or sooner.  NCM attempted to schedule a TCM HFU with the [facility] clini

## 2021-01-14 ENCOUNTER — TELEPHONE (OUTPATIENT)
Dept: FAMILY MEDICINE CLINIC | Facility: CLINIC | Age: 86
End: 2021-01-14

## 2021-01-14 DIAGNOSIS — H01.119 CONTACT DERMATITIS OF EYELID, UNSPECIFIED LATERALITY: ICD-10-CM

## 2021-01-14 RX ORDER — PANTOPRAZOLE SODIUM 40 MG/1
40 TABLET, DELAYED RELEASE ORAL
Qty: 30 TABLET | Refills: 0 | Status: SHIPPED | OUTPATIENT
Start: 2021-01-14 | End: 2021-02-11

## 2021-01-14 NOTE — TELEPHONE ENCOUNTER
Dr. Sunday Garza,  Please advise    Is Video Visit OK for TCM/HFU again?     Patient hospitalized 1/7/21-1/12/21

## 2021-01-14 NOTE — TELEPHONE ENCOUNTER
LOV 12/30/2020    LAST LAB    LAST RX   Pantoprazole Sodium 40 MG Oral Tab EC 30 tablet 0 12/8/2020         Next OV   Future Appointments   Date Time Provider Kiel Alarcon   1/25/2021  2:00 PM Majo Denton MD [id number]/

## 2021-01-14 NOTE — TELEPHONE ENCOUNTER
Lady Adkins from [facility] is calling. Needing clarification to know if pt can be removed from isolation or not?  Pt tested positive upon admission to hospital.

## 2021-01-15 NOTE — TELEPHONE ENCOUNTER
Called patient's son and scheduled Video Visit for TCM/HFU.     Future Appointments   Date Time Provider Kiel Alarcon   1/18/2021  1:30 PM Aurora Rodriguez MD [facility]   1/25/2021  2:00 PM Majo Denton MD [address] [place] [address]

## 2021-01-15 NOTE — TELEPHONE ENCOUNTER
Called and notified Morena Peoples from [facility] of Dr. Weston Inman instructions for isolation. Verbalizes understanding.

## 2021-01-18 ENCOUNTER — TELEMEDICINE (OUTPATIENT)
Dept: FAMILY MEDICINE CLINIC | Facility: CLINIC | Age: 86
End: 2021-01-18
Payer: MEDICARE

## 2021-01-18 VITALS
HEIGHT: 62 IN | BODY MASS INDEX: 30.36 KG/M2 | SYSTOLIC BLOOD PRESSURE: 110 MMHG | TEMPERATURE: 98 F | WEIGHT: 165 LBS | DIASTOLIC BLOOD PRESSURE: 65 MMHG

## 2021-01-18 DIAGNOSIS — R53.81 PHYSICAL DECONDITIONING: ICD-10-CM

## 2021-01-18 DIAGNOSIS — Z93.6 NEPHROSTOMY STATUS (HCC): ICD-10-CM

## 2021-01-18 DIAGNOSIS — E11.42 DIABETIC POLYNEUROPATHY ASSOCIATED WITH TYPE 2 DIABETES MELLITUS (HCC): ICD-10-CM

## 2021-01-18 DIAGNOSIS — N39.0 RECURRENT UTI: ICD-10-CM

## 2021-01-18 DIAGNOSIS — E87.5 HYPERKALEMIA: Primary | ICD-10-CM

## 2021-01-18 DIAGNOSIS — N18.4 CKD (CHRONIC KIDNEY DISEASE) STAGE 4, GFR 15-29 ML/MIN (HCC): ICD-10-CM

## 2021-01-18 PROCEDURE — 99495 TRANSJ CARE MGMT MOD F2F 14D: CPT | Performed by: FAMILY MEDICINE

## 2021-01-18 NOTE — PROGRESS NOTES
Please note that the following visit was completed using two-way, real-time interactive audio and video communication.   This has been done in good saad to provide continuity of care in the best interest of the provider-patient relationship, due to the nichol medication, and changes to current doses of medications and discontinued medications.     HPI: 62-year-old female with past medical history significant for type 2 diabetes mellitus hypertension hyperlipidemia chronic hydronephrosis s/p nephrostomy, recurren clotrimazole-betamethasone 1-0.05 % External Cream, Apply 1 Application topically 2 (two) times daily as needed. •  saccharomyces boulardii 250 MG Oral Cap, Take 1 capsule (250 mg total) by mouth 2 (two) times daily.     •  sodium bicarbonate 650 MG Oral double vision  HEENT: denies nasal congestion, sinus pain or ST  LUNGS: denies shortness of breath with exertion  CARDIOVASCULAR: denies chest pain on exertion or palpitations  GI: denies abdominal pain, denies heartburn, denies diarrhea  MUSCULOSKELETAL: Consults:  HOME HEALTH (EXT)         Transitional Care Management Certification:  I certify that the following are true:  Communication with the patient was made within 2 business days of discharge on date above   Medical Decision Making- Based on service

## 2021-01-19 ENCOUNTER — LAB REQUISITION (OUTPATIENT)
Dept: LAB | Facility: HOSPITAL | Age: 86
End: 2021-01-19
Payer: MEDICARE

## 2021-01-19 DIAGNOSIS — E11.22 TYPE 2 DIABETES MELLITUS WITH DIABETIC CHRONIC KIDNEY DISEASE (HCC): ICD-10-CM

## 2021-01-19 DIAGNOSIS — N18.4 CHRONIC KIDNEY DISEASE, STAGE 4 (SEVERE) (HCC): ICD-10-CM

## 2021-01-19 LAB
ALBUMIN SERPL-MCNC: 3.1 G/DL (ref 3.4–5)
ANION GAP SERPL CALC-SCNC: 7 MMOL/L (ref 0–18)
ANION GAP SERPL CALC-SCNC: 7 MMOL/L (ref 0–18)
BUN BLD-MCNC: 51 MG/DL (ref 7–18)
BUN BLD-MCNC: 51 MG/DL (ref 7–18)
BUN/CREAT SERPL: 26.6 (ref 10–20)
BUN/CREAT SERPL: 26.6 (ref 10–20)
CALCIUM BLD-MCNC: 9 MG/DL (ref 8.5–10.1)
CALCIUM BLD-MCNC: 9 MG/DL (ref 8.5–10.1)
CHLORIDE SERPL-SCNC: 109 MMOL/L (ref 98–112)
CHLORIDE SERPL-SCNC: 109 MMOL/L (ref 98–112)
CO2 SERPL-SCNC: 18 MMOL/L (ref 21–32)
CO2 SERPL-SCNC: 18 MMOL/L (ref 21–32)
CREAT BLD-MCNC: 1.92 MG/DL
CREAT BLD-MCNC: 1.92 MG/DL
GLUCOSE BLD-MCNC: 113 MG/DL (ref 70–99)
GLUCOSE BLD-MCNC: 113 MG/DL (ref 70–99)
OSMOLALITY SERPL CALC.SUM OF ELEC: 292 MOSM/KG (ref 275–295)
OSMOLALITY SERPL CALC.SUM OF ELEC: 292 MOSM/KG (ref 275–295)
PHOSPHATE SERPL-MCNC: 4.2 MG/DL (ref 2.5–4.9)
POTASSIUM SERPL-SCNC: 5.5 MMOL/L (ref 3.5–5.1)
POTASSIUM SERPL-SCNC: 5.5 MMOL/L (ref 3.5–5.1)
SODIUM SERPL-SCNC: 134 MMOL/L (ref 136–145)
SODIUM SERPL-SCNC: 134 MMOL/L (ref 136–145)

## 2021-01-19 PROCEDURE — 80069 RENAL FUNCTION PANEL: CPT | Performed by: INTERNAL MEDICINE

## 2021-01-21 ENCOUNTER — TELEPHONE (OUTPATIENT)
Dept: FAMILY MEDICINE CLINIC | Facility: CLINIC | Age: 86
End: 2021-01-21

## 2021-01-21 NOTE — TELEPHONE ENCOUNTER
Received call from [facility] from [facility]MER HERNÁNDEZ requesting current OV notes and Hospital note from recent hospitalization. Notes faxed to [phone] as requested. Confirmation fax received.

## 2021-01-27 ENCOUNTER — TELEPHONE (OUTPATIENT)
Dept: FAMILY MEDICINE CLINIC | Facility: CLINIC | Age: 86
End: 2021-01-27

## 2021-01-27 RX ORDER — ALBUTEROL SULFATE 90 UG/1
2 AEROSOL, METERED RESPIRATORY (INHALATION) EVERY 4 HOURS PRN
Qty: 1 INHALER | Refills: 0 | Status: SHIPPED | OUTPATIENT
Start: 2021-01-27

## 2021-01-27 NOTE — TELEPHONE ENCOUNTER
I was called by the home health nurse was visiting  Patient is complaining of shortness of breath and is wheezing  Low blood pressure at 100/58, saturation 98% temperature 98  Also having swelling on her feet    Plan  Chest x-ray  Prescription for albutero infection     ANTONIO (acute kidney injury) ([facility] [address]     Urinary tract infection without hematuria, site unspecified            Orders Placed This Encounter      Albuterol Sulfate  (90 Base) MCG/ACT Inhalation Aero Soln          Sig: Inhale 2 puffs into th

## 2021-01-28 DIAGNOSIS — G47.00 INSOMNIA, UNSPECIFIED TYPE: ICD-10-CM

## 2021-01-28 RX ORDER — MELATONIN
3 NIGHTLY
Qty: 90 TABLET | Refills: 1 | Status: SHIPPED | OUTPATIENT
Start: 2021-01-28

## 2021-01-28 NOTE — TELEPHONE ENCOUNTER
LOV 1/18/2021    LAST LAB    LAST RX   melatonin 3 MG Oral Tab 90 tablet 1 6/18/2020         Next OV   Future Appointments   Date Time Provider Kiel Alarcon   2/22/2021  3:00 PM Steven Manjarrez MD [address]

## 2021-01-29 DIAGNOSIS — Z23 NEED FOR VACCINATION: ICD-10-CM

## 2021-02-02 ENCOUNTER — MED REC SCAN ONLY (OUTPATIENT)
Dept: FAMILY MEDICINE CLINIC | Facility: CLINIC | Age: 86
End: 2021-02-02

## 2021-02-03 ENCOUNTER — LAB REQUISITION (OUTPATIENT)
Dept: LAB | Facility: HOSPITAL | Age: 86
End: 2021-02-03
Payer: MEDICARE

## 2021-02-03 DIAGNOSIS — I12.9 HYPERTENSIVE CHRONIC KIDNEY DISEASE WITH STAGE 1 THROUGH STAGE 4 CHRONIC KIDNEY DISEASE, OR UNSPECIFIED CHRONIC KIDNEY DISEASE: ICD-10-CM

## 2021-02-03 DIAGNOSIS — N18.4 CHRONIC KIDNEY DISEASE, STAGE 4 (SEVERE) (HCC): ICD-10-CM

## 2021-02-03 LAB
ALBUMIN SERPL-MCNC: 3.1 G/DL (ref 3.4–5)
ALBUMIN/GLOB SERPL: 0.6 {RATIO} (ref 1–2)
ALP LIVER SERPL-CCNC: 114 U/L
ALT SERPL-CCNC: 17 U/L
ANION GAP SERPL CALC-SCNC: 13 MMOL/L (ref 0–18)
AST SERPL-CCNC: 24 U/L (ref 15–37)
BASOPHILS # BLD AUTO: 0.03 X10(3) UL (ref 0–0.2)
BASOPHILS NFR BLD AUTO: 0.6 %
BILIRUB SERPL-MCNC: 0.2 MG/DL (ref 0.1–2)
BUN BLD-MCNC: 48 MG/DL (ref 7–18)
BUN/CREAT SERPL: 28.7 (ref 10–20)
CALCIUM BLD-MCNC: 8.9 MG/DL (ref 8.5–10.1)
CHLORIDE SERPL-SCNC: 106 MMOL/L (ref 98–112)
CO2 SERPL-SCNC: 18 MMOL/L (ref 21–32)
CREAT BLD-MCNC: 1.67 MG/DL
DEPRECATED RDW RBC AUTO: 55.2 FL (ref 35.1–46.3)
EOSINOPHIL # BLD AUTO: 0.41 X10(3) UL (ref 0–0.7)
EOSINOPHIL NFR BLD AUTO: 7.6 %
ERYTHROCYTE [DISTWIDTH] IN BLOOD BY AUTOMATED COUNT: 17.1 % (ref 11–15)
GLOBULIN PLAS-MCNC: 5 G/DL (ref 2.8–4.4)
GLUCOSE BLD-MCNC: 191 MG/DL (ref 70–99)
HCT VFR BLD AUTO: 29.6 %
HGB BLD-MCNC: 9.1 G/DL
IMM GRANULOCYTES # BLD AUTO: 0.02 X10(3) UL (ref 0–1)
IMM GRANULOCYTES NFR BLD: 0.4 %
LYMPHOCYTES # BLD AUTO: 2.26 X10(3) UL (ref 1–4)
LYMPHOCYTES NFR BLD AUTO: 41.6 %
M PROTEIN MFR SERPL ELPH: 8.1 G/DL (ref 6.4–8.2)
MCH RBC QN AUTO: 27.1 PG (ref 26–34)
MCHC RBC AUTO-ENTMCNC: 30.7 G/DL (ref 31–37)
MCV RBC AUTO: 88.1 FL
MONOCYTES # BLD AUTO: 0.28 X10(3) UL (ref 0.1–1)
MONOCYTES NFR BLD AUTO: 5.2 %
NEUTROPHILS # BLD AUTO: 2.43 X10 (3) UL (ref 1.5–7.7)
NEUTROPHILS # BLD AUTO: 2.43 X10(3) UL (ref 1.5–7.7)
NEUTROPHILS NFR BLD AUTO: 44.6 %
OSMOLALITY SERPL CALC.SUM OF ELEC: 302 MOSM/KG (ref 275–295)
PLATELET # BLD AUTO: 291 10(3)UL (ref 150–450)
POTASSIUM SERPL-SCNC: 4.3 MMOL/L (ref 3.5–5.1)
RBC # BLD AUTO: 3.36 X10(6)UL
SODIUM SERPL-SCNC: 137 MMOL/L (ref 136–145)
WBC # BLD AUTO: 5.4 X10(3) UL (ref 4–11)

## 2021-02-03 PROCEDURE — 85025 COMPLETE CBC W/AUTO DIFF WBC: CPT | Performed by: INTERNAL MEDICINE

## 2021-02-03 PROCEDURE — 80053 COMPREHEN METABOLIC PANEL: CPT | Performed by: INTERNAL MEDICINE

## 2021-02-04 LAB
AMB EXT BILIRUBIN URINE: NEGATIVE
AMB EXT BILIRUBIN URINE: NEGATIVE
AMB EXT BLOOD URINE: NEGATIVE
AMB EXT BLOOD URINE: NEGATIVE
AMB EXT GLUCOSE URINE: NEGATIVE
AMB EXT GLUCOSE URINE: NEGATIVE
AMB EXT KETONES URINE: NEGATIVE
AMB EXT KETONES URINE: NEGATIVE
AMB EXT NITRITE URINE: NEGATIVE
AMB EXT NITRITE URINE: NEGATIVE
AMB EXT PH URINE: 5
AMB EXT PH URINE: 5
AMB EXT PROTEIN URINE: NEGATIVE
AMB EXT PROTEIN URINE: NEGATIVE
AMB EXT URINE COLOR: YELLOW
AMB EXT URINE COLOR: YELLOW
AMB EXT URINE SPECIFIC GRAVITY: 1.01
AMB EXT URINE SPECIFIC GRAVITY: 1.01

## 2021-02-04 NOTE — PROGRESS NOTES
Pt is not taking consistently taking sodium bicarb because it is giving her heart burn. She is taking 2-4 a day but wont get to 6 a day because of the heart burn. Pt needs appointment, son would like a call regarding her appointment tomorrow.

## 2021-02-05 NOTE — PROGRESS NOTES
Spoke with pt son states he will call back to [facility] appt once transportation is in order.

## 2021-02-09 ENCOUNTER — TELEPHONE (OUTPATIENT)
Dept: FAMILY MEDICINE CLINIC | Facility: CLINIC | Age: 86
End: 2021-02-09

## 2021-02-11 DIAGNOSIS — H01.119 CONTACT DERMATITIS OF EYELID, UNSPECIFIED LATERALITY: ICD-10-CM

## 2021-02-11 RX ORDER — PANTOPRAZOLE SODIUM 40 MG/1
40 TABLET, DELAYED RELEASE ORAL
Qty: 30 TABLET | Refills: 0 | Status: SHIPPED | OUTPATIENT
Start: 2021-02-11 | End: 2021-03-22

## 2021-02-11 NOTE — TELEPHONE ENCOUNTER
LOV 1/18/2021    LAST LAB    LAST RX   Pantoprazole Sodium 40 MG Oral Tab EC 30 tablet 0 1/14/2021         Next OV   Future Appointments   Date Time Provider Kiel Alarcon   2/22/2021  3:00 PM Steven Michaud MD [address] [place] [address]

## 2021-02-21 NOTE — PROGRESS NOTES
[facility]ITAL[facility]  Progress Note     Tuan Vuong Patient Status:  Inpatient    1935 MRN [id number]   [facility] 3NE-A Attending Gregory Koehler MD   Hosp Day # 2 PCP Milagro aJcobo MD     Chief Complaint: confusion fatigue fever    S: Pa    < > 138 140 140   K 5.3*   < > 4.2 3.7 4.0   *   < > 117* 115* 116*   CO2 14.0*   < > 14.0* 18.0* 19.0*   ALKPHO 136  --   --   --   --    AST 13*  --   --   --   --    ALT 16  --   --   --   --    BILT 0.2  --   --   --   --    TP 8.7*  - Proteus   abd per ID  Will need midline and out pt IV abx   Spoke to ID      Quality:  · DVT Prophylaxis: Heparin  · CODE status: Full  · Niño: None        Will the patient be referred to [facility] on discharge?: no   Estimated date of discharge: tues Discharg no

## 2021-02-23 ENCOUNTER — TELEPHONE (OUTPATIENT)
Dept: FAMILY MEDICINE CLINIC | Facility: CLINIC | Age: 86
End: 2021-02-23

## 2021-02-23 NOTE — TELEPHONE ENCOUNTER
Received UA orders, but is no longer patient at [place] [address]. Discharged on 1/14/2021 to a different agency. Will shred order.

## 2021-02-24 NOTE — TELEPHONE ENCOUNTER
No current order noted in chart for UA. Last UA documented in chart was completed 2/4/21 and viewed by Dr. Rickey Miranda and noted awaiting C&S. Called [facility] and requested Urine C&S be faxed.     C&S <10,000 CFU/ML   MIXED ENRIQUE    UA with C&S result placed i

## 2021-02-25 ENCOUNTER — TELEPHONE (OUTPATIENT)
Dept: FAMILY MEDICINE CLINIC | Facility: CLINIC | Age: 86
End: 2021-02-25

## 2021-02-25 NOTE — TELEPHONE ENCOUNTER
Fransico Mendoza from [facility] called stating Pt is currently using a different [place] but her home health is receiving calls from [facility] lab requesting a diagnostic code for a [facility] [place] that was done on 1-7-21 while she was still usi

## 2021-02-26 ENCOUNTER — TELEPHONE (OUTPATIENT)
Dept: FAMILY MEDICINE CLINIC | Facility: CLINIC | Age: 86
End: 2021-02-26

## 2021-02-26 ENCOUNTER — MED REC SCAN ONLY (OUTPATIENT)
Dept: FAMILY MEDICINE CLINIC | Facility: CLINIC | Age: 86
End: 2021-02-26

## 2021-02-26 NOTE — TELEPHONE ENCOUNTER
Returned call to Ebony Hu to obtain more information regarding test done at [facility]. She will fax the message sent by [facility].

## 2021-03-01 ENCOUNTER — MED REC SCAN ONLY (OUTPATIENT)
Dept: FAMILY MEDICINE CLINIC | Facility: CLINIC | Age: 86
End: 2021-03-01

## 2021-03-11 ENCOUNTER — TELEMEDICINE (OUTPATIENT)
Dept: FAMILY MEDICINE CLINIC | Facility: CLINIC | Age: 86
End: 2021-03-11
Payer: MEDICARE

## 2021-03-11 VITALS
HEART RATE: 68 BPM | SYSTOLIC BLOOD PRESSURE: 98 MMHG | BODY MASS INDEX: 28 KG/M2 | DIASTOLIC BLOOD PRESSURE: 60 MMHG | WEIGHT: 151 LBS

## 2021-03-11 DIAGNOSIS — E11.42 DIABETIC POLYNEUROPATHY ASSOCIATED WITH TYPE 2 DIABETES MELLITUS (HCC): ICD-10-CM

## 2021-03-11 DIAGNOSIS — M25.511 CHRONIC PAIN OF BOTH SHOULDERS: ICD-10-CM

## 2021-03-11 DIAGNOSIS — G89.29 CHRONIC PAIN OF BOTH SHOULDERS: ICD-10-CM

## 2021-03-11 DIAGNOSIS — L03.312 CELLULITIS OF BACK EXCEPT BUTTOCK: Primary | ICD-10-CM

## 2021-03-11 DIAGNOSIS — Z93.6 NEPHROSTOMY STATUS (HCC): ICD-10-CM

## 2021-03-11 DIAGNOSIS — M25.512 CHRONIC PAIN OF BOTH SHOULDERS: ICD-10-CM

## 2021-03-11 PROCEDURE — 99214 OFFICE O/P EST MOD 30 MIN: CPT | Performed by: FAMILY MEDICINE

## 2021-03-11 RX ORDER — LEVOFLOXACIN 500 MG/1
500 TABLET, FILM COATED ORAL DAILY
Qty: 10 TABLET | Refills: 0 | Status: SHIPPED | OUTPATIENT
Start: 2021-03-11 | End: 2021-03-21

## 2021-03-11 RX ORDER — HYDROCODONE BITARTRATE AND ACETAMINOPHEN 5; 325 MG/1; MG/1
1 TABLET ORAL EVERY 4 HOURS PRN
Qty: 20 TABLET | Refills: 0 | Status: SHIPPED | OUTPATIENT
Start: 2021-03-11

## 2021-03-11 NOTE — PROGRESS NOTES
Please note that the following visit was completed using two-way, real-time interactive audio and video communication.   This has been done in good saad to provide continuity of care in the best interest of the provider-patient relationship, due to the o before breakfast. 30 tablet 0   • melatonin 3 MG Oral Tab Take 1 tablet (3 mg total) by mouth nightly.  90 tablet 1   • Albuterol Sulfate  (90 Base) MCG/ACT Inhalation Aero Soln Inhale 2 puffs into the lungs every 4 (four) hours as needed for [facility] Pulse 68   Wt 151 lb (68.5 kg)   BMI 27.62 kg/m²   Physical Exam:  alert, appears stated age and cooperative, Normocephalic, without obvious abnormality, atraumatic, Speaking in full sentences comfortably, Normal work of breathing, Grossly normal and age Oniel Dong 135    Electronically signed

## 2021-03-12 LAB
AMB EXT BILIRUBIN URINE: NEGATIVE
AMB EXT BLOOD URINE: NEGATIVE
AMB EXT CREATININE: 1.72 MG/DL
AMB EXT GFR: 47
AMB EXT GLUCOSE URINE: NEGATIVE
AMB EXT GLUCOSE: 111 MG/DL
AMB EXT HEMATOCRIT: 28.3
AMB EXT HEMOGLOBIN: 9.7
AMB EXT HGBA1C: 6.5 %
AMB EXT KETONES URINE: NEGATIVE
AMB EXT MALB/CRE CALC: 25 UG/MG
AMB EXT MCV: 82.3
AMB EXT NITRITE URINE: NEGATIVE
AMB EXT PH URINE: 5
AMB EXT PLATELETS: 324
AMB EXT PROTEIN URINE: NEGATIVE
AMB EXT URINE CLARITY: CLEAR
AMB EXT URINE SPECIFIC GRAVITY: 1.01
AMB EXT WBC: 5.5 X10(3)UL

## 2021-03-16 DIAGNOSIS — F32.A DEPRESSION, UNSPECIFIED DEPRESSION TYPE: ICD-10-CM

## 2021-03-16 RX ORDER — MIRTAZAPINE 15 MG/1
30 TABLET, FILM COATED ORAL NIGHTLY
Qty: 90 TABLET | Refills: 1 | Status: SHIPPED | OUTPATIENT
Start: 2021-03-16

## 2021-03-17 ENCOUNTER — TELEPHONE (OUTPATIENT)
Dept: FAMILY MEDICINE CLINIC | Facility: CLINIC | Age: 86
End: 2021-03-17

## 2021-03-22 DIAGNOSIS — H01.119 CONTACT DERMATITIS OF EYELID, UNSPECIFIED LATERALITY: ICD-10-CM

## 2021-03-22 RX ORDER — PANTOPRAZOLE SODIUM 40 MG/1
40 TABLET, DELAYED RELEASE ORAL
Qty: 30 TABLET | Refills: 0 | Status: SHIPPED | OUTPATIENT
Start: 2021-03-22 | End: 2021-04-23

## 2021-04-12 ENCOUNTER — MED REC SCAN ONLY (OUTPATIENT)
Dept: FAMILY MEDICINE CLINIC | Facility: CLINIC | Age: 86
End: 2021-04-12

## 2021-04-22 ENCOUNTER — PATIENT OUTREACH (OUTPATIENT)
Dept: CASE MANAGEMENT | Age: 86
End: 2021-04-22
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
## 2021-04-23 DIAGNOSIS — H01.119 CONTACT DERMATITIS OF EYELID, UNSPECIFIED LATERALITY: ICD-10-CM

## 2021-04-23 RX ORDER — PANTOPRAZOLE SODIUM 40 MG/1
40 TABLET, DELAYED RELEASE ORAL
Qty: 30 TABLET | Refills: 0 | Status: SHIPPED | OUTPATIENT
Start: 2021-04-23 | End: 2021-05-17

## 2021-04-23 NOTE — TELEPHONE ENCOUNTER
LOV  3/11/2021    Pantoprazole Sodium 40 MG Oral Tab EC 30 tablet 0 3/22/2021     Future Appointments   Date Time Provider Kiel Alarcon   4/29/2021  8:30 AM [facility] IVS [facility] IVS [facility] AT [facility]   6/28/2021  3:00 PM Valente Roman MD [address] [place] [address]

## 2021-04-29 ENCOUNTER — HOSPITAL ENCOUNTER (OUTPATIENT)
Dept: INTERVENTIONAL RADIOLOGY/VASCULAR | Facility: HOSPITAL | Age: 86
Discharge: HOME | End: 2021-04-29
Attending: UROLOGY | Admitting: UROLOGY
Payer: MEDICARE

## 2021-04-29 VITALS
TEMPERATURE: 97 F | OXYGEN SATURATION: 98 % | HEART RATE: 68 BPM | WEIGHT: 151 LBS | SYSTOLIC BLOOD PRESSURE: 113 MMHG | RESPIRATION RATE: 15 BRPM | DIASTOLIC BLOOD PRESSURE: 71 MMHG | BODY MASS INDEX: 28 KG/M2

## 2021-04-29 DIAGNOSIS — N13.30 HYDRONEPHROSIS: ICD-10-CM

## 2021-04-29 PROCEDURE — 0T25X0Z CHANGE DRAINAGE DEVICE IN KIDNEY, EXTERNAL APPROACH: ICD-10-PCS | Performed by: RADIOLOGY

## 2021-04-29 PROCEDURE — 82962 GLUCOSE BLOOD TEST: CPT

## 2021-04-29 PROCEDURE — 50435 EXCHANGE NEPHROSTOMY CATH: CPT

## 2021-04-29 PROCEDURE — 85610 PROTHROMBIN TIME: CPT

## 2021-04-29 PROCEDURE — 76937 US GUIDE VASCULAR ACCESS: CPT

## 2021-04-29 PROCEDURE — 99152 MOD SED SAME PHYS/QHP 5/>YRS: CPT

## 2021-04-29 PROCEDURE — 36410 VNPNXR 3YR/> PHY/QHP DX/THER: CPT

## 2021-04-29 RX ORDER — MIDAZOLAM HYDROCHLORIDE 1 MG/ML
INJECTION INTRAMUSCULAR; INTRAVENOUS
Status: COMPLETED
Start: 2021-04-29 | End: 2021-04-29

## 2021-04-29 RX ORDER — MIDAZOLAM HYDROCHLORIDE 1 MG/ML
INJECTION INTRAMUSCULAR; INTRAVENOUS
Status: DISCONTINUED
Start: 2021-04-29 | End: 2021-04-29 | Stop reason: WASHOUT

## 2021-04-29 RX ORDER — LIDOCAINE HYDROCHLORIDE 10 MG/ML
INJECTION, SOLUTION INFILTRATION; PERINEURAL
Status: DISCONTINUED
Start: 2021-04-29 | End: 2021-04-29 | Stop reason: WASHOUT

## 2021-04-29 RX ORDER — SODIUM CHLORIDE 9 MG/ML
INJECTION, SOLUTION INTRAVENOUS CONTINUOUS
Status: DISCONTINUED | OUTPATIENT
Start: 2021-04-29 | End: 2021-04-29

## 2021-04-29 RX ORDER — LIDOCAINE HYDROCHLORIDE 10 MG/ML
INJECTION, SOLUTION INFILTRATION; PERINEURAL
Status: COMPLETED
Start: 2021-04-29 | End: 2021-04-29

## 2021-04-29 RX ORDER — LEVOFLOXACIN 5 MG/ML
INJECTION, SOLUTION INTRAVENOUS
Status: DISCONTINUED
Start: 2021-04-29 | End: 2021-04-29 | Stop reason: WASHOUT

## 2021-04-29 NOTE — PROGRESS NOTES
Patient here for neph tube change this am. Unable to obtain IV access. Patient taken to [facility] for IV placement. After IV placed, patient taken to IR for procedure. Patient received back from IR at 1200.  Right neph tube with dressing in place, CDI, slight h

## 2021-05-17 DIAGNOSIS — H01.119 CONTACT DERMATITIS OF EYELID, UNSPECIFIED LATERALITY: ICD-10-CM

## 2021-05-17 RX ORDER — PANTOPRAZOLE SODIUM 40 MG/1
40 TABLET, DELAYED RELEASE ORAL
Qty: 30 TABLET | Refills: 0 | Status: SHIPPED | OUTPATIENT
Start: 2021-05-17 | End: 2021-06-21

## 2021-05-17 NOTE — TELEPHONE ENCOUNTER
LOV 3/11/2021    Future Appointments   Date Time Provider Kiel Alarcon   6/28/2021  3:00 PM Chas Golden MD [facility] [address]     Pantoprazole Sodium 40 MG Oral Tab EC 30 tablet 0 4/23/2021

## 2021-06-01 ENCOUNTER — MED REC SCAN ONLY (OUTPATIENT)
Dept: FAMILY MEDICINE CLINIC | Facility: CLINIC | Age: 86
End: 2021-06-01

## 2021-06-15 DIAGNOSIS — E11.42 TYPE 2 DIABETES MELLITUS WITH DIABETIC POLYNEUROPATHY, UNSPECIFIED WHETHER LONG TERM INSULIN USE (HCC): ICD-10-CM

## 2021-06-15 NOTE — TELEPHONE ENCOUNTER
Diabetic Medication Protocol Nbugjt53/15/2021 04:55 PM   Appointment in past 6 or next 3 months Protocol Details    HgBA1C procedure resulted in past 6 months     Last HgBA1C < 7.5     Microalbumin procedure in past 12 months or taking ACE/ARB      LOV  3/

## 2021-06-16 RX ORDER — GABAPENTIN 100 MG/1
200 CAPSULE ORAL NIGHTLY
Qty: 180 CAPSULE | Refills: 1 | Status: SHIPPED | OUTPATIENT
Start: 2021-06-16 | End: 2021-12-16

## 2021-06-21 DIAGNOSIS — H01.119 CONTACT DERMATITIS OF EYELID, UNSPECIFIED LATERALITY: ICD-10-CM

## 2021-06-21 DIAGNOSIS — E03.9 ACQUIRED HYPOTHYROIDISM: ICD-10-CM

## 2021-06-21 RX ORDER — LEVOTHYROXINE SODIUM 0.15 MG/1
150 TABLET ORAL
Qty: 90 TABLET | Refills: 1 | Status: SHIPPED | OUTPATIENT
Start: 2021-06-21 | End: 2021-12-17

## 2021-06-21 RX ORDER — PANTOPRAZOLE SODIUM 40 MG/1
40 TABLET, DELAYED RELEASE ORAL
Qty: 30 TABLET | Refills: 0 | Status: SHIPPED | OUTPATIENT
Start: 2021-06-21 | End: 2021-08-25

## 2021-08-04 ENCOUNTER — MED REC SCAN ONLY (OUTPATIENT)
Dept: FAMILY MEDICINE CLINIC | Facility: CLINIC | Age: 86
End: 2021-08-04

## 2021-08-06 ENCOUNTER — TELEPHONE (OUTPATIENT)
Dept: FAMILY MEDICINE CLINIC | Facility: CLINIC | Age: 86
End: 2021-08-06

## 2021-08-06 NOTE — TELEPHONE ENCOUNTER
Called and spoke to patient's son, [place] Hodgkin (on HIPAA). Advised per Dr. MARIO RIVERO, patient's labs are stable and patient should continue all medications and increase fluid intake. Advised labs will be repeated in 3 months. Verbalizes understanding.     Jamaal Morocho

## 2021-08-25 NOTE — TELEPHONE ENCOUNTER
LOV 3/11/2021    No future appointments.   Pantoprazole Sodium 40 MG Oral Tab EC 30 tablet 0 6/21/2021     Pantoprazole Sodium 40 MG Oral Tab EC 30 tablet 0 6/21/2021

## 2021-09-14 ENCOUNTER — TELEPHONE (OUTPATIENT)
Dept: FAMILY MEDICINE CLINIC | Facility: CLINIC | Age: 86
End: 2021-09-14

## 2021-09-19 DIAGNOSIS — H01.119 CONTACT DERMATITIS OF EYELID, UNSPECIFIED LATERALITY: ICD-10-CM

## 2021-09-20 RX ORDER — PANTOPRAZOLE SODIUM 40 MG/1
TABLET, DELAYED RELEASE ORAL
Qty: 90 TABLET | Refills: 0 | Status: SHIPPED | OUTPATIENT
Start: 2021-09-20 | End: 2021-12-16

## 2021-09-20 NOTE — TELEPHONE ENCOUNTER
LOV 3-11-21    LAST LAB    LAST RX 8-25-21 30*0    Next OV No future appointments.     PROTOCOL  Name from pharmacy: PANTOPRAZOLE 40MG TABLETS          Will file in chart as: PANTOPRAZOLE 40 MG Oral Tab EC    Sig: TAKE 1 TABLET(40 MG) BY MOUTH EVERY MORNING

## 2021-09-29 ENCOUNTER — MED REC SCAN ONLY (OUTPATIENT)
Dept: FAMILY MEDICINE CLINIC | Facility: CLINIC | Age: 86
End: 2021-09-29

## 2021-09-30 ENCOUNTER — HOSPITAL ENCOUNTER (OUTPATIENT)
Dept: INTERVENTIONAL RADIOLOGY/VASCULAR | Facility: HOSPITAL | Age: 86
Discharge: HOME | End: 2021-09-30
Attending: UROLOGY | Admitting: UROLOGY
Payer: MEDICARE

## 2021-09-30 VITALS
RESPIRATION RATE: 15 BRPM | HEIGHT: 62 IN | BODY MASS INDEX: 29.44 KG/M2 | OXYGEN SATURATION: 97 % | TEMPERATURE: 97 F | DIASTOLIC BLOOD PRESSURE: 76 MMHG | HEART RATE: 64 BPM | SYSTOLIC BLOOD PRESSURE: 143 MMHG | WEIGHT: 160 LBS

## 2021-09-30 DIAGNOSIS — N13.30 HYDRONEPHROSIS: ICD-10-CM

## 2021-09-30 PROCEDURE — BT111ZZ FLUOROSCOPY OF RIGHT KIDNEY USING LOW OSMOLAR CONTRAST: ICD-10-PCS | Performed by: RADIOLOGY

## 2021-09-30 PROCEDURE — 0T25X0Z CHANGE DRAINAGE DEVICE IN KIDNEY, EXTERNAL APPROACH: ICD-10-PCS | Performed by: RADIOLOGY

## 2021-09-30 PROCEDURE — 50435 EXCHANGE NEPHROSTOMY CATH: CPT

## 2021-09-30 PROCEDURE — 85610 PROTHROMBIN TIME: CPT

## 2021-09-30 RX ORDER — MIDAZOLAM HYDROCHLORIDE 1 MG/ML
INJECTION INTRAMUSCULAR; INTRAVENOUS
Status: COMPLETED
Start: 2021-09-30 | End: 2021-09-30

## 2021-09-30 RX ORDER — SODIUM CHLORIDE 9 MG/ML
INJECTION, SOLUTION INTRAVENOUS CONTINUOUS
Status: DISCONTINUED | OUTPATIENT
Start: 2021-09-30 | End: 2021-09-30

## 2021-09-30 NOTE — PROGRESS NOTES
Here for neph tube exchange R side, has a colostomy, no new complaints. Son is at bedside, VSS.    1550 S/P neph tube exchange, katelin well, ate lunch, son was with patient in recovery. To [facility] via wheel chair, son is driving home.

## 2021-10-01 ENCOUNTER — MED REC SCAN ONLY (OUTPATIENT)
Dept: FAMILY MEDICINE CLINIC | Facility: CLINIC | Age: 86
End: 2021-10-01

## 2021-10-25 ENCOUNTER — TELEPHONE (OUTPATIENT)
Dept: FAMILY MEDICINE CLINIC | Facility: CLINIC | Age: 86
End: 2021-10-25

## 2021-10-25 NOTE — TELEPHONE ENCOUNTER
Yes since been a while since have seen her in the office   If her son can bring her to the office that will be great

## 2021-10-25 NOTE — TELEPHONE ENCOUNTER
LOV 03-11-21 (video visit). Last physical: 12-. Last labs 08-06-21. Due for Shingles, pneumonia and flu shots. Would you like patient to schedule annual physical in office?

## 2021-10-25 NOTE — TELEPHONE ENCOUNTER
Epic nurse said that son told her that Baldemar Yadav's office has been asking son to schedule a f/u appt for pt. Nurse said if Dr. Annel Marley needs to see patient, they can do a video visit on Thursday as nurse will be with patient to help.     Please advise if ap

## 2021-10-26 NOTE — TELEPHONE ENCOUNTER
Called and spoke to [facility]MER HERNÁNDEZ. Informed per Dr. Cheryl Hernandez, patient needs to be seen in office. Has not been here in over a year and is overdue for several vaccines. States she understands.   Patient's son is not very motivated to bring patient to

## 2021-11-10 ENCOUNTER — MED REC SCAN ONLY (OUTPATIENT)
Dept: FAMILY MEDICINE CLINIC | Facility: CLINIC | Age: 86
End: 2021-11-10

## 2021-12-16 DIAGNOSIS — H01.119 CONTACT DERMATITIS OF EYELID, UNSPECIFIED LATERALITY: ICD-10-CM

## 2021-12-16 DIAGNOSIS — E03.9 ACQUIRED HYPOTHYROIDISM: ICD-10-CM

## 2021-12-16 DIAGNOSIS — E11.42 TYPE 2 DIABETES MELLITUS WITH DIABETIC POLYNEUROPATHY, UNSPECIFIED WHETHER LONG TERM INSULIN USE (HCC): ICD-10-CM

## 2021-12-16 RX ORDER — SITAGLIPTIN 25 MG/1
TABLET, FILM COATED ORAL
Qty: 90 TABLET | Refills: 1 | Status: SHIPPED | OUTPATIENT
Start: 2021-12-16

## 2021-12-16 RX ORDER — GABAPENTIN 100 MG/1
CAPSULE ORAL
Qty: 180 CAPSULE | Refills: 1 | Status: SHIPPED | OUTPATIENT
Start: 2021-12-16

## 2021-12-16 RX ORDER — PANTOPRAZOLE SODIUM 40 MG/1
TABLET, DELAYED RELEASE ORAL
Qty: 90 TABLET | Refills: 0 | Status: SHIPPED | OUTPATIENT
Start: 2021-12-16

## 2021-12-16 NOTE — TELEPHONE ENCOUNTER
PANTOPRAZOLE 40 MG Oral Tab EC 90 tablet 0 9/20/2021     SITagliptin Phosphate 25 MG Oral Tab 90 tablet 1 6/16/2021     gabapentin 100 MG Oral Cap 180 capsule 1 6/16/2021     LOV 3/11/2021    Future Appointments   Date Time Provider Kiel Alarcon   1/1

## 2021-12-17 RX ORDER — LEVOTHYROXINE SODIUM 0.15 MG/1
TABLET ORAL
Qty: 90 TABLET | Refills: 1 | Status: SHIPPED | OUTPATIENT
Start: 2021-12-17

## 2021-12-17 NOTE — TELEPHONE ENCOUNTER
LOV 1/18/2021    LAST LAB ?     LAST RX   Levothyroxine Sodium 150 MCG Oral Tab 90 tablet 1 6/21/2021         Next OV   Future Appointments   Date Time Provider Kiel Alarcon   1/10/2022  2:30 PM Gage Ragsdale MD [facility]   1/10/2022  3:30 PM [facility]

## 2022-02-14 ENCOUNTER — OFFICE VISIT (OUTPATIENT)
Dept: FAMILY MEDICINE CLINIC | Facility: CLINIC | Age: 87
End: 2022-02-14
Payer: MEDICARE

## 2022-02-14 ENCOUNTER — LAB ENCOUNTER (OUTPATIENT)
Dept: LAB | Age: 87
End: 2022-02-14
Attending: FAMILY MEDICINE
Payer: MEDICARE

## 2022-02-14 VITALS
HEART RATE: 67 BPM | TEMPERATURE: 97 F | WEIGHT: 166 LBS | SYSTOLIC BLOOD PRESSURE: 100 MMHG | BODY MASS INDEX: 30.55 KG/M2 | DIASTOLIC BLOOD PRESSURE: 52 MMHG | HEIGHT: 62 IN | OXYGEN SATURATION: 99 % | RESPIRATION RATE: 14 BRPM

## 2022-02-14 DIAGNOSIS — Z93.6 NEPHROSTOMY STATUS (HCC): ICD-10-CM

## 2022-02-14 DIAGNOSIS — Z00.00 ENCOUNTER FOR ANNUAL PHYSICAL EXAM: Primary | ICD-10-CM

## 2022-02-14 DIAGNOSIS — E11.42 DIABETIC POLYNEUROPATHY ASSOCIATED WITH TYPE 2 DIABETES MELLITUS (HCC): ICD-10-CM

## 2022-02-14 DIAGNOSIS — R30.9 PAINFUL URINATION: ICD-10-CM

## 2022-02-14 DIAGNOSIS — Z23 NEED FOR VACCINATION: ICD-10-CM

## 2022-02-14 DIAGNOSIS — F32.A DEPRESSION, UNSPECIFIED DEPRESSION TYPE: ICD-10-CM

## 2022-02-14 DIAGNOSIS — E55.9 VITAMIN D DEFICIENCY: ICD-10-CM

## 2022-02-14 DIAGNOSIS — N18.4 CKD (CHRONIC KIDNEY DISEASE) STAGE 4, GFR 15-29 ML/MIN (HCC): ICD-10-CM

## 2022-02-14 DIAGNOSIS — E11.42 TYPE 2 DIABETES MELLITUS WITH DIABETIC POLYNEUROPATHY, UNSPECIFIED WHETHER LONG TERM INSULIN USE (HCC): ICD-10-CM

## 2022-02-14 DIAGNOSIS — E03.9 ACQUIRED HYPOTHYROIDISM: ICD-10-CM

## 2022-02-14 DIAGNOSIS — Z00.00 ENCOUNTER FOR ANNUAL PHYSICAL EXAM: ICD-10-CM

## 2022-02-14 DIAGNOSIS — N39.0 RECURRENT UTI: ICD-10-CM

## 2022-02-14 DIAGNOSIS — Z91.81 AT RISK FOR FALLING: ICD-10-CM

## 2022-02-14 DIAGNOSIS — D63.8 ANEMIA, CHRONIC DISEASE: ICD-10-CM

## 2022-02-14 DIAGNOSIS — R53.81 PHYSICAL DECONDITIONING: ICD-10-CM

## 2022-02-14 PROBLEM — R79.89 ELEVATED TROPONIN: Status: RESOLVED | Noted: 2020-01-13 | Resolved: 2022-01-01

## 2022-02-14 PROBLEM — R77.8 ELEVATED TROPONIN: Status: RESOLVED | Noted: 2020-01-13 | Resolved: 2022-01-01

## 2022-02-14 PROBLEM — N17.9 ACUTE RENAL FAILURE (ARF) (HCC): Status: RESOLVED | Noted: 2020-05-06 | Resolved: 2022-01-01

## 2022-02-14 PROBLEM — U07.1 COVID-19 VIRUS INFECTION: Status: RESOLVED | Noted: 2020-11-04 | Resolved: 2022-01-01

## 2022-02-14 PROBLEM — R77.8 ELEVATED TROPONIN: Status: RESOLVED | Noted: 2020-01-13 | Resolved: 2022-02-14

## 2022-02-14 PROBLEM — E86.0 DEHYDRATION: Status: RESOLVED | Noted: 2020-05-06 | Resolved: 2022-01-01

## 2022-02-14 PROBLEM — E87.5 HYPERKALEMIA: Status: RESOLVED | Noted: 2020-01-13 | Resolved: 2022-02-14

## 2022-02-14 PROBLEM — E86.0 DEHYDRATION: Status: RESOLVED | Noted: 2020-05-06 | Resolved: 2022-02-14

## 2022-02-14 PROBLEM — Z87.19 H/O ISCHEMIC BOWEL DISEASE: Status: RESOLVED | Noted: 2020-07-06 | Resolved: 2022-01-01

## 2022-02-14 PROBLEM — U07.1 COVID-19 VIRUS INFECTION: Status: RESOLVED | Noted: 2020-11-04 | Resolved: 2022-02-14

## 2022-02-14 PROBLEM — N17.9 ACUTE RENAL FAILURE (ARF) (HCC): Status: RESOLVED | Noted: 2020-05-06 | Resolved: 2022-02-14

## 2022-02-14 PROBLEM — N17.9 AKI (ACUTE KIDNEY INJURY) (HCC): Status: RESOLVED | Noted: 2021-01-08 | Resolved: 2022-01-01

## 2022-02-14 PROBLEM — Z87.19 H/O ISCHEMIC BOWEL DISEASE: Status: RESOLVED | Noted: 2020-07-06 | Resolved: 2022-02-14

## 2022-02-14 PROBLEM — N17.9 AKI (ACUTE KIDNEY INJURY) (HCC): Status: RESOLVED | Noted: 2021-01-08 | Resolved: 2022-02-14

## 2022-02-14 PROBLEM — E87.5 HYPERKALEMIA: Status: RESOLVED | Noted: 2020-01-13 | Resolved: 2022-01-01

## 2022-02-14 LAB
ALBUMIN SERPL-MCNC: 3.2 G/DL (ref 3.4–5)
ALBUMIN/GLOB SERPL: 0.7 {RATIO} (ref 1–2)
ALP LIVER SERPL-CCNC: 126 U/L
ALT SERPL-CCNC: 20 U/L
ANION GAP SERPL CALC-SCNC: 10 MMOL/L (ref 0–18)
AST SERPL-CCNC: 24 U/L (ref 15–37)
BASOPHILS # BLD AUTO: 0.05 X10(3) UL (ref 0–0.2)
BASOPHILS NFR BLD AUTO: 0.6 %
BILIRUB SERPL-MCNC: 0.2 MG/DL (ref 0.1–2)
BILIRUBIN: NEGATIVE
BUN BLD-MCNC: 44 MG/DL (ref 7–18)
CALCIUM BLD-MCNC: 9.3 MG/DL (ref 8.5–10.1)
CHLORIDE SERPL-SCNC: 113 MMOL/L (ref 98–112)
CO2 SERPL-SCNC: 14 MMOL/L (ref 21–32)
CREAT BLD-MCNC: 3.34 MG/DL
EOSINOPHIL # BLD AUTO: 0.3 X10(3) UL (ref 0–0.7)
EOSINOPHIL NFR BLD AUTO: 3.8 %
ERYTHROCYTE [DISTWIDTH] IN BLOOD BY AUTOMATED COUNT: 18.4 %
EST. AVERAGE GLUCOSE BLD GHB EST-MCNC: 143 MG/DL (ref 68–126)
FASTING STATUS PATIENT QL REPORTED: NO
GLOBULIN PLAS-MCNC: 4.5 G/DL (ref 2.8–4.4)
GLUCOSE (URINE DIPSTICK): NEGATIVE MG/DL
HBA1C MFR BLD: 6.6 % (ref ?–5.7)
HCT VFR BLD AUTO: 31.4 %
HGB BLD-MCNC: 9.5 G/DL
IMM GRANULOCYTES # BLD AUTO: 0.05 X10(3) UL (ref 0–1)
IMM GRANULOCYTES NFR BLD: 0.6 %
KETONES (URINE DIPSTICK): NEGATIVE MG/DL
LYMPHOCYTES # BLD AUTO: 2.31 X10(3) UL (ref 1–4)
LYMPHOCYTES NFR BLD AUTO: 29.2 %
MCH RBC QN AUTO: 26.9 PG (ref 26–34)
MCHC RBC AUTO-ENTMCNC: 30.3 G/DL (ref 31–37)
MCV RBC AUTO: 89 FL
MONOCYTES # BLD AUTO: 0.51 X10(3) UL (ref 0.1–1)
MONOCYTES NFR BLD AUTO: 6.5 %
MULTISTIX LOT#: ABNORMAL NUMERIC
NEUTROPHILS # BLD AUTO: 4.68 X10 (3) UL (ref 1.5–7.7)
NEUTROPHILS # BLD AUTO: 4.68 X10(3) UL (ref 1.5–7.7)
NEUTROPHILS NFR BLD AUTO: 59.3 %
NITRITE, URINE: NEGATIVE
OSMOLALITY SERPL CALC.SUM OF ELEC: 296 MOSM/KG (ref 275–295)
PH, URINE: 5.5 (ref 4.5–8)
PLATELET # BLD AUTO: 305 10(3)UL (ref 150–450)
POTASSIUM SERPL-SCNC: 5.6 MMOL/L (ref 3.5–5.1)
PROT SERPL-MCNC: 7.7 G/DL (ref 6.4–8.2)
PROTEIN (URINE DIPSTICK): 30 MG/DL
RBC # BLD AUTO: 3.53 X10(6)UL
SODIUM SERPL-SCNC: 137 MMOL/L (ref 136–145)
SPECIFIC GRAVITY: 1.02 (ref 1–1.03)
T4 FREE SERPL-MCNC: 1.2 NG/DL (ref 0.8–1.7)
TSI SER-ACNC: 0.58 MIU/ML (ref 0.36–3.74)
URINE-COLOR: YELLOW
UROBILINOGEN,SEMI-QN: 0.2 MG/DL (ref 0–1.9)
VIT D+METAB SERPL-MCNC: 24.4 NG/ML (ref 30–100)
WBC # BLD AUTO: 7.9 X10(3) UL (ref 4–11)

## 2022-02-14 PROCEDURE — 87086 URINE CULTURE/COLONY COUNT: CPT | Performed by: FAMILY MEDICINE

## 2022-02-14 PROCEDURE — 82306 VITAMIN D 25 HYDROXY: CPT

## 2022-02-14 PROCEDURE — 85025 COMPLETE CBC W/AUTO DIFF WBC: CPT

## 2022-02-14 PROCEDURE — 84443 ASSAY THYROID STIM HORMONE: CPT

## 2022-02-14 PROCEDURE — 80053 COMPREHEN METABOLIC PANEL: CPT

## 2022-02-14 PROCEDURE — G0009 ADMIN PNEUMOCOCCAL VACCINE: HCPCS | Performed by: FAMILY MEDICINE

## 2022-02-14 PROCEDURE — 81003 URINALYSIS AUTO W/O SCOPE: CPT | Performed by: FAMILY MEDICINE

## 2022-02-14 PROCEDURE — G0439 PPPS, SUBSEQ VISIT: HCPCS | Performed by: FAMILY MEDICINE

## 2022-02-14 PROCEDURE — 90732 PPSV23 VACC 2 YRS+ SUBQ/IM: CPT | Performed by: FAMILY MEDICINE

## 2022-02-14 PROCEDURE — 36415 COLL VENOUS BLD VENIPUNCTURE: CPT

## 2022-02-14 PROCEDURE — 84439 ASSAY OF FREE THYROXINE: CPT

## 2022-02-14 PROCEDURE — 83036 HEMOGLOBIN GLYCOSYLATED A1C: CPT

## 2022-02-14 RX ORDER — SACCHAROMYCES BOULARDII 250 MG
250 CAPSULE ORAL 2 TIMES DAILY
Qty: 60 CAPSULE | Refills: 0 | Status: SHIPPED | OUTPATIENT
Start: 2022-02-14

## 2022-02-14 RX ORDER — OMEPRAZOLE 40 MG/1
40 CAPSULE, DELAYED RELEASE ORAL DAILY
Qty: 90 CAPSULE | Refills: 0 | Status: SHIPPED | OUTPATIENT
Start: 2022-02-14 | End: 2022-02-14

## 2022-02-14 RX ORDER — LEVOFLOXACIN 500 MG/1
500 TABLET, FILM COATED ORAL DAILY
Qty: 10 TABLET | Refills: 0 | Status: SHIPPED | OUTPATIENT
Start: 2022-02-14 | End: 2022-03-06

## 2022-02-14 RX ORDER — SERTRALINE HYDROCHLORIDE 100 MG/1
100 TABLET, FILM COATED ORAL DAILY
Qty: 90 TABLET | Refills: 1 | Status: SHIPPED | OUTPATIENT
Start: 2022-02-14

## 2022-02-14 RX ORDER — MIRTAZAPINE 15 MG/1
30 TABLET, FILM COATED ORAL NIGHTLY
Qty: 90 TABLET | Refills: 1 | Status: SHIPPED | OUTPATIENT
Start: 2022-02-14 | End: 2022-04-04

## 2022-02-15 NOTE — PROGRESS NOTES
Discussed the results of the blood test with the patient's sonWorsening creatinine, dehydration, anemiaAbnormal UAGiven the nephrostomy tube that needs to be changed  Advised taking her to the emergency room today    The son will take the patient to [facility] emergency room

## 2022-02-16 ENCOUNTER — APPOINTMENT (OUTPATIENT)
Dept: GENERAL RADIOLOGY | Facility: HOSPITAL | Age: 87
DRG: 280 | End: 2022-02-16
Attending: EMERGENCY MEDICINE
Payer: MEDICARE

## 2022-02-16 ENCOUNTER — APPOINTMENT (OUTPATIENT)
Dept: CT IMAGING | Facility: HOSPITAL | Age: 87
DRG: 280 | End: 2022-02-16
Attending: EMERGENCY MEDICINE
Payer: MEDICARE

## 2022-02-16 ENCOUNTER — HOSPITAL ENCOUNTER (INPATIENT)
Facility: HOSPITAL | Age: 87
LOS: 18 days | Discharge: HOME HEALTH | DRG: 280 | End: 2022-03-06
Attending: EMERGENCY MEDICINE | Admitting: INTERNAL MEDICINE
Payer: MEDICARE

## 2022-02-16 DIAGNOSIS — N18.4 CKD (CHRONIC KIDNEY DISEASE) STAGE 4, GFR 15-29 ML/MIN (HCC): Primary | ICD-10-CM

## 2022-02-16 DIAGNOSIS — I21.4 NSTEMI (NON-ST ELEVATED MYOCARDIAL INFARCTION) (HCC): ICD-10-CM

## 2022-02-16 DIAGNOSIS — R07.9 CHEST PAIN, CARDIAC: ICD-10-CM

## 2022-02-16 DIAGNOSIS — R77.8 ELEVATED TROPONIN: ICD-10-CM

## 2022-02-16 DIAGNOSIS — T83.098A MALFUNCTION OF NEPHROSTOMY TUBE (HCC): ICD-10-CM

## 2022-02-16 PROBLEM — R79.89 ELEVATED TROPONIN: Status: ACTIVE | Noted: 2022-01-01

## 2022-02-16 LAB
ALBUMIN SERPL-MCNC: 3.1 G/DL (ref 3.4–5)
ALBUMIN/GLOB SERPL: 0.6 {RATIO} (ref 1–2)
ALT SERPL-CCNC: 20 U/L
ANION GAP SERPL CALC-SCNC: 10 MMOL/L (ref 0–18)
AST SERPL-CCNC: 20 U/L (ref 15–37)
BASOPHILS # BLD AUTO: 0.03 X10(3) UL (ref 0–0.2)
BASOPHILS NFR BLD AUTO: 0.4 %
BILIRUB SERPL-MCNC: 0.2 MG/DL (ref 0.1–2)
BILIRUB UR QL STRIP.AUTO: NEGATIVE
BUN BLD-MCNC: 40 MG/DL (ref 7–18)
CALCIUM BLD-MCNC: 9 MG/DL (ref 8.5–10.1)
CHOLEST SERPL-MCNC: 170 MG/DL (ref ?–200)
CO2 SERPL-SCNC: 14 MMOL/L (ref 21–32)
COLOR UR AUTO: YELLOW
CREAT BLD-MCNC: 3.01 MG/DL
EOSINOPHIL # BLD AUTO: 0.23 X10(3) UL (ref 0–0.7)
EOSINOPHIL NFR BLD AUTO: 3 %
ERYTHROCYTE [DISTWIDTH] IN BLOOD BY AUTOMATED COUNT: 17.9 %
GLOBULIN PLAS-MCNC: 5.3 G/DL (ref 2.8–4.4)
GLUCOSE BLD-MCNC: 143 MG/DL (ref 70–99)
GLUCOSE UR STRIP.AUTO-MCNC: NEGATIVE MG/DL
HCT VFR BLD AUTO: 30.3 %
HDLC SERPL-MCNC: 42 MG/DL (ref 40–59)
HGB BLD-MCNC: 9.3 G/DL
IMM GRANULOCYTES # BLD AUTO: 0.04 X10(3) UL (ref 0–1)
KETONES UR STRIP.AUTO-MCNC: NEGATIVE MG/DL
LACTATE SERPL-SCNC: 1.1 MMOL/L (ref 0.4–2)
LDLC SERPL CALC-MCNC: 88 MG/DL (ref ?–100)
LYMPHOCYTES NFR BLD AUTO: 24.5 %
MCH RBC QN AUTO: 26.7 PG (ref 26–34)
MCHC RBC AUTO-ENTMCNC: 30.7 G/DL (ref 31–37)
MCV RBC AUTO: 87.1 FL
MONOCYTES # BLD AUTO: 0.52 X10(3) UL (ref 0.1–1)
MONOCYTES NFR BLD AUTO: 6.9 %
NEUTROPHILS # BLD AUTO: 4.89 X10 (3) UL (ref 1.5–7.7)
NEUTROPHILS # BLD AUTO: 4.89 X10(3) UL (ref 1.5–7.7)
NEUTROPHILS NFR BLD AUTO: 64.7 %
NITRITE UR QL STRIP.AUTO: NEGATIVE
NONHDLC SERPL-MCNC: 128 MG/DL (ref ?–130)
NT-PROBNP SERPL-MCNC: 7508 PG/ML (ref ?–450)
OSMOLALITY SERPL CALC.SUM OF ELEC: 294 MOSM/KG (ref 275–295)
PH UR STRIP.AUTO: 8 [PH] (ref 5–8)
POTASSIUM SERPL-SCNC: 5.1 MMOL/L (ref 3.5–5.1)
PROT SERPL-MCNC: 8.4 G/DL (ref 6.4–8.2)
PROT UR STRIP.AUTO-MCNC: 100 MG/DL
RBC # BLD AUTO: 3.48 X10(6)UL
RBC #/AREA URNS AUTO: >10 /HPF
SARS-COV-2 RNA RESP QL NAA+PROBE: NOT DETECTED
SODIUM SERPL-SCNC: 136 MMOL/L (ref 136–145)
SP GR UR STRIP.AUTO: 1.01 (ref 1–1.03)
TRIGL SERPL-MCNC: 237 MG/DL (ref 30–149)
TROPONIN I HIGH SENSITIVITY: 4326 NG/L
UROBILINOGEN UR STRIP.AUTO-MCNC: <2 MG/DL
VLDLC SERPL CALC-MCNC: 38 MG/DL (ref 0–30)
WBC # BLD AUTO: 7.6 X10(3) UL (ref 4–11)
WBC #/AREA URNS AUTO: >50 /HPF
WBC CLUMPS UR QL AUTO: PRESENT /HPF

## 2022-02-16 PROCEDURE — 71045 X-RAY EXAM CHEST 1 VIEW: CPT | Performed by: EMERGENCY MEDICINE

## 2022-02-16 PROCEDURE — 74176 CT ABD & PELVIS W/O CONTRAST: CPT | Performed by: EMERGENCY MEDICINE

## 2022-02-16 PROCEDURE — 99223 1ST HOSP IP/OBS HIGH 75: CPT | Performed by: HOSPITALIST

## 2022-02-16 RX ORDER — ASPIRIN 81 MG/1
324 TABLET, CHEWABLE ORAL ONCE
Status: COMPLETED | OUTPATIENT
Start: 2022-02-16 | End: 2022-02-16

## 2022-02-16 NOTE — ED INITIAL ASSESSMENT (HPI)
Patient here with report of abnormal. K+5.6, chlor 115, patient also C/O chest pain at night and nephrostomy tube that is bleeding on the right.

## 2022-02-17 ENCOUNTER — APPOINTMENT (OUTPATIENT)
Dept: CV DIAGNOSTICS | Facility: HOSPITAL | Age: 87
DRG: 280 | End: 2022-02-17
Attending: HOSPITALIST
Payer: MEDICARE

## 2022-02-17 LAB
ANION GAP SERPL CALC-SCNC: 6 MMOL/L (ref 0–18)
ANION GAP SERPL CALC-SCNC: 9 MMOL/L (ref 0–18)
APTT PPP: 137.4 SECONDS (ref 23.3–35.6)
APTT PPP: 38.1 SECONDS (ref 23.3–35.6)
APTT PPP: 45.6 SECONDS (ref 23.3–35.6)
APTT PPP: 97.1 SECONDS (ref 23.3–35.6)
ATRIAL RATE: 75 BPM
ATRIAL RATE: 82 BPM
BASOPHILS # BLD AUTO: 0.02 X10(3) UL (ref 0–0.2)
BASOPHILS NFR BLD AUTO: 0.4 %
BUN BLD-MCNC: 35 MG/DL (ref 7–18)
BUN BLD-MCNC: 35 MG/DL (ref 7–18)
CALCIUM BLD-MCNC: 8.3 MG/DL (ref 8.5–10.1)
CALCIUM BLD-MCNC: 8.9 MG/DL (ref 8.5–10.1)
CHLORIDE SERPL-SCNC: 116 MMOL/L (ref 98–112)
CHLORIDE SERPL-SCNC: 120 MMOL/L (ref 98–112)
CO2 SERPL-SCNC: 14 MMOL/L (ref 21–32)
CO2 SERPL-SCNC: 14 MMOL/L (ref 21–32)
CREAT BLD-MCNC: 2.89 MG/DL
CREAT BLD-MCNC: 2.94 MG/DL
CREAT UR-SCNC: 61.4 MG/DL
EOSINOPHIL # BLD AUTO: 0.16 X10(3) UL (ref 0–0.7)
EOSINOPHIL NFR BLD AUTO: 2.9 %
ERYTHROCYTE [DISTWIDTH] IN BLOOD BY AUTOMATED COUNT: 18.1 %
ERYTHROCYTE [DISTWIDTH] IN BLOOD BY AUTOMATED COUNT: 18.2 %
GLUCOSE BLD-MCNC: 102 MG/DL (ref 70–99)
GLUCOSE BLD-MCNC: 124 MG/DL (ref 70–99)
GLUCOSE BLD-MCNC: 134 MG/DL (ref 70–99)
GLUCOSE BLD-MCNC: 171 MG/DL (ref 70–99)
GLUCOSE BLD-MCNC: 178 MG/DL (ref 70–99)
GLUCOSE BLD-MCNC: 255 MG/DL (ref 70–99)
GLUCOSE BLD-MCNC: 99 MG/DL (ref 70–99)
HCT VFR BLD AUTO: 25.7 %
HCT VFR BLD AUTO: 28.3 %
HGB BLD-MCNC: 7.8 G/DL
HGB BLD-MCNC: 8.8 G/DL
IMM GRANULOCYTES # BLD AUTO: 0.03 X10(3) UL (ref 0–1)
IMM GRANULOCYTES NFR BLD: 0.5 %
LYMPHOCYTES # BLD AUTO: 1.78 X10(3) UL (ref 1–4)
LYMPHOCYTES NFR BLD AUTO: 32.2 %
MCH RBC QN AUTO: 26.8 PG (ref 26–34)
MCH RBC QN AUTO: 26.9 PG (ref 26–34)
MCHC RBC AUTO-ENTMCNC: 30.4 G/DL (ref 31–37)
MCHC RBC AUTO-ENTMCNC: 31.1 G/DL (ref 31–37)
MCV RBC AUTO: 86.5 FL
MCV RBC AUTO: 88.3 FL
MONOCYTES # BLD AUTO: 0.4 X10(3) UL (ref 0.1–1)
MONOCYTES NFR BLD AUTO: 7.2 %
NEUTROPHILS # BLD AUTO: 3.14 X10 (3) UL (ref 1.5–7.7)
NEUTROPHILS # BLD AUTO: 3.14 X10(3) UL (ref 1.5–7.7)
NEUTROPHILS NFR BLD AUTO: 56.8 %
OSMOLALITY SERPL CALC.SUM OF ELEC: 298 MOSM/KG (ref 275–295)
OSMOLALITY SERPL CALC.SUM OF ELEC: 300 MOSM/KG (ref 275–295)
P AXIS: 61 DEGREES
P AXIS: 70 DEGREES
P-R INTERVAL: 202 MS
P-R INTERVAL: 212 MS
PLATELET # BLD AUTO: 215 10(3)UL (ref 150–450)
PLATELET # BLD AUTO: 238 10(3)UL (ref 150–450)
POTASSIUM SERPL-SCNC: 4.3 MMOL/L (ref 3.5–5.1)
POTASSIUM SERPL-SCNC: 5.1 MMOL/L (ref 3.5–5.1)
PROCALCITONIN SERPL-MCNC: <0.05 NG/ML (ref ?–0.16)
Q-T INTERVAL: 362 MS
Q-T INTERVAL: 392 MS
QRS DURATION: 84 MS
QRS DURATION: 86 MS
QTC CALCULATION (BEZET): 422 MS
QTC CALCULATION (BEZET): 437 MS
R AXIS: -31 DEGREES
R AXIS: -32 DEGREES
RBC # BLD AUTO: 2.91 X10(6)UL
RBC # BLD AUTO: 3.27 X10(6)UL
SODIUM SERPL-SCNC: 139 MMOL/L (ref 136–145)
SODIUM SERPL-SCNC: 140 MMOL/L (ref 136–145)
SODIUM SERPL-SCNC: 19 MMOL/L
T AXIS: 102 DEGREES
T AXIS: 204 DEGREES
TROPONIN I HIGH SENSITIVITY: 3292 NG/L
VENTRICULAR RATE: 75 BPM
VENTRICULAR RATE: 82 BPM
WBC # BLD AUTO: 5.5 X10(3) UL (ref 4–11)
WBC # BLD AUTO: 8.2 X10(3) UL (ref 4–11)

## 2022-02-17 PROCEDURE — 93306 TTE W/DOPPLER COMPLETE: CPT | Performed by: HOSPITALIST

## 2022-02-17 PROCEDURE — 99291 CRITICAL CARE FIRST HOUR: CPT | Performed by: HOSPITALIST

## 2022-02-17 PROCEDURE — 99233 SBSQ HOSP IP/OBS HIGH 50: CPT | Performed by: HOSPITALIST

## 2022-02-17 RX ORDER — MORPHINE SULFATE 2 MG/ML
INJECTION, SOLUTION INTRAMUSCULAR; INTRAVENOUS
Status: COMPLETED
Start: 2022-02-17 | End: 2022-02-17

## 2022-02-17 RX ORDER — MAGNESIUM HYDROXIDE/ALUMINUM HYDROXICE/SIMETHICONE 120; 1200; 1200 MG/30ML; MG/30ML; MG/30ML
30 SUSPENSION ORAL 4 TIMES DAILY PRN
Status: DISCONTINUED | OUTPATIENT
Start: 2022-02-17 | End: 2022-03-06

## 2022-02-17 RX ORDER — METOCLOPRAMIDE HYDROCHLORIDE 5 MG/ML
5 INJECTION INTRAMUSCULAR; INTRAVENOUS EVERY 8 HOURS PRN
Status: DISCONTINUED | OUTPATIENT
Start: 2022-02-17 | End: 2022-02-24

## 2022-02-17 RX ORDER — SODIUM CHLORIDE 9 MG/ML
INJECTION, SOLUTION INTRAVENOUS CONTINUOUS
Status: ACTIVE | OUTPATIENT
Start: 2022-02-17 | End: 2022-02-17

## 2022-02-17 RX ORDER — PANTOPRAZOLE SODIUM 40 MG/1
40 TABLET, DELAYED RELEASE ORAL
Status: DISCONTINUED | OUTPATIENT
Start: 2022-02-17 | End: 2022-02-18

## 2022-02-17 RX ORDER — SODIUM CHLORIDE 9 MG/ML
INJECTION, SOLUTION INTRAVENOUS CONTINUOUS
Status: DISCONTINUED | OUTPATIENT
Start: 2022-02-17 | End: 2022-02-17

## 2022-02-17 RX ORDER — HYDROCODONE BITARTRATE AND ACETAMINOPHEN 5; 325 MG/1; MG/1
1 TABLET ORAL EVERY 4 HOURS PRN
Status: DISCONTINUED | OUTPATIENT
Start: 2022-02-16 | End: 2022-03-06

## 2022-02-17 RX ORDER — SODIUM BICARBONATE 325 MG/1
1300 TABLET ORAL DAILY
Status: DISCONTINUED | OUTPATIENT
Start: 2022-02-17 | End: 2022-02-21

## 2022-02-17 RX ORDER — MIRTAZAPINE 15 MG/1
30 TABLET, FILM COATED ORAL NIGHTLY
Status: DISCONTINUED | OUTPATIENT
Start: 2022-02-17 | End: 2022-03-06

## 2022-02-17 RX ORDER — HEPARIN SODIUM AND DEXTROSE 10000; 5 [USP'U]/100ML; G/100ML
INJECTION INTRAVENOUS
Status: COMPLETED
Start: 2022-02-17 | End: 2022-02-17

## 2022-02-17 RX ORDER — METOCLOPRAMIDE HYDROCHLORIDE 5 MG/ML
INJECTION INTRAMUSCULAR; INTRAVENOUS
Status: COMPLETED
Start: 2022-02-17 | End: 2022-02-17

## 2022-02-17 RX ORDER — MELATONIN
3 NIGHTLY PRN
Status: DISCONTINUED | OUTPATIENT
Start: 2022-02-17 | End: 2022-03-06

## 2022-02-17 RX ORDER — MORPHINE SULFATE 2 MG/ML
1 INJECTION, SOLUTION INTRAMUSCULAR; INTRAVENOUS EVERY 6 HOURS PRN
Status: DISCONTINUED | OUTPATIENT
Start: 2022-02-17 | End: 2022-03-06

## 2022-02-17 RX ORDER — ONDANSETRON 2 MG/ML
INJECTION INTRAMUSCULAR; INTRAVENOUS
Status: COMPLETED
Start: 2022-02-17 | End: 2022-02-17

## 2022-02-17 RX ORDER — MELATONIN
3 NIGHTLY
Status: DISCONTINUED | OUTPATIENT
Start: 2022-02-17 | End: 2022-03-06

## 2022-02-17 RX ORDER — ASPIRIN 325 MG
325 TABLET, DELAYED RELEASE (ENTERIC COATED) ORAL DAILY
Status: DISCONTINUED | OUTPATIENT
Start: 2022-02-17 | End: 2022-02-18

## 2022-02-17 RX ORDER — ASPIRIN 81 MG/1
324 TABLET, CHEWABLE ORAL NIGHTLY
Status: DISCONTINUED | OUTPATIENT
Start: 2022-02-17 | End: 2022-02-17 | Stop reason: ALTCHOICE

## 2022-02-17 RX ORDER — ROSUVASTATIN CALCIUM 20 MG/1
20 TABLET, COATED ORAL NIGHTLY
Status: DISCONTINUED | OUTPATIENT
Start: 2022-02-17 | End: 2022-03-06

## 2022-02-17 RX ORDER — MORPHINE SULFATE 2 MG/ML
2 INJECTION, SOLUTION INTRAMUSCULAR; INTRAVENOUS
Status: DISCONTINUED | OUTPATIENT
Start: 2022-02-17 | End: 2022-02-17

## 2022-02-17 RX ORDER — HEPARIN SODIUM 5000 [USP'U]/ML
5000 INJECTION, SOLUTION INTRAVENOUS; SUBCUTANEOUS EVERY 8 HOURS SCHEDULED
Status: DISCONTINUED | OUTPATIENT
Start: 2022-02-17 | End: 2022-02-17 | Stop reason: DRUGHIGH

## 2022-02-17 RX ORDER — DEXTROSE MONOHYDRATE 25 G/50ML
50 INJECTION, SOLUTION INTRAVENOUS
Status: DISCONTINUED | OUTPATIENT
Start: 2022-02-16 | End: 2022-03-06

## 2022-02-17 RX ORDER — NITROGLYCERIN 20 MG/100ML
INJECTION INTRAVENOUS CONTINUOUS
Status: DISCONTINUED | OUTPATIENT
Start: 2022-02-17 | End: 2022-02-24

## 2022-02-17 RX ORDER — HYDROCODONE BITARTRATE AND ACETAMINOPHEN 5; 325 MG/1; MG/1
2 TABLET ORAL EVERY 4 HOURS PRN
Status: DISCONTINUED | OUTPATIENT
Start: 2022-02-16 | End: 2022-03-06

## 2022-02-17 RX ORDER — CALCIUM CARBONATE 200(500)MG
1000 TABLET,CHEWABLE ORAL 3 TIMES DAILY PRN
Status: DISCONTINUED | OUTPATIENT
Start: 2022-02-17 | End: 2022-03-06

## 2022-02-17 RX ORDER — HEPARIN SODIUM AND DEXTROSE 10000; 5 [USP'U]/100ML; G/100ML
INJECTION INTRAVENOUS CONTINUOUS
Status: DISCONTINUED | OUTPATIENT
Start: 2022-02-17 | End: 2022-02-22

## 2022-02-17 RX ORDER — LEVOTHYROXINE SODIUM 0.15 MG/1
150 TABLET ORAL
Status: DISCONTINUED | OUTPATIENT
Start: 2022-02-17 | End: 2022-03-06

## 2022-02-17 RX ORDER — HEPARIN SODIUM 5000 [USP'U]/ML
INJECTION, SOLUTION INTRAVENOUS; SUBCUTANEOUS
Status: COMPLETED
Start: 2022-02-17 | End: 2022-02-17

## 2022-02-17 RX ORDER — ONDANSETRON 2 MG/ML
4 INJECTION INTRAMUSCULAR; INTRAVENOUS EVERY 6 HOURS PRN
Status: DISCONTINUED | OUTPATIENT
Start: 2022-02-17 | End: 2022-03-06

## 2022-02-17 RX ORDER — ONDANSETRON 2 MG/ML
4 INJECTION INTRAMUSCULAR; INTRAVENOUS EVERY 6 HOURS PRN
Status: DISCONTINUED | OUTPATIENT
Start: 2022-02-17 | End: 2022-02-17

## 2022-02-17 RX ORDER — ACETAMINOPHEN 325 MG/1
650 TABLET ORAL EVERY 4 HOURS PRN
Status: DISCONTINUED | OUTPATIENT
Start: 2022-02-16 | End: 2022-02-23

## 2022-02-17 RX ORDER — MORPHINE SULFATE 2 MG/ML
0.5 INJECTION, SOLUTION INTRAMUSCULAR; INTRAVENOUS EVERY 6 HOURS PRN
Status: DISCONTINUED | OUTPATIENT
Start: 2022-02-17 | End: 2022-03-06

## 2022-02-17 RX ORDER — HEPARIN SODIUM 5000 [USP'U]/ML
60 INJECTION INTRAVENOUS; SUBCUTANEOUS ONCE
Status: COMPLETED | OUTPATIENT
Start: 2022-02-17 | End: 2022-02-17

## 2022-02-17 RX ORDER — NITROGLYCERIN 20 MG/100ML
INJECTION INTRAVENOUS
Status: COMPLETED
Start: 2022-02-17 | End: 2022-02-17

## 2022-02-17 RX ORDER — NITROGLYCERIN 0.4 MG/1
TABLET SUBLINGUAL
Status: DISPENSED
Start: 2022-02-17 | End: 2022-02-17

## 2022-02-17 RX ORDER — NICOTINE POLACRILEX 4 MG
15 LOZENGE BUCCAL
Status: DISCONTINUED | OUTPATIENT
Start: 2022-02-16 | End: 2022-03-06

## 2022-02-17 RX ORDER — SERTRALINE HYDROCHLORIDE 100 MG/1
100 TABLET, FILM COATED ORAL DAILY
Status: DISCONTINUED | OUTPATIENT
Start: 2022-02-17 | End: 2022-03-06

## 2022-02-17 RX ORDER — HEPARIN SODIUM AND DEXTROSE 10000; 5 [USP'U]/100ML; G/100ML
12 INJECTION INTRAVENOUS ONCE
Status: COMPLETED | OUTPATIENT
Start: 2022-02-17 | End: 2022-02-17

## 2022-02-17 RX ORDER — ALBUTEROL SULFATE 90 UG/1
2 AEROSOL, METERED RESPIRATORY (INHALATION) EVERY 4 HOURS PRN
Status: DISCONTINUED | OUTPATIENT
Start: 2022-02-17 | End: 2022-03-06

## 2022-02-17 RX ORDER — NITROGLYCERIN 0.6 MG/1
0.6 TABLET SUBLINGUAL EVERY 5 MIN PRN
Status: DISCONTINUED | OUTPATIENT
Start: 2022-02-17 | End: 2022-03-06

## 2022-02-17 RX ORDER — PANTOPRAZOLE SODIUM 40 MG/1
40 TABLET, DELAYED RELEASE ORAL
Status: DISCONTINUED | OUTPATIENT
Start: 2022-02-17 | End: 2022-02-17

## 2022-02-17 RX ORDER — NICOTINE POLACRILEX 4 MG
30 LOZENGE BUCCAL
Status: DISCONTINUED | OUTPATIENT
Start: 2022-02-16 | End: 2022-03-06

## 2022-02-17 RX ORDER — GABAPENTIN 100 MG/1
200 CAPSULE ORAL NIGHTLY
Status: DISCONTINUED | OUTPATIENT
Start: 2022-02-17 | End: 2022-03-06

## 2022-02-17 NOTE — ED QUICK NOTES
[facility] [phone] patient's niece would like to be contacted once patient is in room

## 2022-02-17 NOTE — PLAN OF CARE
Assumed care at 0730. Pt is A&Ox4, Kiswahili speaking, lethargic this morning, more alert this afternoon. Pt is on RA with , sats maintaining >90%, lungs clear. NSR on tele, VSS. No complaints of cardiac symptoms. Colostomy in place, nephrostomy tube on R, no urine output this shift. Denies pain at this time. Up x2 w/ walker, unable to tolerate at this time. Plan of care reviewed with patient, verbalizes understanding, all needs addressed at this time, pt seems to be resting comfortably.      Problem: Diabetes/Glucose Control  Goal: Glucose maintained within prescribed range  Description: INTERVENTIONS:  - Monitor Blood Glucose as ordered  - Assess for signs and symptoms of hyperglycemia and hypoglycemia  - Administer ordered medications to maintain glucose within target range  - Assess barriers to adequate nutritional intake and initiate nutrition consult as needed  - Instruct patient on self management of diabetes  2/17/2022 1302 by Inocente Brock RN  Outcome: Progressing  2/17/2022 1301 by Inocente Brock RN  Outcome: Progressing     Problem: Patient/Family Goals  Goal: Patient/Family Long Term Goal  Description: Patient's Long Term Goal: to go home    Interventions:  - comply to care plan, adhere to medication regimen  - See additional Care Plan goals for specific interventions  2/17/2022 1302 by Inocente Brock RN  Outcome: Progressing  2/17/2022 1301 by Inocente Brock RN  Outcome: Progressing  Goal: Patient/Family Short Term Goal  Description: Patient's Short Term Goal: Nausea/vomiting relief    Interventions:   - Order/advance diet as tolerated  - See additional Care Plan goals for specific interventions  2/17/2022 1302 by Inocente Brock RN  Outcome: Progressing  2/17/2022 1301 by Inocente Brock RN  Outcome: Progressing

## 2022-02-17 NOTE — PLAN OF CARE
Assumed care of patient at [phone]. Upon admission assessment, patient is primarily Thai speaking. Communicated via  and son. Alert and oriented x4. On room air with adequate saturations. NSR on tele. R Nephrostomy tube, colostomy. Per son, continent of bladder. No pain upon admission assessment. Bed locked and in lowest position, call light within reach. Bed alarm on. 0230: Pt calling out in pain. Via , determined chest pain was 8/10, placed on 2L and obtained an EKG. 0245: RRT called d/t patient complaining of chest pain, EKG changes and vomiting. Per MD, pt started on a NTG gtt, heparin gtt and given morphine and Zofran, and Reglan (See MAR). Chest pain subsided, pt resting comfortably. Cardiology to see in AM.    0355: Pt's BP 72/44. MD informed, Per MD, 250cc bolus given, nitroglycerin gtt stopped. Pt's blood pressures improving. 0367: Updated son on events and plan of care.       Problem: Diabetes/Glucose Control  Goal: Glucose maintained within prescribed range  Description: INTERVENTIONS:  - Monitor Blood Glucose as ordered  - Assess for signs and symptoms of hyperglycemia and hypoglycemia  - Administer ordered medications to maintain glucose within target range  - Assess barriers to adequate nutritional intake and initiate nutrition consult as needed  - Instruct patient on self management of diabetes  Outcome: Progressing

## 2022-02-17 NOTE — PROGRESS NOTES
02/17/22 1101 02/17/22 1104   Vital Signs   Pulse 73 72   Heart Rate Source Monitor Monitor   Cardiac Rhythm NSR NSR   Resp 18 18   Respiratory Quality Normal Normal   /53 104/48   MAP (mmHg) 64 61   BP Location Right arm Right arm   BP Method Automatic Automatic   Patient Position Lying Sitting     Orthostatic vitals lying and sitting, pt unable to stand for duration of BP reading.

## 2022-02-17 NOTE — PROGRESS NOTES
02/17/22 1115   Clinical Encounter Type   Visited With Health care provider  (Checked patient's decisional capacity w/ GARY Leung)   Routine Visit   (Responded to POLST consult)   Taxonomy   Intended Effects Build relationship of care and support   Interventions Acknowledge current situation; Active listening; Facilitate advance care planning   Identified that the patient is not decisional to complete the POLST paperwork. [facility] agent is not present. Placed POLST paperwork with patient's demographic information on chart, to be completed, as appropriate. Notified GARY Leung regarding the same.

## 2022-02-17 NOTE — ED QUICK NOTES
Orders for admission, patient is aware of plan and ready to go upstairs. Any questions, please call ED RN Yury Gerber at extension [phone].      Patient Covid vaccination status: Unvaccinated     COVID Test Ordered in ED: Rapid SARS-CoV-2 by PCR    COVID Suspicion at Admission: Low clinical suspicion for COVID    Running Infusions:  0.9NS    Mental Status/LOC at time of transport: A&Ox3    Other pertinent information: patient primarily Latvian speaking, son prefers to be called for translation, Rand [phone]  CIWA score: N/A   NIH score:  N/A

## 2022-02-17 NOTE — PROGRESS NOTES
NURSING ADMISSION NOTE      Patient admitted via Cart  Oriented to room. Safety precautions initiated. Bed in low position. Call light in reach. Admission navigator completed with patient,  and patient's son.

## 2022-02-18 ENCOUNTER — APPOINTMENT (OUTPATIENT)
Dept: GENERAL RADIOLOGY | Facility: HOSPITAL | Age: 87
DRG: 280 | End: 2022-02-18
Attending: HOSPITALIST
Payer: MEDICARE

## 2022-02-18 LAB
ALBUMIN SERPL-MCNC: 2.5 G/DL (ref 3.4–5)
ANION GAP SERPL CALC-SCNC: 7 MMOL/L (ref 0–18)
APTT PPP: 27.5 SECONDS (ref 23.3–35.6)
APTT PPP: 63.2 SECONDS (ref 23.3–35.6)
ARTERIAL PATENCY WRIST A: POSITIVE
ARTERIAL PATENCY WRIST A: POSITIVE
ATRIAL RATE: 67 BPM
ATRIAL RATE: 78 BPM
BASE EXCESS BLDA CALC-SCNC: 2.3 MMOL/L (ref ?–2)
BASE EXCESS BLDA CALC-SCNC: 5.4 MMOL/L (ref ?–2)
BODY TEMPERATURE: 98.6 F
BODY TEMPERATURE: 98.6 F
BUN BLD-MCNC: 32 MG/DL (ref 7–18)
CA-I BLD-SCNC: 1.14 MMOL/L (ref 0.95–1.32)
CA-I BLD-SCNC: 1.18 MMOL/L (ref 0.95–1.32)
CALCIUM BLD-MCNC: 8.5 MG/DL (ref 8.5–10.1)
CHLORIDE SERPL-SCNC: 113 MMOL/L (ref 98–112)
CO2 SERPL-SCNC: 21 MMOL/L (ref 21–32)
COHGB MFR BLD: 0.8 % SAT (ref 0–3)
COHGB MFR BLD: 1.7 % SAT (ref 0–3)
CREAT BLD-MCNC: 2.89 MG/DL
DEPRECATED HBV CORE AB SER IA-ACNC: 1617.7 NG/ML
ERYTHROCYTE [DISTWIDTH] IN BLOOD BY AUTOMATED COUNT: 18.2 %
EXPIRATORY PRESSURE: 6 CM H2O
FIO2: 50 %
GLUCOSE BLD-MCNC: 116 MG/DL (ref 70–99)
GLUCOSE BLD-MCNC: 122 MG/DL (ref 70–99)
GLUCOSE BLD-MCNC: 158 MG/DL (ref 70–99)
GLUCOSE BLD-MCNC: 221 MG/DL (ref 70–99)
HCO3 BLDA-SCNC: 26.7 MEQ/L (ref 21–27)
HCO3 BLDA-SCNC: 29.2 MEQ/L (ref 21–27)
HCT VFR BLD AUTO: 31.5 %
HGB BLD-MCNC: 8 G/DL
HGB BLD-MCNC: 8.4 G/DL
HGB BLD-MCNC: 8.5 G/DL
INSP PRESSURE: 12 CM H2O
IRON SATN MFR SERPL: 16 %
IRON SERPL-MCNC: 42 UG/DL
L/M: 7 L/MIN
LACTATE BLD-SCNC: 1.4 MMOL/L (ref 0.5–2)
LACTATE BLD-SCNC: 1.7 MMOL/L (ref 0.5–2)
MAGNESIUM SERPL-MCNC: 1.8 MG/DL (ref 1.6–2.6)
MCH RBC QN AUTO: 26.6 PG (ref 26–34)
MCHC RBC AUTO-ENTMCNC: 27 G/DL (ref 31–37)
MCV RBC AUTO: 98.7 FL
METHGB MFR BLD: 0.3 % SAT (ref 0.4–1.5)
METHGB MFR BLD: 0.6 % SAT (ref 0.4–1.5)
NT-PROBNP SERPL-MCNC: ABNORMAL PG/ML (ref ?–450)
OXYHGB MFR BLDA: 94.3 % (ref 92–100)
OXYHGB MFR BLDA: 96.2 % (ref 92–100)
P AXIS: 44 DEGREES
P AXIS: 57 DEGREES
P-R INTERVAL: 202 MS
P-R INTERVAL: 222 MS
P/F RATIO: 138 MMHG
PCO2 BLDA: 35 MM HG (ref 35–45)
PCO2 BLDA: 53 MM HG (ref 35–45)
PH BLDA: 7.34 [PH] (ref 7.35–7.45)
PH BLDA: 7.52 [PH] (ref 7.35–7.45)
PHOSPHATE SERPL-MCNC: 3.8 MG/DL (ref 2.5–4.9)
PLATELET # BLD AUTO: 107 10(3)UL (ref 150–450)
PO2 BLDA: 69 MM HG (ref 80–100)
PO2 BLDA: 69 MM HG (ref 80–100)
POTASSIUM BLD-SCNC: 4 MMOL/L (ref 3.6–5.1)
POTASSIUM SERPL-SCNC: 4.3 MMOL/L (ref 3.5–5.1)
Q-T INTERVAL: 374 MS
Q-T INTERVAL: 412 MS
QRS DURATION: 80 MS
QRS DURATION: 82 MS
QTC CALCULATION (BEZET): 426 MS
QTC CALCULATION (BEZET): 435 MS
R AXIS: -28 DEGREES
R AXIS: -33 DEGREES
RBC # BLD AUTO: 3.19 X10(6)UL
SODIUM BLD-SCNC: 139 MMOL/L (ref 135–145)
SODIUM BLD-SCNC: 143 MMOL/L (ref 135–145)
SODIUM SERPL-SCNC: 141 MMOL/L (ref 136–145)
T AXIS: 100 DEGREES
T AXIS: 162 DEGREES
TIBC SERPL-MCNC: 258 UG/DL (ref 240–450)
TRANSFERRIN SERPL-MCNC: 173 MG/DL (ref 200–360)
TROPONIN I HIGH SENSITIVITY: 2209 NG/L
VENTRICULAR RATE: 67 BPM
VENTRICULAR RATE: 78 BPM
WBC # BLD AUTO: 5.6 X10(3) UL (ref 4–11)

## 2022-02-18 PROCEDURE — 99291 CRITICAL CARE FIRST HOUR: CPT | Performed by: HOSPITALIST

## 2022-02-18 PROCEDURE — 71045 X-RAY EXAM CHEST 1 VIEW: CPT | Performed by: HOSPITALIST

## 2022-02-18 PROCEDURE — 99232 SBSQ HOSP IP/OBS MODERATE 35: CPT | Performed by: HOSPITALIST

## 2022-02-18 RX ORDER — HEPARIN SODIUM 5000 [USP'U]/ML
30 INJECTION INTRAVENOUS; SUBCUTANEOUS ONCE
Status: COMPLETED | OUTPATIENT
Start: 2022-02-18 | End: 2022-02-18

## 2022-02-18 RX ORDER — FUROSEMIDE 10 MG/ML
40 INJECTION INTRAMUSCULAR; INTRAVENOUS ONCE
Status: COMPLETED | OUTPATIENT
Start: 2022-02-18 | End: 2022-02-18

## 2022-02-18 RX ORDER — IPRATROPIUM BROMIDE AND ALBUTEROL SULFATE 2.5; .5 MG/3ML; MG/3ML
3 SOLUTION RESPIRATORY (INHALATION) EVERY 6 HOURS
Status: DISCONTINUED | OUTPATIENT
Start: 2022-02-18 | End: 2022-02-19

## 2022-02-18 RX ORDER — HEPARIN SODIUM 5000 [USP'U]/ML
INJECTION, SOLUTION INTRAVENOUS; SUBCUTANEOUS
Status: DISPENSED
Start: 2022-02-18 | End: 2022-02-18

## 2022-02-18 RX ORDER — ALBUMIN (HUMAN) 12.5 G/50ML
25 SOLUTION INTRAVENOUS EVERY 6 HOURS
Status: COMPLETED | OUTPATIENT
Start: 2022-02-18 | End: 2022-02-18

## 2022-02-18 RX ORDER — ASPIRIN 300 MG/1
300 SUPPOSITORY RECTAL DAILY
Status: DISCONTINUED | OUTPATIENT
Start: 2022-02-18 | End: 2022-02-18

## 2022-02-18 RX ORDER — LORAZEPAM 2 MG/ML
INJECTION INTRAMUSCULAR
Status: COMPLETED
Start: 2022-02-18 | End: 2022-02-18

## 2022-02-18 RX ORDER — IPRATROPIUM BROMIDE AND ALBUTEROL SULFATE 2.5; .5 MG/3ML; MG/3ML
SOLUTION RESPIRATORY (INHALATION)
Status: COMPLETED
Start: 2022-02-18 | End: 2022-02-18

## 2022-02-18 RX ORDER — FUROSEMIDE 10 MG/ML
INJECTION INTRAMUSCULAR; INTRAVENOUS
Status: DISPENSED
Start: 2022-02-18 | End: 2022-02-19

## 2022-02-18 RX ORDER — LORAZEPAM 2 MG/ML
0.5 INJECTION INTRAMUSCULAR ONCE
Status: COMPLETED | OUTPATIENT
Start: 2022-02-18 | End: 2022-02-18

## 2022-02-18 RX ORDER — ASPIRIN 300 MG/1
300 SUPPOSITORY RECTAL DAILY
Status: DISCONTINUED | OUTPATIENT
Start: 2022-02-19 | End: 2022-02-19

## 2022-02-18 RX ORDER — MORPHINE SULFATE 2 MG/ML
2 INJECTION, SOLUTION INTRAMUSCULAR; INTRAVENOUS ONCE
Status: DISCONTINUED | OUTPATIENT
Start: 2022-02-18 | End: 2022-03-06

## 2022-02-18 RX ORDER — METOPROLOL TARTRATE 5 MG/5ML
2.5 INJECTION INTRAVENOUS EVERY 6 HOURS
Status: DISCONTINUED | OUTPATIENT
Start: 2022-02-18 | End: 2022-02-24

## 2022-02-18 NOTE — PLAN OF CARE
Assumed care of patient at 1. Patient is alert and oriented x4. On room air with adequate O2 saturations. NSR/SB on tele. No complaints of chest pain. R nephrostomy tube. Colostomy in place. Up with 2. Bed locked and in lowest position, call light within reach. Bed alarm on.      Plan: sodium bicarb, nephrology to see      Problem: Diabetes/Glucose Control  Goal: Glucose maintained within prescribed range  Description: INTERVENTIONS:  - Monitor Blood Glucose as ordered  - Assess for signs and symptoms of hyperglycemia and hypoglycemia  - Administer ordered medications to maintain glucose within target range  - Assess barriers to adequate nutritional intake and initiate nutrition consult as needed  - Instruct patient on self management of diabetes  Outcome: Progressing     Problem: Patient/Family Goals  Goal: Patient/Family Long Term Goal  Description: Patient's Long Term Goal: to go home    Interventions:  - comply to care plan, adhere to medication regimen  - See additional Care Plan goals for specific interventions  Outcome: Progressing  Goal: Patient/Family Short Term Goal  Description: Patient's Short Term Goal: Nausea/vomiting relief    Interventions:   - Order/advance diet as tolerated  - See additional Care Plan goals for specific interventions  Outcome: Progressing

## 2022-02-19 ENCOUNTER — APPOINTMENT (OUTPATIENT)
Dept: GENERAL RADIOLOGY | Facility: HOSPITAL | Age: 87
DRG: 280 | End: 2022-02-19
Attending: INTERNAL MEDICINE
Payer: MEDICARE

## 2022-02-19 LAB
ALBUMIN SERPL-MCNC: 3.2 G/DL (ref 3.4–5)
ANION GAP SERPL CALC-SCNC: 6 MMOL/L (ref 0–18)
APTT PPP: 46.3 SECONDS (ref 23.3–35.6)
APTT PPP: 57.1 SECONDS (ref 23.3–35.6)
ATRIAL RATE: 94 BPM
BUN BLD-MCNC: 28 MG/DL (ref 7–18)
CALCIUM BLD-MCNC: 8.6 MG/DL (ref 8.5–10.1)
CHLORIDE SERPL-SCNC: 107 MMOL/L (ref 98–112)
CO2 SERPL-SCNC: 30 MMOL/L (ref 21–32)
CREAT BLD-MCNC: 2.78 MG/DL
CREAT UR-SCNC: 27.7 MG/DL
GLUCOSE BLD-MCNC: 118 MG/DL (ref 70–99)
GLUCOSE BLD-MCNC: 122 MG/DL (ref 70–99)
GLUCOSE BLD-MCNC: 130 MG/DL (ref 70–99)
GLUCOSE BLD-MCNC: 135 MG/DL (ref 70–99)
GLUCOSE BLD-MCNC: 140 MG/DL (ref 70–99)
OSMOLALITY SERPL CALC.SUM OF ELEC: 303 MOSM/KG (ref 275–295)
P AXIS: 87 DEGREES
P-R INTERVAL: 210 MS
PHOSPHATE SERPL-MCNC: 4.6 MG/DL (ref 2.5–4.9)
PLATELET # BLD AUTO: 205 10(3)UL (ref 150–450)
Q-T INTERVAL: 346 MS
QRS DURATION: 88 MS
QTC CALCULATION (BEZET): 432 MS
R AXIS: -25 DEGREES
SODIUM SERPL-SCNC: 123 MMOL/L
SODIUM SERPL-SCNC: 143 MMOL/L (ref 136–145)
T AXIS: 122 DEGREES
VENTRICULAR RATE: 94 BPM

## 2022-02-19 PROCEDURE — 99232 SBSQ HOSP IP/OBS MODERATE 35: CPT | Performed by: INTERNAL MEDICINE

## 2022-02-19 PROCEDURE — 74220 X-RAY XM ESOPHAGUS 1CNTRST: CPT | Performed by: INTERNAL MEDICINE

## 2022-02-19 RX ORDER — IPRATROPIUM BROMIDE AND ALBUTEROL SULFATE 2.5; .5 MG/3ML; MG/3ML
3 SOLUTION RESPIRATORY (INHALATION) EVERY 6 HOURS PRN
Status: DISCONTINUED | OUTPATIENT
Start: 2022-02-19 | End: 2022-03-06

## 2022-02-19 RX ORDER — ASPIRIN 81 MG/1
81 TABLET, CHEWABLE ORAL DAILY
Status: DISCONTINUED | OUTPATIENT
Start: 2022-02-19 | End: 2022-03-06

## 2022-02-19 RX ORDER — FUROSEMIDE 10 MG/ML
40 INJECTION INTRAMUSCULAR; INTRAVENOUS ONCE
Status: COMPLETED | OUTPATIENT
Start: 2022-02-19 | End: 2022-02-19

## 2022-02-19 NOTE — PLAN OF CARE
Received pt from Rm 2616. Pt is Lithuanian speaking only. Son was at bedside to assist with translate. Pt is AOx2-3 and confused at times per son. Bipap on with FiO2 50%. Continue on Heparin gtt. Nephrostomy bag in place and draining clear yellow urine. Pt also incontinent; Purewick applied. Colostomy bag in place. Small amount of stool noted. NPO.    0000  Pt is confused and kept pulling Bipap. Switched to nasal cannula and able to wean down to 1L. Continue to monitor.

## 2022-02-20 ENCOUNTER — APPOINTMENT (OUTPATIENT)
Dept: GENERAL RADIOLOGY | Facility: HOSPITAL | Age: 87
DRG: 280 | End: 2022-02-20
Attending: INTERNAL MEDICINE
Payer: MEDICARE

## 2022-02-20 LAB
ALBUMIN SERPL-MCNC: 3.2 G/DL (ref 3.4–5)
ANION GAP SERPL CALC-SCNC: 8 MMOL/L (ref 0–18)
APTT PPP: 52.5 SECONDS (ref 23.3–35.6)
BASOPHILS # BLD AUTO: 0.04 X10(3) UL (ref 0–0.2)
BASOPHILS NFR BLD AUTO: 0.5 %
BUN BLD-MCNC: 28 MG/DL (ref 7–18)
CALCIUM BLD-MCNC: 8.6 MG/DL (ref 8.5–10.1)
CHLORIDE SERPL-SCNC: 99 MMOL/L (ref 98–112)
CO2 SERPL-SCNC: 32 MMOL/L (ref 21–32)
CREAT BLD-MCNC: 2.56 MG/DL
EOSINOPHIL # BLD AUTO: 0.33 X10(3) UL (ref 0–0.7)
EOSINOPHIL NFR BLD AUTO: 4.5 %
ERYTHROCYTE [DISTWIDTH] IN BLOOD BY AUTOMATED COUNT: 16.9 %
GLUCOSE BLD-MCNC: 127 MG/DL (ref 70–99)
GLUCOSE BLD-MCNC: 147 MG/DL (ref 70–99)
GLUCOSE BLD-MCNC: 151 MG/DL (ref 70–99)
GLUCOSE BLD-MCNC: 177 MG/DL (ref 70–99)
GLUCOSE BLD-MCNC: 180 MG/DL (ref 70–99)
GLUCOSE BLD-MCNC: 184 MG/DL (ref 70–99)
HCT VFR BLD AUTO: 25.6 %
HGB BLD-MCNC: 7.8 G/DL
IMM GRANULOCYTES # BLD AUTO: 0.03 X10(3) UL (ref 0–1)
IMM GRANULOCYTES NFR BLD: 0.4 %
INR BLD: 1.3 (ref 0.8–1.2)
LYMPHOCYTES # BLD AUTO: 1.74 X10(3) UL (ref 1–4)
LYMPHOCYTES NFR BLD AUTO: 23.6 %
MAGNESIUM SERPL-MCNC: 1.4 MG/DL (ref 1.6–2.6)
MCH RBC QN AUTO: 26.5 PG (ref 26–34)
MCHC RBC AUTO-ENTMCNC: 30.5 G/DL (ref 31–37)
MONOCYTES # BLD AUTO: 0.57 X10(3) UL (ref 0.1–1)
MONOCYTES NFR BLD AUTO: 7.7 %
NEUTROPHILS # BLD AUTO: 4.67 X10 (3) UL (ref 1.5–7.7)
NEUTROPHILS # BLD AUTO: 4.67 X10(3) UL (ref 1.5–7.7)
NEUTROPHILS NFR BLD AUTO: 63.3 %
OSMOLALITY SERPL CALC.SUM OF ELEC: 296 MOSM/KG (ref 275–295)
PHOSPHATE SERPL-MCNC: 4 MG/DL (ref 2.5–4.9)
PLATELET # BLD AUTO: 207 10(3)UL (ref 150–450)
POTASSIUM SERPL-SCNC: 3.9 MMOL/L (ref 3.5–5.1)
PROTHROMBIN TIME: 16.2 SECONDS (ref 11.6–14.8)
RBC # BLD AUTO: 2.94 X10(6)UL
SARS-COV-2 RNA RESP QL NAA+PROBE: NOT DETECTED
SODIUM SERPL-SCNC: 139 MMOL/L (ref 136–145)
TROPONIN I HIGH SENSITIVITY: 5213 NG/L
WBC # BLD AUTO: 7.4 X10(3) UL (ref 4–11)

## 2022-02-20 PROCEDURE — 99233 SBSQ HOSP IP/OBS HIGH 50: CPT | Performed by: INTERNAL MEDICINE

## 2022-02-20 PROCEDURE — 71045 X-RAY EXAM CHEST 1 VIEW: CPT | Performed by: INTERNAL MEDICINE

## 2022-02-20 RX ORDER — SODIUM CHLORIDE 9 MG/ML
INJECTION, SOLUTION INTRAVENOUS
Status: ACTIVE | OUTPATIENT
Start: 2022-02-20 | End: 2022-02-20

## 2022-02-20 RX ORDER — MAGNESIUM SULFATE HEPTAHYDRATE 40 MG/ML
2 INJECTION, SOLUTION INTRAVENOUS ONCE
Status: COMPLETED | OUTPATIENT
Start: 2022-02-20 | End: 2022-02-20

## 2022-02-20 NOTE — PROGRESS NOTES
Pharmacy Note:  Renal Adjustment for piperacillin/tazobactam (Elmyra Sprout)         Heather Esqueda is a 81year old female who has been prescribed piperacillin/tazobactam 3.375g q8hr. Est CrCl: ~14 mL/min (ANTONIO on CKD)    The dose has been adjusted to piperacillin/tazobactam 3.375g q12hr per hospital renal dose adjustment protocol. Pharmacy will follow and adjust dose as warranted for renal function changes.     Thank you,      Chloe Olivarez, MelvinD, BCPS  2/20/2022  10:08 AM

## 2022-02-20 NOTE — PLAN OF CARE
Assumed care at [facility]. AOx2-3. Frisian speaking only. Continue on 1L nasal cannula; O2 sat mid 90's. Heparin gtt infusing. Nephrostomy bag and colostomy in place. Incontinent; purewick in place. Plan for cardiac cath on Monday. Continue to monitor. Suturegard Intro: Intraoperative tissue expansion was performed, utilizing the SUTUREGARD device, in order to reduce wound tension.

## 2022-02-21 ENCOUNTER — APPOINTMENT (OUTPATIENT)
Dept: GENERAL RADIOLOGY | Facility: HOSPITAL | Age: 87
DRG: 280 | End: 2022-02-21
Attending: INTERNAL MEDICINE
Payer: MEDICARE

## 2022-02-21 LAB
ALBUMIN SERPL-MCNC: 2.8 G/DL (ref 3.4–5)
ANION GAP SERPL CALC-SCNC: 10 MMOL/L (ref 0–18)
APTT PPP: 54.2 SECONDS (ref 23.3–35.6)
BASOPHILS # BLD AUTO: 0.04 X10(3) UL (ref 0–0.2)
BASOPHILS NFR BLD AUTO: 0.4 %
BUN BLD-MCNC: 26 MG/DL (ref 7–18)
CALCIUM BLD-MCNC: 8.6 MG/DL (ref 8.5–10.1)
CHLORIDE SERPL-SCNC: 98 MMOL/L (ref 98–112)
CO2 SERPL-SCNC: 27 MMOL/L (ref 21–32)
CREAT BLD-MCNC: 2.48 MG/DL
EOSINOPHIL # BLD AUTO: 0.25 X10(3) UL (ref 0–0.7)
EOSINOPHIL NFR BLD AUTO: 2.5 %
ERYTHROCYTE [DISTWIDTH] IN BLOOD BY AUTOMATED COUNT: 16.8 %
GLUCOSE BLD-MCNC: 166 MG/DL (ref 70–99)
GLUCOSE BLD-MCNC: 212 MG/DL (ref 70–99)
GLUCOSE BLD-MCNC: 217 MG/DL (ref 70–99)
GLUCOSE BLD-MCNC: 309 MG/DL (ref 70–99)
HCT VFR BLD AUTO: 26.2 %
HGB BLD-MCNC: 8.2 G/DL
IMM GRANULOCYTES # BLD AUTO: 0.08 X10(3) UL (ref 0–1)
IMM GRANULOCYTES NFR BLD: 0.8 %
LACTATE SERPL-SCNC: 0.8 MMOL/L (ref 0.4–2)
LYMPHOCYTES # BLD AUTO: 2.4 X10(3) UL (ref 1–4)
LYMPHOCYTES NFR BLD AUTO: 24.3 %
MAGNESIUM SERPL-MCNC: 2.2 MG/DL (ref 1.6–2.6)
MCH RBC QN AUTO: 26.8 PG (ref 26–34)
MCHC RBC AUTO-ENTMCNC: 31.3 G/DL (ref 31–37)
MCV RBC AUTO: 85.6 FL
MONOCYTES # BLD AUTO: 0.85 X10(3) UL (ref 0.1–1)
MONOCYTES NFR BLD AUTO: 8.6 %
NEUTROPHILS # BLD AUTO: 6.26 X10 (3) UL (ref 1.5–7.7)
NEUTROPHILS # BLD AUTO: 6.26 X10(3) UL (ref 1.5–7.7)
NEUTROPHILS NFR BLD AUTO: 63.4 %
OSMOLALITY SERPL CALC.SUM OF ELEC: 291 MOSM/KG (ref 275–295)
PHOSPHATE SERPL-MCNC: 2.9 MG/DL (ref 2.5–4.9)
PLATELET # BLD AUTO: 220 10(3)UL (ref 150–450)
POTASSIUM SERPL-SCNC: 3.4 MMOL/L (ref 3.5–5.1)
PROCALCITONIN SERPL-MCNC: 0.34 NG/ML (ref ?–0.16)
RBC # BLD AUTO: 3.06 X10(6)UL
SODIUM SERPL-SCNC: 135 MMOL/L (ref 136–145)
WBC # BLD AUTO: 9.9 X10(3) UL (ref 4–11)

## 2022-02-21 PROCEDURE — 99232 SBSQ HOSP IP/OBS MODERATE 35: CPT | Performed by: HOSPITALIST

## 2022-02-21 PROCEDURE — 74018 RADEX ABDOMEN 1 VIEW: CPT | Performed by: INTERNAL MEDICINE

## 2022-02-21 RX ORDER — POTASSIUM CHLORIDE 20 MEQ/1
40 TABLET, EXTENDED RELEASE ORAL ONCE
Status: COMPLETED | OUTPATIENT
Start: 2022-02-21 | End: 2022-02-21

## 2022-02-21 NOTE — PLAN OF CARE
Attempted twice to obtain consent from patient designated proxy Charanjit Lyman at [phone]. No answer. Left message on voicemail. Awaiting call back.

## 2022-02-21 NOTE — SLP NOTE
SPEECH DAILY NOTE - INPATIENT    ASSESSMENT & PLAN   ASSESSMENT  Pt seen for dysphagia tx to assess tolerance with recommended diet, ensure proper utilization of aspiration precautions and provide pt/family education. Patient's daughter present at bedside interpreting throughout the assessment and providing supplementary history. Patient's daughter stated that patient was on a regular consistency thin liquid diet prior to admission (including but not limited to fried chicken, french fries, corn flakes etc.). Patient edentulous. Patient with reports of pain while swallowing, however, not impacting her  po intake or desire to eat. Patient's daughter reports pain began following \"procedure 2 days ago\". Patient alert and in bed, and agreeable to participate in po trials. SLP assessed hard solids and thin liquid by straw. Patient presented hard solid trials, independently breaking the cracker into smaller bite size pieces. SLP assisted in hand over hand presentation of thin liquid by straw, in which patient would independently self limit to 2 sips per trial. Intact oral retrieval and containment. Mastication WFL, with complete oral clearance. Laryngeal excursion judged to be adequate strength and timeliness to palpation. No overt sign of aspiration noted. Based on patients clinical performance, recommend diet increase to regular consistency, thin liquid with implementation of aspiration precautions (slow rate, small bites and sips, upright and midline positioning). Discussed with family at bedside and RN. Diet Recommendations - Solids: Regular  Diet Recommendations - Liquids: Thin Liquids    Compensatory Strategies Recommended: Slow rate;Small bites and sips  Aspiration Precautions: Upright position; Slow rate;Small bites and sips  Medication Administration Recommendations: No restrictions    Patient Experiencing Pain: Yes  Pain Rating: Unable to Rate  Pain Location: throat     Nursing Aware of Pain?: Yes    Discharge Recommendations  Discharge Recommendations/Plan: 24 hour care/supervision    Treatment Plan  Treatment Plan/Recommendations: Dysphagia therapy    Interdisciplinary Communication: Discussed with RN            GOALS  Goal #1 The patient will tolerate regular consistency and thin liquids without overt signs or symptoms of aspiration with 100 % accuracy over 1-2 session(s). In Progress   Updated 2/21/22    Goal #2 The patient/family/caregiver will demonstrate understanding and implementation of aspiration precautions and swallow strategies independently over 1-2 session(s). In Progress     FOLLOW UP  Follow Up Needed (Documentation Required): Yes  SLP Follow-up Date: 02/22/22  Number of Visits to Meet Established Goals: 3    Session: 1 of 4     If you have any questions, please contact [facility], [address]  Clinician   Johnny Signs pager [phone])    Prior to entering room, SLP donned appropriate PPE for Patient level of isolation including gloves, eye protection, and surgical mask. Patient was not masked due to nature of visit.

## 2022-02-21 NOTE — BH PROGRESS NOTE
Spoke with Jose Lawler patient son and proxy attempting to get consent for cardiac cath this am.  At this time son is refusing to give consent stating that he would like to speak to Dr. Bradford Oliveira and then he needs to discuss it with family. Awaiting call back.

## 2022-02-21 NOTE — BH PROGRESS NOTE
Call placed to Dr. Mehrdad Quintero [facility] to notify of patient's son refusal to sign consent for cardiac acth procedure. Awaiting call back.

## 2022-02-22 ENCOUNTER — APPOINTMENT (OUTPATIENT)
Dept: INTERVENTIONAL RADIOLOGY/VASCULAR | Facility: HOSPITAL | Age: 87
DRG: 280 | End: 2022-02-22
Attending: INTERNAL MEDICINE
Payer: MEDICARE

## 2022-02-22 LAB
ALBUMIN SERPL-MCNC: 2.5 G/DL (ref 3.4–5)
ANION GAP SERPL CALC-SCNC: 7 MMOL/L (ref 0–18)
APTT PPP: 51.4 SECONDS (ref 23.3–35.6)
APTT PPP: 56.6 SECONDS (ref 23.3–35.6)
BUN BLD-MCNC: 24 MG/DL (ref 7–18)
CALCIUM BLD-MCNC: 8.7 MG/DL (ref 8.5–10.1)
CHLORIDE SERPL-SCNC: 101 MMOL/L (ref 98–112)
CO2 SERPL-SCNC: 29 MMOL/L (ref 21–32)
CREAT BLD-MCNC: 2.44 MG/DL
GLUCOSE BLD-MCNC: 148 MG/DL (ref 70–99)
GLUCOSE BLD-MCNC: 152 MG/DL (ref 70–99)
GLUCOSE BLD-MCNC: 159 MG/DL (ref 70–99)
GLUCOSE BLD-MCNC: 180 MG/DL (ref 70–99)
GLUCOSE BLD-MCNC: 203 MG/DL (ref 70–99)
OSMOLALITY SERPL CALC.SUM OF ELEC: 291 MOSM/KG (ref 275–295)
PHOSPHATE SERPL-MCNC: 3 MG/DL (ref 2.5–4.9)
POTASSIUM SERPL-SCNC: 3.3 MMOL/L (ref 3.5–5.1)
POTASSIUM SERPL-SCNC: 3.3 MMOL/L (ref 3.5–5.1)
SODIUM SERPL-SCNC: 137 MMOL/L (ref 136–145)

## 2022-02-22 PROCEDURE — 99232 SBSQ HOSP IP/OBS MODERATE 35: CPT | Performed by: HOSPITALIST

## 2022-02-22 PROCEDURE — B211YZZ FLUOROSCOPY OF MULTIPLE CORONARY ARTERIES USING OTHER CONTRAST: ICD-10-PCS | Performed by: INTERNAL MEDICINE

## 2022-02-22 RX ORDER — MIDAZOLAM HYDROCHLORIDE 1 MG/ML
INJECTION INTRAMUSCULAR; INTRAVENOUS
Status: COMPLETED
Start: 2022-02-22 | End: 2022-02-22

## 2022-02-22 RX ORDER — LIDOCAINE HYDROCHLORIDE 10 MG/ML
INJECTION, SOLUTION EPIDURAL; INFILTRATION; INTRACAUDAL; PERINEURAL
Status: COMPLETED
Start: 2022-02-22 | End: 2022-02-22

## 2022-02-22 RX ORDER — POTASSIUM CHLORIDE 20 MEQ/1
40 TABLET, EXTENDED RELEASE ORAL ONCE
Status: COMPLETED | OUTPATIENT
Start: 2022-02-22 | End: 2022-02-22

## 2022-02-22 RX ORDER — HEPARIN SODIUM 5000 [USP'U]/ML
INJECTION, SOLUTION INTRAVENOUS; SUBCUTANEOUS
Status: COMPLETED
Start: 2022-02-22 | End: 2022-02-22

## 2022-02-22 RX ORDER — VERAPAMIL HYDROCHLORIDE 2.5 MG/ML
INJECTION, SOLUTION INTRAVENOUS
Status: COMPLETED
Start: 2022-02-22 | End: 2022-02-22

## 2022-02-22 RX ORDER — NITROGLYCERIN 20 MG/100ML
INJECTION INTRAVENOUS
Status: COMPLETED
Start: 2022-02-22 | End: 2022-02-22

## 2022-02-22 NOTE — PLAN OF CARE
Assumed care at [address]. AOx2-3 per family. Romansh speaking only. Continue on Levo gtt and Heparin gtt. NPO since midnight for possible cardiac cath. Plan of care discussed with pt and family. Call light within reach. Continue to monitor.

## 2022-02-22 NOTE — PROCEDURES
[address] Location: Cath Lab    [facility] [id number] MRN [id number]   Admission Date 2/16/2022 Procedure Date 2/22/2022   Attending Physician Arnoldo Beck MD Procedure Physician Avni Reno MD         CARDIAC CATHETERIZATION REPORT     PREOPERATIVE DIAGNOSIS:  NSTEMI, severe aortic stenosis  POSTOPERATIVE DIAGNOSIS:  same as above, multi-vessel coronary artery disease. PROCEDURE PERFORMED:  selective coronary angiography      PROCEDURE:  The patient was brought to the cardiac catheterization lab in the fasting state. Informed consent was obtained. Moderate sedation was employed using a total of IV Versed 1mg and IV fentanyl 100mcg. I directly observed the patient from 1312 to 1326, for a total of 14 minutes, and an independent trained observer was present and assisted in the monitoring of the patient's level of consciousness and physiological status, watching the heart rate, blood pressure, oximetry, and rhythm, in addition to total moderation time. ACCESS/CATHETER PLACEMENT:   The right radial area was prepped and draped in a sterile manner and anesthetized with 2% lidocaine. The right radial artery was accessed, and a 6-Hebrew, 11 cm sheath was placed. Advancing catheters to the aortic root was challenging due to radial artery and subclavian artery tortuosity. Left selective coronary angiography was performed using a 6F JL3.5 catheter. Right selective coronary angiography was performed with a 6F JR4 guide catheter, which had to be telescoped over a 4F glide catheter. The aortic valve was not crossed with known severe aortic stenosis. At the conclusion of the study, the right radial artery hemostasis was performed using a radial band. FINDINGS:      1. Left heart catheterization:  not performed.     Aorta: 73/40/54mmHg    2.  Selective coronary angiography:      Left main artery: The left main artery is a medium size, heavily calcified vessel with a bulky 80% calcified plaque that extends into the ostium of the LCx. LAD:  The left anterior descending artery is a medium sized, heavily calcified vessel that wraps around the apex and gives rise to a medium, branching mid diagonal artery. The proximal-mid LAD demonstrates a diffuse 80-90% calcified stenosis. The plaque extends into the ostium of the diagonal artery; further down the inferior limb of the diagonal branch, there is a 90% diffuse stenosis. LCx: The left circumflex artery is a medium size vessel that gives rise to a mid obtuse marginal branch. Severe, calcified 95% stenosis of the ostial LCx is present (extending back into the distal LM artery). Moderate diffuse disease is noted in the OM branch. RCA:  The right coronary artery is a medium caliber, dominant vessel that gives rise to a medium rPDA. Diffuse calcification is noted through the vessel with  a 90% discrete stenosis in the mid RCA. The rPDA is severely diseased in the mid-distal segment. MEDICATIONS:  See nursing record. COMPLICATIONS:  No major complications were observed during this visit to the catheterization lab. IMPRESSION:    1. Left heart catheterization: not performed, known severe aortic stenosis  2. Coronary angiography:  right dominant system  - LM: 80% distal calcified stenosis  - LAD: 90% mid stenosis  - LCx: 95% ostial stenosis  - RCA: 90% mid stenosis, rPDA- severe diffuse disease     RECOMMENDATIONS: Multi-vessel calcified coronary artery disease. Consider concomitant severe aortic stenosis, advanced age and severe co-morbidities, complex multi-vessel PCI is risk prohibitive. Surgical revascularization/valvular correction is accompanied by similar risk. Will discuss with Dr. Charito Wiley and family, conservative medical management is, likely, the most reasonable management approach.

## 2022-02-22 NOTE — SLP NOTE
Note patient NPO for possible procedure later today. Will hold today and check status 2/23/22.     Monty Jackson Sebastian 88 CCC-SLP  Pager [phone]

## 2022-02-23 PROBLEM — I35.0 SEVERE AORTIC STENOSIS: Status: ACTIVE | Noted: 2017-06-08

## 2022-02-23 LAB
ALBUMIN SERPL-MCNC: 2.4 G/DL (ref 3.4–5)
ANION GAP SERPL CALC-SCNC: 5 MMOL/L (ref 0–18)
CALCIUM BLD-MCNC: 8.4 MG/DL (ref 8.5–10.1)
CHLORIDE SERPL-SCNC: 105 MMOL/L (ref 98–112)
CO2 SERPL-SCNC: 29 MMOL/L (ref 21–32)
CREAT BLD-MCNC: 2.59 MG/DL
GLUCOSE BLD-MCNC: 139 MG/DL (ref 70–99)
GLUCOSE BLD-MCNC: 149 MG/DL (ref 70–99)
GLUCOSE BLD-MCNC: 165 MG/DL (ref 70–99)
GLUCOSE BLD-MCNC: 167 MG/DL (ref 70–99)
GLUCOSE BLD-MCNC: 198 MG/DL (ref 70–99)
GLUCOSE BLD-MCNC: 213 MG/DL (ref 70–99)
OSMOLALITY SERPL CALC.SUM OF ELEC: 295 MOSM/KG (ref 275–295)
PHOSPHATE SERPL-MCNC: 2.9 MG/DL (ref 2.5–4.9)
POTASSIUM SERPL-SCNC: 4.3 MMOL/L (ref 3.5–5.1)
SODIUM SERPL-SCNC: 139 MMOL/L (ref 136–145)

## 2022-02-23 PROCEDURE — 99232 SBSQ HOSP IP/OBS MODERATE 35: CPT | Performed by: HOSPITALIST

## 2022-02-23 PROCEDURE — 99497 ADVNCD CARE PLAN 30 MIN: CPT | Performed by: CLINICAL NURSE SPECIALIST

## 2022-02-23 PROCEDURE — 99221 1ST HOSP IP/OBS SF/LOW 40: CPT | Performed by: CLINICAL NURSE SPECIALIST

## 2022-02-23 RX ORDER — HEPARIN SODIUM 5000 [USP'U]/ML
5000 INJECTION, SOLUTION INTRAVENOUS; SUBCUTANEOUS EVERY 12 HOURS SCHEDULED
Status: DISCONTINUED | OUTPATIENT
Start: 2022-02-24 | End: 2022-03-06

## 2022-02-23 RX ORDER — CLOPIDOGREL BISULFATE 75 MG/1
75 TABLET ORAL DAILY
Status: DISCONTINUED | OUTPATIENT
Start: 2022-02-23 | End: 2022-02-24

## 2022-02-23 RX ORDER — ACETAMINOPHEN 325 MG/1
650 TABLET ORAL EVERY 8 HOURS
Status: DISCONTINUED | OUTPATIENT
Start: 2022-02-23 | End: 2022-03-06

## 2022-02-23 NOTE — PLAN OF CARE
Assumed care this morning. Using interpretor line, pt assessed. Pt c/o vague pain in throat, chest, abd, eye and ears. Wanting to eat and drink. NPO for cath, POC explained using interpretor and family for reinforcement throughout day. NSR on monitor, BP stable with low dose levophed. Denies SOB. To cathlab around 1200, back just before 1400. Recovered per protocol and orders. MVD, plan for medical management. Palliative to see tomorrow.      Problem: Diabetes/Glucose Control  Goal: Glucose maintained within prescribed range  Description: INTERVENTIONS:  - Monitor Blood Glucose as ordered  - Assess for signs and symptoms of hyperglycemia and hypoglycemia  - Administer ordered medications to maintain glucose within target range  - Assess barriers to adequate nutritional intake and initiate nutrition consult as needed  - Instruct patient on self management of diabetes  Outcome: Progressing     Problem: Patient/Family Goals  Goal: Patient/Family Long Term Goal  Description: Patient's Long Term Goal: to go home    Interventions:  - comply to care plan, adhere to medication regimen  - See additional Care Plan goals for specific interventions  Outcome: Progressing  Goal: Patient/Family Short Term Goal  Description: Patient's Short Term Goal: Nausea/vomiting relief    Interventions:   - Order/advance diet as tolerated  - See additional Care Plan goals for specific interventions  Outcome: Progressing

## 2022-02-23 NOTE — CM/SW NOTE
02/23/22 1400   CM/ Referral Data   Referral Source    Reason for Referral Discharge planning   Informant Son  [facility])   Pertinent Medical Hx   Does patient have an established PCP? 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 requires assistance with Housework;Driving;Meals; Bathing; Ambulating;Dressing;Medications; Feeding; Toileting;Finances   Services in place prior to admission Home Health Care;DME/Supplies at home   [address]   Type of DME/Supplies [facility]   Discharge Needs   Anticipated D/C needs Home health care   Choice of Post-Acute Provider   Informed patient of right to choose their preferred provider Yes   List of appropriate post-acute services provided to patient/family with quality data No - Declined list   Patient/family choice Epic      spoke with son re: dc needs. He provides 24 hour care. States he has all appropriate DME. Patient is current w/ [facility] HH RN, declines pt/ot as she does not participate in therapy.  requested department  [facility]) to initiate [address] [facility] [place] [address]. Son will provide transportation at time of dc. / to remain available for any further discharge planning needs.     Corinna Hills RN,   Phone [phone]

## 2022-02-23 NOTE — PLAN OF CARE
Assumed care at [address] [place] [address]. AOx4. Continue on low dose of levophed to keep SBP >90 and MAP>65.  1L O2 nasal cannula. No SOB noted. Palliative care to see today. Call light within reach. Continue to monitor.

## 2022-02-23 NOTE — PLAN OF CARE
Received patient resting in bed this am in no apparent distress. No c/o pain. VSS. Levophed on standby. Will continue to monitor patient.

## 2022-02-23 NOTE — CM/SW NOTE
Department  notified of request for [facility], aidin referrals started. Assigned CM/SW to follow up with pt/family on further discharge planning.        Brandie Batista   February 23, 2022   14:32

## 2022-02-24 ENCOUNTER — APPOINTMENT (OUTPATIENT)
Dept: ULTRASOUND IMAGING | Facility: HOSPITAL | Age: 87
DRG: 280 | End: 2022-02-24
Attending: HOSPITALIST
Payer: MEDICARE

## 2022-02-24 LAB
ALBUMIN SERPL-MCNC: 2.4 G/DL (ref 3.4–5)
ANION GAP SERPL CALC-SCNC: 5 MMOL/L (ref 0–18)
BILIRUB UR QL STRIP.AUTO: NEGATIVE
BUN BLD-MCNC: 32 MG/DL (ref 7–18)
CALCIUM BLD-MCNC: 8.7 MG/DL (ref 8.5–10.1)
CHLORIDE SERPL-SCNC: 106 MMOL/L (ref 98–112)
CO2 SERPL-SCNC: 26 MMOL/L (ref 21–32)
COLOR UR AUTO: YELLOW
CREAT BLD-MCNC: 3.09 MG/DL
GLUCOSE BLD-MCNC: 159 MG/DL (ref 70–99)
GLUCOSE BLD-MCNC: 172 MG/DL (ref 70–99)
GLUCOSE BLD-MCNC: 187 MG/DL (ref 70–99)
GLUCOSE BLD-MCNC: 187 MG/DL (ref 70–99)
GLUCOSE BLD-MCNC: 192 MG/DL (ref 70–99)
GLUCOSE BLD-MCNC: 200 MG/DL (ref 70–99)
GLUCOSE UR STRIP.AUTO-MCNC: NEGATIVE MG/DL
KETONES UR STRIP.AUTO-MCNC: NEGATIVE MG/DL
NITRITE UR QL STRIP.AUTO: NEGATIVE
OSMOLALITY SERPL CALC.SUM OF ELEC: 296 MOSM/KG (ref 275–295)
PHOSPHATE SERPL-MCNC: 3 MG/DL (ref 2.5–4.9)
POTASSIUM SERPL-SCNC: 4.8 MMOL/L (ref 3.5–5.1)
PROT UR STRIP.AUTO-MCNC: 30 MG/DL
RBC UR QL AUTO: NEGATIVE
SODIUM SERPL-SCNC: 137 MMOL/L (ref 136–145)
SODIUM SERPL-SCNC: 26 MMOL/L
SP GR UR STRIP.AUTO: 1.02 (ref 1–1.03)
UROBILINOGEN UR STRIP.AUTO-MCNC: <2 MG/DL
WBC #/AREA URNS AUTO: >50 /HPF

## 2022-02-24 PROCEDURE — 99232 SBSQ HOSP IP/OBS MODERATE 35: CPT | Performed by: HOSPITALIST

## 2022-02-24 PROCEDURE — 99497 ADVNCD CARE PLAN 30 MIN: CPT | Performed by: CLINICAL NURSE SPECIALIST

## 2022-02-24 PROCEDURE — 76775 US EXAM ABDO BACK WALL LIM: CPT | Performed by: HOSPITALIST

## 2022-02-24 PROCEDURE — 99233 SBSQ HOSP IP/OBS HIGH 50: CPT | Performed by: CLINICAL NURSE SPECIALIST

## 2022-02-24 RX ORDER — SODIUM CHLORIDE 9 MG/ML
INJECTION, SOLUTION INTRAVENOUS CONTINUOUS
Status: ACTIVE | OUTPATIENT
Start: 2022-02-24 | End: 2022-02-24

## 2022-02-24 RX ORDER — PANTOPRAZOLE SODIUM 40 MG/1
40 TABLET, DELAYED RELEASE ORAL
Status: DISCONTINUED | OUTPATIENT
Start: 2022-02-25 | End: 2022-03-06

## 2022-02-24 RX ORDER — CEPHALEXIN 500 MG/1
500 CAPSULE ORAL EVERY 12 HOURS SCHEDULED
Status: DISCONTINUED | OUTPATIENT
Start: 2022-02-24 | End: 2022-02-24

## 2022-02-24 NOTE — PROGRESS NOTES
[facility] Note: Route Optimization for Pantoprazole (PROTONIX)    Patient is currently on Pantoprazole (PROTONIX) 40 mg IV every 24 hours. The patient meets the criteria to convert to the oral equivalent as established by the IV to Oral conversion protocol approved by the P&T committee. Medication was changed from IV formulation to Pantoprazole (PROTONIX) 40 mg PO every 24 hours per protocol.       Yousif Shelton PharmD  2/24/2022,  9:10 AM

## 2022-02-24 NOTE — PLAN OF CARE
Assumed care at 0730. Pt Frisian speaking. Alert, oriented to self. Oxygenation stable on room air. Lung sounds clear, diminished bilaterally. Tele: normal sinus rhythm. BLE +1 edema. Pt has colostomy, output of undigested food, brown. Passing flatus via colostomy. Pt incontinent of bladder. Nephrostomy. Pt reporting right sided abdominal pain, managed with oral medication. Bedrest. Pt updated on plan of care. Questions answered. 1500 - Ostomy output changed from brown to green in color. Pt family reporting pt more lethargic/tired than normal. MD notified. 1645 - Pt reporting heaviness in the legs, BLE discomfort. MD notified.      1800 - US bilateral kidneys    Problem: Diabetes/Glucose Control  Goal: Glucose maintained within prescribed range  Description: INTERVENTIONS:  - Monitor Blood Glucose as ordered  - Assess for signs and symptoms of hyperglycemia and hypoglycemia  - Administer ordered medications to maintain glucose within target range  - Assess barriers to adequate nutritional intake and initiate nutrition consult as needed  - Instruct patient on self management of diabetes  Outcome: Progressing     Problem: Patient/Family Goals  Goal: Patient/Family Long Term Goal  Description: Patient's Long Term Goal: to go home    Interventions:  - comply to care plan, adhere to medication regimen  - See additional Care Plan goals for specific interventions  Outcome: Progressing  Goal: Patient/Family Short Term Goal  Description: Patient's Short Term Goal: Nausea/vomiting relief    Interventions:   - Order/advance diet as tolerated  - See additional Care Plan goals for specific interventions  Outcome: Progressing

## 2022-02-24 NOTE — PLAN OF CARE
Assumed care of pt at approximately 1930, VSS, on 1L NC. Ileostomy emptied and purewick changed. Nephrostomy bag intact. Purewick changed and intact. No notable events over night. PRN pain medication utilized. Transfer orders in, new bed assigned in 2623. Report given to [facility].

## 2022-02-24 NOTE — PLAN OF CARE
Pt transferred to CTU 2 at approximately 0400. Pt is AO x 1-2. Kyrgyz speaking. Pt on 1L o2, sats above 92%. no sob noted. Telemetry monitoring, in NSR. No chest painBilateral lower extremity edema noted. Pt has ileostomy and nephrostomy. Total lift/ q2 turns.    Pt orientated to room, call light within reach, bed alarm on

## 2022-02-25 LAB
ALBUMIN SERPL-MCNC: 2.2 G/DL (ref 3.4–5)
ANION GAP SERPL CALC-SCNC: 5 MMOL/L (ref 0–18)
BUN BLD-MCNC: 30 MG/DL (ref 7–18)
CALCIUM BLD-MCNC: 8.6 MG/DL (ref 8.5–10.1)
CHLORIDE SERPL-SCNC: 107 MMOL/L (ref 98–112)
CO2 SERPL-SCNC: 24 MMOL/L (ref 21–32)
CREAT BLD-MCNC: 2.89 MG/DL
GLUCOSE BLD-MCNC: 118 MG/DL (ref 70–99)
GLUCOSE BLD-MCNC: 131 MG/DL (ref 70–99)
GLUCOSE BLD-MCNC: 133 MG/DL (ref 70–99)
GLUCOSE BLD-MCNC: 149 MG/DL (ref 70–99)
GLUCOSE BLD-MCNC: 173 MG/DL (ref 70–99)
OSMOLALITY SERPL CALC.SUM OF ELEC: 289 MOSM/KG (ref 275–295)
PHOSPHATE SERPL-MCNC: 2.8 MG/DL (ref 2.5–4.9)
POTASSIUM SERPL-SCNC: 4.8 MMOL/L (ref 3.5–5.1)
SODIUM SERPL-SCNC: 136 MMOL/L (ref 136–145)

## 2022-02-25 PROCEDURE — 99232 SBSQ HOSP IP/OBS MODERATE 35: CPT | Performed by: HOSPITALIST

## 2022-02-26 LAB
ALBUMIN SERPL-MCNC: 2.3 G/DL (ref 3.4–5)
ANION GAP SERPL CALC-SCNC: 6 MMOL/L (ref 0–18)
BUN BLD-MCNC: 35 MG/DL (ref 7–18)
CALCIUM BLD-MCNC: 9 MG/DL (ref 8.5–10.1)
CHLORIDE SERPL-SCNC: 109 MMOL/L (ref 98–112)
CO2 SERPL-SCNC: 22 MMOL/L (ref 21–32)
CREAT BLD-MCNC: 2.65 MG/DL
GLUCOSE BLD-MCNC: 126 MG/DL (ref 70–99)
GLUCOSE BLD-MCNC: 136 MG/DL (ref 70–99)
GLUCOSE BLD-MCNC: 169 MG/DL (ref 70–99)
GLUCOSE BLD-MCNC: 182 MG/DL (ref 70–99)
GLUCOSE BLD-MCNC: 199 MG/DL (ref 70–99)
MAGNESIUM SERPL-MCNC: 2 MG/DL (ref 1.6–2.6)
OSMOLALITY SERPL CALC.SUM OF ELEC: 294 MOSM/KG (ref 275–295)
PHOSPHATE SERPL-MCNC: 3.7 MG/DL (ref 2.5–4.9)
PLATELET # BLD AUTO: 265 10(3)UL (ref 150–450)
POTASSIUM SERPL-SCNC: 5 MMOL/L (ref 3.5–5.1)
SODIUM SERPL-SCNC: 137 MMOL/L (ref 136–145)

## 2022-02-26 PROCEDURE — 99232 SBSQ HOSP IP/OBS MODERATE 35: CPT | Performed by: HOSPITALIST

## 2022-02-26 RX ORDER — CEFDINIR 300 MG/1
300 CAPSULE ORAL DAILY
Status: COMPLETED | OUTPATIENT
Start: 2022-02-27 | End: 2022-03-01

## 2022-02-26 NOTE — PROGRESS NOTES
Assumed pt care at 0700, Un able to assess the pt LOC, pt is Irish speaking only,  line was attempted to be used but pt would only scream and moan and did not response to the questions asked by the , on Ra, NSR on the monitor. PT denies any cardiovascular symptoms at this time. Pt is up with SBA, continent of B/B. All needs met and will continue to monitor. PLAN: Plavix on  Hold for nephrostomy tube Exchange on Monday, pain management. POC: Discussed and updated with pt, pt verbalized understanding.

## 2022-02-27 LAB
ALBUMIN SERPL-MCNC: 2.4 G/DL (ref 3.4–5)
ANION GAP SERPL CALC-SCNC: 7 MMOL/L (ref 0–18)
BUN BLD-MCNC: 36 MG/DL (ref 7–18)
CALCIUM BLD-MCNC: 9.1 MG/DL (ref 8.5–10.1)
CHLORIDE SERPL-SCNC: 107 MMOL/L (ref 98–112)
CO2 SERPL-SCNC: 22 MMOL/L (ref 21–32)
CREAT BLD-MCNC: 2.69 MG/DL
GLUCOSE BLD-MCNC: 147 MG/DL (ref 70–99)
GLUCOSE BLD-MCNC: 169 MG/DL (ref 70–99)
GLUCOSE BLD-MCNC: 188 MG/DL (ref 70–99)
GLUCOSE BLD-MCNC: 196 MG/DL (ref 70–99)
GLUCOSE BLD-MCNC: 198 MG/DL (ref 70–99)
OSMOLALITY SERPL CALC.SUM OF ELEC: 294 MOSM/KG (ref 275–295)
PHOSPHATE SERPL-MCNC: 4.2 MG/DL (ref 2.5–4.9)
SARS-COV-2 RNA RESP QL NAA+PROBE: NOT DETECTED
SODIUM SERPL-SCNC: 136 MMOL/L (ref 136–145)

## 2022-02-27 PROCEDURE — 99232 SBSQ HOSP IP/OBS MODERATE 35: CPT | Performed by: HOSPITALIST

## 2022-02-27 NOTE — PLAN OF CARE
Assumed care of pt at 4 pm. Pt speaks Portuguese but able to communicate a little. Also requested help on son on the phone. ON room air. Ileostomy bag leaked all over the bed so bag was changed. Refused her dinner. purewick in place.  No complaints of pain

## 2022-02-27 NOTE — PROGRESS NOTES
Patient not fully examined today  Orders reviewed in preparation for right nephrostomy tube exchange  Cefdinir started  ASA and subcutaneous heparin on hold  NT exchange order has been placed  PT INR ordered for AM  NPO after HOWIE Burnette MD   [facility] Urology

## 2022-02-28 ENCOUNTER — APPOINTMENT (OUTPATIENT)
Dept: INTERVENTIONAL RADIOLOGY/VASCULAR | Facility: HOSPITAL | Age: 87
DRG: 280 | End: 2022-02-28
Attending: PHYSICIAN ASSISTANT
Payer: MEDICARE

## 2022-02-28 LAB
ALBUMIN SERPL-MCNC: 2.4 G/DL (ref 3.4–5)
ANION GAP SERPL CALC-SCNC: 5 MMOL/L (ref 0–18)
BUN BLD-MCNC: 30 MG/DL (ref 7–18)
CALCIUM BLD-MCNC: 9.2 MG/DL (ref 8.5–10.1)
CHLORIDE SERPL-SCNC: 109 MMOL/L (ref 98–112)
CO2 SERPL-SCNC: 22 MMOL/L (ref 21–32)
CREAT BLD-MCNC: 2.35 MG/DL
GLUCOSE BLD-MCNC: 105 MG/DL (ref 70–99)
GLUCOSE BLD-MCNC: 130 MG/DL (ref 70–99)
GLUCOSE BLD-MCNC: 135 MG/DL (ref 70–99)
GLUCOSE BLD-MCNC: 163 MG/DL (ref 70–99)
INR BLD: 1.18 (ref 0.8–1.2)
OSMOLALITY SERPL CALC.SUM OF ELEC: 290 MOSM/KG (ref 275–295)
PHOSPHATE SERPL-MCNC: 4.3 MG/DL (ref 2.5–4.9)
POTASSIUM SERPL-SCNC: 5.3 MMOL/L (ref 3.5–5.1)
PROTHROMBIN TIME: 15 SECONDS (ref 11.6–14.8)
SODIUM SERPL-SCNC: 136 MMOL/L (ref 136–145)

## 2022-02-28 PROCEDURE — BT111ZZ FLUOROSCOPY OF RIGHT KIDNEY USING LOW OSMOLAR CONTRAST: ICD-10-PCS | Performed by: RADIOLOGY

## 2022-02-28 PROCEDURE — 0T25X0Z CHANGE DRAINAGE DEVICE IN KIDNEY, EXTERNAL APPROACH: ICD-10-PCS | Performed by: RADIOLOGY

## 2022-02-28 PROCEDURE — 99232 SBSQ HOSP IP/OBS MODERATE 35: CPT | Performed by: HOSPITALIST

## 2022-02-28 RX ORDER — LIDOCAINE HYDROCHLORIDE 10 MG/ML
INJECTION, SOLUTION INFILTRATION; PERINEURAL
Status: COMPLETED
Start: 2022-02-28 | End: 2022-02-28

## 2022-02-28 RX ORDER — MIDAZOLAM HYDROCHLORIDE 1 MG/ML
INJECTION INTRAMUSCULAR; INTRAVENOUS
Status: COMPLETED
Start: 2022-02-28 | End: 2022-02-28

## 2022-02-28 RX ORDER — LEVOFLOXACIN 5 MG/ML
INJECTION, SOLUTION INTRAVENOUS
Status: COMPLETED
Start: 2022-02-28 | End: 2022-02-28

## 2022-02-28 NOTE — PROCEDURES
Yung Patient Status:  Inpatient    1935 MRN [id number]   Location [address] [facility] Attending Reese Banegas, [address] [facility] Day # 12 PCP Junito Leyva MD         Brief Procedure Report    Pre-Operative Diagnosis: Right hydronephrosis    Post-Operative Diagnosis: Same as above. Procedure Performed: Right neph tube exchange    Anesthesia: 1% lidocaine    EBL: 0    Complications: None    Summary of Case: New right 10 Fr percutaneous nephrostomy tube placed. Patient tolerated procedure well without immediate complication. Full report to follow in PACS.     Nereyda Rickie

## 2022-02-28 NOTE — PROCEDURES
[facility]  Pre-Procedure Note    Name: Rose Vargas  MRN#: [id number]  : 1935    Procedure:  Neph tube exchange    Indication: RIGHT HYDRONEPHROSIS    Allergies:    No Known Allergies    Pertinent Medications:    Is patient on any Aspirin, Coumadin, or any other Anticoagulations/Antiplatelet medications? no      Mental Status:  Alert and Oriented      Health Status: Acceptable for Procedure    Impression and Plans: Indwelling right hydronephrosis with indwelling right PCN. For neph tube exchange. I have reviewed the above information prior to procedure. I have discussed the risks and benefits and alternatives with the patient. The patient understands and agrees to proceed with plan of care.     Diana Medrano MD

## 2022-03-01 LAB
ALBUMIN SERPL-MCNC: 2.4 G/DL (ref 3.4–5)
ANION GAP SERPL CALC-SCNC: 7 MMOL/L (ref 0–18)
BUN BLD-MCNC: 31 MG/DL (ref 7–18)
CALCIUM BLD-MCNC: 8.9 MG/DL (ref 8.5–10.1)
CHLORIDE SERPL-SCNC: 108 MMOL/L (ref 98–112)
CO2 SERPL-SCNC: 22 MMOL/L (ref 21–32)
CREAT BLD-MCNC: 2.38 MG/DL
ERYTHROCYTE [DISTWIDTH] IN BLOOD BY AUTOMATED COUNT: 16.1 %
GLUCOSE BLD-MCNC: 128 MG/DL (ref 70–99)
GLUCOSE BLD-MCNC: 139 MG/DL (ref 70–99)
GLUCOSE BLD-MCNC: 140 MG/DL (ref 70–99)
GLUCOSE BLD-MCNC: 161 MG/DL (ref 70–99)
GLUCOSE BLD-MCNC: 188 MG/DL (ref 70–99)
GLUCOSE BLD-MCNC: 189 MG/DL (ref 70–99)
HCT VFR BLD AUTO: 24.6 %
HGB BLD-MCNC: 7.6 G/DL
MCH RBC QN AUTO: 27.1 PG (ref 26–34)
MCHC RBC AUTO-ENTMCNC: 30.9 G/DL (ref 31–37)
MCV RBC AUTO: 87.9 FL
OSMOLALITY SERPL CALC.SUM OF ELEC: 293 MOSM/KG (ref 275–295)
PHOSPHATE SERPL-MCNC: 4.8 MG/DL (ref 2.5–4.9)
PLATELET # BLD AUTO: 354 10(3)UL (ref 150–450)
POTASSIUM SERPL-SCNC: 5.5 MMOL/L (ref 3.5–5.1)
POTASSIUM SERPL-SCNC: 5.7 MMOL/L (ref 3.5–5.1)
RBC # BLD AUTO: 2.8 X10(6)UL
SODIUM SERPL-SCNC: 137 MMOL/L (ref 136–145)
WBC # BLD AUTO: 6 X10(3) UL (ref 4–11)

## 2022-03-01 PROCEDURE — 99232 SBSQ HOSP IP/OBS MODERATE 35: CPT | Performed by: HOSPITALIST

## 2022-03-01 RX ORDER — SODIUM POLYSTYRENE SULFONATE 4.1 MEQ/G
15 POWDER, FOR SUSPENSION ORAL; RECTAL ONCE
Status: COMPLETED | OUTPATIENT
Start: 2022-03-01 | End: 2022-03-02

## 2022-03-01 RX ORDER — CLOPIDOGREL BISULFATE 75 MG/1
75 TABLET ORAL DAILY
Status: DISCONTINUED | OUTPATIENT
Start: 2022-03-01 | End: 2022-03-03

## 2022-03-01 RX ORDER — SODIUM BICARBONATE 650 MG/1
1300 TABLET ORAL DAILY
Status: SHIPPED | COMMUNITY
Start: 2022-03-01 | End: 2022-03-06

## 2022-03-01 RX ORDER — METOPROLOL SUCCINATE 25 MG/1
12.5 TABLET, EXTENDED RELEASE ORAL
Qty: 15 TABLET | Refills: 0 | Status: SHIPPED | OUTPATIENT
Start: 2022-03-02 | End: 2022-04-04

## 2022-03-01 RX ORDER — ASPIRIN 81 MG/1
81 TABLET, CHEWABLE ORAL DAILY
Qty: 30 TABLET | Refills: 0 | Status: SHIPPED | OUTPATIENT
Start: 2022-03-01 | End: 2022-03-04

## 2022-03-01 RX ORDER — ROSUVASTATIN CALCIUM 20 MG/1
20 TABLET, COATED ORAL NIGHTLY
Qty: 30 TABLET | Refills: 0 | Status: SHIPPED | OUTPATIENT
Start: 2022-03-01 | End: 2022-03-31

## 2022-03-02 LAB
ALBUMIN SERPL-MCNC: 2.7 G/DL (ref 3.4–5)
ANION GAP SERPL CALC-SCNC: 6 MMOL/L (ref 0–18)
BASOPHILS # BLD AUTO: 0.05 X10(3) UL (ref 0–0.2)
BASOPHILS NFR BLD AUTO: 0.9 %
BUN BLD-MCNC: 29 MG/DL (ref 7–18)
CALCIUM BLD-MCNC: 9.2 MG/DL (ref 8.5–10.1)
CHLORIDE SERPL-SCNC: 109 MMOL/L (ref 98–112)
CO2 SERPL-SCNC: 21 MMOL/L (ref 21–32)
CREAT BLD-MCNC: 2.37 MG/DL
EOSINOPHIL # BLD AUTO: 0.4 X10(3) UL (ref 0–0.7)
EOSINOPHIL NFR BLD AUTO: 7.4 %
ERYTHROCYTE [DISTWIDTH] IN BLOOD BY AUTOMATED COUNT: 16.1 %
GLUCOSE BLD-MCNC: 139 MG/DL (ref 70–99)
GLUCOSE BLD-MCNC: 139 MG/DL (ref 70–99)
GLUCOSE BLD-MCNC: 145 MG/DL (ref 70–99)
GLUCOSE BLD-MCNC: 185 MG/DL (ref 70–99)
GLUCOSE BLD-MCNC: 226 MG/DL (ref 70–99)
HCT VFR BLD AUTO: 26.8 %
HGB BLD-MCNC: 7.8 G/DL
IMM GRANULOCYTES # BLD AUTO: 0.08 X10(3) UL (ref 0–1)
IMM GRANULOCYTES NFR BLD: 1.5 %
LYMPHOCYTES # BLD AUTO: 2.16 X10(3) UL (ref 1–4)
LYMPHOCYTES NFR BLD AUTO: 39.9 %
MCH RBC QN AUTO: 25.9 PG (ref 26–34)
MCHC RBC AUTO-ENTMCNC: 29.1 G/DL (ref 31–37)
MCV RBC AUTO: 89 FL
MONOCYTES # BLD AUTO: 0.45 X10(3) UL (ref 0.1–1)
MONOCYTES NFR BLD AUTO: 8.3 %
NEUTROPHILS # BLD AUTO: 2.27 X10 (3) UL (ref 1.5–7.7)
NEUTROPHILS # BLD AUTO: 2.27 X10(3) UL (ref 1.5–7.7)
NEUTROPHILS NFR BLD AUTO: 42 %
OSMOLALITY SERPL CALC.SUM OF ELEC: 290 MOSM/KG (ref 275–295)
PHOSPHATE SERPL-MCNC: 4.6 MG/DL (ref 2.5–4.9)
POTASSIUM SERPL-SCNC: 5.2 MMOL/L (ref 3.5–5.1)
POTASSIUM SERPL-SCNC: 5.3 MMOL/L (ref 3.5–5.1)
RBC # BLD AUTO: 3.01 X10(6)UL
SODIUM SERPL-SCNC: 136 MMOL/L (ref 136–145)
WBC # BLD AUTO: 5.4 X10(3) UL (ref 4–11)

## 2022-03-02 PROCEDURE — 99232 SBSQ HOSP IP/OBS MODERATE 35: CPT | Performed by: HOSPITALIST

## 2022-03-03 LAB
ALBUMIN SERPL-MCNC: 2.6 G/DL (ref 3.4–5)
ANION GAP SERPL CALC-SCNC: 5 MMOL/L (ref 0–18)
BASOPHILS # BLD AUTO: 0.05 X10(3) UL (ref 0–0.2)
BASOPHILS NFR BLD AUTO: 1 %
BUN BLD-MCNC: 38 MG/DL (ref 7–18)
CALCIUM BLD-MCNC: 8.9 MG/DL (ref 8.5–10.1)
CHLORIDE SERPL-SCNC: 110 MMOL/L (ref 98–112)
CO2 SERPL-SCNC: 21 MMOL/L (ref 21–32)
CREAT BLD-MCNC: 2.37 MG/DL
EOSINOPHIL NFR BLD AUTO: 7.1 %
ERYTHROCYTE [DISTWIDTH] IN BLOOD BY AUTOMATED COUNT: 16.1 %
GLUCOSE BLD-MCNC: 134 MG/DL (ref 70–99)
GLUCOSE BLD-MCNC: 141 MG/DL (ref 70–99)
GLUCOSE BLD-MCNC: 202 MG/DL (ref 70–99)
GLUCOSE BLD-MCNC: 217 MG/DL (ref 70–99)
HCT VFR BLD AUTO: 26.6 %
HGB BLD-MCNC: 7.9 G/DL
IMM GRANULOCYTES # BLD AUTO: 0.09 X10(3) UL (ref 0–1)
IMM GRANULOCYTES NFR BLD: 1.8 %
LYMPHOCYTES # BLD AUTO: 2.08 X10(3) UL (ref 1–4)
LYMPHOCYTES NFR BLD AUTO: 41 %
MCH RBC QN AUTO: 26.2 PG (ref 26–34)
MCHC RBC AUTO-ENTMCNC: 29.7 G/DL (ref 31–37)
MCV RBC AUTO: 88.1 FL
MONOCYTES # BLD AUTO: 0.47 X10(3) UL (ref 0.1–1)
MONOCYTES NFR BLD AUTO: 9.3 %
NEUTROPHILS # BLD AUTO: 2.02 X10 (3) UL (ref 1.5–7.7)
NEUTROPHILS # BLD AUTO: 2.02 X10(3) UL (ref 1.5–7.7)
NEUTROPHILS NFR BLD AUTO: 39.8 %
OSMOLALITY SERPL CALC.SUM OF ELEC: 293 MOSM/KG (ref 275–295)
PHOSPHATE SERPL-MCNC: 5.2 MG/DL (ref 2.5–4.9)
PLATELET # BLD AUTO: 385 10(3)UL (ref 150–450)
POTASSIUM SERPL-SCNC: 5.4 MMOL/L (ref 3.5–5.1)
RBC # BLD AUTO: 3.02 X10(6)UL
SODIUM SERPL-SCNC: 136 MMOL/L (ref 136–145)
WBC # BLD AUTO: 5.1 X10(3) UL (ref 4–11)

## 2022-03-03 PROCEDURE — 99232 SBSQ HOSP IP/OBS MODERATE 35: CPT | Performed by: HOSPITALIST

## 2022-03-03 RX ORDER — SODIUM BICARBONATE 325 MG/1
1300 TABLET ORAL DAILY
Status: DISCONTINUED | OUTPATIENT
Start: 2022-03-03 | End: 2022-03-03

## 2022-03-03 RX ORDER — SODIUM BICARBONATE 325 MG/1
1300 TABLET ORAL 3 TIMES DAILY
Status: DISCONTINUED | OUTPATIENT
Start: 2022-03-03 | End: 2022-03-06

## 2022-03-03 NOTE — CM/SW NOTE
Care Progression Note:  Active Acute Medical Issue:   CKD (chronic kidney disease) stage 4, GFR 15-29 ml/min ([facility]) , NSTEMI, multi-vessel CAD and aortic stenosis - non surgical candidate, sepsis/hypotension, chf, dm2, sp neph tube exchanged 2/28/22 (chronic since complicated bowel surgery in 2019), UTI abx course completed. Length of stay: 15  GMLOS: 4.1  Avoidable Delays: none    Discharge Barriers: bloody output through nephrostomy tube, DAPT on hold, will need diuretics restarted pending kidney function (Cr today 2.37, baseline Cr ~1.7-1.9mg/dL), hyperkalemia. Expected discharge date: 1-2 days   Expected discharge level of care: Home with [facility], from home w/son who provides 24 hour care, [facility] for RN only, son declines community PC. Unit / to remain available for any further discharge planning needs.     Nallely Arzola RN,

## 2022-03-04 ENCOUNTER — PATIENT OUTREACH (OUTPATIENT)
Dept: CASE MANAGEMENT | Age: 87
End: 2022-03-04

## 2022-03-04 LAB
ALBUMIN SERPL-MCNC: 2.8 G/DL (ref 3.4–5)
ANION GAP SERPL CALC-SCNC: 4 MMOL/L (ref 0–18)
BASOPHILS # BLD AUTO: 0.05 X10(3) UL (ref 0–0.2)
BASOPHILS NFR BLD AUTO: 0.9 %
BUN BLD-MCNC: 41 MG/DL (ref 7–18)
CALCIUM BLD-MCNC: 8.9 MG/DL (ref 8.5–10.1)
CHLORIDE SERPL-SCNC: 110 MMOL/L (ref 98–112)
CO2 SERPL-SCNC: 22 MMOL/L (ref 21–32)
CREAT BLD-MCNC: 2.4 MG/DL
EOSINOPHIL # BLD AUTO: 0.41 X10(3) UL (ref 0–0.7)
EOSINOPHIL NFR BLD AUTO: 7 %
ERYTHROCYTE [DISTWIDTH] IN BLOOD BY AUTOMATED COUNT: 15.9 %
GLUCOSE BLD-MCNC: 119 MG/DL (ref 70–99)
GLUCOSE BLD-MCNC: 136 MG/DL (ref 70–99)
GLUCOSE BLD-MCNC: 195 MG/DL (ref 70–99)
GLUCOSE BLD-MCNC: 220 MG/DL (ref 70–99)
HCT VFR BLD AUTO: 25.9 %
HGB BLD-MCNC: 7.9 G/DL
IMM GRANULOCYTES # BLD AUTO: 0.05 X10(3) UL (ref 0–1)
IMM GRANULOCYTES NFR BLD: 0.9 %
LYMPHOCYTES # BLD AUTO: 2.45 X10(3) UL (ref 1–4)
LYMPHOCYTES NFR BLD AUTO: 41.9 %
MCH RBC QN AUTO: 26.9 PG (ref 26–34)
MCHC RBC AUTO-ENTMCNC: 30.5 G/DL (ref 31–37)
MCV RBC AUTO: 88.1 FL
MONOCYTES # BLD AUTO: 0.51 X10(3) UL (ref 0.1–1)
MONOCYTES NFR BLD AUTO: 8.7 %
NEUTROPHILS # BLD AUTO: 2.38 X10 (3) UL (ref 1.5–7.7)
NEUTROPHILS # BLD AUTO: 2.38 X10(3) UL (ref 1.5–7.7)
NEUTROPHILS NFR BLD AUTO: 40.6 %
OSMOLALITY SERPL CALC.SUM OF ELEC: 293 MOSM/KG (ref 275–295)
PHOSPHATE SERPL-MCNC: 5.4 MG/DL (ref 2.5–4.9)
PLATELET # BLD AUTO: 388 10(3)UL (ref 150–450)
POTASSIUM SERPL-SCNC: 5.4 MMOL/L (ref 3.5–5.1)
RBC # BLD AUTO: 2.94 X10(6)UL
SODIUM SERPL-SCNC: 136 MMOL/L (ref 136–145)
WBC # BLD AUTO: 5.9 X10(3) UL (ref 4–11)

## 2022-03-04 PROCEDURE — 99232 SBSQ HOSP IP/OBS MODERATE 35: CPT | Performed by: HOSPITALIST

## 2022-03-04 RX ORDER — FUROSEMIDE 20 MG/1
20 TABLET ORAL ONCE
Status: COMPLETED | OUTPATIENT
Start: 2022-03-04 | End: 2022-03-04

## 2022-03-04 RX ORDER — CLOPIDOGREL BISULFATE 75 MG/1
75 TABLET ORAL DAILY
Qty: 90 TABLET | Refills: 3 | Status: SHIPPED | OUTPATIENT
Start: 2022-03-05

## 2022-03-04 NOTE — PLAN OF CARE
Patient Aox2; continues on on K+ adherance and monitoring wherein the latest value in the AM draw is 5.4. Patient assisted during meals; Anticoagulants held d/t risk of bleeding. Colostomy appliance was changed; nephrostomy tube output observed for bleeding;noted dark/brownish  throughout the shift. Patient provided with PRN Norco for right lower quadrant pain; reassesses with relief. Patient on room air; needs  at times ( Amharic). Will be endorsed to the next shift for continuous monitoring. Problem: Diabetes/Glucose Control  Goal: Glucose maintained within prescribed range  Description: INTERVENTIONS:  - Monitor Blood Glucose as ordered  - Assess for signs and symptoms of hyperglycemia and hypoglycemia  - Administer ordered medications to maintain glucose within target range  - Assess barriers to adequate nutritional intake and initiate nutrition consult as needed  - Instruct patient on self management of diabetes  Outcome: Progressing     Problem: Patient/Family Goals  Goal: Patient/Family Long Term Goal  Description: Patient's Long Term Goal: to go home    Interventions:  - comply to care plan, adhere to medication regimen  - See additional Care Plan goals for specific interventions  Outcome: Progressing     Problem: CARDIOVASCULAR - ADULT  Goal: Maintains optimal cardiac output and hemodynamic stability  Description: INTERVENTIONS:  - Monitor vital signs, rhythm, and trends  - Monitor for bleeding, hypotension and signs of decreased cardiac output  - Evaluate effectiveness of vasoactive medications to optimize hemodynamic stability  - Monitor arterial and/or venous puncture sites for bleeding and/or hematoma  - Assess quality of pulses, skin color and temperature  - Assess for signs of decreased coronary artery perfusion - ex.  Angina  - Evaluate fluid balance, assess for edema, trend weights  Outcome: Progressing

## 2022-03-04 NOTE — PROGRESS NOTES
1st attempt TCC apt request    [facility]  [address] [facility] [address] [facility] [phone]  Apt made for Thurs.  March 10th @3:30pm    Patients son [place] notified

## 2022-03-04 NOTE — PROGRESS NOTES
Patient AOX1-2. O2 sat >90% on RA. NSR on tele. VSS. C/o pain to nephrostomy site, PRN Morphine, scheduled Tylenol administered with relief. Nephrostomy output lightening, now appears light brown/red tinged. Monitoring hemoglobin and potassium daily. Patient updated on plan of care.

## 2022-03-05 LAB
ALBUMIN SERPL-MCNC: 3 G/DL (ref 3.4–5)
ANION GAP SERPL CALC-SCNC: 7 MMOL/L (ref 0–18)
BUN BLD-MCNC: 39 MG/DL (ref 7–18)
CALCIUM BLD-MCNC: 9.3 MG/DL (ref 8.5–10.1)
CHLORIDE SERPL-SCNC: 106 MMOL/L (ref 98–112)
CO2 SERPL-SCNC: 22 MMOL/L (ref 21–32)
CREAT BLD-MCNC: 2.53 MG/DL
GLUCOSE BLD-MCNC: 117 MG/DL (ref 70–99)
GLUCOSE BLD-MCNC: 128 MG/DL (ref 70–99)
GLUCOSE BLD-MCNC: 135 MG/DL (ref 70–99)
GLUCOSE BLD-MCNC: 148 MG/DL (ref 70–99)
GLUCOSE BLD-MCNC: 151 MG/DL (ref 70–99)
GLUCOSE BLD-MCNC: 194 MG/DL (ref 70–99)
MAGNESIUM SERPL-MCNC: 1.8 MG/DL (ref 1.6–2.6)
OSMOLALITY SERPL CALC.SUM OF ELEC: 290 MOSM/KG (ref 275–295)
PHOSPHATE SERPL-MCNC: 5.1 MG/DL (ref 2.5–4.9)
POTASSIUM SERPL-SCNC: 5.4 MMOL/L (ref 3.5–5.1)
SODIUM SERPL-SCNC: 135 MMOL/L (ref 136–145)

## 2022-03-05 PROCEDURE — 99232 SBSQ HOSP IP/OBS MODERATE 35: CPT | Performed by: HOSPITALIST

## 2022-03-05 RX ORDER — FUROSEMIDE 20 MG/1
20 TABLET ORAL ONCE
Status: COMPLETED | OUTPATIENT
Start: 2022-03-05 | End: 2022-03-05

## 2022-03-06 VITALS
DIASTOLIC BLOOD PRESSURE: 56 MMHG | TEMPERATURE: 99 F | BODY MASS INDEX: 28.72 KG/M2 | OXYGEN SATURATION: 95 % | WEIGHT: 156.06 LBS | RESPIRATION RATE: 18 BRPM | HEART RATE: 65 BPM | HEIGHT: 62 IN | SYSTOLIC BLOOD PRESSURE: 94 MMHG

## 2022-03-06 PROBLEM — I50.32 CHRONIC HEART FAILURE WITH PRESERVED EJECTION FRACTION (HFPEF) (HCC): Status: ACTIVE | Noted: 2022-03-06

## 2022-03-06 PROBLEM — I50.32 CHRONIC HEART FAILURE WITH PRESERVED EJECTION FRACTION (HFPEF) (HCC): Status: ACTIVE | Noted: 2022-01-01

## 2022-03-06 LAB
ALBUMIN SERPL-MCNC: 2.9 G/DL (ref 3.4–5)
ANION GAP SERPL CALC-SCNC: 9 MMOL/L (ref 0–18)
BASOPHILS # BLD AUTO: 0.07 X10(3) UL (ref 0–0.2)
BASOPHILS NFR BLD AUTO: 1.3 %
BUN BLD-MCNC: 51 MG/DL (ref 7–18)
CALCIUM BLD-MCNC: 9.2 MG/DL (ref 8.5–10.1)
CHLORIDE SERPL-SCNC: 107 MMOL/L (ref 98–112)
CO2 SERPL-SCNC: 22 MMOL/L (ref 21–32)
CREAT BLD-MCNC: 2.52 MG/DL
EOSINOPHIL # BLD AUTO: 0.38 X10(3) UL (ref 0–0.7)
EOSINOPHIL NFR BLD AUTO: 7.1 %
ERYTHROCYTE [DISTWIDTH] IN BLOOD BY AUTOMATED COUNT: 15.7 %
GLUCOSE BLD-MCNC: 134 MG/DL (ref 70–99)
GLUCOSE BLD-MCNC: 141 MG/DL (ref 70–99)
GLUCOSE BLD-MCNC: 171 MG/DL (ref 70–99)
GLUCOSE BLD-MCNC: 184 MG/DL (ref 70–99)
HCT VFR BLD AUTO: 26.7 %
HGB BLD-MCNC: 8.1 G/DL
IMM GRANULOCYTES # BLD AUTO: 0.04 X10(3) UL (ref 0–1)
IMM GRANULOCYTES NFR BLD: 0.7 %
LYMPHOCYTES # BLD AUTO: 2.33 X10(3) UL (ref 1–4)
LYMPHOCYTES NFR BLD AUTO: 43.2 %
MCH RBC QN AUTO: 27 PG (ref 26–34)
MCHC RBC AUTO-ENTMCNC: 30.3 G/DL (ref 31–37)
MCV RBC AUTO: 89 FL
MONOCYTES # BLD AUTO: 0.43 X10(3) UL (ref 0.1–1)
MONOCYTES NFR BLD AUTO: 8 %
NEUTROPHILS # BLD AUTO: 2.14 X10 (3) UL (ref 1.5–7.7)
NEUTROPHILS # BLD AUTO: 2.14 X10(3) UL (ref 1.5–7.7)
NEUTROPHILS NFR BLD AUTO: 39.7 %
OSMOLALITY SERPL CALC.SUM OF ELEC: 302 MOSM/KG (ref 275–295)
PHOSPHATE SERPL-MCNC: 6.2 MG/DL (ref 2.5–4.9)
PLATELET # BLD AUTO: 397 10(3)UL (ref 150–450)
POTASSIUM SERPL-SCNC: 5.1 MMOL/L (ref 3.5–5.1)
RBC # BLD AUTO: 3 X10(6)UL
WBC # BLD AUTO: 5.4 X10(3) UL (ref 4–11)

## 2022-03-06 PROCEDURE — 99239 HOSP IP/OBS DSCHRG MGMT >30: CPT | Performed by: HOSPITALIST

## 2022-03-06 RX ORDER — SODIUM BICARBONATE 650 MG/1
1300 TABLET ORAL 3 TIMES DAILY
Qty: 2 TABLET | Refills: 0 | Status: SHIPPED | OUTPATIENT
Start: 2022-03-06 | End: 2022-03-08

## 2022-03-06 NOTE — CM/SW NOTE
Per RN, pt may be ready for dc today - waiting for MD to sign off. Son wants pt to go by [facility] - her medicaid should cover the [facility]. [facility] on will call. PCS form completed. RN will also leave a message for [facility] if pt is dc'd home today.

## 2022-03-06 NOTE — PLAN OF CARE
Pt is ok to discharge per primary and consults. Discharge instructions including meds & follow ups given. Patient verbalizes understanding & questions answered. IV removed, tele monitor discontinued, all belongings taken with patient. Pt has a R renal tube, CDI. Pt has a ileostomy bag, changed today, CDI. Pt picked up by medicar and taken home. Notified Ketan at [facility] of patients discharge. Discussed discharge information with son & patient. Nephrology spoke with son prior for RFP on Tuesday and about potassium at discharge.

## 2022-03-07 LAB
ATRIAL RATE: 71 BPM
P AXIS: 62 DEGREES
P-R INTERVAL: 208 MS
Q-T INTERVAL: 404 MS
QRS DURATION: 86 MS
QTC CALCULATION (BEZET): 439 MS
R AXIS: -36 DEGREES
T AXIS: 128 DEGREES
VENTRICULAR RATE: 71 BPM

## 2022-03-08 ENCOUNTER — PATIENT OUTREACH (OUTPATIENT)
Dept: CASE MANAGEMENT | Age: 87
End: 2022-03-08

## 2022-03-08 PROCEDURE — 1111F DSCHRG MED/CURRENT MED MERGE: CPT

## 2022-03-09 RX ORDER — CLOPIDOGREL BISULFATE 75 MG/1
75 TABLET ORAL DAILY
Qty: 90 TABLET | Refills: 3 | OUTPATIENT
Start: 2022-03-09

## 2022-03-09 RX ORDER — ALBUTEROL SULFATE 90 UG/1
2 AEROSOL, METERED RESPIRATORY (INHALATION) EVERY 4 HOURS PRN
Qty: 1 EACH | Refills: 0 | Status: SHIPPED | OUTPATIENT
Start: 2022-03-09

## 2022-03-09 RX ORDER — MIRTAZAPINE 15 MG/1
30 TABLET, FILM COATED ORAL NIGHTLY
Qty: 90 TABLET | Refills: 1 | OUTPATIENT
Start: 2022-03-09

## 2022-03-14 ENCOUNTER — TELEPHONE (OUTPATIENT)
Dept: FAMILY MEDICINE CLINIC | Facility: CLINIC | Age: 87
End: 2022-03-14

## 2022-03-16 RX ORDER — PANTOPRAZOLE SODIUM 40 MG/1
TABLET, DELAYED RELEASE ORAL
Qty: 90 TABLET | Refills: 0 | Status: SHIPPED | OUTPATIENT
Start: 2022-03-16

## 2022-03-29 LAB
AMB EXT BUN: 32 MG/DL
AMB EXT CALCIUM: 8.9
AMB EXT CARBON DIOXIDE: 29
AMB EXT CHLORIDE: 102
AMB EXT CREATININE: 1.93 MG/DL
AMB EXT EGFR NON-AA: 35
AMB EXT EGFR-AA: 40
AMB EXT GLUCOSE: 234 MG/DL
AMB EXT HEMATOCRIT: 25.8
AMB EXT HEMOGLOBIN: 8.4
AMB EXT MCV: 84
AMB EXT PLATELETS: 341
AMB EXT POSTASSIUM: 4.69 MMOL/L
AMB EXT SODIUM: 136 MMOL/L
AMB EXT WBC: 5.8 X10(3)UL

## 2022-03-30 ENCOUNTER — MED REC SCAN ONLY (OUTPATIENT)
Dept: FAMILY MEDICINE CLINIC | Facility: CLINIC | Age: 87
End: 2022-03-30

## 2022-03-31 ENCOUNTER — TELEPHONE (OUTPATIENT)
Dept: FAMILY MEDICINE CLINIC | Facility: CLINIC | Age: 87
End: 2022-03-31

## 2022-04-04 ENCOUNTER — TELEMEDICINE (OUTPATIENT)
Dept: FAMILY MEDICINE CLINIC | Facility: CLINIC | Age: 87
End: 2022-04-04
Payer: MEDICARE

## 2022-04-04 DIAGNOSIS — N18.4 CKD (CHRONIC KIDNEY DISEASE) STAGE 4, GFR 15-29 ML/MIN (HCC): ICD-10-CM

## 2022-04-04 DIAGNOSIS — F32.A DEPRESSION, UNSPECIFIED DEPRESSION TYPE: ICD-10-CM

## 2022-04-04 DIAGNOSIS — E11.9 TYPE 2 DIABETES MELLITUS WITHOUT COMPLICATION, WITHOUT LONG-TERM CURRENT USE OF INSULIN (HCC): ICD-10-CM

## 2022-04-04 DIAGNOSIS — I21.4 NSTEMI (NON-ST ELEVATED MYOCARDIAL INFARCTION) (HCC): Primary | ICD-10-CM

## 2022-04-04 DIAGNOSIS — N30.00 ACUTE CYSTITIS WITHOUT HEMATURIA: ICD-10-CM

## 2022-04-04 DIAGNOSIS — E46 MALNUTRITION, UNSPECIFIED TYPE (HCC): ICD-10-CM

## 2022-04-04 PROCEDURE — 99214 OFFICE O/P EST MOD 30 MIN: CPT | Performed by: FAMILY MEDICINE

## 2022-04-04 PROCEDURE — 1111F DSCHRG MED/CURRENT MED MERGE: CPT | Performed by: FAMILY MEDICINE

## 2022-04-04 RX ORDER — METOPROLOL SUCCINATE 25 MG/1
12.5 TABLET, EXTENDED RELEASE ORAL
Qty: 15 TABLET | Refills: 0 | Status: SHIPPED | OUTPATIENT
Start: 2022-04-04 | End: 2022-05-04

## 2022-04-04 RX ORDER — ROSUVASTATIN CALCIUM 20 MG/1
20 TABLET, COATED ORAL NIGHTLY
Qty: 90 TABLET | Refills: 2 | Status: SHIPPED | OUTPATIENT
Start: 2022-04-04

## 2022-04-04 RX ORDER — MIRTAZAPINE 15 MG/1
30 TABLET, FILM COATED ORAL NIGHTLY
Qty: 90 TABLET | Refills: 1 | Status: SHIPPED | OUTPATIENT
Start: 2022-04-04

## 2022-04-05 RX ORDER — METOPROLOL SUCCINATE 25 MG/1
TABLET, EXTENDED RELEASE ORAL
Qty: 45 TABLET | Refills: 0 | OUTPATIENT
Start: 2022-04-05

## 2022-04-28 RX ORDER — ALBUTEROL SULFATE 90 UG/1
AEROSOL, METERED RESPIRATORY (INHALATION)
Qty: 8.5 G | Refills: 0 | Status: SHIPPED | OUTPATIENT
Start: 2022-04-28

## 2022-04-28 NOTE — TELEPHONE ENCOUNTER
Asthma & COPD Medication Protocol Failed 04/28/2022 12:33 PM    Asthma Action Score greater than or equal to 20    AAP/ACT given in last 12 months    Appointment in past 6 or next 3 months         LOV  4/4/22 video dr Isiah Reynolds     LAST LAB n/a     LAST RX 3/9/22 1 each    Next OV No future appointments.       PROTOCOL failed

## 2022-05-02 RX ORDER — METOPROLOL SUCCINATE 25 MG/1
TABLET, EXTENDED RELEASE ORAL
Qty: 45 TABLET | Refills: 0 | OUTPATIENT
Start: 2022-05-02

## 2022-05-02 RX ORDER — METOPROLOL SUCCINATE 25 MG/1
TABLET, EXTENDED RELEASE ORAL
Qty: 15 TABLET | Refills: 0 | Status: SHIPPED | OUTPATIENT
Start: 2022-05-02

## 2022-05-02 NOTE — TELEPHONE ENCOUNTER
Hypertension Medications Protocol Passed 05/01/2022 06:41 PM   Protocol Details  CMP or BMP in past 12 months    Last serum creatinine< 2.0    Appointment in past 6 or next 3 months        LOV  4/4/22 video     LAST LAB  4/3/22     LAST RX 4/4/22 15     Next OV No future appointments.       PROTOCOL pass

## 2022-05-06 ENCOUNTER — MED REC SCAN ONLY (OUTPATIENT)
Dept: FAMILY MEDICINE CLINIC | Facility: CLINIC | Age: 87
End: 2022-05-06

## 2022-05-11 RX ORDER — OMEPRAZOLE 40 MG/1
CAPSULE, DELAYED RELEASE ORAL
Qty: 90 CAPSULE | Refills: 0 | OUTPATIENT
Start: 2022-05-11

## 2022-05-17 NOTE — PLAN OF CARE
Problem: Impaired Functional Mobility  Goal: Achieve highest/safest level of mobility/gait  Description  Interventions:  - Assess patient's functional ability and stability  - Promote increasing activity/tolerance for mobility and gait  - Educate and eng Alar Island Pedicle Flap Text: The defect edges were debeveled with a #15 scalpel blade.  Given the location of the defect, shape of the defect and the proximity to the alar rim an island pedicle advancement flap was deemed most appropriate.  Using a sterile surgical marker, an appropriate advancement flap was drawn incorporating the defect, outlining the appropriate donor tissue and placing the expected incisions within the nasal ala running parallel to the alar rim. The area thus outlined was incised with a #15 scalpel blade.  The skin margins were undermined minimally to an appropriate distance in all directions around the primary defect and laterally outward around the island pedicle utilizing iris scissors.  There was minimal undermining beneath the pedicle flap.

## 2022-05-25 DIAGNOSIS — I21.4 NSTEMI (NON-ST ELEVATED MYOCARDIAL INFARCTION) (HCC): ICD-10-CM

## 2022-05-26 RX ORDER — METOPROLOL SUCCINATE 25 MG/1
TABLET, EXTENDED RELEASE ORAL
Qty: 15 TABLET | Refills: 2 | Status: SHIPPED | OUTPATIENT
Start: 2022-05-26

## 2022-06-04 ENCOUNTER — MED REC SCAN ONLY (OUTPATIENT)
Dept: FAMILY MEDICINE CLINIC | Facility: CLINIC | Age: 87
End: 2022-06-04

## 2022-06-08 ENCOUNTER — TELEPHONE (OUTPATIENT)
Dept: FAMILY MEDICINE CLINIC | Facility: CLINIC | Age: 87
End: 2022-06-08

## 2022-06-08 NOTE — TELEPHONE ENCOUNTER
Received call from [place] at [facility]. States patient was recently hospitalized at [address] is to return home today with Hospice care. They are looking for a verbal order stating Dr. Natividad Bravo is aware and agrees to be patient's PCP. They are not asking for any other orders. V.O. given stating Dr. Natividad Bravo will be PCP.     Dr. Natividad Bravo,  [facility]

## 2022-06-14 DIAGNOSIS — H01.119 CONTACT DERMATITIS OF EYELID, UNSPECIFIED LATERALITY: ICD-10-CM

## 2022-06-14 DIAGNOSIS — E03.9 ACQUIRED HYPOTHYROIDISM: ICD-10-CM

## 2022-06-14 DIAGNOSIS — E11.42 TYPE 2 DIABETES MELLITUS WITH DIABETIC POLYNEUROPATHY, UNSPECIFIED WHETHER LONG TERM INSULIN USE (HCC): ICD-10-CM

## 2022-06-16 RX ORDER — SITAGLIPTIN 25 MG/1
TABLET, FILM COATED ORAL
Qty: 90 TABLET | Refills: 1 | Status: SHIPPED | OUTPATIENT
Start: 2022-06-16

## 2022-06-16 RX ORDER — PANTOPRAZOLE SODIUM 40 MG/1
TABLET, DELAYED RELEASE ORAL
Qty: 90 TABLET | Refills: 0 | Status: SHIPPED | OUTPATIENT
Start: 2022-06-16

## 2022-06-16 RX ORDER — LEVOTHYROXINE SODIUM 0.15 MG/1
TABLET ORAL
Qty: 90 TABLET | Refills: 1 | Status: SHIPPED | OUTPATIENT
Start: 2022-06-16

## 2022-06-16 RX ORDER — GABAPENTIN 100 MG/1
CAPSULE ORAL
Qty: 180 CAPSULE | Refills: 1 | Status: SHIPPED | OUTPATIENT
Start: 2022-06-16

## 2022-06-21 RX ORDER — ALBUTEROL SULFATE 90 UG/1
AEROSOL, METERED RESPIRATORY (INHALATION)
Qty: 8.5 G | Refills: 0 | Status: SHIPPED | OUTPATIENT
Start: 2022-06-21

## 2022-06-21 NOTE — TELEPHONE ENCOUNTER
Asthma & COPD Medication Protocol Failed 06/21/2022 11:45 AM    Asthma Action Score greater than or equal to 20    AAP/ACT given in last 12 months    Appointment in past 6 or next 3 months      LOV 4/4/22 video  k     LAST LAB  N/a     LAST RX  4//28/22 8.5 g    Next OV No future appointments.       PROTOCOL failed

## 2022-07-26 ENCOUNTER — MED REC SCAN ONLY (OUTPATIENT)
Dept: FAMILY MEDICINE CLINIC | Facility: CLINIC | Age: 87
End: 2022-07-26

## 2022-08-20 DIAGNOSIS — I21.4 NSTEMI (NON-ST ELEVATED MYOCARDIAL INFARCTION) (HCC): ICD-10-CM

## 2022-08-22 RX ORDER — METOPROLOL SUCCINATE 25 MG/1
TABLET, EXTENDED RELEASE ORAL
Qty: 15 TABLET | Refills: 2 | Status: SHIPPED | OUTPATIENT
Start: 2022-08-22

## 2022-08-22 NOTE — TELEPHONE ENCOUNTER
Hypertension Medications Protocol Failed 08/20/2022 01:56 PM   Protocol Details  Appointment in past 6 or next 3 months    CMP or BMP in past 12 months    Last serum creatinine< 2.0     LOV  4/4/22 video     LAST LAB 3/29/22     LAST RX  5/26/22 15 tabs with 2     Next OV No future appointments.     PROTOCOL failed

## 2022-08-24 ENCOUNTER — MED REC SCAN ONLY (OUTPATIENT)
Dept: FAMILY MEDICINE CLINIC | Facility: CLINIC | Age: 87
End: 2022-08-24

## 2022-08-24 ENCOUNTER — TELEPHONE (OUTPATIENT)
Dept: FAMILY MEDICINE CLINIC | Facility: CLINIC | Age: 87
End: 2022-08-24

## 2022-09-12 DIAGNOSIS — H01.119 CONTACT DERMATITIS OF EYELID, UNSPECIFIED LATERALITY: ICD-10-CM

## 2022-09-12 RX ORDER — PANTOPRAZOLE SODIUM 40 MG/1
TABLET, DELAYED RELEASE ORAL
Qty: 90 TABLET | Refills: 0 | OUTPATIENT
Start: 2022-09-12

## 2023-04-08 NOTE — PLAN OF CARE
Pt is aox1-2, drowsy, speak to self in Lithuanian most of the time, ileostomy bag changed, w/ watery, greenish moderate output, nephrostomy tube in place with clear, straw colored urine, attempted to feed but spits out food, very poor appetite, latest BP 87/54 weight  - Monitor urine specific gravity, serum osmolarity and serum sodium as indicated or ordered  - Monitor response to interventions for patient's volume status, including labs, urine output, blood pressure (other measures as available)  - Encourage or Harjeet Lomeli MD. 45 yo F PMHx DM, CAD s/p CABG 3/28/23, presents to ED for bleeding from her surgical site under her left breast today. Pt had a chest tube there s/p CABG and it was removed about 10 days ago. Yesterday, had minimal oozing . Today, woke up and soaked through 4 tissues of bright red blood. It stopped on its own but then after sneezing, it started bleeding again. It has now stopped again. Pt otherwise feels fine. pt has been taking her dapt as prescribed. Pt  denies fever, chills, cp, sob,a bd pain, dizziness, syncope. palpitations.   vss. in no acute distress. neurologically intact, A&ox3. rrr nl s1/s2, no m/r/g. lungs cta. abd soft nt nd. b/l le no edema or ttp. the surgical site underneath her breast is c/d/i. the chest tube site just inferior to the breast is c/d/i without active bleeding. no ttp. no crepitance. no visualized hematoma. pt likely not actively hemorrhaging but given that she had a recent cabg and w/ chest tube at that site, would want to explore further w/ ct imaging to eval for active extrav or even hemothorax. Will check CBC to eval WBC, anemia, plt count; CMP to eval for lyte abnormalities, renal and/or liver dysfunction. will speak w/ CTS (cabg done here). will reassess. dispo pending ed w/u

## 2024-03-03 NOTE — ED PROVIDER NOTES
Patient Seen in: [facility] Emergency Department      History   Patient presents with:  Abnormal Labs    Stated Complaint: K+ of 6    HPI    81-year-old female presents emergency department who had some recent lab work done and was called by her [de-identified] Dutch Resendez MD at [address] [facility] MAIN OR                    Social History    Tobacco Use      Smoking status: Never Smoker      Smokeless tobacco: Never Used    Alcohol use: No    Drug use:  No             Review of Systems    Positive for stated complaint: K+ of 6  Oth focal sensory or motor abnormality.       ED Course     Labs Reviewed   COMP METABOLIC PANEL (14) - Abnormal; Notable for the following components:       Result Value    Glucose 121 (*)     Potassium 6.4 (*)     Chloride 113 (*)     CO2 18.0 (*)     BUN 51 Kayexalate. Will discuss case with nephrology. Patient will be admitted and monitored closely. We will try to get her potassium down with the fluids and let the Kayexalate work.   Patient had no cardiac dysrhythmias and I am comfortable sending to the fl Opt out

## (undated) DIAGNOSIS — H01.119 CONTACT DERMATITIS OF EYELID, UNSPECIFIED LATERALITY: ICD-10-CM

## (undated) DIAGNOSIS — I21.4 NSTEMI (NON-ST ELEVATED MYOCARDIAL INFARCTION) (HCC): ICD-10-CM

## (undated) DIAGNOSIS — Z02.9 ENCOUNTERS FOR ADMINISTRATIVE PURPOSE: ICD-10-CM

## (undated) DIAGNOSIS — F32.A DEPRESSION, UNSPECIFIED DEPRESSION TYPE: ICD-10-CM

## (undated) DEVICE — TIGERTAIL 5F FLXTIP 70CM

## (undated) DEVICE — PROXIMATE RELOADABLE LINEAR CUTTER WITH SAFETY LOCK-OUT, 100MM: Brand: PROXIMATE

## (undated) DEVICE — 3M(TM) MICROPORE TAPE DISPENSER 1535-2: Brand: 3M™ MICROPORE™

## (undated) DEVICE — VIOLET BRAIDED (POLYGLACTIN 910), SYNTHETIC ABSORBABLE SUTURE: Brand: COATED VICRYL

## (undated) DEVICE — SUTURE ETHILON 2-0 FS

## (undated) DEVICE — STERILE POLYISOPRENE POWDER-FREE SURGICAL GLOVES: Brand: PROTEXIS

## (undated) DEVICE — SYRINGE 20CC LL TIP

## (undated) DEVICE — SUTURE VICRYL 0

## (undated) DEVICE — NITINOL WIRE STR 035

## (undated) DEVICE — GAMMEX® NON-LATEX PI ORTHO SIZE 7.5, STERILE POLYISOPRENE POWDER-FREE SURGICAL GLOVE: Brand: GAMMEX

## (undated) DEVICE — SOL  .9 3000ML

## (undated) DEVICE — CYSTO CDS-LF: Brand: MEDLINE INDUSTRIES, INC.

## (undated) DEVICE — LAPAROTOMY CDS: Brand: MEDLINE INDUSTRIES, INC.

## (undated) DEVICE — LIGASURE IMPACT OPEN DEVICE

## (undated) DEVICE — LAPAROTOMY SPONGE - RF AND X-RAY DETECTABLE PRE-WASHED: Brand: SITUATE

## (undated) DEVICE — 3M™ IOBAN™ 2 ANTIMICROBIAL INCISE DRAPE 6648EZ: Brand: IOBAN™ 2

## (undated) DEVICE — REM POLYHESIVE ADULT PATIENT RETURN ELECTRODE: Brand: VALLEYLAB

## (undated) DEVICE — DRAIN CHANNEL 19FR BLAKE

## (undated) DEVICE — BLADE ELECTRODE: Brand: EDGE

## (undated) DEVICE — GOWN,SIRUS,FABRIC-REINFORCED,X-LARGE: Brand: MEDLINE

## (undated) DEVICE — SOL  .9 1000ML BTL

## (undated) DEVICE — Device

## (undated) DEVICE — PROXIMATE RELOADABLE LINEAR CUTTER WITH SAFETY LOCK-OUT, 75MM: Brand: PROXIMATE

## (undated) DEVICE — SUTURE VICRYL 4-0 RB-1

## (undated) DEVICE — ADHESIVE MASTISOL 2/3CC VL

## (undated) DEVICE — 450 ML BOTTLE OF 0.05% CHLORHEXIDINE GLUCONATE IN 99.95% STERILE WATER FOR IRRIGATION, USP AND APPLICATOR.: Brand: IRRISEPT ANTIMICROBIAL WOUND LAVAGE

## (undated) DEVICE — SOLUTION SURG DURA PREP HAZMAT

## (undated) DEVICE — KENDALL SCD EXPRESS SLEEVES, KNEE LENGTH, MEDIUM: Brand: KENDALL SCD

## (undated) DEVICE — TOWEL OR BLU 16X26 STRL

## (undated) DEVICE — SUTURE PDS II 1 ST-21

## (undated) DEVICE — PROXIMATE SKIN STAPLERS (35 WIDE) CONTAINS 35 STAINLESS STEEL STAPLES (FIXED HEAD): Brand: PROXIMATE

## (undated) DEVICE — SUTURE VICRYL 3-0 SH

## (undated) DEVICE — DRAPE EQUIPMENT INTRATEMP THER

## (undated) DEVICE — POOLE SUCTION HANDLE: Brand: CARDINAL HEALTH

## (undated) DEVICE — CONTOUR CURVED CUTTER STAPLER: Brand: CONTOUR

## (undated) NOTE — LETTER
Radha Jenkins [address]  [place], [address] [facility]    Consent for Operation  Date: __________________                                Time: _______________    1.  I authorize the performance upon Tuan Vuong the following operation:  Procedure( revealed by the pictures or by descriptive texts accompanying them. If the procedure has been videotaped, the surgeon will obtain the original videotape. The hospital will not be responsible for storage or maintenance of this tape.   7. For the purpose of a THAT MY DOCTOR PROVIDED ME WITH THE ABOVE EXPLANATIONS, THAT ALL BLANKS OR STATEMENTS REQUIRING INSERTION OR COMPLETION WERE FILLED IN.     Signature of Patient:   ___________________________    When the patient is a minor or mentally incompetent to give co iii. All of the medicines I take (including prescriptions, herbal supplements, and pills I can buy without a prescription (including street drugs/illegal medications).  Failure to inform my anesthesiologist about these medicines may increase my risk of anes _____________________________________________________________________________  Anesthesiologist Signature     Date   Time  I have discussed the procedure and information above with the patient (or patient’s representative) and answered their questions.  The

## (undated) NOTE — LETTER
[facility] [address], [address] [place] [address]  Consent for Procedure/Sedation    Date: ***    Time: ***      {edw ivs consent:6679}

## (undated) NOTE — LETTER
11/30/20        Tuan Vuong  [address] [place] [address]      Dear Kale Weiss records indicate that you have outstanding lab work and or testing that was ordered for you and has not yet been completed:  Orders Placed This Encounter

## (undated) NOTE — IP AVS SNAPSHOT
[address]            (For Outpatient Use Only) Initial Admit Date: 12/9/2019   Inpt/Obs Admit Date: Inpt: 12/10/19 / Obs: 12/09/19   Discharge Date:    Trevor Pouch:  [de-identified]   MRN: [de-identified]   CSN: [id number]   CEID: [id number] Subscriber Name:  Archie Ochoa :    Subscriber ID:  Pt Rel to Subscriber:    Hospital Account Financial Class: Medicare    2019

## (undated) NOTE — IP AVS SNAPSHOT
[address]            (For Outpatient Use Only) Initial Admit Date: 2/16/2020   Inpt/Obs Admit Date: Inpt: 2/16/20 / Obs: N/A   Discharge Date:    Hospital Acct:  [de-identified]   MRN: [de-identified]   CSN: [id number]   CEID: [id number] Subscriber Name:  [facility] [address] :    Subscriber ID:  Pt Rel to Subscriber:    Hospital Account Financial Class: Medicare    2020

## (undated) NOTE — IP AVS SNAPSHOT
[address]            (For Outpatient Use Only) Initial Admit Date: 2022   Inpt/Obs Admit Date: Inpt: 22 / Obs: N/A   Discharge Date:    Hospital Acct:  [de-identified]   MRN: [de-identified]   CSN: [id number]   CEID: [id number]        ENCOUNTER  Patient Class: Inpatient Admitting Provider: Jackson Ryan MD Unit: [place] [address] Service: Cardiac Telemetry Attending Provider: Bart Layton MD   Bed: 6693-F   Visit Type:   Referring Physician: No ref. provider found Billing Flag:    Admit Diagnosis: CKD (chronic kidney disease) stage 4, GFR 15-29 ml/min ([address] [N18.4]      PATIENT  Legal Name:   Alecia Nunez   Legal Sex: Female  Gender ID:              [address] [facility] Name:    PCP:  Colletta Dural, MD Home: [phone]   Address:  [address] [place] [address] : 1935 (86 yrs) Mobile: [phone]         City/State/Zip: [address] Marital:  Language: [address] [place] [address]: [facility] Binning: [id number] Latter day: Tenriism - Parish Not List*     Race: Other Ethnicity: Non  Or [address] [place] [address] Road CONTACT   Name Relationship Legal Guardian? Home Phone Work Phone Mobile Phone   1. LizzetteCruz (surrogate deci*  2.  Concha Hackett Son  Daughter    [phone]     GUARANTOR  Guarantor: Eddiekatya Sanjeev TORO : 1935 Home Phone: [phone]   Address: [place] [address] [address]  Sex: Female Work Phone:    City/State/Zip: [address]   Rel. to Patient: Self Guarantor ID: [id number]   GUARANTOR EMPLOYER   Employer:  Status: RETIRED     COVERAGE  PRIMARY INSURANCE   Payor: MEDICARE Plan: MEDICARE PART B ONLY   Group Number:  Insurance Type: INDEMNITY   Subscriber Name: Lisa Blankenship : 1935   Subscriber ID: [id number] Pt Rel to Subscriber: Self   SECONDARY INSURANCE   Payor: MEDICAID Plan: MEDICAID   Group Number: [id number] Insurance Type: [id number] Name: Lisa Blankenship : 1935   Subscriber ID: [id number] Pt Rel to Subscriber: SELF   TERTIARY INSURANCE   Payor:  Plan:    Group Number:  Insurance Type:    Subscriber Name:  Subscriber :    Subscriber ID:  Pt Rel to Subscriber:    Hospital Account Financial Class: Medicare    2022

## (undated) NOTE — IP AVS SNAPSHOT
[address]            (For Outpatient Use Only) Initial Admit Date: 11/11/2019   Inpt/Obs Admit Date: Inpt: 11/11/19 / Obs: N/A   Discharge Date:    Jose Alberto Ochoa:  [de-identified]   MRN: [de-identified]   CSN: [id number]   CEID: [id number] Subscriber Name:  Aron Downss :    Subscriber ID:  Pt Rel to Subscriber:    Hospital Account Financial Class: Medicare    2019

## (undated) NOTE — LETTER
07/20/20        Tuan Vuong  [address] [place] [address]      Dear Kathy Palumbo records indicate that you have outstanding lab work and or testing that was ordered for you and has not yet been completed:  Orders Placed This Encounter

## (undated) NOTE — IP AVS SNAPSHOT
[address]            (For Outpatient Use Only) Initial Admit Date: 10/8/2020   Inpt/Obs Admit Date: Inpt: 10/10/20 / Obs: 10/08/20   Discharge Date:    Radha Almonte:  [de-identified]   MRN: [de-identified]   CSN: [id number]   CEID: [id number] TERTIARY INSURANCE   Payor:  Plan:    Group Number:  Insurance Type:    Subscriber Name:  Subscriber :    Subscriber ID:  Pt Rel to Subscriber:    Hospital Account Financial Class: Medicare    2020

## (undated) NOTE — IP AVS SNAPSHOT
Patient Demographics     Address  [address] [facility] [place] [address] Phone  [phone] (Home) *Preferred*  [phone] [facility]) E-mail Address  [email or web address]      Emergency Contact(s)     Name Relation Home Work [address] [place] [address] Your medication list      TAKE these medications       Instructions Authorizing Provider Morning Afternoon Evening As Needed   acetaminophen 325 MG Tabs  Commonly known as: TYLENOL      Take 2 tablets (650 mg total) by mouth every 6 (six) hours as ne Commonly known as: ZOLOFT  Next dose due: Take tomorrow at 9am      Take 1 tablet (100 mg total) by mouth daily. Bernard Germain MD         SITagliptin Phosphate 25 MG Tabs  Commonly known as: Santiago Hernandez  Next dose due:  Take tomorrow at 9am      Take 1 tablet [id number] Heparin Sodium (Porcine) 5000 UNIT/ML injection 5,000 Units 10/12/20 2300 Given              Recent Vital Signs       Most Recent Value   Vitals  123/74 Filed at 10/13/2020 1217   Pulse  63 Filed at 10/13/2020 1217   Resp  19 Filed at 10/13/2020 Urine Culture 50,000-99,000 CFU/ML Escherichia coli  ESBL Pos      10,000 - 50,000 CFU/ML Proteus mirabilis    Susceptibility      Escherichia coli  ESBL Pos     Not Specified    Cefazolin >=64  Resistant    Cefepime >=64  Resistant    Ceftazidime  Resis History of Present Illness:   80 yo F UTI for routine neph exchange, R    History   Past Medical History:  Past Medical History:   Diagnosis Date   • Depression    • Diabetes ([address]    • Difficulty urinating    • Disorder of thyroid    • High blood pressure Physical Exam & Review of Systems:   Physical Exam:    /78   Pulse 84   Temp 98.4 °F (36.9 °C) (Oral)   Resp 20   Ht 61\"   Wt 149 lb   SpO2 93%   BMI 28.15 kg/m²     General: NAD  Neck: No JVD  Lungs: CTA bilat  Heart: RRR, S1, S2  Abdomen: Soft, NT CONSULTING PHYSICIAN: Meme Locke M.D.    PATIENT ACCOUNT#:   [de-identified]    LOCATION:  [address] [place] [address]  MEDICAL RECORD #:   [id number]       YOB: 1935  ADMISSION DATE:       10/08/2020      CONSULT DATE:  10/12/2020    REPORT OF CONSULT VITAL SIGNS:  Blood pressure is 140/60. The patient is afebrile. Pulse rate is in the 60s. HEENT:  Normocephalic. Anicteric sclerae. LUNGS:  Clear. HEART:  Systolic ejection murmur. ABDOMEN:  Urostomy tube. EXTREMITIES:  No edema.   NEUROLOGIC:  M INFLUENZA 11/16/16     INFLUENZA 10/29/15     INFLUENZA 10/28/14     INFLUENZA 10/30/13     Pneumococcal (Prevnar 13) 12/27/17     Zoster Vaccine Live (Zostavax) 10/30/13       Future Appointments        Provider Kiel Alarcon    10/19/2020 1:30 PM We

## (undated) NOTE — LETTER
[facility] [address], [address]  Consent for Procedure/Sedation    Date: 8/14/20    Time:       1. I authorize the performance upon Tuan Vuong the following:  Right Nephrostomy Tube Exchange or Insertion     2.  I authorize D Printed: 2020   11:21 AM  Patient Name: Miguel Knowles        : 1935       Medical Record #: [id number]

## (undated) NOTE — IP AVS SNAPSHOT
Patient Demographics     Address  [place] [address] Phone  [phone] (Home)  [phone] [facility]) E-mail Address  [email or web address]      Emergency Contact(s)     Name Relation Home Work Gabby day Daughter [phone] [employer] [facility]  [phone]    Please call to set up your 1 week post op visit    Address:  James Hardy MD. Schedule an appointment as soon as possible for a visit in 2 week Bryce Antunez DO         Rivaroxaban 20 MG Tabs  Commonly known as:  Beulah Parker  Next dose due:   Tomorrow       TAKE 1 TABLET(20 MG) BY MOUTH DAILY WITH FOOD   George Alvarado MD         Sertraline HCl 100 MG Tabs  Commonly known as:  ZOLOFT  Next dose due: [id number] Insulin Aspart Pen (NOVOLOG) 100 UNIT/ML flexpen 2-10 Units 11/29/19 2202 Given              Recent Vital Signs       Most Recent Value   Vitals  139/79 Filed at 11/30/2019 1536   Pulse  72 Filed at 11/30/2019 1536   Resp  18 Filed at 11/30/2019 [address] [facility] Efrem Lebron  [address] [address] 02/03/16 1201 - Present            Microbiology Results (All)     Procedure Component Value Units Date/Time    Urine Culture, Routine Once [[id number]] Collected I saw patient in ICU after the operating. NG in place, moving all extremities, awake. [ID.2]   Past Medical History:   Diagnosis Date   • Depression    • Diabetes ([facility]    • Difficulty urinating    • Endocrine disorder    • Essential hypertension    • Other Drug use: No      Sexual activity: Not on file    Lifestyle      Physical activity:        Days per week: Not on file        Minutes per session: Not on file      Stress: Not on file    Relationships      Social connections:        Talks on phone: Not Levothyroxine Sodium 100 MCG Oral Tab, Take 1 tablet (100 mcg total) by mouth daily. , Disp: 30 tablet, Rfl: 12  Omeprazole 40 MG Oral Capsule Delayed Release, TK 1 C PO QD, Disp: , Rfl: 0  Sertraline HCl 100 MG Oral Tab, RESTART WITH TAKING 1/2 TABLET BY M excluded. Stable intrahepatic and common bile duct dilatation. Small amount of free fluid in the pelvis and adjacent to the inferior tip of the right lobe of the liver. Critical exam results phoned to Dr. Morena Melara in the ER on 11/11/2019 . [ID.2] History of Present Illness:  Miguel Knowles is a a(n) 81year old female with past history of multiple medical problems including DM, HTN,HL that presented about 10 days ago with abdominal pain and distention, limited stool and urine output.  CT showed pneu Performed by Leny Vazquez MD at [address] [facility]   • LEFT PHACOEMULSIFICATION OF CATARACT WITH INTRAOCULAR LENS IMPLANT 73321 Left 5/17/2017    Performed by Narayan Ureña MD at [address] [facility]   • OTHER Levorn Loosen •  Naloxone HCl (NARCAN) 0.4 MG/ML injection 0.08 mg, 0.08 mg, Intravenous, Q5 Min PRN  •  Piperacillin Sod-Tazobactam So (ZOSYN) 3.375 g in dextrose 5 % 100 mL ADD-vantage, 3.375 g, Intravenous, Q8H  •  HYDROmorphone HCl (DILAUDID) 1 MG/ML injection 0.2 m ALB 1.7 11/22/2019    MG 1.9 11/22/2019    PHOS 3.2 11/22/2019       Imaging:[SD.1]  Us Venous Doppler Arm Left - Diag Img (cpt=93971)    Result Date: 11/18/2019  CONCLUSION:  Limited by overlying bandages.   No DVT or superficial thrombophlebitis is ident underlying stricture. Mass cannot entirely be excluded. Stable intrahepatic and common bile duct dilatation. Small amount of free fluid in the pelvis and adjacent to the inferior tip of the right lobe of the liver.   Critical exam results phoned to Dr. Phani Girard Essential hypertension     Hypo-osmolar hyponatremia     Counseling regarding advance care planning and goals of care     Palliative care encounter    Impression--  1. Post operative anemia.   Now stable at 8 grams the last few days after PRBCs transfusi INFLUENZA 10/15/17     INFLUENZA 09/07/17     INFLUENZA 11/16/16     INFLUENZA 10/29/15     INFLUENZA 10/28/14     INFLUENZA 10/30/13     Pneumococcal (Prevnar 13) 12/27/17     Zoster Vaccine Live (Zostavax) 10/30/13       Future Appointments        Provi

## (undated) NOTE — IP AVS SNAPSHOT
Patient Demographics     [facility] Phone  [phone] [facility])  [phone] (Mobile) *Preferred* E-mail Address  [email or web address]      Emergency Contact(s)     Name Relation Home Work Mobile    Cruz Crocker Apply 1 Application topically 2 (two) times daily as needed. Benigno Abrams MD         gabapentin 100 MG Caps  Commonly known as: NEURONTIN  Next dose due: 1/12 tonight before bed      Take 2 capsules (200 mg total) by mouth nightly.    Benigno Abrams MD Next dose due: 1/12 tonight before bed      Take 1 tablet (650 mg total) by mouth 2 (two) times daily.    Nathanael Mccormick MD               Where to Get Your Medications      These medications were sent to [address] M RENAL FUNCTION PANEL [[id number]] (Abnormal)  Resulted: 01/12/21 0640, Result status: Final result   Ordering provider: Leeann Simms MD  01/11/21 2300 Resulting lab: [facility])    Specimen Information    Type Source Co Order Status: Completed Lab Status: Preliminary result Updated: 01/12/21 1000    Specimen: Blood,peripheral      Blood Culture Result No Growth 4 Days    Blood Culture FREQ X 2 [[id number]] Collected: 01/08/21 0924    Order Status: Completed Lab Status: Pr History of Present Illness: Richard Dill is a 81year old female with PMHx diabetes mellitus type 2, CKD stage III, chronic hydronephrosis status post right PCNT, recurrent MDRO UTI, ischemic bowel status post colectomy and ileostomy, recent COVID-19 p • OTHER      SHOULDER, KNEES   • OTHER SURGICAL HISTORY      KNEE SURGERY   • OTHER SURGICAL HISTORY      GALLBLADDER   • OTHER SURGICAL HISTORY      STOMACH SURGERY   • OTHER SURGICAL HISTORY      RECONSTUCTION RT UPPER ARM   • PART REMOVAL COLON W END CO •  Sertraline HCl 100 MG Oral Tab, Take 1 tablet (100 mg total) by mouth daily. , Disp: 90 tablet, Rfl: 1    •  acetaminophen 325 MG Oral Tab, Take 2 tablets (650 mg total) by mouth every 6 (six) hours as needed. , Disp: , Rfl: 0    •  clotrimazole-betametha Recent Labs   Lab 01/08/21  0007 01/08/21  0625   WBC 6.9 6.1   HGB 9.9* 9.2*   MCV 86.2 88.4   .0 268.0     Recent Labs   Lab 01/08/21 0007 01/08/21  0625   * 110*   BUN 59* 57*   CREATSERUM 2.40* 2.44*   GFRAA 21* 20*   GFRNAA 18* 18*   [place] PHYSICIAN Certification of Need for Inpatient Hospitalization - Initial Certification    Patient will require inpatient services that will reasonably be expected to span two midnight's based on the clinical documentation in H+P.    Based on patients current History taken from chart review and Discussed with RN and in light of COVID19 infection for this patient, a chart review of provider notes, patient lab work and studies were reviewed.  Face to face visits and physical exams will be limited to those in whom • Hypertension Son       reports that she has never smoked. She has never used smokeless tobacco. She reports that she does not drink alcohol or use drugs.     ALLERGIES:     No Known Allergies    MEDICATIONS:       Current Facility-Administered Medications •  ondansetron HCl (ZOFRAN) injection 4 mg, 4 mg, Intravenous, Q6H PRN  •  sodium bicarbonate 150mEq in dextrose 5% 1000mL premix infusion, 100 mL/hr, Intravenous, Continuous  •  sodium chloride 0.9% IV bolus 500 mL, 500 mL, Intravenous, PRN  No current ou MCHC 30.3 (L) 01/08/2021    RDW 19.4 (H) 01/08/2021    NEPRELIM 2.88 01/08/2021    NEUTABS 5.85 11/20/2019    LYMPHABS 1.39 11/20/2019    EOSABS 0.23 11/20/2019    BASABS 0.08 11/20/2019    NEUT 75 11/20/2019    LYMPH 18 11/20/2019    MON 2 11/20/2019 81year old female[KD. 1] w ho DM2, CKD3, ischemic bowel sp subtotal colectomy and end ileostomy w L ureteral implantation 11/2019 then recurrent UTIs and R hydronephrosis sp nephrostomy tube sp exchange on 102/20, severe aortic stensosi, PE/DVT, recent cov Author: Queta Story PT Service: Rehab Author Type: Physical Therapist    Filed: 1/10/2021  1:57 PM Date of Service: 1/10/2021  1:51 PM Status: Signed    : Queta Story PT (Physical Therapist)         PHYSICAL THERAPY QUICK EVALUATION - INPA Performed by Aníbal Daugherty MD at [address] [place] [address]  Type of Home: House   Home Layout: Able to live on main level                Lives With: Son     Patient Owned Equipment: Rolling walker; Other (Comment)(alyssa lift, w/c)       Prior Level Skilled Therapy Provided: Pt received seated in chair, agreeable to PT. Pt demos sit to/from stand completed with max A to RW. Pt not placing heels on floor and BLEs sliding forwards.   Pt completed x2 more attempts, needing CGA for next 2 then quickly si Room Number: 524/524-A  Evaluation Date: 1/10/2021     Type of Evaluation: Initial[KK. 1]  Presenting Problem: ANTONIO[KK. 2]    Physician Order: IP Consult to Occupational Therapy  Reason for Therapy:  ADL/IADL Dysfunction and Discharge Planning    History re Erik Marin. 1]  Type of Home: House  Home Layout: Able to live on main level  Lives With: Son    Toilet and Equipment: Comfort height toilet  Shower/Tub and Equipment: Walk-in shower[KK. 2]                     Prior Level of Fu Skilled Therapy Provided: Pt was received up in chair for session, pt agreeable to therapy, pt dancing in chair, pt stood with mod assist and able to maintain with min assist on first attempt, pt then on second attempt stood with CGA and then sat back down Patient able to dress lower extremities: at previous functional level  Patient/Caregiver able to demonstrate safety with ADLS: at previous functional level     PPE worn: hair net, goggles, surgical mask, iso gown, gloves. Pt wore [address] 1]     Attributio 1/10 NOC: control pain and sleep well   1/11 AM: go home  1/11 [address] Tom Reddings: control pain and sleep well   Interventions:   - PRN pain med , Melatonin  - offering food pt likes  - meds per [facility]  - frequent rounding  - nephrology consult  - See additional Care Plan g

## (undated) NOTE — LETTER
November 16, 2020    Tuan Vuong  [address] [place] [address]    Dear Melly Perez: It was a pleasure speaking with you over the phone recently.  To follow up, I wanted to send you some contact information to utilize when you have a question a

## (undated) NOTE — IP AVS SNAPSHOT
Patient Demographics     Address  [place] [address]  [place] [address] Phone  [phone] (Home)  [phone] (Mobile) *Preferred*      Emergency Contact(s)     Name Relation Home Work [address] [facility] [phone]    [facility] MIKALA DOWELL, DO         Levothyroxine Sodium 125 MCG Tabs      Take 1 tablet (125 mcg total) by mouth before breakfast.   Trista Gtz, DO         lidocaine 4 % Ptch  Commonly known as:  LIDODERM      Place 1 patch onto the skin daily.  Apply to These medications were sent to [facility] [address] [facility], [address] [facility] [address] [place] [address], [phone], [address] [place] [address], [place] [address]    Phone:  [phone]   gabapentin 100 MG Caps     Please Order ID Medication Name Action Time Action Reason Comments    [id number] Heparin Sodium (Porcine) 5000 UNIT/ML injection 5,000 Units 02/20/20 1511 Given            LEFT UPPER ABDOMEN     Order ID Medication Name Action Time Action Reason Comments    [id number] CBC WITH DIFFERENTIAL WITH PLATELET [[id number]]  Resulted: 02/20/20 1514, Result status: Final result   Ordering provider:  Manisha Heath MD  02/19/20 2300 Resulting lab:  [facility]   Narrative:   The following orders were created for panel order CBC [place] Ciprofloxacin >=4  Resistant    Gentamicin <=1  Sensitive [1]     Levofloxacin >=8  Resistant    Meropenem <=0.25  Sensitive    Piperacillin + Tazobactam 8  Sensitive    Tobramycin 8  Intermediate    Trimethoprim/Sulfa >=320  Resistant           [1]   Unu :  Julian Rubio MD (Physician)         [facility] Hospitalist H&P       CC:[SB.1] Patient presents with:  Fatigue[SB. 2]       PCP:[SB.1] Ilana Rodriguez MD[SB.2]    History of Present Illness:  Adal Pulido is an 80 yo female with PMH of DM type II, HTN HLD, SHOULDER, KNEES   • OTHER SURGICAL HISTORY      KNEE SURGERY   • OTHER SURGICAL HISTORY      GALLBLADDER   • OTHER SURGICAL HISTORY      STOMACH SURGERY   • OTHER SURGICAL HISTORY      RECONSTUCTION RT UPPER ARM   • PART REMOVAL COLON W END COLOSTOMY  11/ Acetaminophen  MG Oral Tab CR, Take 1 tablet (650 mg total) by mouth every 8 (eight) hours as needed for Pain., Disp: 90 tablet, Rfl: 2[SB. 2]          Soc Hx[SB. 1]  Social History    Tobacco Use      Smoking status: Never Smoker      Smokeless tobacc CO2 10.0*   BUN 94*   CREATSERUM 4.65*   *   CA 10.7*       Recent Labs   Lab 02/16/20  1711   ALT 27   AST 25   ALB 2.8*       No results for input(s): TROP in the last 168 hours.[SB.2]      CXR: image personally reviewed[SB.1] some perihilar inter lesions. PELVIC ORGANS:  No acute process. LUNG BASES:  No visible pulmonary or pleural disease. Mild scarring or atelectasis left lung base. OTHER:  Negative. CONCLUSION:  No acute process is identified.   Right nephrostomy tube without hydronephro was placed January 17, 2020 and was pulled out today. DESCRIPTION OF PROCEDURE:  The patient has a history of distal stricture, precluding retrograde access. A right nephrostomy tube was placed 1/17/2020 for decompression.   The nephrostomy tube has becom 2.9 minutes. AK:AP 16.32.       CONCLUSION:  1. The previous nephrostomy tube tract is no longer open. 2. New 8 Welsh percutaneous nephrostomy tube placed via a posterior-inferior calyx. Gravity bag drainage. Routine care.   Recommend outpatient schedul DVT:[SB.1] heparin subQ[SB. 5]  Deconditioning prevention: PT[SB.1]/OT[SB. 5]    Dispo: admi[SB. 1]tted for ANTONIO    Call son, Delila Mortimer listed to update this AM, no answer. [SB.5]     Outpatient records reviewed confirming patient's medical history and medications. Vickie Waldrop is a 81year old female[NS. 1] with multiple medical problems[NS.2]     PMH of DM type II, HTN HLD, s/p ex-;ap and subtotal colectomy with creation of end-ileostomy and left ureteral transplant. [NS.1] Also has[NS.2] hx[NS.1] of[NS.2] P[NS.1]E • Hypertension Daughter    • Diabetes Son    • Hypertension Son       reports that she has never smoked. She has never used smokeless tobacco. She reports that she does not drink alcohol or use drugs.     Allergies:  No Known Allergies    Medications:    Cu Blood pressure 116/58, pulse 66, temperature 98.7 °F (37.1 °C), temperature source Oral, resp. rate 18, height 1.6 m (5' 3\"), weight 71.4 kg (157 lb 8 oz), SpO2 96 %, not currently breastfeeding.     Performance Status:[NS.1] 3+[NS.2]    General: Patient i BUN 56 (H) 7 - 18 mg/dL    Creatinine 2.22 (H) 0.55 - 1.02 mg/dL    BUN/CREA Ratio 25.2 (H) 10.0 - 20.0    Calcium, Total 8.2 (L) 8.5 - 10.1 mg/dL    Calculated Osmolality 297 (H) 275 - 295 mOsm/kg    GFR, Non- 20 (L) >=60    GFR, - 5:56 AM 6.0 6.7 (LL)   1/13/2020      3:51 PM 8.5 7.8 (L)   1/13/2020      7:55 AM 11.1 (H) 8.0 (L)   1/11/2020       7.5 7.6 (L)   1/10/2020       8.2 8.4 (L)   1/9/2020       6.3 7.6 (L)   1/8/2020       6.6 7.8 (L)   1/6/2020       6.9 7.3 (L)   1/2 349.0 78.0 (L)   1/20/2020       241.0 78.7 (L)   1/16/2020       284.0 78.5 (L)   1/15/2020       295.0 78.9 (L)   1/14/2020      6:37 .0 79.8 (L)   1/14/2020      5:56 .0 80.4   1/13/2020      3:51 .0 81.8   1/13/2020      7:55 AM Benign finding in the liver felt to be of no clinical significance.     Dictated by: Mark Conde MD on 2/16/2020 at 17:54     Approved by: Mark Conde MD on 2/16/2020 at 18:01          Xr Chest Ap Portable  (cpt=71045)    Result Date: 2/16/2020  CONCL Counseling regarding advance care planning and goals of care     Palliative care encounter     Malnutrition [facility])     Acute renal insufficiency     ANTONIO (acute kidney injury) ([facility] [place] [address]     Urinary tract infection without hematuria     Urinary tract infection Filed:  2/20/2020 11:17 AM Date of Service:  2/20/2020 11:11 AM Status:  Signed    :  Lalo Whyte MD (Physician)                                                            General Medicine Discharge Summary     Patient ID:[JS.1]  Tuan BIRD - Imaging without hydronephrosis  - IVF per renal  - avoid nephrotoxic agents  - renally dose medication     # Ecoli Bacteremia  # E colic ESBL UTI  - on IV meropenem, will need 2 weeks abx  - appreciate ID recommendations  - Nephrostomy tube had been disl mirtazapine 15 MG Oral Tab  Take 30 mg by mouth nightly. Taking 30 mg nightly     ondansetron 4 MG Oral Tablet Dispersible  Take 4 mg by mouth 3 (three) times daily before meals.     Sertraline HCl 100 MG Oral Tab  Take 1.5 tablets (150 mg total) by mouth d Code Status:[JS.1] Full Code[JS.2]      Exam on day of discharge:[JS.1]      02/20/20  0627   BP: 144/88   Pulse: 67   Resp:    Temp:[JS.2]        General: no acute distress, alert and oriented x 3  Heart: RRR  Lungs: clear bilaterally, no active wheezing

## (undated) NOTE — LETTER
[facility] [address], [address] [place] [address]  Consent for Procedure/Sedation    Date: 02/20/2022    Time: 0800      1. I authorize the performance upon Tuan Vuong the following:cardiac catheterization, left ventricular cineangiography, bilateral selective coronary angiography and/or right heart catheterization; possible percutaneous transluminal coronary angioplasty, coronary atherectomy, coronary stent, intracoronary thrombolytic therapy, antiplatelet therapy and/or intravascular ultrasound. 2. I authorize Dr. Yolanda Ridley (and whomever is designated as the doctor's assistant), to perform the above-mentioned procedures. 3. If any unforeseen conditions arise during this procedure calling for additional procedures, operations, or medications (including anesthesia and blood transfusion), I further request and authorize the doctor to do whatever he/she deems advisable in my interest.  4. I consent to the taking and reproduction of any photographs in the course of this procedure for professional purposes. 5. I consent to the administration of such sedation as may be considered necessary, or advisable by the physician responsible for this service. 6. I have been informed by my doctor of the nature and purpose of this procedure and sedation, possible alternative methods of treatment, risk involved and possible complications. In the event your procedure results in extended X-Ray/fluoroscopy time, you may develop a skin reaction. 7. If I have a Do Not Attempt Resuscitation (DNAR) order in place, that status will be suspended while in the operating room, procedural suite, and during the recovery period unless otherwise explicitly stated by me (or a person authorized to consent on my behalf). The surgeon or my attending physician will determine when the applicable recovery period ends for purposes of reinstating the DNAR order.     Signature of Patient: ____________________________________________________    Signature of person authorized                                     Relationship to  to consent for patient: _________________________ patient: ___________________    Witness: _______________________________ Date: _____________________    Printed: 2022   7:35 AM  Patient Name: Rose Vargas        : 1935       Medical Record #: [id number]

## (undated) NOTE — ED AVS SNAPSHOT
Merlene Yoder   MRN: [id number]    Department:  [facility] Emergency Department   Date of Visit:  2/22/2020           Disclosure     Insurance plans vary and the physician(s) referred by the ER may not be covered by your plan.  Please contact your tell this physician (or your personal doctor if your instructions are to return to your personal doctor) about any new or lasting problems. The primary care or specialist physician will see patients referred from the [facility] Emergency Department.  Delmis Johnson

## (undated) NOTE — LETTER
Consent to Procedure/Sedation    Date: 2/15/2020    Time: 10:27  1. I authorize the performance upon Farazdebora CORTEZ Vuong the following:       RIGHT NEPHROSTOMY EXCHANGE POSSIBLE INSERTION      2.  I authorize Dr. Stefanie Oh (and whomever is designated as the doctor’s as ___________________________    ___________________    Witness: ____________________     Date: ______________    Printed: 2/15/2020   10:27 AM    Patient Name: Daryl Alberts        : 1935       Medical Record #: [id number]

## (undated) NOTE — LETTER
[facility] [address], [address]  Consent for Procedure/Sedation    Date: 10/9/20    Time:       1. I authorize the performance upon Tuan Vuong the following:  Right Nephrostomy Tube Exchange or Insertion     2.  I authorize D Printed: 10/9/2020   12:42 PM  Patient Name: Miguel Knowles        : 1935       Medical Record #: [id number]

## (undated) NOTE — IP AVS SNAPSHOT
Patient Demographics     Address  [facility] [place] [address] Phone  [phone] (Home)  [phone] (Mobile) *Preferred*      Emergency Contact(s)     Name Relation Home Work [address] [facility] [phone]    [facility] Instructions Authorizing Provider Morning Afternoon Evening As Needed   TYLENOL EXTRA STRENGTH 500 MG Tabs  Generic drug:  acetaminophen      Take 500 mg by mouth daily as needed for Pain.     [    ]   [    ]   [    ]   [    ]     Acetaminophen  MG T [    ]   [    ]   [    ]   [    ]     melatonin 3 MG Tabs      Take 3 mg by mouth nightly. [    ]   [    ]   [    ]   [    ]     mirtazapine 15 MG Tabs  Commonly known as:  REMERON      Take 15 mg by mouth nightly.     [    ]   [    ]   [    ]   [    ] [id number] Meropenem (MERREM) 500 mg in sodium chloride 0.9% 100 mL MBP 01/22/20 1151 New Bag      [id number] Normal Saline Flush 0.9 % injection 10 mL 01/21/20 2116 Given      [id number] Normal Saline Flush 0.9 % injection 10 mL 01/22/20 1025 Given      303 Ordering provider:  Savaanh Sandoval MD  01/21/20 2308 Resulting lab:  [facility]    Specimen Information    Type Source Collected On   Blood — 01/22/20 0607          Components    Component Value Reference Range Flag Lab   Glucose 135 70 - 99 mg/dL H Edw Order Status:  Completed Lab Status:  Final result Updated:  01/18/20 1800    Specimen:  Blood,peripheral      Blood Culture Result No Growth 5 Days    Blood Culture FREQ X 2 [[id number]] Collected:  01/13/20 1601    Order Status:  Completed Lab Status:  F Signed    :  Lashon Ordoñez MD (Physician)       [facility]   History & Physical    Tuan CORTEZ Isidrodel Patient Status:  Inpatient    1935 MRN [id number]   Location [address] [facility] Attending Chirag Mars MD   Psychiatric Day # 4 • Diabetes Daughter    • Hypertension Daughter    • Diabetes Son    • Hypertension Son        Allergies/Medications: Allergies:  No Known Allergies    Medications:  No current outpatient medications on file.     Physical Exam & Review of Systems:   Physic 1/17/2020  11:50 AM[RY.1]      Electronically signed by Catherine Cranker, MD on 1/17/2020 11:52 AM   Attribution Key    RY. 1 - Catherine Cranker, MD on 1/17/2020 11:50 AM                        Consults - MD Consult Notes      Consults signed by La Velázquez MD at Performed by Corky Sweeney MD at [facility] MAIN OR   • COLON SURGERY     • LEFT PHACOEMULSIFICATION OF CATARACT WITH INTRAOCULAR LENS IMPLANT [id number] Left 2017    Performed by Santiago Lyman MD at [address] [place] [address]   •      • OTHER      SHOULDER, K •  lidocaine-menthol 4-1 % 1 patch, 1 patch, Transdermal, Q24H  •  Megestrol Acetate (MEGACE) 40 MG/ML oral suspension 400 mg, 400 mg, Oral, Daily  •  melatonin tab TABS 3 mg, 3 mg, Oral, Nightly  •  mirtazapine (REMERON) tab 15 mg, 15 mg, Oral, Nightly  • lidocaine 4 % External Patch, Place 1 patch onto the skin daily.  Apply to low back in am and remove at pm , Disp: , Rfl:   insulin glargine (LANTUS SOLOSTAR) 100 UNIT/ML Subcutaneous Solution Pen-injector, Inject 4 Units into the skin nightly., Disp: , Rfl Musculoskeletal: Full range of motion of all extremities. No swelling noted. Joints: no effusions  Skin: No lesions.  No erythema, no open wounds  Niño in place cloudy rine      Laboratory Data:      Recent Labs   Lab 01/14/20  1837 01/15/20  0601   RBC but in vivo use should be avoided.      Meropenem <=0.25 Sensitive      Levofloxacin >=8 Resistant      Nitrofurantoin 32 Sensitive      Piperacillin + Tazobactam 8 Sensitive      Tobramycin >=16 Resistant      Trimethoprim/Sulfa >=320 Resistant     Escheri Acute renal failure (HCC)     Acute renal failure, unspecified acute renal failure type (HCC)     Hyperkalemia     Elevated troponin      ASSESSMENT/PLAN:  1. 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  SP ureteral stent, for injury, In 2 weeks  Call Dr. Narendra Hooks MD  [facility] [address] [place] [address]  [phone]    Schedule an appointment as soon as possible for a visit in 2 weeks  urology follow-up.   OFFICE NUMBER [phone] - SQ SSI, accuchecks     # Hypothyroidism  - synthroid     # Depression  - home meds     # Back pain  - PRN tylenol, tramadol, heat pack     # Hx colectomy/ileostomy 11/2019  - ostomy in place    Consults: IP CONSULT TO HOSPITALIST  IP CONSULT TO Maggie Anthony Place 1 patch onto the skin daily. Apply to low back in am and remove at pm     insulin glargine (LANTUS SOLOSTAR) 100 UNIT/ML Subcutaneous Solution Pen-injector  Inject 4 Units into the skin nightly.     simethicone 80 MG Oral Chew Tab  Chew 80 mg by mouth AM  Version 1 of 1    Author:  Hernesto Frank PTA Service:  Rehab Author Type:  Physical Therapy Assistant    Filed:  1/22/2020 10:14 AM Date of Service:  1/22/2020 10:14 AM Status:  Signed    :  Hernesto Frank PTA (Physical Therapy Assistant) Performed by Babak Sanchez MD at [facility] MAIN OR   • LEFT PHACOEMULSIFICATION OF CATARACT WITH INTRAOCULAR LENS IMPLANT 22593 Left 2017    Performed by Jenny Norris MD at [address] [place] [address]   •      • OTHER      SHOULDER, KNEES   • OTHER SURG AM-PAC Score:  Raw Score: 13   Approx Degree of Impairment: 64.91%   Standardized Score (AM-PAC Scale): 36.74   CMS Modifier (G-Code): CL    FUNCTIONAL ABILITY STATUS  Gait Assessment   Gait Assistance: Not tested  Distance (ft): 0  Assistive Device: Rol Occupational Therapy Notes (last 72 hours) (Notes from 1/19/2020  4:27 PM through 1/22/2020  4:27 PM)      Occupational Therapy Note signed by Deanna Brown OT at 1/21/2020 11:44 AM  Version 1 of 1    Author:  Deanna Brown OT Service:  Rehab Aut •      • OTHER      SHOULDER, KNEES   • OTHER SURGICAL HISTORY      KNEE SURGERY   • OTHER SURGICAL HISTORY      GALLBLADDER   • OTHER SURGICAL HISTORY      STOMACH SURGERY   • OTHER SURGICAL HISTORY      RECONSTUCTION RT UPPER ARM   • PART REMOVAL COL transfers sit <> stand MOD A from bed to [facility] utilizing RW with tactile cues for hand placement on bed prior to push off, with poor carryover. Pt indicated through pointing to pelvis that ostomy bag needed to be changed. RN made aware.  Pt returned to seated Pt will stand 2 min with Min (A) in prep for stance level ADL[BJ.1]       Attribution Sethi    BJ.1 - Stella Collet, OT on 1/21/2020 11:36 AM                     Video Swallow Study Notes    No notes of this type exist for this encounter.      SLP Notes

## (undated) NOTE — Clinical Note
FYI, TCM call made, see notes. [facility]ARD[facility] attempted to schedule a TCM HFU virtual phone visit, but was unable to schedule message sent to MD's office with a request that they call patient's son Elinor Harden to schedule the appointment.

## (undated) NOTE — Clinical Note
FYI, TCM call made, see notes. [facility] attempted to schedule a TCM HFU with the [facility] clinic as per order, [facility] SALINAS declines, states that it is very difficult for him to transport his mother, states his mother usually follows with Dr. Farnaz Maciel by virtual video visit.

## (undated) NOTE — IP AVS SNAPSHOT
[address]            (For Outpatient Use Only) Initial Admit Date: 11/3/2020   Inpt/Obs Admit Date: Inpt: 11/4/20 / Obs: N/A   Discharge Date:    Samina Brothers:  [de-identified]   MRN: [de-identified]   CSN: [id number]   CEID: [id number] TERTIARY INSURANCE   Payor:  Plan:    Group Number:  Insurance Type:    Subscriber Name:  Subscriber :    Subscriber ID:  Pt Rel to Subscriber:    Hospital Account Financial Class: Medicare    2020

## (undated) NOTE — IP AVS SNAPSHOT
[address]            (For Outpatient Use Only) Initial Admit Date: 1/13/2020   Inpt/Obs Admit Date: Inpt: 1/13/20 / Obs: N/A   Discharge Date:    Radha Almonte:  [de-identified]   MRN: [de-identified]   CSN: [id number]   CEID: [id number] Subscriber ID:  Pt Rel to Subscriber:    Hospital Account Financial Class: Medicare    January 22, 2020

## (undated) NOTE — IP AVS SNAPSHOT
Patient Demographics     Address  [place] [address] Phone  [phone] (Home)  [phone] (Mobile) *Preferred*      Emergency Contact(s)     Name Relation Home Work [address] [facility] [phone]    [facility] Take 325 mg by mouth daily with breakfast.          gabapentin 300 MG Caps  Commonly known as:  NEURONTIN  Next dose due:   Tonight at 9 pm      TAKE 2 CAPSULES(600 MG) BY MOUTH THREE TIMES DAILY   Rosa Alvarado MD         Insulin Aspart Pen 100 UNIT/ML Sopn Place 0.4 mg under the tongue every 5 (five) minutes as needed for Chest pain.           ONETOUCH DELICA LANCETS FINE Misc      Test blood sugar three times daily, before each meal   Sherrill Brooks MD         [address] w/Device Kit      T [id number] acetaminophen (TYLENOL) tab 650 mg 01/09/20 1806 Given      [id number] apixaban (ELIQUIS) tab 5 mg 01/09/20 2149 Given      [id number] apixaban (ELIQUIS) tab 5 mg 01/10/20 0929 Given      [id number] bumetanide (BUMEX) tab 0.5 mg 01/10/20 0930 Given Ordering provider:  uEfemia Eddy DO  01/10/20 1154 Resulting lab:  [facility]   Narrative: The following orders were created for panel order CBC WITH DIFFERENTIAL WITH PLATELET.   Procedure                               Abnormality         Status #h/o DVT - resume AC when ok with urology, DVT is recent and she should cont AC    PPX: SCDs, holding ac given hematuria    Jerel Vargas DO  [facility] Hospitalist  [phone]    [place] History and Physical      Patient presents with:  Urinary Anjelica Re Performed by Ami Triana MD at [facility] Left 11/12/2019    Performed by Judy Bennett MD at [facility] MAIN OR        ALL:  No Known Allergies     lidocaine 4 % External Patch, Place 1 patch onto the skin daily.  Ap 3 (three) times daily before meals. melatonin 3 MG Oral Tab, Take 3 mg by mouth nightly. mirtazapine 7.5 MG Oral Tab, Take 15 mg by mouth nightly.     Levothyroxine Sodium 125 MCG Oral Tab, Take 1 tablet (125 mcg total) by mouth before breakfast.  Pantopr Skin: Skin color, texture, turgor normal. No rashes or lesions.     Neurologic: Moving all extremities spontaneously, no focal deficit appreciated     Data Review:    LABS:   Lab Results   Component Value Date    WBC 6.6 01/08/2020    HGB 7.8 01/08/2020 Common, deep, and superficial femoral, popliteal, sapheno-femoral junction, and posterior tibial veins. PATIENT STATED HISTORY: (As transcribed by Technologist)  Bilateral leg pain. FINDINGS:  SAPHENOFEMORAL JUNCTION:  No reflux.  THROMBI:  There is an stranding of the renal pelvis has resolved. The proximal right ureter is distended but the entire course is not well visualized without IV contrast.  ADRENALS:  No mass or enlargement.   LIVER:  No enlargement, atrophy, abnormal density, or significant foc The prior noted mild subhepatic ascites is not currently appreciated. 3. An acute process is not discretely identified. Please see further details above. Dictated by: Khari Quarles DO on 1/08/2020 at 15:00     Approved by:  Khari Quarles DO on 1/08/2020 11.6 x 5.6 x 5.2 cm ECHOGENICITY:  Normal. HYDRONEPHROSIS:  Slight improvement in mild to moderate right hydronephrosis when compared to 12/9/2019. CYSTS/STONES/MASSES:  None.   LEFT KIDNEY MEASUREMENTS:  8.6 x 4.9 x 4.8 cm ECHOGENICITY:  Normal. HYDRONEPHR PROCEDURE:  CT ABDOMEN/PELVIS KIDNEYSTONE 2D RNDR (NO IV,NO ORAL) (CPT=74176)  COMPARISON:  [facility] , CT, CT ABDOMEN+PELVIS(CPT=74176), 11/18/2019, 14:31.   INDICATIONS:  Right hydronephrosis  TECHNIQUE:  Unenhanced multislice CT scanning from above the kidn of left ureteral stent present. LUNG BASES:  Slight elevation of the left hemidiaphragm with left lower lobe increased interstitial and nonspecific ground-glass density most likely due to atelectasis. CONCLUSION:  1.  Limited exam due to lack of IV a Date of Admission:  1/8/2020  Date of Consult: 1/8/2020  Reason for Consultation:   HEMATURIA, QUESTION OF UTI, BILATERAL HYDRONEPHROSIS    History of Present Illness:   Asher Rodriguez is a a(n) 81year old elder, nursing home bound female who underwent ex Performed by Santiago Lyman MD at [facility]   •      • OTHER      SHOULDER, KNEES   • OTHER SURGICAL HISTORY      KNEE SURGERY   • OTHER SURGICAL HISTORY      GALLBLADDER   • OTHER SURGICAL HISTORY      STOMACH SURGERY   • OTHER SURGIC the son he reports progressive malaise, gross hematuria for a week, abdominal pain for a week, no N, V, F, SOB, CP.[SB.3]    Physical Exam:[SB.1]   Blood pressure 131/67, pulse 67, temperature 99.2 °F (37.3 °C), temperature source Temporal, resp.  rate 16, associated peripelvic fat stranding of the right kidney is not currently identified. Of note, the distal ureters are  suboptimally visualized. 2. The prior noted mild subhepatic ascites is not currently appreciated.  3. An acute process is not discretely i Maritza Perez  1/8/2020[SB.1]    Electronically signed by Ananya Marino MD on 1/8/2020  6:00 PM   Attribution Sethi    SB. 1 - Ananya Marino MD on 1/8/2020  4:42 PM  SB.2 - Ananya Marino MD on 1/8/2020  4:49 PM  SB.3 - Ananya Marino MD on 1/ #Hematuria, BL hydronephrosis  -urology consulted  -she was due for stent removal next week - cont f/u for stent  -UA dirty, urine culture NG  -Started on iv ceftriaxone - now dC  -CT showing BL hydro     #CKD- improved from prior check, follows with renal 1 tablet (5 mg total) 2 (two) times daily. bumetanide 1 MG Oral Tab  Take 1 tablet (1 mg total) by mouth daily.     ferrous sulfate 325 (65 FE) MG Oral Tab EC  Take 325 mg by mouth daily with breakfast.    ondansetron (ZOFRAN ODT) 4 MG Oral Tablet Disper Attribution Sethi    DO. 1 - Trevin Gonzales DO on 1/10/2020  1:25 PM                     Physical Therapy Notes (last 72 hours) (Notes from 1/7/2020  4:36 PM through 1/10/2020  4:36 PM)    No notes of this type exist for this encounter.      Occupational T

## (undated) NOTE — IP AVS SNAPSHOT
[address]            (For Outpatient Use Only) Initial Admit Date: 1/7/2021   Inpt/Obs Admit Date: Inpt: 1/8/21 / Obs: N/A   Discharge Date:    Marietta Captain:  [de-identified]   MRN: [de-identified]   CSN: [id number]   CEID: [id number] Payor:  Plan:    Group Number:  Insurance Type:    Subscriber Name:  Subscriber :    Subscriber ID:  Pt Rel to Subscriber:    Hospital Account Financial Class: Medicare    2021

## (undated) NOTE — LETTER
Consent to Procedure/Sedation    Date: 5/14/2020    Time: 1818    1. I authorize the performance upon Tuan Vuong the following: Right Nephrostomy Exchange and/or Reinsertion        2.  I authorize Dr. Micheal Ching whomever is designated as the doctor’s as ___________________________    ___________________    Witness: ____________________     Date: ______________    Printed: 2020   6:18 PM    Patient Name: Pooja Jasmine        : 1935       Medical Record #: [id number]

## (undated) NOTE — LETTER
Radha Jenkins [address] [facility], [address] [place] [address]    Consent for Operation  Date: __11/12/19_______                                Time: ___0820____________    1.  I authorize the performance upon Tuan Vuong the following operation:  Procedu procedure has been videotaped, the surgeon will obtain the original videotape. The hospital will not be responsible for storage or maintenance of this tape.   6. For the purpose of advancing medical education, I consent to the admittance of observers to the STATEMENTS REQUIRING INSERTION OR COMPLETION WERE FILLED IN.     Signature of Patient:   ___________________________    When the patient is a minor or mentally incompetent to give consent:  Signature of person authorized to consent for patient: ____________ supplements, and pills I can buy without a prescription (including street drugs/illegal medications). Failure to inform my anesthesiologist about these medicines may increase my risk of anesthetic complications. iv.  If I am allergic to anything or have ha Anesthesiologist Signature     Date   Time  I have discussed the procedure and information above with the patient (or patient’s representative) and answered their questions. The patient or their representative has agreed to have anesthesia services.     ___

## (undated) NOTE — IP AVS SNAPSHOT
[address]            (For Outpatient Use Only) Initial Admit Date: 1/8/2020   Inpt/Obs Admit Date: Inpt: 1/8/20 / Obs: N/A   Discharge Date:    Hospital Acct:  [de-identified]   MRN: [de-identified]   CSN: [id number]   CEID: [id number] Subscriber Name:  Zana Savage :    Subscriber ID:  Pt Rel to Subscriber:    Hospital Account Financial Class: Medicare    January 10, 2020

## (undated) NOTE — IP AVS SNAPSHOT
[address]            (For Outpatient Use Only) Initial Admit Date: 12/17/2020   Inpt/Obs Admit Date: Inpt: N/A / Obs: 12/17/20   Discharge Date:    Alex Binet:  [de-identified]   MRN: [de-identified]   CSN: [id number]   CEID: [id number] TERTIARY INSURANCE   Payor:  Plan:    Group Number:  Insurance Type:    Subscriber Name:  Subscriber :    Subscriber ID:  Pt Rel to Subscriber:    Hospital Account Financial Class: Medicare    2020

## (undated) NOTE — IP AVS SNAPSHOT
Patient Demographics     Address  [address]  [place] [address] Phone  [phone] ([phone] [facility])      Emergency Contact(s)     Name Relation Home Work Gabby day Daughter [phone]  [address] [place] [address] Next dose due:  12/24 bedtime      Take 2 tablets (10 mg total) by mouth 2 (two) times daily for 2 days, THEN 1 tablet (5 mg total) 2 (two) times daily.   Stop taking on:  January 25, 2020   Saul Officer, MD         bumetanide 1 MG Tabs  Commonly known as Megestrol Acetate 40 MG/ML Susp  Commonly known as:  MEGACE  Start taking on:  December 25, 2019  Next dose due:  12/25 am      Take 10 mL (400 mg total) by mouth daily.    Isi Sullivan MD         melatonin 3 MG Tabs  Next dose due:  12/24 bedtime      T Ciprofloxacin HCl 500 MG Tabs  ONETOEmu Messenger VERIO FLEX SYSTEM w/Device Kit     Please  your prescriptions at the location directed by your doctor or nurse    Bring a paper prescription for each of these medications  apixaban 5 MG Tabs  bumetanide 1 MG T [id number] [facility]) chewable tab 80 mg 12/24/19 1643 Given      [id number] sucralfate (CARAFATE) tab 1 g 12/23/19 2025 Given      [id number] sucralfate (CARAFATE) tab 1 g 12/24/19 0615 Given      [id number] sucralfate (CARAFATE) tab 1 g 12/24/19 RENAL FUNCTION PANEL [[id number]] (Abnormal)  Resulted: 12/24/19 0641, Result status: Final result   Ordering provider:  Raul Felipe DO  12/23/19 2300 Resulting lab:  [facility]    Specimen Information    Type Source Collected On   Blood — 12/24/19 0604 Ketones Urine Negative Negative mg/dL — [facility]   Blood Urine Large Negative A [facility]   pH Urine 6.0 4.5 - 8.0 — [facility]   Protein Urine Negative Negative mg/dl — [facility]   Urobilinogen Urine <2.0 0.2 - 2.0 mg/dL — [facility]   Nitrite Urine N Ciprofloxacin 1  Sensitive    Gentamicin <=1  Sensitive    Levofloxacin 1  Sensitive    Meropenem <=0.25  Sensitive    Nitrofurantoin <=16  Sensitive    Piperacillin + Tazobactam <=4  Sensitive    Trimethoprim/Sulfa <=20  Sensitive                     Pen daughter at bedside states that her oral intake has remained very poor at the [facility] even when food from home is brought it. [MS.2]      In the ED, Na 131 and BUN/Cr 51/2. 77.   US kidney showed moderate R hydronephrosis, L ureteral stent in place. NS given. mirtazapine 7.5 MG Oral Tab, Take 15 mg by mouth nightly.  , Disp: , Rfl:   Levothyroxine Sodium 125 MCG Oral Tab, Take 1 tablet (125 mcg total) by mouth before breakfast., Disp: 30 tablet, Rfl: 0  Pantoprazole Sodium 40 MG Oral Tab EC, Take 1 tablet (40 m /63 (BP Location: Right arm)   Pulse 72   Temp 98.4 °F (36.9 °C) (Oral)   Resp 18   Ht 5' (1.524 m)   Wt 147 lb 11.3 oz (67 kg)   SpO2 96%   BMI 28.85 kg/m²   Gen: No acute distress, alert and oriented x3, no focal neurologic deficits[MS.1].  Chronica no additional history at this time. FLUOROSCOPY IMAGES OBTAINED:  13 FLUOROSCOPY TIME:  1.39 min CONTRAST USED:  200cc CONTRAST WASTED:  100cc  FINDINGS:   The patient is fluent in Greek.   The cystogram was performed with virtual presence of a live inte of the study, contrast developed in the uterus and vagina.   Although there is potential for fistula between the bladder and the vagina, which cannot be excluded, this can also be seen in the setting of urethrovaginal reflux which can sometimes occur as the TECHNIQUE:  Real time, grey scale, and duplex ultrasound was used to evaluate the lower extremity venous system. B-mode two-dimensional images of the vascular structures, Doppler spectral analysis, and color flow. Doppler imaging were performed.   The foll Lyubov Griggs MD on 11/18/2019 at 15:40     Approved by: Lyubov Griggs MD on 11/18/2019 at 15:41          Ct Abdomen+pelvis(cpt=74176)    Result Date: 11/18/2019  PROCEDURE:  CT ABDOMEN+PELVIS (CPT=74176)  COMPARISON:  [facility], CT, CT ABDOMEN+ small bowel. This is favored represent ileus or postop change. However continued follow-up is suggested. A nonspecific enteritis is difficult to exclude.    Dictated by: Joice Curling, MD on 11/18/2019 at 15:32     Approved by: Joice Curling, MD on 11/18/2019 at ascending transverse and descending colon. Abrupt caliber change involving the mid sigmoid colon. This may be secondary to stricture. There is colonic diverticulosis. There is some adjacent free fluid. The cecum is distended up to 13.4 cm.   There is s Technologist)     FINDINGS:   RIGHT KIDNEY MEASUREMENTS:  13.1 x 6.5 x 7.4 cm ECHOGENICITY:  Normal. HYDRONEPHROSIS:  Moderate right hydronephrosis. CYSTS/STONES/MASSES:  None.   LEFT KIDNEY MEASUREMENTS:  11.9 x 6 by 5.6 cm ECHOGENICITY:  Normal. HYDRONEPH tube placement  PATIENT STATED HISTORY: (As transcribed by Technologist)  Verify NG tube placement. FINDINGS:  The NG tube has its tip in the distal esophagus. There is subsegmental atelectasis or scarring at the lung bases with COPD changes.       CONC - per discussion with pt's daughter at bedside will try megace  - they wish to hold off on trial of dobhoff for now[MS. 2]    DVT prophy -[MS. 1] hep SC[MS.5]   Dispo: Inpt care. Plan of care d/w pt's[MS. 1] daughter at bedside who agrees[MS.2].     Outpatien poor oral intake and ANTONIO. She was managed with IVFs per renal.  Pt had declined dobhoff for TFs and appetite stimulants. Pt began to eat more when family brought food from home, pt discharged to [place] [address].   At the [place] [address] she was being managed with IVFs, but unfor Performed by Daren Michaud MD at [facility] MAIN OR        ALL:  No Known Allergies[MS. 1]     No current facility-administered medications on file prior to encounter.    ferrous sulfate 325 (65 FE) MG Oral Tab EC, Take 325 mg by mouth daily with breakfast., Disp: Smoking status: Never Smoker      Smokeless tobacco: Never Used    Alcohol use: No       Fam Hx  Family History   Problem Relation Age of Onset   • Diabetes Daughter    • Hypertension Daughter    • Diabetes Son    • Hypertension Son        Review of [address] material via gravity infusion. The bladder and voiding process were evaluated fluoroscopically and with radiographs. COMPARISON:  [facility] , CT, CT ABDOMEN+PELVIS(CPT=74176), 11/18/2019, 14:31.   INDICATIONS:  status-post left ureteral re-implantation/left CONCLUSION:   1. No bladder rupture or mass. Contrast extended from the bladder into the collecting system of the left kidney and renal pelvis by way of the existing nephro ureteral stent.   This confirms patency of the stent, and this findings not unexpec Dictated by: Van Rice MD on 11/18/2019 at 15:36     Approved by: Van Rice MD on 11/18/2019 at 15:40          Us Venous Doppler Leg Bilat - Diag Img (cpt=93970)    Result Date: 11/24/2019  PROCEDURE:  US VENOUS DOPPLER LEG BILAT - DIAG I (As transcribed by Technologist)     FINDINGS:  EXTREMITY EXAMINED:  Left lower extremity SAPHENOFEMORAL JUNCTION:  No reflux. THROMBI:  None visible. COMPRESSION:  Normal compressibility, phasicity, and augmentation.  OTHER:  Subcutaneous edema identified obstruction as contrast material seen within the ostomy bag. No pneumatosis is seen. There are a few locules of free intraperitoneal air which may be related to postoperative change. No evidence of retroperitoneal hematoma.       CONCLUSION:  No evidence lobe of the liver. [place] PANCREAS:  Atrophic SPLEEN:  Stable KIDNEYS:  No hydronephrosis. 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   Stable right renal cysts largest ezequiel Marylin Marcelino MD on 11/11/2019 at 19:10          Us Kidney/bladder (cpt=76770)    Result Date: 12/9/2019  PROCEDURE:  US KIDNEY/BLADDER (CPT=76770)  COMPARISON:  None.   INDICATIONS:  sam  TECHNIQUE:  Transabdominal gray scale ultrasound imaging of the jovanny 11/12/2019 at 8:40     Approved by: Tammie Whitmore MD on 11/12/2019 at 8:41          Xr Chest Ap Portable  (cpt=71045)    Result Date: 11/11/2019  PROCEDURE:  XR CHEST AP PORTABLE  (CPT=71045)  TECHNIQUE:  AP chest radiograph was obtained.   COMPARISON: -[MS.1] hold[MS.4] xarelto[MS. 1] given ANTONIO  - SC hep for now[MS.4]    #Hypothyroidism  - cont synthroid    #Major depression, recurrent  - cont sertraline    #Poor po intake  - dietician c/s  - supplements per dietary recs[MS.1]  - per discussion with pt's from [facility] for ANTONIO and hyponatremia. She was recently admitted to [facility] 11/11/19-11/30/19 after complex colon resection with ileostomy c/b poor oral intake and ANTONIO.   She was managed with IVFs per renal.  Pt had declined dobhoff for TFs and appetit [address] Right 4/26/2017    Performed by Morena Davidson MD at [place] [address] Left 11/12/2019    Performed by Eli Constantino MD at [facility] MAIN OR        ALL:  No Known Allergies[MS. 1]     No current facility-administered medi ondansetron (ZOFRAN ODT) 4 MG Oral Tablet Dispersible, Take 4 mg by mouth 3 (three) times daily before meals. , Disp: , Rfl:[MS.3]           Social History    Tobacco Use      Smoking status: Never Smoker      Smokeless tobacco: Never Used    Alcohol use: N PROCEDURE:  XR CYSTOGRAM (MIN 3 VIEWS)(CPT=74430/22177)  TECHNIQUE:  The bladder was sterilely catheterized and filled with water-soluble contrast material via gravity infusion.   The bladder and voiding process were evaluated fluoroscopically and with radi uterus showing club shaped structure opacified immediately posterior to the bladder. CONCLUSION:   1. No bladder rupture or mass.   Contrast extended from the bladder into the collecting system of the left kidney and renal pelvis by way of the existin thrombophlebitis is identified in the visualized left upper extremity.     Dictated by: Meir Byrd MD on 11/18/2019 at 15:36     Approved by: Meir Byrd MD on 11/18/2019 at 15:40          Us Venous Doppler Leg Bilat - Diag Img (cpt=93970) veins, and the contralateral common femoral vein. PATIENT STATED HISTORY: (As transcribed by Technologist)     FINDINGS:  EXTREMITY EXAMINED:  Left lower extremity SAPHENOFEMORAL JUNCTION:  No reflux. THROMBI:  None visible.  COMPRESSION:  Normal compressi No oral contrast present in this region as well. There is no evidence of obstruction as contrast material seen within the ostomy bag. No pneumatosis is seen.   There are a few locules of free intraperitoneal air which may be related to postoperative lee Stable coarse 1.5 cm calcification abutting the lateral margin of the left lobe of the liver. Nora Riedel PANCREAS:  Atrophic SPLEEN:  Stable KIDNEYS:  No hydronephrosis.   Stable CT appearance with low-attenuation 1.3 cm left adrenal mass arising from the inferior po .   Dictated by: Quincy Saavedra MD on 11/11/2019 at 18:52     Approved by: Quincy Saavedra MD on 11/11/2019 at 19:10          Us Kidney/bladder (cpt=76770)    Result Date: 12/9/2019  PROCEDURE:  US KIDNEY/BLADDER (QAP=90334)  COMPARISON:  None.   INDICATIONS [facility] teleradiology service.      Dictated by: Yael Lucio MD on 11/12/2019 at 8:40     Approved by: Yael Lucio MD on 11/12/2019 at 8:41          Xr Chest Ap Portable  (cpt=71045)    Result Date: 11/11/2019  PROCEDURE:  XR CHEST AP PORTABLE  ( - hgb of 7.7 likely dilutional  - repeat in AM[MS.2]    #Hx of PE  - cont xarelto     #Hypothyroidism  - cont synthroid    #Major depression, recurrent  - cont sertraline    #Poor po intake  - dietician c/s  - supplements per dietary recs[MS.1]  - per disc from [facility] for ANTONIO and hyponatremia. She was admitted to [facility] in November and seen by me at that time as well. She has a hx of distant PE (2015) and a reference to a hypercoagulable state.   She has been maintained on Xarelto for this since that • RIGHT PHACOEMULSIFICATION OF CATARACT WITH INTRAOCULAR LENS IMPLANT 71941 Right 4/26/2017    Performed by Rd Gill MD at [place] [address] Left 11/12/2019    Performed by Isis Jacobs MD at [address] [place] [address] Review of Systems:  A 10-point review of systems was done with pertinent positives and negatives per the HPI. Physical Exam:   Blood pressure 132/68, pulse 75, temperature 98.3 °F (36.8 °C), temperature source Oral, resp.  rate 18, height 1.524 m (5'), w Adilene Mendez MD on 11/18/2019 at 15:40          Us Venous Doppler Leg Bilat - Diag Img (cpt=93970)    Result Date: 11/24/2019  CONCLUSION:  No evidence of DVT in either leg.     Dictated by: Nancy Figueredo MD on 11/24/2019 at 7:03     Approved by: Back 23:17          Ct Abdomen+pelvis Kidneystone 2d Rndr(no Iv,no Oral)(cpt=74176)    Result Date: 12/10/2019  CONCLUSION:  1. Limited exam due to lack of IV and oral contrast. 2. Postsurgical changes.   Several mildly distended small bowel loops with wall thic From last admission--She has a history of PE in 2015, diagnosed at an OSH. She was seen at [facility] after this and chest imaging was stable about 2 weeks after PE was diagnosed. Per notes, reportedly has a hypercoagulable state.   I could not fine any Hematolog • Other and unspecified hyperlipidemia    • Pneumonia, organism unspecified(486)    • Pulmonary embolism (HCC)    • Thyroid disease    • Type II or unspecified type diabetes mellitus without mention of complication, not stated as uncontrolled    • Vision l How much difficulty does the patient currently have. .. [AR.1]  -   Turning over in bed (including adjusting bedclothes, sheets and blankets)?: A Little   -   Sitting down on and standing up from a chair with arms (e.g., wheelchair, bedside commode, etc.): A Stairs: NA  Curb Step: NA        *Patient with increase jaden/effort needed for mobility due to lanaguage barrier, requires directional cues to repeated several times.      THERAPEUTIC EXERCISES          Position Sitting     Repetitions      Sets[AR.1] Occupational Therapy Notes (last 72 hours) (Notes from 12/21/2019  5:50 PM through 12/24/2019  5:50 PM)      Occupational Therapy Note signed by Stella Collet, OT at 12/23/2019  1:35 PM  Version 1 of 1    Author:  Stella Collet, OT Service:  Rehab Performed by Lizeth Franco MD at [address] [facility]   • LEFT PHACOEMULSIFICATION OF CATARACT WITH INTRAOCULAR LENS IMPLANT 61674 Left 5/17/2017    Performed by Ami Triana MD at [address] [place] [address]   • OTHER No Hilliard transfers sit <> stand CGA from arm chair utilizing RW. Pt completed commode transfers MOD A utilizing arm rests and RW with verbal and tactile cues for hand placement and sequencing.  Pt facilitated in functional mobility within hallways requiring CGA util Patient will perform lower body dressing:  with supervision  Patient will perform toileting: with supervision     Functional Transfer Goals  Patient will transfer from sit to supine:  with supervision  Patient will transfer from supine to sit:  with superv

## (undated) NOTE — Clinical Note
DEQUAN, TCM call made, see notes. NCM confirmed with patient son Crispin Qiu that she has a TCM HFU with the [facility] clinic on 10/19/2020 at 1:30 pm via virtual phone visit.

## (undated) NOTE — LETTER
[facility] FOR BLOOD OR BLOOD COMPONENTS      In the course of your treatment, it may become necessary to administer a transfusion of blood or blood components.  This form provides basic information concerning this proc explain the alternatives to you if it has not already been done. I,Tuan Vuong, have read/had read to me the above. I understand the matters bearing on the decision whether or not to authorize a transfusion of blood or blood components.  I have no quest

## (undated) NOTE — LETTER
July 16, 2020    Tuan Vuong  [address] [place] [address]    Dear Christopher Ip: It was a pleasure speaking with you over the phone recently.  To follow up, I wanted to send you some contact information to utilize when you have a question and o

## (undated) NOTE — Clinical Note
FYI, TCM call made, see notes. BERNARDINO attempted to schedule a TCM HFU, Cruz states that they call to schedule a HFU with Dr. Eliza Cohen. Message sent to MD's office.

## (undated) NOTE — ED AVS SNAPSHOT
Daryl Alberts   MRN: [id number]    Department:  [facility] Emergency Department   Date of Visit:  2/15/2020           Disclosure     Insurance plans vary and the physician(s) referred by the ER may not be covered by your plan.  Please contact your tell this physician (or your personal doctor if your instructions are to return to your personal doctor) about any new or lasting problems. The primary care or specialist physician will see patients referred from the [facility] Emergency Department.  Jm Kapoor

## (undated) NOTE — LETTER
Consent to Procedure/Sedation    Date: __________________    Time: _______________    1.  I authorize the performance upon Tuan Vuong the following:       Insertion Right Nephrostomy Tube     I authorize Dr. Regla Garvey (and whomever is designated as the Diallo Jackson Witness: ____________________     Date: ______________    Printed: 2020   9:25 AM    Patient Name: Asher Rodriguez        : 1935       Medical Record #: [id number]